# Patient Record
Sex: FEMALE | Race: BLACK OR AFRICAN AMERICAN | Employment: OTHER | URBAN - METROPOLITAN AREA
[De-identification: names, ages, dates, MRNs, and addresses within clinical notes are randomized per-mention and may not be internally consistent; named-entity substitution may affect disease eponyms.]

---

## 2018-12-25 ENCOUNTER — HOSPITAL ENCOUNTER (EMERGENCY)
Age: 63
Discharge: HOME | End: 2018-12-25
Attending: EMERGENCY MEDICINE
Payer: MEDICARE

## 2018-12-25 VITALS
BODY MASS INDEX: 33.34 KG/M2 | DIASTOLIC BLOOD PRESSURE: 89 MMHG | TEMPERATURE: 97.7 F | HEART RATE: 68 BPM | HEIGHT: 61 IN | WEIGHT: 176.59 LBS | SYSTOLIC BLOOD PRESSURE: 168 MMHG | RESPIRATION RATE: 16 BRPM | OXYGEN SATURATION: 96 %

## 2018-12-25 DIAGNOSIS — G89.29 CHRONIC PAIN OF LEFT KNEE: Primary | ICD-10-CM

## 2018-12-25 DIAGNOSIS — M25.562 CHRONIC PAIN OF LEFT KNEE: Primary | ICD-10-CM

## 2018-12-25 PROCEDURE — 99283 EMERGENCY DEPT VISIT LOW MDM: CPT

## 2018-12-25 PROCEDURE — 6370000000 HC RX 637 (ALT 250 FOR IP): Performed by: EMERGENCY MEDICINE

## 2018-12-25 RX ORDER — GLIPIZIDE 5 MG/1
5 TABLET ORAL
Status: ON HOLD | COMMUNITY
End: 2019-01-15 | Stop reason: ALTCHOICE

## 2018-12-25 RX ORDER — FLUTICASONE PROPIONATE 50 MCG
1 SPRAY, SUSPENSION (ML) NASAL DAILY PRN
COMMUNITY

## 2018-12-25 RX ORDER — TRIAMTERENE AND HYDROCHLOROTHIAZIDE 75; 50 MG/1; MG/1
0.5 TABLET ORAL DAILY
COMMUNITY
End: 2019-12-03

## 2018-12-25 RX ORDER — LOSARTAN POTASSIUM 50 MG/1
50 TABLET ORAL DAILY
COMMUNITY

## 2018-12-25 RX ORDER — IBUPROFEN 600 MG/1
600 TABLET ORAL ONCE
Status: COMPLETED | OUTPATIENT
Start: 2018-12-25 | End: 2018-12-25

## 2018-12-25 RX ORDER — IBUPROFEN 600 MG/1
600 TABLET ORAL EVERY 6 HOURS PRN
Qty: 20 TABLET | Refills: 0 | Status: ON HOLD | OUTPATIENT
Start: 2018-12-25 | End: 2019-01-16 | Stop reason: HOSPADM

## 2018-12-25 RX ADMIN — IBUPROFEN 600 MG: 600 TABLET ORAL at 17:53

## 2018-12-25 ASSESSMENT — PAIN DESCRIPTION - PAIN TYPE: TYPE: ACUTE PAIN

## 2018-12-25 ASSESSMENT — PAIN DESCRIPTION - LOCATION: LOCATION: KNEE

## 2018-12-25 ASSESSMENT — PAIN SCALES - GENERAL
PAINLEVEL_OUTOF10: 8

## 2018-12-25 ASSESSMENT — PAIN DESCRIPTION - ORIENTATION: ORIENTATION: LEFT

## 2018-12-29 ASSESSMENT — ENCOUNTER SYMPTOMS
COLOR CHANGE: 0
NAUSEA: 0
VOMITING: 0

## 2019-01-02 ENCOUNTER — OFFICE VISIT (OUTPATIENT)
Dept: ORTHOPEDIC SURGERY | Age: 64
End: 2019-01-02
Payer: MEDICARE

## 2019-01-02 VITALS
BODY MASS INDEX: 33.08 KG/M2 | WEIGHT: 175.2 LBS | HEIGHT: 61 IN | TEMPERATURE: 96.7 F | DIASTOLIC BLOOD PRESSURE: 96 MMHG | SYSTOLIC BLOOD PRESSURE: 181 MMHG | HEART RATE: 65 BPM

## 2019-01-02 DIAGNOSIS — M25.562 CHRONIC PAIN OF LEFT KNEE: Primary | ICD-10-CM

## 2019-01-02 DIAGNOSIS — G89.29 CHRONIC PAIN OF RIGHT KNEE: ICD-10-CM

## 2019-01-02 DIAGNOSIS — M17.0 PRIMARY OSTEOARTHRITIS OF BOTH KNEES: ICD-10-CM

## 2019-01-02 DIAGNOSIS — M25.561 CHRONIC PAIN OF RIGHT KNEE: ICD-10-CM

## 2019-01-02 DIAGNOSIS — G89.29 CHRONIC PAIN OF LEFT KNEE: Primary | ICD-10-CM

## 2019-01-02 PROCEDURE — 99204 OFFICE O/P NEW MOD 45 MIN: CPT | Performed by: PHYSICIAN ASSISTANT

## 2019-01-02 PROCEDURE — 20610 DRAIN/INJ JOINT/BURSA W/O US: CPT | Performed by: PHYSICIAN ASSISTANT

## 2019-01-04 ENCOUNTER — TELEPHONE (OUTPATIENT)
Dept: CARDIOLOGY CLINIC | Age: 64
End: 2019-01-04

## 2019-01-14 ENCOUNTER — APPOINTMENT (OUTPATIENT)
Dept: GENERAL RADIOLOGY | Age: 64
DRG: 069 | End: 2019-01-14
Payer: MEDICARE

## 2019-01-14 ENCOUNTER — APPOINTMENT (OUTPATIENT)
Dept: MRI IMAGING | Age: 64
DRG: 069 | End: 2019-01-14
Payer: MEDICARE

## 2019-01-14 ENCOUNTER — APPOINTMENT (OUTPATIENT)
Dept: CT IMAGING | Age: 64
DRG: 069 | End: 2019-01-14
Payer: MEDICARE

## 2019-01-14 ENCOUNTER — HOSPITAL ENCOUNTER (INPATIENT)
Age: 64
LOS: 2 days | Discharge: HOME | DRG: 069 | End: 2019-01-16
Attending: EMERGENCY MEDICINE | Admitting: INTERNAL MEDICINE
Payer: MEDICARE

## 2019-01-14 DIAGNOSIS — I10 HYPERTENSION, UNSPECIFIED TYPE: Primary | ICD-10-CM

## 2019-01-14 DIAGNOSIS — I63.9 CEREBROVASCULAR ACCIDENT (CVA), UNSPECIFIED MECHANISM (HCC): ICD-10-CM

## 2019-01-14 LAB
A/G RATIO: 1.4 (ref 1.1–2.2)
ALBUMIN SERPL-MCNC: 3.9 G/DL (ref 3.4–5)
ALP BLD-CCNC: 140 U/L (ref 40–129)
ALT SERPL-CCNC: 12 U/L (ref 10–40)
ANION GAP SERPL CALCULATED.3IONS-SCNC: 12 MMOL/L (ref 3–16)
AST SERPL-CCNC: 10 U/L (ref 15–37)
BASOPHILS ABSOLUTE: 0.1 K/UL (ref 0–0.2)
BASOPHILS RELATIVE PERCENT: 0.5 %
BILIRUB SERPL-MCNC: 0.6 MG/DL (ref 0–1)
BILIRUBIN URINE: NEGATIVE
BLOOD, URINE: NEGATIVE
BUN BLDV-MCNC: 23 MG/DL (ref 7–20)
CALCIUM SERPL-MCNC: 10 MG/DL (ref 8.3–10.6)
CHLORIDE BLD-SCNC: 106 MMOL/L (ref 99–110)
CLARITY: CLEAR
CO2: 26 MMOL/L (ref 21–32)
COLOR: YELLOW
CREAT SERPL-MCNC: 1 MG/DL (ref 0.6–1.2)
EOSINOPHILS ABSOLUTE: 0.1 K/UL (ref 0–0.6)
EOSINOPHILS RELATIVE PERCENT: 1.2 %
GFR AFRICAN AMERICAN: >60
GFR NON-AFRICAN AMERICAN: 56
GLOBULIN: 2.8 G/DL
GLUCOSE BLD-MCNC: 104 MG/DL (ref 70–99)
GLUCOSE BLD-MCNC: 114 MG/DL (ref 70–99)
GLUCOSE BLD-MCNC: 148 MG/DL (ref 70–99)
GLUCOSE BLD-MCNC: 90 MG/DL (ref 70–99)
GLUCOSE URINE: NEGATIVE MG/DL
HCT VFR BLD CALC: 38.9 % (ref 36–48)
HEMOGLOBIN: 13.3 G/DL (ref 12–16)
INR BLD: 1.03 (ref 0.86–1.14)
KETONES, URINE: NEGATIVE MG/DL
LEUKOCYTE ESTERASE, URINE: NEGATIVE
LYMPHOCYTES ABSOLUTE: 2.8 K/UL (ref 1–5.1)
LYMPHOCYTES RELATIVE PERCENT: 27.2 %
MCH RBC QN AUTO: 28.4 PG (ref 26–34)
MCHC RBC AUTO-ENTMCNC: 34.1 G/DL (ref 31–36)
MCV RBC AUTO: 83.2 FL (ref 80–100)
MICROSCOPIC EXAMINATION: NORMAL
MONOCYTES ABSOLUTE: 0.6 K/UL (ref 0–1.3)
MONOCYTES RELATIVE PERCENT: 5.6 %
NEUTROPHILS ABSOLUTE: 6.7 K/UL (ref 1.7–7.7)
NEUTROPHILS RELATIVE PERCENT: 65.5 %
NITRITE, URINE: NEGATIVE
PDW BLD-RTO: 14.1 % (ref 12.4–15.4)
PERFORMED ON: ABNORMAL
PERFORMED ON: ABNORMAL
PERFORMED ON: NORMAL
PH UA: 5.5
PLATELET # BLD: 262 K/UL (ref 135–450)
PMV BLD AUTO: 8.1 FL (ref 5–10.5)
POTASSIUM SERPL-SCNC: 3.8 MMOL/L (ref 3.5–5.1)
PRO-BNP: 327 PG/ML (ref 0–124)
PROTEIN UA: NEGATIVE MG/DL
PROTHROMBIN TIME: 11.7 SEC (ref 9.8–13)
RBC # BLD: 4.68 M/UL (ref 4–5.2)
SODIUM BLD-SCNC: 144 MMOL/L (ref 136–145)
SPECIFIC GRAVITY UA: 1.02
TOTAL PROTEIN: 6.7 G/DL (ref 6.4–8.2)
TROPONIN: <0.01 NG/ML
URINE TYPE: NORMAL
UROBILINOGEN, URINE: 0.2 E.U./DL
WBC # BLD: 10.3 K/UL (ref 4–11)

## 2019-01-14 PROCEDURE — 93010 ELECTROCARDIOGRAM REPORT: CPT | Performed by: INTERNAL MEDICINE

## 2019-01-14 PROCEDURE — G8988 SELF CARE GOAL STATUS: HCPCS

## 2019-01-14 PROCEDURE — G8979 MOBILITY GOAL STATUS: HCPCS | Performed by: PHYSICAL THERAPIST

## 2019-01-14 PROCEDURE — 97161 PT EVAL LOW COMPLEX 20 MIN: CPT | Performed by: PHYSICAL THERAPIST

## 2019-01-14 PROCEDURE — 71045 X-RAY EXAM CHEST 1 VIEW: CPT

## 2019-01-14 PROCEDURE — 70547 MR ANGIOGRAPHY NECK W/O DYE: CPT

## 2019-01-14 PROCEDURE — 70544 MR ANGIOGRAPHY HEAD W/O DYE: CPT

## 2019-01-14 PROCEDURE — 6360000002 HC RX W HCPCS: Performed by: INTERNAL MEDICINE

## 2019-01-14 PROCEDURE — 83880 ASSAY OF NATRIURETIC PEPTIDE: CPT

## 2019-01-14 PROCEDURE — 99285 EMERGENCY DEPT VISIT HI MDM: CPT

## 2019-01-14 PROCEDURE — 36415 COLL VENOUS BLD VENIPUNCTURE: CPT

## 2019-01-14 PROCEDURE — G8980 MOBILITY D/C STATUS: HCPCS | Performed by: PHYSICAL THERAPIST

## 2019-01-14 PROCEDURE — 2580000003 HC RX 258: Performed by: EMERGENCY MEDICINE

## 2019-01-14 PROCEDURE — 85025 COMPLETE CBC W/AUTO DIFF WBC: CPT

## 2019-01-14 PROCEDURE — G8987 SELF CARE CURRENT STATUS: HCPCS

## 2019-01-14 PROCEDURE — 96360 HYDRATION IV INFUSION INIT: CPT

## 2019-01-14 PROCEDURE — G8989 SELF CARE D/C STATUS: HCPCS

## 2019-01-14 PROCEDURE — 70551 MRI BRAIN STEM W/O DYE: CPT

## 2019-01-14 PROCEDURE — 6370000000 HC RX 637 (ALT 250 FOR IP): Performed by: INTERNAL MEDICINE

## 2019-01-14 PROCEDURE — 80053 COMPREHEN METABOLIC PANEL: CPT

## 2019-01-14 PROCEDURE — 85610 PROTHROMBIN TIME: CPT

## 2019-01-14 PROCEDURE — 70450 CT HEAD/BRAIN W/O DYE: CPT

## 2019-01-14 PROCEDURE — 6370000000 HC RX 637 (ALT 250 FOR IP): Performed by: EMERGENCY MEDICINE

## 2019-01-14 PROCEDURE — G8978 MOBILITY CURRENT STATUS: HCPCS | Performed by: PHYSICAL THERAPIST

## 2019-01-14 PROCEDURE — 83036 HEMOGLOBIN GLYCOSYLATED A1C: CPT

## 2019-01-14 PROCEDURE — 97165 OT EVAL LOW COMPLEX 30 MIN: CPT

## 2019-01-14 PROCEDURE — 2580000003 HC RX 258: Performed by: INTERNAL MEDICINE

## 2019-01-14 PROCEDURE — 81003 URINALYSIS AUTO W/O SCOPE: CPT

## 2019-01-14 PROCEDURE — 93005 ELECTROCARDIOGRAM TRACING: CPT | Performed by: EMERGENCY MEDICINE

## 2019-01-14 PROCEDURE — 84484 ASSAY OF TROPONIN QUANT: CPT

## 2019-01-14 PROCEDURE — 2060000000 HC ICU INTERMEDIATE R&B

## 2019-01-14 RX ORDER — NICOTINE POLACRILEX 4 MG
15 LOZENGE BUCCAL PRN
Status: DISCONTINUED | OUTPATIENT
Start: 2019-01-14 | End: 2019-01-16 | Stop reason: HOSPADM

## 2019-01-14 RX ORDER — SODIUM CHLORIDE 0.9 % (FLUSH) 0.9 %
10 SYRINGE (ML) INJECTION EVERY 12 HOURS SCHEDULED
Status: DISCONTINUED | OUTPATIENT
Start: 2019-01-14 | End: 2019-01-16 | Stop reason: HOSPADM

## 2019-01-14 RX ORDER — ATORVASTATIN CALCIUM 40 MG/1
40 TABLET, FILM COATED ORAL DAILY
Status: DISCONTINUED | OUTPATIENT
Start: 2019-01-14 | End: 2019-01-16 | Stop reason: HOSPADM

## 2019-01-14 RX ORDER — INSULIN GLARGINE 100 [IU]/ML
10 INJECTION, SOLUTION SUBCUTANEOUS NIGHTLY
Status: DISCONTINUED | OUTPATIENT
Start: 2019-01-14 | End: 2019-01-16 | Stop reason: HOSPADM

## 2019-01-14 RX ORDER — ASPIRIN 81 MG/1
81 TABLET, CHEWABLE ORAL DAILY
Status: DISCONTINUED | OUTPATIENT
Start: 2019-01-14 | End: 2019-01-14

## 2019-01-14 RX ORDER — 0.9 % SODIUM CHLORIDE 0.9 %
1000 INTRAVENOUS SOLUTION INTRAVENOUS ONCE
Status: COMPLETED | OUTPATIENT
Start: 2019-01-14 | End: 2019-01-14

## 2019-01-14 RX ORDER — DEXTROSE MONOHYDRATE 25 G/50ML
12.5 INJECTION, SOLUTION INTRAVENOUS PRN
Status: DISCONTINUED | OUTPATIENT
Start: 2019-01-14 | End: 2019-01-16 | Stop reason: HOSPADM

## 2019-01-14 RX ORDER — DOCUSATE SODIUM 100 MG/1
100 CAPSULE, LIQUID FILLED ORAL 2 TIMES DAILY PRN
COMMUNITY
End: 2020-12-21 | Stop reason: ALTCHOICE

## 2019-01-14 RX ORDER — MECLIZINE HCL 12.5 MG/1
25 TABLET ORAL EVERY 6 HOURS PRN
Status: DISCONTINUED | OUTPATIENT
Start: 2019-01-14 | End: 2019-01-16 | Stop reason: HOSPADM

## 2019-01-14 RX ORDER — ASPIRIN 81 MG/1
81 TABLET, CHEWABLE ORAL DAILY
Status: DISCONTINUED | OUTPATIENT
Start: 2019-01-15 | End: 2019-01-16 | Stop reason: HOSPADM

## 2019-01-14 RX ORDER — NITROGLYCERIN 0.4 MG/1
0.4 TABLET SUBLINGUAL EVERY 5 MIN PRN
Status: DISCONTINUED | OUTPATIENT
Start: 2019-01-14 | End: 2019-01-16 | Stop reason: HOSPADM

## 2019-01-14 RX ORDER — ONDANSETRON 2 MG/ML
4 INJECTION INTRAMUSCULAR; INTRAVENOUS EVERY 6 HOURS PRN
Status: DISCONTINUED | OUTPATIENT
Start: 2019-01-14 | End: 2019-01-16 | Stop reason: HOSPADM

## 2019-01-14 RX ORDER — SODIUM CHLORIDE 0.9 % (FLUSH) 0.9 %
10 SYRINGE (ML) INJECTION PRN
Status: DISCONTINUED | OUTPATIENT
Start: 2019-01-14 | End: 2019-01-16 | Stop reason: HOSPADM

## 2019-01-14 RX ORDER — FLUTICASONE PROPIONATE 50 MCG
1 SPRAY, SUSPENSION (ML) NASAL DAILY
Status: DISCONTINUED | OUTPATIENT
Start: 2019-01-14 | End: 2019-01-16 | Stop reason: HOSPADM

## 2019-01-14 RX ORDER — DIAPER,BRIEF,INFANT-TODD,DISP
EACH MISCELLANEOUS 2 TIMES DAILY
COMMUNITY

## 2019-01-14 RX ORDER — LOSARTAN POTASSIUM 25 MG/1
25 TABLET ORAL DAILY
Status: DISCONTINUED | OUTPATIENT
Start: 2019-01-14 | End: 2019-01-14

## 2019-01-14 RX ORDER — ISOSORBIDE MONONITRATE 60 MG/1
60 TABLET, EXTENDED RELEASE ORAL DAILY
Status: DISCONTINUED | OUTPATIENT
Start: 2019-01-15 | End: 2019-01-16 | Stop reason: HOSPADM

## 2019-01-14 RX ORDER — DEXTROSE MONOHYDRATE 50 MG/ML
100 INJECTION, SOLUTION INTRAVENOUS PRN
Status: DISCONTINUED | OUTPATIENT
Start: 2019-01-14 | End: 2019-01-16 | Stop reason: HOSPADM

## 2019-01-14 RX ORDER — ASPIRIN 81 MG/1
324 TABLET, CHEWABLE ORAL ONCE
Status: COMPLETED | OUTPATIENT
Start: 2019-01-14 | End: 2019-01-14

## 2019-01-14 RX ADMIN — ASPIRIN 81 MG 324 MG: 81 TABLET ORAL at 08:13

## 2019-01-14 RX ADMIN — Medication 10 ML: at 22:11

## 2019-01-14 RX ADMIN — INSULIN GLARGINE 10 UNITS: 100 INJECTION, SOLUTION SUBCUTANEOUS at 22:07

## 2019-01-14 RX ADMIN — ATORVASTATIN CALCIUM 40 MG: 40 TABLET, FILM COATED ORAL at 15:38

## 2019-01-14 RX ADMIN — FLUTICASONE PROPIONATE 1 SPRAY: 50 SPRAY, METERED NASAL at 22:04

## 2019-01-14 RX ADMIN — ENOXAPARIN SODIUM 40 MG: 40 INJECTION SUBCUTANEOUS at 15:38

## 2019-01-14 RX ADMIN — SODIUM CHLORIDE 1000 ML: 9 INJECTION, SOLUTION INTRAVENOUS at 07:34

## 2019-01-14 RX ADMIN — Medication 10 ML: at 15:38

## 2019-01-14 ASSESSMENT — PAIN SCALES - GENERAL
PAINLEVEL_OUTOF10: 0

## 2019-01-15 PROBLEM — I63.9 CEREBROVASCULAR ACCIDENT (CVA) (HCC): Status: ACTIVE | Noted: 2019-01-15

## 2019-01-15 LAB
CHOLESTEROL, TOTAL: 111 MG/DL (ref 0–199)
ESTIMATED AVERAGE GLUCOSE: 142.7 MG/DL
GLUCOSE BLD-MCNC: 121 MG/DL (ref 70–99)
GLUCOSE BLD-MCNC: 126 MG/DL (ref 70–99)
GLUCOSE BLD-MCNC: 146 MG/DL (ref 70–99)
GLUCOSE BLD-MCNC: 152 MG/DL (ref 70–99)
HBA1C MFR BLD: 6.6 %
HDLC SERPL-MCNC: 28 MG/DL (ref 40–60)
LDL CHOLESTEROL CALCULATED: 63 MG/DL
LV EF: 63 %
LVEF MODALITY: NORMAL
PERFORMED ON: ABNORMAL
TRIGL SERPL-MCNC: 101 MG/DL (ref 0–150)
VLDLC SERPL CALC-MCNC: 20 MG/DL

## 2019-01-15 PROCEDURE — 36415 COLL VENOUS BLD VENIPUNCTURE: CPT

## 2019-01-15 PROCEDURE — 2580000003 HC RX 258: Performed by: INTERNAL MEDICINE

## 2019-01-15 PROCEDURE — 2060000000 HC ICU INTERMEDIATE R&B

## 2019-01-15 PROCEDURE — 6370000000 HC RX 637 (ALT 250 FOR IP): Performed by: INTERNAL MEDICINE

## 2019-01-15 PROCEDURE — 93306 TTE W/DOPPLER COMPLETE: CPT

## 2019-01-15 PROCEDURE — 6360000002 HC RX W HCPCS: Performed by: INTERNAL MEDICINE

## 2019-01-15 PROCEDURE — 94760 N-INVAS EAR/PLS OXIMETRY 1: CPT

## 2019-01-15 PROCEDURE — 80061 LIPID PANEL: CPT

## 2019-01-15 RX ORDER — DOCUSATE SODIUM 100 MG/1
100 CAPSULE, LIQUID FILLED ORAL DAILY
Status: DISCONTINUED | OUTPATIENT
Start: 2019-01-15 | End: 2019-01-16 | Stop reason: HOSPADM

## 2019-01-15 RX ORDER — DOCUSATE SODIUM 100 MG/1
100 CAPSULE, LIQUID FILLED ORAL 2 TIMES DAILY PRN
Status: DISCONTINUED | OUTPATIENT
Start: 2019-01-15 | End: 2019-01-16 | Stop reason: HOSPADM

## 2019-01-15 RX ORDER — DIAPER,BRIEF,INFANT-TODD,DISP
EACH MISCELLANEOUS 2 TIMES DAILY
Status: DISCONTINUED | OUTPATIENT
Start: 2019-01-15 | End: 2019-01-16 | Stop reason: HOSPADM

## 2019-01-15 RX ORDER — AMLODIPINE BESYLATE 5 MG/1
5 TABLET ORAL DAILY
Status: DISCONTINUED | OUTPATIENT
Start: 2019-01-15 | End: 2019-01-16

## 2019-01-15 RX ORDER — LOSARTAN POTASSIUM 50 MG/1
50 TABLET ORAL DAILY
Status: DISCONTINUED | OUTPATIENT
Start: 2019-01-15 | End: 2019-01-16 | Stop reason: HOSPADM

## 2019-01-15 RX ADMIN — ATORVASTATIN CALCIUM 40 MG: 40 TABLET, FILM COATED ORAL at 08:13

## 2019-01-15 RX ADMIN — DOCUSATE SODIUM 100 MG: 100 CAPSULE, LIQUID FILLED ORAL at 13:14

## 2019-01-15 RX ADMIN — VITAMIN D, TAB 1000IU (100/BT) 1000 UNITS: 25 TAB at 08:13

## 2019-01-15 RX ADMIN — HYDROCORTISONE: 1 CREAM TOPICAL at 21:40

## 2019-01-15 RX ADMIN — Medication 10 ML: at 21:42

## 2019-01-15 RX ADMIN — INSULIN LISPRO 1 UNITS: 100 INJECTION, SOLUTION INTRAVENOUS; SUBCUTANEOUS at 21:40

## 2019-01-15 RX ADMIN — INSULIN GLARGINE 10 UNITS: 100 INJECTION, SOLUTION SUBCUTANEOUS at 21:40

## 2019-01-15 RX ADMIN — LOSARTAN POTASSIUM 50 MG: 50 TABLET ORAL at 15:34

## 2019-01-15 RX ADMIN — MECLIZINE 25 MG: 12.5 TABLET ORAL at 07:27

## 2019-01-15 RX ADMIN — AMLODIPINE BESYLATE 5 MG: 5 TABLET ORAL at 08:13

## 2019-01-15 RX ADMIN — Medication 10 ML: at 08:14

## 2019-01-15 RX ADMIN — INSULIN LISPRO 1 UNITS: 100 INJECTION, SOLUTION INTRAVENOUS; SUBCUTANEOUS at 12:49

## 2019-01-15 RX ADMIN — ENOXAPARIN SODIUM 40 MG: 40 INJECTION SUBCUTANEOUS at 08:13

## 2019-01-15 RX ADMIN — ASPIRIN 81 MG 81 MG: 81 TABLET ORAL at 08:12

## 2019-01-15 RX ADMIN — ISOSORBIDE MONONITRATE 60 MG: 60 TABLET, EXTENDED RELEASE ORAL at 08:13

## 2019-01-15 RX ADMIN — ONDANSETRON 4 MG: 2 INJECTION INTRAMUSCULAR; INTRAVENOUS at 07:27

## 2019-01-15 ASSESSMENT — PAIN SCALES - GENERAL
PAINLEVEL_OUTOF10: 0

## 2019-01-16 VITALS
HEIGHT: 61 IN | BODY MASS INDEX: 32.28 KG/M2 | SYSTOLIC BLOOD PRESSURE: 160 MMHG | WEIGHT: 171 LBS | RESPIRATION RATE: 16 BRPM | TEMPERATURE: 98.3 F | DIASTOLIC BLOOD PRESSURE: 72 MMHG | HEART RATE: 60 BPM | OXYGEN SATURATION: 95 %

## 2019-01-16 LAB
ANION GAP SERPL CALCULATED.3IONS-SCNC: 13 MMOL/L (ref 3–16)
BUN BLDV-MCNC: 19 MG/DL (ref 7–20)
CALCIUM SERPL-MCNC: 9.7 MG/DL (ref 8.3–10.6)
CHLORIDE BLD-SCNC: 106 MMOL/L (ref 99–110)
CO2: 27 MMOL/L (ref 21–32)
CREAT SERPL-MCNC: 0.8 MG/DL (ref 0.6–1.2)
EKG ATRIAL RATE: 45 BPM
EKG DIAGNOSIS: NORMAL
EKG P AXIS: 24 DEGREES
EKG P-R INTERVAL: 216 MS
EKG Q-T INTERVAL: 468 MS
EKG QRS DURATION: 88 MS
EKG QTC CALCULATION (BAZETT): 404 MS
EKG R AXIS: -4 DEGREES
EKG T AXIS: 16 DEGREES
EKG VENTRICULAR RATE: 45 BPM
GFR AFRICAN AMERICAN: >60
GFR NON-AFRICAN AMERICAN: >60
GLUCOSE BLD-MCNC: 124 MG/DL (ref 70–99)
GLUCOSE BLD-MCNC: 126 MG/DL (ref 70–99)
GLUCOSE BLD-MCNC: 138 MG/DL (ref 70–99)
PERFORMED ON: ABNORMAL
PERFORMED ON: ABNORMAL
POTASSIUM SERPL-SCNC: 3.6 MMOL/L (ref 3.5–5.1)
SODIUM BLD-SCNC: 146 MMOL/L (ref 136–145)

## 2019-01-16 PROCEDURE — 6360000002 HC RX W HCPCS: Performed by: INTERNAL MEDICINE

## 2019-01-16 PROCEDURE — 80048 BASIC METABOLIC PNL TOTAL CA: CPT

## 2019-01-16 PROCEDURE — 94760 N-INVAS EAR/PLS OXIMETRY 1: CPT

## 2019-01-16 PROCEDURE — 6370000000 HC RX 637 (ALT 250 FOR IP): Performed by: INTERNAL MEDICINE

## 2019-01-16 PROCEDURE — 2580000003 HC RX 258: Performed by: INTERNAL MEDICINE

## 2019-01-16 PROCEDURE — 36415 COLL VENOUS BLD VENIPUNCTURE: CPT

## 2019-01-16 RX ORDER — AMLODIPINE BESYLATE 5 MG/1
5 TABLET ORAL ONCE
Status: COMPLETED | OUTPATIENT
Start: 2019-01-16 | End: 2019-01-16

## 2019-01-16 RX ORDER — AMLODIPINE BESYLATE 10 MG/1
10 TABLET ORAL DAILY
Status: DISCONTINUED | OUTPATIENT
Start: 2019-01-17 | End: 2019-01-16 | Stop reason: HOSPADM

## 2019-01-16 RX ORDER — AMLODIPINE BESYLATE 10 MG/1
10 TABLET ORAL DAILY
Qty: 30 TABLET | Refills: 0 | Status: SHIPPED | OUTPATIENT
Start: 2019-01-17 | End: 2019-05-13 | Stop reason: ALTCHOICE

## 2019-01-16 RX ORDER — MECLIZINE HYDROCHLORIDE 25 MG/1
25 TABLET ORAL 3 TIMES DAILY PRN
Qty: 30 TABLET | Refills: 0 | Status: SHIPPED | OUTPATIENT
Start: 2019-01-16

## 2019-01-16 RX ADMIN — LOSARTAN POTASSIUM 50 MG: 50 TABLET ORAL at 07:35

## 2019-01-16 RX ADMIN — AMLODIPINE BESYLATE 5 MG: 5 TABLET ORAL at 12:11

## 2019-01-16 RX ADMIN — ENOXAPARIN SODIUM 40 MG: 40 INJECTION SUBCUTANEOUS at 07:36

## 2019-01-16 RX ADMIN — Medication 10 ML: at 07:33

## 2019-01-16 RX ADMIN — VITAMIN D, TAB 1000IU (100/BT) 1000 UNITS: 25 TAB at 07:35

## 2019-01-16 RX ADMIN — ONDANSETRON 4 MG: 2 INJECTION INTRAMUSCULAR; INTRAVENOUS at 07:33

## 2019-01-16 RX ADMIN — MECLIZINE 25 MG: 12.5 TABLET ORAL at 07:35

## 2019-01-16 RX ADMIN — HYDROCORTISONE: 1 CREAM TOPICAL at 10:59

## 2019-01-16 RX ADMIN — ATORVASTATIN CALCIUM 40 MG: 40 TABLET, FILM COATED ORAL at 07:35

## 2019-01-16 RX ADMIN — ASPIRIN 81 MG 81 MG: 81 TABLET ORAL at 07:36

## 2019-01-16 RX ADMIN — MECLIZINE 25 MG: 12.5 TABLET ORAL at 01:26

## 2019-01-16 RX ADMIN — ISOSORBIDE MONONITRATE 60 MG: 60 TABLET, EXTENDED RELEASE ORAL at 07:35

## 2019-01-16 RX ADMIN — AMLODIPINE BESYLATE 5 MG: 5 TABLET ORAL at 07:36

## 2019-01-16 ASSESSMENT — PAIN SCALES - GENERAL
PAINLEVEL_OUTOF10: 0

## 2019-02-13 ENCOUNTER — OFFICE VISIT (OUTPATIENT)
Dept: ORTHOPEDIC SURGERY | Age: 64
End: 2019-02-13
Payer: MEDICARE

## 2019-02-13 VITALS
DIASTOLIC BLOOD PRESSURE: 84 MMHG | SYSTOLIC BLOOD PRESSURE: 149 MMHG | TEMPERATURE: 97 F | HEART RATE: 80 BPM | BODY MASS INDEX: 33.04 KG/M2 | HEIGHT: 61 IN | WEIGHT: 175 LBS

## 2019-02-13 DIAGNOSIS — M17.0 PRIMARY OSTEOARTHRITIS OF BOTH KNEES: Primary | ICD-10-CM

## 2019-02-13 PROCEDURE — 99212 OFFICE O/P EST SF 10 MIN: CPT | Performed by: PHYSICIAN ASSISTANT

## 2019-03-06 ENCOUNTER — OFFICE VISIT (OUTPATIENT)
Dept: CARDIOLOGY CLINIC | Age: 64
End: 2019-03-06
Payer: MEDICARE

## 2019-03-06 VITALS
HEART RATE: 70 BPM | SYSTOLIC BLOOD PRESSURE: 128 MMHG | WEIGHT: 174 LBS | BODY MASS INDEX: 32.85 KG/M2 | HEIGHT: 61 IN | DIASTOLIC BLOOD PRESSURE: 64 MMHG | OXYGEN SATURATION: 97 %

## 2019-03-06 DIAGNOSIS — I25.10 CORONARY ARTERY DISEASE INVOLVING NATIVE CORONARY ARTERY OF NATIVE HEART, ANGINA PRESENCE UNSPECIFIED: Primary | ICD-10-CM

## 2019-03-06 DIAGNOSIS — I63.9 CEREBROVASCULAR ACCIDENT (CVA), UNSPECIFIED MECHANISM (HCC): ICD-10-CM

## 2019-03-06 DIAGNOSIS — E78.5 HYPERLIPIDEMIA, UNSPECIFIED HYPERLIPIDEMIA TYPE: ICD-10-CM

## 2019-03-06 DIAGNOSIS — I10 ESSENTIAL HYPERTENSION: ICD-10-CM

## 2019-03-06 PROCEDURE — 99205 OFFICE O/P NEW HI 60 MIN: CPT | Performed by: INTERNAL MEDICINE

## 2019-03-06 PROCEDURE — 93000 ELECTROCARDIOGRAM COMPLETE: CPT | Performed by: INTERNAL MEDICINE

## 2019-03-06 RX ORDER — MECLIZINE HYDROCHLORIDE 25 MG/1
25 TABLET ORAL
COMMUNITY
Start: 2019-01-08 | End: 2019-03-26 | Stop reason: SDUPTHER

## 2019-03-14 ENCOUNTER — OFFICE VISIT (OUTPATIENT)
Dept: ORTHOPEDIC SURGERY | Age: 64
End: 2019-03-14
Payer: MEDICARE

## 2019-03-14 VITALS
HEART RATE: 78 BPM | DIASTOLIC BLOOD PRESSURE: 85 MMHG | WEIGHT: 172 LBS | BODY MASS INDEX: 32.47 KG/M2 | HEIGHT: 61 IN | SYSTOLIC BLOOD PRESSURE: 134 MMHG

## 2019-03-14 DIAGNOSIS — G89.29 CHRONIC LOW BACK PAIN WITHOUT SCIATICA, UNSPECIFIED BACK PAIN LATERALITY: ICD-10-CM

## 2019-03-14 DIAGNOSIS — M54.50 CHRONIC LOW BACK PAIN WITHOUT SCIATICA, UNSPECIFIED BACK PAIN LATERALITY: ICD-10-CM

## 2019-03-14 DIAGNOSIS — M47.816 LUMBAR FACET ARTHROPATHY: Primary | ICD-10-CM

## 2019-03-14 PROCEDURE — 99214 OFFICE O/P EST MOD 30 MIN: CPT | Performed by: NURSE PRACTITIONER

## 2019-03-14 RX ORDER — ACETAMINOPHEN 500 MG
500 TABLET ORAL EVERY 6 HOURS PRN
COMMUNITY

## 2019-03-19 ENCOUNTER — HOSPITAL ENCOUNTER (OUTPATIENT)
Dept: NON INVASIVE DIAGNOSTICS | Age: 64
Discharge: HOME | End: 2019-03-19
Payer: MEDICARE

## 2019-03-19 DIAGNOSIS — I10 ESSENTIAL HYPERTENSION: ICD-10-CM

## 2019-03-19 DIAGNOSIS — I25.10 CORONARY ARTERY DISEASE INVOLVING NATIVE CORONARY ARTERY OF NATIVE HEART, ANGINA PRESENCE UNSPECIFIED: ICD-10-CM

## 2019-03-19 LAB
LV EF: 76 %
LVEF MODALITY: NORMAL

## 2019-03-19 PROCEDURE — 78452 HT MUSCLE IMAGE SPECT MULT: CPT

## 2019-03-19 PROCEDURE — A9502 TC99M TETROFOSMIN: HCPCS | Performed by: INTERNAL MEDICINE

## 2019-03-19 PROCEDURE — 93017 CV STRESS TEST TRACING ONLY: CPT

## 2019-03-19 PROCEDURE — 3430000000 HC RX DIAGNOSTIC RADIOPHARMACEUTICAL: Performed by: INTERNAL MEDICINE

## 2019-03-19 PROCEDURE — 6360000002 HC RX W HCPCS: Performed by: INTERNAL MEDICINE

## 2019-03-19 RX ADMIN — TETROFOSMIN 30 MILLICURIE: 0.23 INJECTION, POWDER, LYOPHILIZED, FOR SOLUTION INTRAVENOUS at 08:58

## 2019-03-19 RX ADMIN — REGADENOSON 0.4 MG: 0.08 INJECTION, SOLUTION INTRAVENOUS at 09:08

## 2019-03-19 RX ADMIN — TETROFOSMIN 10 MILLICURIE: 0.23 INJECTION, POWDER, LYOPHILIZED, FOR SOLUTION INTRAVENOUS at 07:34

## 2019-03-26 ENCOUNTER — OFFICE VISIT (OUTPATIENT)
Dept: ORTHOPEDIC SURGERY | Age: 64
End: 2019-03-26
Payer: MEDICARE

## 2019-03-26 ENCOUNTER — OFFICE VISIT (OUTPATIENT)
Dept: CARDIOLOGY CLINIC | Age: 64
End: 2019-03-26
Payer: MEDICARE

## 2019-03-26 VITALS
TEMPERATURE: 97.9 F | DIASTOLIC BLOOD PRESSURE: 85 MMHG | HEART RATE: 80 BPM | WEIGHT: 175 LBS | SYSTOLIC BLOOD PRESSURE: 147 MMHG | HEIGHT: 61 IN | BODY MASS INDEX: 33.04 KG/M2

## 2019-03-26 VITALS
BODY MASS INDEX: 32.85 KG/M2 | OXYGEN SATURATION: 96 % | DIASTOLIC BLOOD PRESSURE: 72 MMHG | HEIGHT: 61 IN | SYSTOLIC BLOOD PRESSURE: 134 MMHG | HEART RATE: 76 BPM | WEIGHT: 174 LBS

## 2019-03-26 DIAGNOSIS — I25.10 CORONARY ARTERY DISEASE INVOLVING NATIVE CORONARY ARTERY OF NATIVE HEART, ANGINA PRESENCE UNSPECIFIED: ICD-10-CM

## 2019-03-26 DIAGNOSIS — E78.5 HYPERLIPIDEMIA, UNSPECIFIED HYPERLIPIDEMIA TYPE: ICD-10-CM

## 2019-03-26 DIAGNOSIS — I10 ESSENTIAL HYPERTENSION: ICD-10-CM

## 2019-03-26 DIAGNOSIS — I63.9 CEREBROVASCULAR ACCIDENT (CVA), UNSPECIFIED MECHANISM (HCC): Primary | ICD-10-CM

## 2019-03-26 DIAGNOSIS — M17.0 PRIMARY OSTEOARTHRITIS OF BOTH KNEES: Primary | ICD-10-CM

## 2019-03-26 PROCEDURE — 99212 OFFICE O/P EST SF 10 MIN: CPT | Performed by: PHYSICIAN ASSISTANT

## 2019-03-26 PROCEDURE — 99215 OFFICE O/P EST HI 40 MIN: CPT | Performed by: INTERNAL MEDICINE

## 2019-03-27 ENCOUNTER — TELEPHONE (OUTPATIENT)
Dept: CARDIOLOGY CLINIC | Age: 64
End: 2019-03-27

## 2019-03-27 DIAGNOSIS — I25.10 CORONARY ARTERY DISEASE INVOLVING NATIVE CORONARY ARTERY OF NATIVE HEART, ANGINA PRESENCE UNSPECIFIED: ICD-10-CM

## 2019-03-27 LAB
ANION GAP SERPL CALCULATED.3IONS-SCNC: 11 MMOL/L (ref 3–16)
BUN BLDV-MCNC: 25 MG/DL (ref 7–20)
CALCIUM SERPL-MCNC: 10.1 MG/DL (ref 8.3–10.6)
CHLORIDE BLD-SCNC: 104 MMOL/L (ref 99–110)
CO2: 28 MMOL/L (ref 21–32)
CREAT SERPL-MCNC: 1.1 MG/DL (ref 0.6–1.2)
GFR AFRICAN AMERICAN: >60
GFR NON-AFRICAN AMERICAN: 50
GLUCOSE BLD-MCNC: 137 MG/DL (ref 70–99)
HCT VFR BLD CALC: 36.9 % (ref 36–48)
HEMOGLOBIN: 12.4 G/DL (ref 12–16)
INR BLD: 1.07 (ref 0.86–1.14)
MCH RBC QN AUTO: 28.3 PG (ref 26–34)
MCHC RBC AUTO-ENTMCNC: 33.6 G/DL (ref 31–36)
MCV RBC AUTO: 84 FL (ref 80–100)
PDW BLD-RTO: 14.1 % (ref 12.4–15.4)
PLATELET # BLD: 267 K/UL (ref 135–450)
PMV BLD AUTO: 8.2 FL (ref 5–10.5)
POTASSIUM SERPL-SCNC: 3.9 MMOL/L (ref 3.5–5.1)
PROTHROMBIN TIME: 12.2 SEC (ref 9.8–13)
RBC # BLD: 4.39 M/UL (ref 4–5.2)
SODIUM BLD-SCNC: 143 MMOL/L (ref 136–145)
WBC # BLD: 9.1 K/UL (ref 4–11)

## 2019-04-01 ENCOUNTER — TELEPHONE (OUTPATIENT)
Dept: CARDIOLOGY CLINIC | Age: 64
End: 2019-04-01

## 2019-04-01 NOTE — TELEPHONE ENCOUNTER
Due to emergent cath needing to be done ASAP in the morning, I called patient and asked her to arrive 1 hour later for procedure now at 9:00 and she was agreeable.   Kezia Kearney MSN, RN Calvin Benavidez

## 2019-04-02 ENCOUNTER — HOSPITAL ENCOUNTER (OUTPATIENT)
Dept: CARDIAC CATH/INVASIVE PROCEDURES | Age: 64
Discharge: HOME | End: 2019-04-03
Attending: INTERNAL MEDICINE | Admitting: INTERNAL MEDICINE
Payer: MEDICARE

## 2019-04-02 PROBLEM — I25.10 CAD S/P PERCUTANEOUS CORONARY ANGIOPLASTY: Status: ACTIVE | Noted: 2019-04-02

## 2019-04-02 PROBLEM — I25.10 CAD IN NATIVE ARTERY: Status: ACTIVE | Noted: 2019-04-02

## 2019-04-02 PROBLEM — Z98.61 CAD S/P PERCUTANEOUS CORONARY ANGIOPLASTY: Status: ACTIVE | Noted: 2019-04-02

## 2019-04-02 LAB
GLUCOSE BLD-MCNC: 111 MG/DL (ref 70–99)
GLUCOSE BLD-MCNC: 134 MG/DL (ref 70–99)
GLUCOSE BLD-MCNC: 138 MG/DL (ref 70–99)
PERFORMED ON: ABNORMAL
POC ACT LR: 250 SEC
POC ACT LR: 326 SEC

## 2019-04-02 PROCEDURE — 2709999900 HC NON-CHARGEABLE SUPPLY

## 2019-04-02 PROCEDURE — 6370000000 HC RX 637 (ALT 250 FOR IP): Performed by: INTERNAL MEDICINE

## 2019-04-02 PROCEDURE — 85347 COAGULATION TIME ACTIVATED: CPT

## 2019-04-02 PROCEDURE — C1769 GUIDE WIRE: HCPCS

## 2019-04-02 PROCEDURE — 6360000004 HC RX CONTRAST MEDICATION: Performed by: INTERNAL MEDICINE

## 2019-04-02 PROCEDURE — C1725 CATH, TRANSLUMIN NON-LASER: HCPCS

## 2019-04-02 PROCEDURE — 99153 MOD SED SAME PHYS/QHP EA: CPT

## 2019-04-02 PROCEDURE — 94760 N-INVAS EAR/PLS OXIMETRY 1: CPT

## 2019-04-02 PROCEDURE — 93010 ELECTROCARDIOGRAM REPORT: CPT | Performed by: INTERNAL MEDICINE

## 2019-04-02 PROCEDURE — 92928 PRQ TCAT PLMT NTRAC ST 1 LES: CPT | Performed by: INTERNAL MEDICINE

## 2019-04-02 PROCEDURE — C1874 STENT, COATED/COV W/DEL SYS: HCPCS

## 2019-04-02 PROCEDURE — 92928 PRQ TCAT PLMT NTRAC ST 1 LES: CPT

## 2019-04-02 PROCEDURE — 93458 L HRT ARTERY/VENTRICLE ANGIO: CPT | Performed by: INTERNAL MEDICINE

## 2019-04-02 PROCEDURE — C1894 INTRO/SHEATH, NON-LASER: HCPCS

## 2019-04-02 PROCEDURE — 93005 ELECTROCARDIOGRAM TRACING: CPT | Performed by: INTERNAL MEDICINE

## 2019-04-02 PROCEDURE — 6370000000 HC RX 637 (ALT 250 FOR IP)

## 2019-04-02 PROCEDURE — 2500000003 HC RX 250 WO HCPCS

## 2019-04-02 PROCEDURE — C1887 CATHETER, GUIDING: HCPCS

## 2019-04-02 PROCEDURE — 93458 L HRT ARTERY/VENTRICLE ANGIO: CPT

## 2019-04-02 PROCEDURE — 2060000000 HC ICU INTERMEDIATE R&B

## 2019-04-02 PROCEDURE — 6360000002 HC RX W HCPCS

## 2019-04-02 PROCEDURE — 99152 MOD SED SAME PHYS/QHP 5/>YRS: CPT

## 2019-04-02 PROCEDURE — 2580000003 HC RX 258: Performed by: INTERNAL MEDICINE

## 2019-04-02 RX ORDER — SODIUM CHLORIDE 0.9 % (FLUSH) 0.9 %
10 SYRINGE (ML) INJECTION PRN
Status: DISCONTINUED | OUTPATIENT
Start: 2019-04-02 | End: 2019-04-02 | Stop reason: SDUPTHER

## 2019-04-02 RX ORDER — DEXTROSE MONOHYDRATE 25 G/50ML
12.5 INJECTION, SOLUTION INTRAVENOUS PRN
Status: DISCONTINUED | OUTPATIENT
Start: 2019-04-02 | End: 2019-04-03 | Stop reason: HOSPADM

## 2019-04-02 RX ORDER — DOCUSATE SODIUM 100 MG/1
100 CAPSULE, LIQUID FILLED ORAL 2 TIMES DAILY PRN
Status: DISCONTINUED | OUTPATIENT
Start: 2019-04-02 | End: 2019-04-03 | Stop reason: HOSPADM

## 2019-04-02 RX ORDER — HYDROXYZINE HYDROCHLORIDE 10 MG/1
50 TABLET, FILM COATED ORAL PRN
Status: DISCONTINUED | OUTPATIENT
Start: 2019-04-02 | End: 2019-04-03 | Stop reason: HOSPADM

## 2019-04-02 RX ORDER — ATORVASTATIN CALCIUM 40 MG/1
40 TABLET, FILM COATED ORAL DAILY
Status: DISCONTINUED | OUTPATIENT
Start: 2019-04-03 | End: 2019-04-03 | Stop reason: HOSPADM

## 2019-04-02 RX ORDER — DIAPER,BRIEF,INFANT-TODD,DISP
EACH MISCELLANEOUS 2 TIMES DAILY
Status: DISCONTINUED | OUTPATIENT
Start: 2019-04-02 | End: 2019-04-03 | Stop reason: HOSPADM

## 2019-04-02 RX ORDER — SODIUM CHLORIDE 0.9 % (FLUSH) 0.9 %
10 SYRINGE (ML) INJECTION EVERY 12 HOURS SCHEDULED
Status: DISCONTINUED | OUTPATIENT
Start: 2019-04-02 | End: 2019-04-02 | Stop reason: SDUPTHER

## 2019-04-02 RX ORDER — INSULIN GLARGINE 100 [IU]/ML
10 INJECTION, SOLUTION SUBCUTANEOUS NIGHTLY
Status: DISCONTINUED | OUTPATIENT
Start: 2019-04-02 | End: 2019-04-03 | Stop reason: HOSPADM

## 2019-04-02 RX ORDER — SODIUM CHLORIDE 0.9 % (FLUSH) 0.9 %
10 SYRINGE (ML) INJECTION EVERY 12 HOURS SCHEDULED
Status: DISCONTINUED | OUTPATIENT
Start: 2019-04-02 | End: 2019-04-03 | Stop reason: HOSPADM

## 2019-04-02 RX ORDER — 0.9 % SODIUM CHLORIDE 0.9 %
250 INTRAVENOUS SOLUTION INTRAVENOUS PRN
Status: DISCONTINUED | OUTPATIENT
Start: 2019-04-02 | End: 2019-04-03 | Stop reason: HOSPADM

## 2019-04-02 RX ORDER — ASPIRIN 81 MG/1
81 TABLET, CHEWABLE ORAL DAILY
Status: DISCONTINUED | OUTPATIENT
Start: 2019-04-03 | End: 2019-04-03 | Stop reason: HOSPADM

## 2019-04-02 RX ORDER — METOPROLOL SUCCINATE 25 MG/1
25 TABLET, EXTENDED RELEASE ORAL DAILY
Status: DISCONTINUED | OUTPATIENT
Start: 2019-04-02 | End: 2019-04-03 | Stop reason: HOSPADM

## 2019-04-02 RX ORDER — TRIAMTERENE AND HYDROCHLOROTHIAZIDE 37.5; 25 MG/1; MG/1
1 TABLET ORAL DAILY
Status: DISCONTINUED | OUTPATIENT
Start: 2019-04-03 | End: 2019-04-03 | Stop reason: HOSPADM

## 2019-04-02 RX ORDER — MORPHINE SULFATE 2 MG/ML
2 INJECTION, SOLUTION INTRAMUSCULAR; INTRAVENOUS
Status: ACTIVE | OUTPATIENT
Start: 2019-04-02 | End: 2019-04-02

## 2019-04-02 RX ORDER — FLUTICASONE PROPIONATE 50 MCG
1 SPRAY, SUSPENSION (ML) NASAL DAILY
Status: DISCONTINUED | OUTPATIENT
Start: 2019-04-02 | End: 2019-04-03 | Stop reason: HOSPADM

## 2019-04-02 RX ORDER — AMLODIPINE BESYLATE 10 MG/1
10 TABLET ORAL DAILY
Status: DISCONTINUED | OUTPATIENT
Start: 2019-04-03 | End: 2019-04-03 | Stop reason: HOSPADM

## 2019-04-02 RX ORDER — ASPIRIN 325 MG
325 TABLET ORAL ONCE
Status: DISCONTINUED | OUTPATIENT
Start: 2019-04-02 | End: 2019-04-03 | Stop reason: HOSPADM

## 2019-04-02 RX ORDER — ASPIRIN 81 MG/1
81 TABLET, CHEWABLE ORAL DAILY
Status: DISCONTINUED | OUTPATIENT
Start: 2019-04-03 | End: 2019-04-02 | Stop reason: SDUPTHER

## 2019-04-02 RX ORDER — LOSARTAN POTASSIUM 50 MG/1
50 TABLET ORAL DAILY
Status: DISCONTINUED | OUTPATIENT
Start: 2019-04-03 | End: 2019-04-03 | Stop reason: HOSPADM

## 2019-04-02 RX ORDER — ACETAMINOPHEN 325 MG/1
650 TABLET ORAL EVERY 4 HOURS PRN
Status: DISCONTINUED | OUTPATIENT
Start: 2019-04-02 | End: 2019-04-03 | Stop reason: HOSPADM

## 2019-04-02 RX ORDER — NITROGLYCERIN 0.4 MG/1
0.4 TABLET SUBLINGUAL EVERY 5 MIN PRN
Status: DISCONTINUED | OUTPATIENT
Start: 2019-04-02 | End: 2019-04-03 | Stop reason: HOSPADM

## 2019-04-02 RX ORDER — ISOSORBIDE MONONITRATE 60 MG/1
60 TABLET, EXTENDED RELEASE ORAL DAILY
Status: DISCONTINUED | OUTPATIENT
Start: 2019-04-03 | End: 2019-04-03 | Stop reason: HOSPADM

## 2019-04-02 RX ORDER — DEXTROSE MONOHYDRATE 50 MG/ML
100 INJECTION, SOLUTION INTRAVENOUS PRN
Status: DISCONTINUED | OUTPATIENT
Start: 2019-04-02 | End: 2019-04-03 | Stop reason: HOSPADM

## 2019-04-02 RX ORDER — ONDANSETRON 2 MG/ML
4 INJECTION INTRAMUSCULAR; INTRAVENOUS EVERY 6 HOURS PRN
Status: DISCONTINUED | OUTPATIENT
Start: 2019-04-02 | End: 2019-04-03 | Stop reason: HOSPADM

## 2019-04-02 RX ORDER — ATROPINE SULFATE 0.4 MG/ML
0.5 AMPUL (ML) INJECTION
Status: ACTIVE | OUTPATIENT
Start: 2019-04-02 | End: 2019-04-02

## 2019-04-02 RX ORDER — MECLIZINE HCL 12.5 MG/1
25 TABLET ORAL 3 TIMES DAILY PRN
Status: DISCONTINUED | OUTPATIENT
Start: 2019-04-02 | End: 2019-04-03 | Stop reason: HOSPADM

## 2019-04-02 RX ORDER — SODIUM CHLORIDE 9 MG/ML
INJECTION, SOLUTION INTRAVENOUS CONTINUOUS
Status: DISCONTINUED | OUTPATIENT
Start: 2019-04-02 | End: 2019-04-03 | Stop reason: HOSPADM

## 2019-04-02 RX ORDER — BISACODYL 10 MG
10 SUPPOSITORY, RECTAL RECTAL DAILY PRN
Status: DISCONTINUED | OUTPATIENT
Start: 2019-04-02 | End: 2019-04-03 | Stop reason: HOSPADM

## 2019-04-02 RX ORDER — SODIUM CHLORIDE 0.9 % (FLUSH) 0.9 %
10 SYRINGE (ML) INJECTION PRN
Status: DISCONTINUED | OUTPATIENT
Start: 2019-04-02 | End: 2019-04-03 | Stop reason: HOSPADM

## 2019-04-02 RX ORDER — ACETAMINOPHEN 500 MG
500 TABLET ORAL EVERY 6 HOURS PRN
Status: DISCONTINUED | OUTPATIENT
Start: 2019-04-02 | End: 2019-04-02 | Stop reason: SDUPTHER

## 2019-04-02 RX ORDER — HYDRALAZINE HYDROCHLORIDE 20 MG/ML
10 INJECTION INTRAMUSCULAR; INTRAVENOUS
Status: DISCONTINUED | OUTPATIENT
Start: 2019-04-02 | End: 2019-04-03 | Stop reason: HOSPADM

## 2019-04-02 RX ORDER — NICOTINE POLACRILEX 4 MG
15 LOZENGE BUCCAL PRN
Status: DISCONTINUED | OUTPATIENT
Start: 2019-04-02 | End: 2019-04-03 | Stop reason: HOSPADM

## 2019-04-02 RX ADMIN — LINAGLIPTIN 5 MG: 5 TABLET, FILM COATED ORAL at 18:58

## 2019-04-02 RX ADMIN — METOPROLOL SUCCINATE 25 MG: 25 TABLET, EXTENDED RELEASE ORAL at 18:58

## 2019-04-02 RX ADMIN — IOPAMIDOL 235 ML: 755 INJECTION, SOLUTION INTRAVENOUS at 12:30

## 2019-04-02 RX ADMIN — HYDROCORTISONE: 1 CREAM TOPICAL at 21:33

## 2019-04-02 RX ADMIN — INSULIN GLARGINE 10 UNITS: 100 INJECTION, SOLUTION SUBCUTANEOUS at 21:35

## 2019-04-02 RX ADMIN — TICAGRELOR 90 MG: 90 TABLET ORAL at 21:34

## 2019-04-02 RX ADMIN — SODIUM CHLORIDE: 9 INJECTION, SOLUTION INTRAVENOUS at 08:30

## 2019-04-02 ASSESSMENT — PAIN SCALES - GENERAL
PAINLEVEL_OUTOF10: 0

## 2019-04-02 NOTE — PRE SEDATION
Brief Pre-Op Note/Sedation Assessment      Sonja Urena  1955  Room/bed info not found  [id number]  10:59 AM    Planned Procedure: Cardiac Catheterization Procedure    Post Procedure Plan: Return to same level of care    Consent: I have discussed with the patient and/or the patient representative the indication, alternatives, and the possible risks and/or complications of the planned procedure and the anesthesia methods. The patient and/or patient representative appear to understand and agree to proceed. Chief Complaint: Chest Pain/Pressure      Indications for Cath Procedure: Worsening Angina  Anginal Classification within 2 weeks:  CCS III - Symptoms with everyday living activities, i.e., moderate limitation  NYHA Heart Failure Class within 2 weeks: No symptoms    Anti- Anginal Meds within 2 weeks:   Yes: Beta Blockers, Aspirin and Statin (Any)    Stress or Imaging Studies Performed:  Stress Test with SPECT Result: Positive:  inferior and inferolateral Risk/Extent of Ischemia:  Intermediate    Vital Signs:  Ht 5' 1\" (1.549 m)   Wt 172 lb 13.5 oz (78.4 kg)   BMI 32.66 kg/m²     Allergies:   Allergies   Allergen Reactions    Clonidine Derivatives     Doxycycline     Lisinopril      States \"don't remember reaction but had something long time ago\"    Penicillins Swelling    Sulfa Antibiotics Swelling       Past Medical History:  Past Medical History:   Diagnosis Date    Arthritis     CAD (coronary artery disease)     \"blocked artery\" no stents    Constipation     due to slow movement through bowels    Depression     Diabetes mellitus (HCC)     GERD (gastroesophageal reflux disease)     Hyperlipidemia     Hypertension     Vertigo     Vitamin D deficiency, unspecified          Surgical History:  Past Surgical History:   Procedure Laterality Date    HYSTERECTOMY           Medications:  Current Outpatient Medications   Medication Sig Dispense Refill    acetaminophen (TYLENOL) 500 MG tablet Take 500 mg by mouth every 6 hours as needed for Pain      amLODIPine (NORVASC) 10 MG tablet Take 1 tablet by mouth daily 30 tablet 0    meclizine (ANTIVERT) 25 MG tablet Take 1 tablet by mouth 3 times daily as needed for Dizziness 30 tablet 0    docusate sodium (COLACE) 100 MG capsule Take 100 mg by mouth 2 times daily as needed for Constipation      hydrocortisone 1 % cream Apply topically 2 times daily Apply topically 2 times daily.  linagliptin (TRADJENTA) 5 MG tablet Take 5 mg by mouth daily      RaNITidine HCl (RANITIDINE 150 MAX STRENGTH PO) Take 150 mg by mouth as needed       vitamin D (CHOLECALCIFEROL) 1000 UNIT TABS tablet Take 1,000 Units by mouth daily      fluticasone (FLONASE) 50 MCG/ACT nasal spray 1 spray by Each Nare route daily      Insulin Detemir (LEVEMIR SC) Inject 10 Units into the skin nightly \"insulin that starts with a L, maybe levemir\"      losartan (COZAAR) 25 MG tablet Take 50 mg by mouth daily       triamterene-hydrochlorothiazide (MAXZIDE) 75-50 MG per tablet Take 0.5 tablets by mouth daily       hydrOXYzine (ATARAX) 25 MG tablet Take 50 mg by mouth as needed for Itching       Omega-3 Fatty Acids (FISH OIL PO) Take  by mouth.  aspirin 81 MG tablet Take 81 mg by mouth daily.  atorvastatin (LIPITOR) 40 MG tablet Take 1 tablet by mouth daily. 30 tablet 3    isosorbide mononitrate (IMDUR) 60 MG CR tablet Take 1 tablet by mouth daily. 30 tablet 3    nitroGLYCERIN (NITROSTAT) 0.4 MG SL tablet Place 0.4 mg under the tongue every 5 minutes as needed for Chest pain.        Current Facility-Administered Medications   Medication Dose Route Frequency Provider Last Rate Last Dose    0.9 % sodium chloride infusion   Intravenous Continuous Dariusz Venegas MD        sodium chloride flush 0.9 % injection 10 mL  10 mL Intravenous 2 times per day Chayito Escamilla MD        sodium chloride flush 0.9 % injection 10 mL  10 mL Intravenous PRN MD Javier Santos aspirin tablet 325 mg  325 mg Oral Once Wenceslao Blankenship MD               Pre-Sedation:    Pre-Sedation Documentation and Exam:  I have personally completed a history, physical exam & review of systems for this patient (see notes). Prior History of Anesthesia Complications:   none    Modified Mallampati:  I (soft palate, uvula, fauces, tonsillar pillars visible)    ASA Classification:  Class 3 - A patient with severe systemic disease that limits activity but is not incapacitating and Class 2 -- A normal healthy patient with mild systemic disease    Kofi Scale: Activity:  2 - Able to move 4 extremities voluntarily on command  Respiration:  2 - Able to breathe deeply and cough freely  Circulation:  2 - BP+/- 20mmHg of normal  Consciousness:  2 - Fully awake  Oxygen Saturation (color):  2 - Able to maintain oxygen saturation >92% on room air    Sedation/Anesthesia Plan:  Guard the patient's safety and welfare. Minimize physical discomfort and pain. Minimize negative psychological responses to treatment by providing sedation and analgesia and maximize the potential amnesia. Patient to meet pre-procedure discharge plan.     Medication Planned:  midazolam intravenously and fentanyl intravenously    Patient is an appropriate candidate for plan of sedation: yes      Electronically signed by Ethan Cerda MD on 4/2/2019 at 10:59 AM

## 2019-04-02 NOTE — LETTER
[address] [address]  Phone: [phone]             April 3, 2019    Patient: Dre Krishnamurthy   YOB: 1955   Date of Visit: 4/2/2019       To Whom It May Concern:    Lisa Hughes was seen and treated in our facility  beginning 4/2/2019 until DISCHARGE DATE:04/03/209. She may return to work on April 8 2019.   Sincerely,       Jimbo Juarez RN         Signature:__________________________________
Student

## 2019-04-02 NOTE — PROGRESS NOTES
4 Eyes Skin Assessment     The patient is being assess for  Admission    I agree that 2 RN's have performed a thorough Head to Toe Skin Assessment on the patient. ALL assessment sites listed below have been assessed. Areas assessed by both nurses: Abigail/  [x]   Head, Face, and Ears   [x]   Shoulders, Back, and Chest  [x]   Arms, Elbows, and Hands   [x]   Coccyx, Sacrum, and IschIum  [x]   Legs, Feet, and Heels        Does the Patient have Skin Breakdown?   No         Carlo Prevention initiated:  No   Wound Care Orders initiated:  No      [facility] nurse consulted for Pressure Injury (Stage 3,4, Unstageable, DTI, NWPT, and Complex wounds), New and Established Ostomies:  No       Nurse 1 eSignature: Electronically signed by Ethel Judd RN on 4/2/19 at 6:36 PM    **SHARE this note so that the co-signing nurse is able to place an eSignature**    Nurse 2 eSignature: Electronically signed by Sarah Marcano RN on 4/2/19 at 6:38 PM

## 2019-04-02 NOTE — PROGRESS NOTES
Patient arrived from cath lab via stretcher. TR band in place with 2ml air remaining. PCI site CDI, soft. Tirofiban infusing at 14ml/hr. Patient and spouse oriented to room, given opportunity to ask questions. Patient nelli to ambulate to restroom with standby assist, spouse will help. Verbalize understanding to call for assist as needed.

## 2019-04-02 NOTE — H&P
capsule Take 100 mg by mouth 2 times daily as needed for Constipation        hydrocortisone 1 % cream Apply topically 2 times daily Apply topically 2 times daily.        linagliptin (TRADJENTA) 5 MG tablet Take 5 mg by mouth daily        RaNITidine HCl (RANITIDINE 150 MAX STRENGTH PO) Take 150 mg by mouth as needed         vitamin D (CHOLECALCIFEROL) 1000 UNIT TABS tablet Take 1,000 Units by mouth daily        fluticasone (FLONASE) 50 MCG/ACT nasal spray 1 spray by Each Nare route daily        Insulin Detemir (LEVEMIR SC) Inject 10 Units into the skin nightly \"insulin that starts with a L, maybe levemir\"        losartan (COZAAR) 25 MG tablet Take 50 mg by mouth daily         triamterene-hydrochlorothiazide (MAXZIDE) 75-50 MG per tablet Take 0.5 tablets by mouth daily         hydrOXYzine (ATARAX) 25 MG tablet Take 50 mg by mouth as needed for Itching         Omega-3 Fatty Acids (FISH OIL PO) Take  by mouth.        aspirin 81 MG tablet Take 81 mg by mouth daily.        nitroGLYCERIN (NITROSTAT) 0.4 MG SL tablet Place 0.4 mg under the tongue every 5 minutes as needed for Chest pain.        atorvastatin (LIPITOR) 40 MG tablet Take 1 tablet by mouth daily. 30 tablet 3    isosorbide mononitrate (IMDUR) 60 MG CR tablet Take 1 tablet by mouth daily. 30 tablet 3      No current facility-administered medications for this visit.             Review of Systems:  · Constitutional: no unanticipated weight loss. There's been no change in energy level, sleep pattern, or activity level. No fevers, chills. · Eyes: No visual changes or diplopia. No scleral icterus. · ENT: No Headaches, hearing loss or vertigo. No mouth sores or sore throat. · Cardiovascular: as reviewed in HPI  · Respiratory: No cough or wheezing, no sputum production. No hematemesis. · Gastrointestinal: No abdominal pain, appetite loss, blood in stools. No change in bowel or bladder habits.   · Genitourinary: No dysuria, trouble voiding, or follow-up after recent stress test. She continues to have exertional chest tightness and shortness of breath suggestive of angina. Symptoms improve have worsened compared to the last visit. A nuclear stresses shows evidence of reversible ischemia of the mid to basal inferior and inferolateral walls. In view of abnormal stress test and symptoms that she has been having , I recommend coronary angiogram. Risks, benefits, expectations and alternative treatments discussed. The patient verbalizes understanding and agrees to proceed. I have told her to stop therapy until she has the [address]. I have told her to keep nitroglycerin with her at all times. Have instructed her to go to the ER if she has to take 2 nitro without relieve.      HTN  BP is stable today.      HLD  Lat lipid profile 1/15/19 was WNL. Will continue Lipitor 40 mg daily.      Follow up after procedure.      Thank you very much for allowing me to participate in the care of your patient. Please do not hesitate to contact me if you have any questions.     This note was scribed in the presence of Dr Mary Pierre, by Lisa Buck RN  Physician Attestation:  The scribes documentation has been prepared under my direction and personally reviewed by me in its entirety.      I confirm that the note above accurately reflects all work, treatment, procedures, and medical decision making performed by me.       History update  Chart reviewed. Pt examined.  No new changes seen the last visit.     Sincerely,  Endy Camacho MD        [address] [place] [address]  Ph: [phone]  Fax: [phone]

## 2019-04-02 NOTE — CONSULTS
DIAGNOSIS:   CAD S/P percutaneous coronary angioplasty [I25.10, Z98.61]  CAD in native artery [I25.10]     /76   Pulse 66   Temp 97.6 °F (36.4 °C) (Oral)   Resp 18   Ht 5' 1\" (1.549 m)   Wt 172 lb 13.5 oz (78.4 kg)   SpO2 98%   BMI 32.66 kg/m²     ADMIT:  Weight: 172 lb 13.5 oz (78.4 kg)    TODAY: Weight: 172 lb 13.5 oz (78.4 kg)    Wt Readings from Last 3 Encounters:   04/02/19 172 lb 13.5 oz (78.4 kg)   03/26/19 174 lb (78.9 kg)   03/26/19 175 lb (79.4 kg)      ECHOCARDIOGRAM: Summary   Left ventricular cavity size is normal.   There is asymmetric hypertrophy of the septum.   Normal systolic function with an LV ejection fraction visually estimated to   be 60-65%.   No regional wall motion abnormalities are noted.   The right ventricle is not well visualized but appears to have normal size   and function.   There are no significant valvular abnormalities appreciated in this study.       HgBA1c:  No components found for: HGBA1C  LIPID PANEL:    Lab Results   Component Value Date    CHOL 111 01/15/2019    HDL 28 01/15/2019    TRIG 101 01/15/2019        Assessment:     CONSULTS:   IP CONSULT TO CARDIAC REHAB    Patient has a CARDIOLOGY CONSULT: Yes        EDUCATION STATUS: Patient   [x]  Provided both written and verbal education on Cardiopulmonary Rehabilitation. []  Provided instructions for smoking cessation programs. []  Provided education of CAD risk factors. []  Provided recommendations on activity and exercise. []  Provided education on medications. []  Provided education on coronary anatomy and coronary interventions. []  Other:    CURRENT DIET: DIET CARDIAC;    EDUCATIONAL PACKETS PROVIDED- PRINTED FROM [employer].     Titles and material given:  Yes   [x]  Managing Heart Disease and Preventing Stroke  []  Heart Owner's Manual  []  Living Well with Heart Failure  []  Other:     PATIENT/CAREGIVER TEACHING:    Level of patient/caregiver understanding able to:   [x] Verbalize understanding   [] Demonstrate understanding       [] Teach back        [] Needs reinforcement     []  Other:       TEACHING TIME:  15 minutes       Plan:       DISCHARGE PLAN:  Placement for patient upon discharge: home with support   Hospice Care:  no  Code Status: Full Code  Discharge appointment scheduled: No     RECOMMENDATIONS:   [x]  Patient/Family instructed to call Cardiopulmonary Rehab ([phone]) upon discharge schedule the initial evaluation  []  Encourage to call Cardiopulmonary Rehabilitation with any questions. []  Referral to alternate Cardiopulmonary Rehabilitation facility.    []  Other:    [] Appointment scheduled for   [x] Chooses to not schedule at this time          Electronically signed by Abhay Marsh, RN,MS on 4/2/2019 at 6:36 PM

## 2019-04-03 ENCOUNTER — TELEPHONE (OUTPATIENT)
Dept: CARDIOLOGY CLINIC | Age: 64
End: 2019-04-03

## 2019-04-03 VITALS
SYSTOLIC BLOOD PRESSURE: 160 MMHG | TEMPERATURE: 98.1 F | OXYGEN SATURATION: 96 % | BODY MASS INDEX: 33.01 KG/M2 | RESPIRATION RATE: 16 BRPM | HEIGHT: 61 IN | WEIGHT: 174.82 LBS | HEART RATE: 66 BPM | DIASTOLIC BLOOD PRESSURE: 81 MMHG

## 2019-04-03 LAB
EKG ATRIAL RATE: 64 BPM
EKG DIAGNOSIS: NORMAL
EKG P AXIS: 23 DEGREES
EKG P-R INTERVAL: 194 MS
EKG Q-T INTERVAL: 424 MS
EKG QRS DURATION: 94 MS
EKG QTC CALCULATION (BAZETT): 437 MS
EKG R AXIS: -6 DEGREES
EKG T AXIS: 62 DEGREES
EKG VENTRICULAR RATE: 64 BPM
GLUCOSE BLD-MCNC: 134 MG/DL (ref 70–99)
PERFORMED ON: ABNORMAL
PLATELET # BLD: 213 K/UL (ref 135–450)

## 2019-04-03 PROCEDURE — 85049 AUTOMATED PLATELET COUNT: CPT

## 2019-04-03 PROCEDURE — 2580000003 HC RX 258: Performed by: INTERNAL MEDICINE

## 2019-04-03 PROCEDURE — 6370000000 HC RX 637 (ALT 250 FOR IP): Performed by: INTERNAL MEDICINE

## 2019-04-03 PROCEDURE — 99239 HOSP IP/OBS DSCHRG MGMT >30: CPT | Performed by: INTERNAL MEDICINE

## 2019-04-03 PROCEDURE — 94760 N-INVAS EAR/PLS OXIMETRY 1: CPT

## 2019-04-03 PROCEDURE — 36415 COLL VENOUS BLD VENIPUNCTURE: CPT

## 2019-04-03 RX ORDER — METOPROLOL SUCCINATE 25 MG/1
25 TABLET, EXTENDED RELEASE ORAL DAILY
Qty: 30 TABLET | Refills: 3 | Status: SHIPPED | OUTPATIENT
Start: 2019-04-04 | End: 2019-04-15 | Stop reason: SDUPTHER

## 2019-04-03 RX ADMIN — METOPROLOL SUCCINATE 25 MG: 25 TABLET, EXTENDED RELEASE ORAL at 09:13

## 2019-04-03 RX ADMIN — SODIUM CHLORIDE 50 ML/HR: 9 INJECTION, SOLUTION INTRAVENOUS at 03:38

## 2019-04-03 RX ADMIN — ATORVASTATIN CALCIUM 40 MG: 40 TABLET, FILM COATED ORAL at 09:13

## 2019-04-03 RX ADMIN — ISOSORBIDE MONONITRATE 60 MG: 60 TABLET, EXTENDED RELEASE ORAL at 09:13

## 2019-04-03 RX ADMIN — VITAMIN D, TAB 1000IU (100/BT) 1000 UNITS: 25 TAB at 09:13

## 2019-04-03 RX ADMIN — TICAGRELOR 90 MG: 90 TABLET ORAL at 09:13

## 2019-04-03 RX ADMIN — LINAGLIPTIN 5 MG: 5 TABLET, FILM COATED ORAL at 09:13

## 2019-04-03 RX ADMIN — AMLODIPINE BESYLATE 10 MG: 10 TABLET ORAL at 09:13

## 2019-04-03 RX ADMIN — TRIAMTERENE AND HYDROCHLOROTHIAZIDE 1 TABLET: 37.5; 25 TABLET ORAL at 09:23

## 2019-04-03 RX ADMIN — ASPIRIN 81 MG 81 MG: 81 TABLET ORAL at 09:13

## 2019-04-03 RX ADMIN — LOSARTAN POTASSIUM 50 MG: 50 TABLET ORAL at 09:13

## 2019-04-03 RX ADMIN — FLUTICASONE PROPIONATE 1 SPRAY: 50 SPRAY, METERED NASAL at 09:14

## 2019-04-03 ASSESSMENT — PAIN SCALES - GENERAL
PAINLEVEL_OUTOF10: 0

## 2019-04-03 NOTE — TELEPHONE ENCOUNTER
Patient would like a letter stating she can return to work on Monday. Per pt Dr. Mary Pierre said she could when she was in the hospital.  You can reach the pt at [phone].

## 2019-04-03 NOTE — PROCEDURES
[address] [place] [address] Bernadine Antonio [address]                                 PROCEDURE NOTE    PATIENT NAME: Prince Payan                   :        1955  MED REC NO:   [id number]                          ROOM:       5106  ACCOUNT NO:   [de-identified]                           ADMIT DATE: 2019  PROVIDER:     Naresh Laboy MD    DATE OF PROCEDURE:  2019    PROCEDURE NOTE:  The patient was explained benefits and the risks of the  procedure. Informed consent was obtained. The right radial artery was  punctured using Seldinger technique after performing Pb's test.  A  5/6-Polish sheath was placed in the right radial artery. The patient  received intraarterial heparin, verapamil, and nitroglycerin. Coronary  angiography was carried out with the help of a 5-Polish Jeff catheter. Multiple view of the left and the right coronary arteries were obtained  in [place]an and MARIA projection with cranial and caudal angulations. Angled  pigtail catheter was used for a left ventriculogram.  Pressure in the  left ventricular cavity was obtained before and after left  ventriculogram.  The patient's angiogram was carefully reviewed and it  was decided to proceed with the intervention of the mid left anterior  descending artery. After multiple attempts, a 5-Polish EBU 3.5 guide  was used to cannulate the ostium of the left main trunk. Guide shots  were obtained. A 180 Runthrough wire was advanced to the calcified  plaque into the distal LAD. The patient received intravenous heparin as  well as tirofiban bolus followed by a drip. ACT was monitored  throughout the case. The 2.5 x 15 balloon was used for the dilatation. Subsequently, 3.0 x 23 Karen drug-eluting stent was deployed in the  area of the stenosis.   This was postdilated with a noncompliant 3.25 x  15 balloon with a final diameter of the proximal segment to be close to  3.41 and close to 3.25 in the distal portion of the stent. The patient  had excellent result. The patient received multiple doses of  nitroglycerin. All the balloon wires were removed and the patient left  the cath lab in stable condition. HEMODYNAMIC DATA:  Please see the computerized printout. CORONARY ANGIOGRAPHY:  1. Left main trunk gives rise to the left sinus of Valsalva. It is  long. It divides into the left anterior descending artery and left  circumflex artery. The left main trunk is free of atherosclerosis. 2.  Left anterior descending artery is a moderate-sized artery. It  descends in the interventricular sulcus and wraps around the apex of the  heart. It has evidence of moderate to heavy calcification but there is  a discrete 80% to 90% stenosis of the mid LAD at the origin of the  diagonal branch which appears eccentric in nature. There is also 99%  stenosis of the ostium of the diagonal branch. 3.  Left circumflex artery gives rise to the left main trunk. It is  extremely tortuous and comes with a very acute angle from the left main  trunk but there appears to be a 40% to 50% stenosis at the mid portion  of the circumflex artery. There are left to right collaterals seen  extensively from the left coronary ejection. 4.  Right coronary artery gives rise to the right sinus of Valsalva. It  is 100% occluded in the mid segment. There are collaterals from the  left side. LEFT VENTRICULOGRAM:  Reveals a normal left ventricular systolic  function with an EF of 50% to 55%. OVERALL IMPRESSION:  1. Patent left main trunk. 2.  Moderate calcification of the mid left anterior descending artery  with 80% to 90% eccentric stenosis, 99% stenosis of the septal  . 3.  Tortuous circumflex artery with 50% stenosis in the mid segment. 4.  100% occlusion of the mid right coronary artery with left to right  collaterals.   5.  Normal left ventricular systolic function with estimated EF of 50%  to 55%. 6.  Successful angioplasty followed by drug-eluting stent deployment in  the mid left anterior descending artery. 90% stenosis reduced to 0%. In view of the above findings, we will continue the patient carefully on  the current medicines and add beta blocker to her regimen.         Angela Nassar MD    D: 04/02/2019 12:47:13       T: 04/02/2019 21:16:04     [id number]  Job#: [id number]     Doc#: [id number]    CC:  Brock Chang MD

## 2019-04-03 NOTE — DISCHARGE SUMMARY
[address] [place] [address] SUMMARY      Patient ID:  Jessica Kaur  [id number] y.o. 1955    Admit date: 4/2/2019    Discharge date:  4/3/19    Admitting Physician: Autumn Camarena MD     Discharge Physician: Nneka Holley     Admission Diagnoses: CAD S/P percutaneous coronary angioplasty [I25.10, Z98.61]  CAD in native artery [I25.10]  CAD in native artery [I25.10]    Discharge Diagnoses:   Patient Active Problem List   Diagnosis    CAD (coronary artery disease)    HTN (hypertension)    Hyperlipidemia    Chronic pain of right knee    Primary osteoarthritis of both knees    [facility]    [facility]    CAD S/P percutaneous coronary angioplasty    CAD in native artery        Discharged Condition: good    Hospital Course: Jessica Kaur was admitted  After she had LAD PCI. She is having increasing angina. She had abnormal stress test. She had cardiac cath which showed 80-90% mid LAD stenosis & had successful PCI using RACHELLE. She had uneventful post PCI course. Consults:  IP CONSULT TO CARDIAC REHAB    Significant Diagnostic Studies:   Echocardiography: normal and reviewed by myself  Stress test:  not done   Cardiac cath(4/3/19)  1. Patent left main trunk. 2.  Moderate calcification of the mid left anterior descending artery  with 80% to 90% eccentric stenosis, 99% stenosis of the septal  . 3.  Tortuous circumflex artery with 50% stenosis in the mid segment. 4.  100% occlusion of the mid right coronary artery with left to right  collaterals. 5.  Normal left ventricular systolic function with estimated EF of 50%  to 55%. 6.  Successful angioplasty followed by drug-eluting stent deployment in  the mid left anterior descending artery.   90% stenosis reduced to 0%.         Labs:   Lab Results   Component Value Date    CREATININE 1.1 03/27/2019    BUN 25 (H) 03/27/2019     03/27/2019    K 3.9 03/27/2019     03/27/2019    CO2 28 03/27/2019      Lab Results   Component Value Date    WBC 9.1 03/27/2019    HGB 12.4 03/27/2019    HCT 36.9 03/27/2019    MCV 84.0 03/27/2019     04/03/2019      Lab Results   Component Value Date    INR 1.07 03/27/2019    PROTIME 12.2 03/27/2019    No results found for: BNP    Disposition: home  1. Patent left main trunk. 2.  Moderate calcification of the mid left anterior descending artery  with 80% to 90% eccentric stenosis, 99% stenosis of the septal  . 3.  Tortuous circumflex artery with 50% stenosis in the mid segment. 4.  100% occlusion of the mid right coronary artery with left to right  collaterals. 5.  Normal left ventricular systolic function with estimated EF of 50%  to 55%. 6.  Successful angioplasty followed by drug-eluting stent deployment in  the mid left anterior descending artery. 90% stenosis reduced to 0%.         Patient Instructions:    Lulu Burroughs   Home Medication Instructions [id number]    Printed on:04/03/19 7168   Medication Information                      acetaminophen (TYLENOL) 500 MG tablet  Take 500 mg by mouth every 6 hours as needed for Pain             amLODIPine (NORVASC) 10 MG tablet  Take 1 tablet by mouth daily             aspirin 81 MG tablet  Take 81 mg by mouth daily. atorvastatin (LIPITOR) 40 MG tablet  Take 1 tablet by mouth daily. docusate sodium (COLACE) 100 MG capsule  Take 100 mg by mouth 2 times daily as needed for Constipation             fluticasone (FLONASE) 50 MCG/ACT nasal spray  1 spray by Each Nare route daily             hydrocortisone 1 % cream  Apply topically 2 times daily Apply topically 2 times daily. hydrOXYzine (ATARAX) 50 MG tablet  Take 50 mg by mouth as needed for Itching              Insulin Detemir (LEVEMIR SC)  Inject 10 Units into the skin nightly              isosorbide mononitrate (IMDUR) 60 MG CR tablet  Take 1 tablet by mouth daily.

## 2019-04-03 NOTE — PLAN OF CARE
Pain/discomfort being managed with PRN analgesics per MD orders. Pt able to express presence and absence of pain and rate pain appropriately using numerical scale. Fall risk assessment completed every shift. All precautions in place. Pt has call light within reach at all times. Room clear of clutter. Pt aware to call for assistance when getting up.

## 2019-04-03 NOTE — DISCHARGE INSTR - COC
Continuity of Care Form    Patient Name: Charanjit Morillo   :  1955  MRN:  [id number]    Admit date:  2019  Discharge date:  ***    Code Status Order: Full Code   Advance Directives:   Advance Care Flowsheet Documentation     Date/Time Healthcare Directive Type of Healthcare Directive Copy in [address] Agent's Name Healthcare Agent's Phone Number    [phone]  No, patient does not have an advance directive for healthcare treatment -- -- -- -- --          Admitting Physician:  Izabella Schneider MD  PCP: Magnolia Tenorio MD    Discharging Nurse: [facility] Unit/Room#: L4R-1727/4376-70  Discharging Unit Phone Number: ***    Emergency Contact:   Extended Emergency Contact Information  Primary Emergency Contact: [facility]  Address: [place] [address] [place] [address] Phone: [phone]  Mobile Phone: [phone]  Relation: Other  Secondary Emergency Contact: Ezio Ambrosio , [address] [facility] Phone: [phone]  Relation: Brother/Sister    Past Surgical History:  Past Surgical History:   Procedure Laterality Date    HYSTERECTOMY         Immunization History: There is no immunization history on file for this patient.     Active Problems:  Patient Active Problem List   Diagnosis Code    CAD (coronary artery disease) I25.10    HTN (hypertension) I10    Hyperlipidemia E78.5    Chronic pain of right knee M25.561, G89.29    Primary osteoarthritis of both knees M17.0    Acute CVA (cerebrovascular accident) ([address] I63.9    Cerebrovascular accident (CVA) ([address] I63.9    CAD S/P percutaneous coronary angioplasty I25.10, Z80.64    CAD in native artery I25.10       Isolation/Infection:   Isolation          No Isolation            Nurse Assessment:  Last Vital Signs: BP (!) 160/81   Pulse 66   Temp 98.1 °F (36.7 °C) (Oral)   Resp 16   Ht 5' 1\" (1.549 m)   Wt 174 lb 13.2 oz {EQUIPMENT:070069272}  Other Treatments: ***    Patient's personal belongings (please select all that are sent with patient):  {CHP DME Belongings:015743975}    RN SIGNATURE:  {Esignature:674569027}    CASE MANAGEMENT/SOCIAL WORK SECTION    Inpatient Status Date: ***    Readmission Risk Assessment Score:  Readmission Risk              Risk of Unplanned Readmission:        14           Discharging to Facility/ Agency   · Name:   · Address:  · Phone:  · Fax:    Dialysis Facility (if applicable)   · Name:  · Address:  · Dialysis Schedule:  · Phone:  · Fax:    / signature: {Esignature:486608064}    PHYSICIAN SECTION    Prognosis: {Prognosis:3808830375}    Condition at Discharge: [address] [place] [address] Patient Condition:145364828}    Rehab Potential (if transferring to Rehab): {Prognosis:4774019241}    Recommended Labs or Other Treatments After Discharge: ***    Physician Certification: I certify the above information and transfer of Yash Rosenberg  is necessary for the continuing treatment of the diagnosis listed and that she requires {Admit to Appropriate Level of Care:22651} for {GREATER/LESS:465065267} 30 days.      Update Admission H&P: {CHP DME Changes in ISJQ:747503563}    PHYSICIAN SIGNATURE:  {Esignature:830704961}

## 2019-04-03 NOTE — PLAN OF CARE
Problem: Falls - Risk of:  Goal: Will remain free from falls  Description  Will remain free from falls  Outcome: Ongoing   Fall risk precautions in place. Bed in lowest position with wheels locked,bed alarm in place and activated,non-skid socks on pt, fall risk ID on pt, call light in reach, pt sedated, will continue to monitor. Fall risk assessment completed every shift. All precautions in place. Pt has call light within reach at all times. Room clear of clutter. Pt aware to call for assistance when getting up.

## 2019-04-04 ENCOUNTER — TELEPHONE (OUTPATIENT)
Dept: CARDIOLOGY CLINIC | Age: 64
End: 2019-04-04

## 2019-04-04 DIAGNOSIS — I25.10 CORONARY ARTERY DISEASE INVOLVING NATIVE CORONARY ARTERY OF NATIVE HEART, ANGINA PRESENCE UNSPECIFIED: Primary | ICD-10-CM

## 2019-04-15 ENCOUNTER — OFFICE VISIT (OUTPATIENT)
Dept: CARDIOLOGY CLINIC | Age: 64
End: 2019-04-15
Payer: MEDICARE

## 2019-04-15 VITALS
OXYGEN SATURATION: 98 % | HEIGHT: 61 IN | BODY MASS INDEX: 32.28 KG/M2 | SYSTOLIC BLOOD PRESSURE: 120 MMHG | DIASTOLIC BLOOD PRESSURE: 62 MMHG | WEIGHT: 171 LBS | HEART RATE: 57 BPM

## 2019-04-15 DIAGNOSIS — I25.10 CORONARY ARTERY DISEASE INVOLVING NATIVE CORONARY ARTERY OF NATIVE HEART WITHOUT ANGINA PECTORIS: Primary | ICD-10-CM

## 2019-04-15 DIAGNOSIS — I10 ESSENTIAL HYPERTENSION: ICD-10-CM

## 2019-04-15 DIAGNOSIS — E78.5 HYPERLIPIDEMIA LDL GOAL <70: ICD-10-CM

## 2019-04-15 PROCEDURE — 93000 ELECTROCARDIOGRAM COMPLETE: CPT | Performed by: NURSE PRACTITIONER

## 2019-04-15 PROCEDURE — 99214 OFFICE O/P EST MOD 30 MIN: CPT | Performed by: NURSE PRACTITIONER

## 2019-04-15 RX ORDER — METOPROLOL SUCCINATE 25 MG/1
12.5 TABLET, EXTENDED RELEASE ORAL DAILY
Qty: 30 TABLET | Refills: 3 | Status: SHIPPED | OUTPATIENT
Start: 2019-04-15

## 2019-04-15 NOTE — LETTER
[facility] HEART [address]  Phone: [phone]  Fax: [phone]    ELISSA Arreguin CNP        April 15, 2019    [address], [place]  [address] [facility] [address]  [address] [facility]    Patient: Harry Ybarra  MR Number: [id number]  YOB: 1955  Date of Visit: 4/15/2019    Dear Dr. Meghana Mccormick:    Thank you for the request for consultation for [facility]. If you have questions, please do not hesitate to call me. I look forward to following Sonia Oshea along with you.     Sincerely,        ELISSA Arreguin CNP

## 2019-04-15 NOTE — PROGRESS NOTES
[facility], [place], operated by [facility]  Office Visit    Sonja Urena  1955    April 15, 2019    CC:   Chief Complaint   Patient presents with    Follow-Up from [address]     gets really sweaty    Dizziness     HPI:  The patient is 70 y.o. female with a past medical history significant for CAD, HTN, hyperlipidemia, DM and CVA here for follow up. She was recently seen d/t c/o SOB with ambulation and underwent stress testing on 3/19/2019 demonstrating reversible ischemia. On 4/2/2019 she had cardiac cath with resultant PCI to mid LAD. Here for follow up of her CAD and recent procedure. Since procedure has noted some SOB, described as having to take a deep sigh once in a while, but continuing to improve. Has also noted that in the AM after taking AM meds and approximately 60-90 minutes she develops dizziness, diaphoresis and nausea which last few hours which limits her activity. Denies chest pain/discomfort, SOB, orthopnea/PND, cough, palpitations, syncope, edema , weight change or claudication. She works full time as [employer]. Starts cardiac rehab in May. Review of Systems:  Constitutional: Denies  fatigue, weakness, night sweats or fever. + occasional diaphoresis  HEENT: Denies new visual changes, ringing in ears, nosebleeds, nasal congestion  Respiratory: Denies new or change in SOB, PND, orthopnea or cough. Cardiovascular: see HPI. + dizziness, occasional SOB  GI: Denies vomiting, diarrhea, constipation, abdominal pain, change in bowel habits, melena or hematochezia  + nausea after medications  : Denies urinary frequency, urgency, incontinence, hematuria or dysuria. Skin: Denies rash, hives, or cyanosis  Musculoskeletal: + chronic arthritic pain in back and knees  Neurological: Denies syncope or TIA-like symptoms.   Psychiatric: Denies anxiety, insomnia or depression     Past Medical History:   Diagnosis Date    Arthritis     CAD (coronary artery disease) \"blocked artery\" no stents    Constipation     due to slow movement through bowels    Depression     Diabetes mellitus ([address]     GERD (gastroesophageal reflux disease)     Hyperlipidemia     Hypertension     Vertigo     Vitamin D deficiency, unspecified      Past Surgical History:   Procedure Laterality Date    DIAGNOSTIC CARDIAC CATH LAB PROCEDURE  04/2019    stent    HYSTERECTOMY       Family History   Problem Relation Age of Onset    High Blood Pressure Mother     High Cholesterol Mother      Social History     Tobacco Use    Smoking status: Never Smoker    Smokeless tobacco: Never Used   Substance Use Topics    Alcohol use: No    Drug use: No       Allergies   Allergen Reactions    Clonidine Derivatives     Doxycycline     Lisinopril      States \"don't remember reaction but had something long time ago\"    Penicillins Swelling    Sulfa Antibiotics Swelling     Current Outpatient Medications   Medication Sig Dispense Refill    metoprolol succinate (TOPROL XL) 25 MG extended release tablet Take 0.5 tablets by mouth daily 30 tablet 3    ticagrelor (BRILINTA) 90 MG TABS tablet Take 1 tablet by mouth 2 times daily 60 tablet 11    acetaminophen (TYLENOL) 500 MG tablet Take 500 mg by mouth every 6 hours as needed for Pain      amLODIPine (NORVASC) 10 MG tablet Take 1 tablet by mouth daily 30 tablet 0    meclizine (ANTIVERT) 25 MG tablet Take 1 tablet by mouth 3 times daily as needed for Dizziness 30 tablet 0    docusate sodium (COLACE) 100 MG capsule Take 100 mg by mouth 2 times daily as needed for Constipation      hydrocortisone 1 % cream Apply topically 2 times daily Apply topically 2 times daily.       linagliptin (TRADJENTA) 5 MG tablet Take 5 mg by mouth daily      RaNITidine HCl (RANITIDINE 150 MAX STRENGTH PO) Take 150 mg by mouth as needed       vitamin D (CHOLECALCIFEROL) 1000 UNIT TABS tablet Take 1,000 Units by mouth daily      fluticasone (FLONASE) 50 MCG/ACT nasal spray 1 spray by Each Nare route daily      Insulin Detemir (LEVEMIR SC) Inject 10 Units into the skin nightly       losartan (COZAAR) 50 MG tablet Take 50 mg by mouth daily       triamterene-hydrochlorothiazide (MAXZIDE) 75-50 MG per tablet Take 0.5 tablets by mouth daily       hydrOXYzine (ATARAX) 50 MG tablet Take 50 mg by mouth as needed for Itching       Omega-3 Fatty Acids (FISH OIL PO) Take  by mouth.  aspirin 81 MG tablet Take 81 mg by mouth daily.  nitroGLYCERIN (NITROSTAT) 0.4 MG SL tablet Place 0.4 mg under the tongue every 5 minutes as needed for Chest pain.  atorvastatin (LIPITOR) 40 MG tablet Take 1 tablet by mouth daily. 30 tablet 3    isosorbide mononitrate (IMDUR) 60 MG CR tablet Take 1 tablet by mouth daily. 30 tablet 3     No current facility-administered medications for this visit. Physical Exam:   /62 (Site: Left Upper Arm, Position: Sitting)   Pulse 57   Ht 5' 1\" (1.549 m)   Wt 171 lb (77.6 kg)   SpO2 98%   BMI 32.31 kg/m²   Wt Readings from Last 2 Encounters:   04/15/19 171 lb (77.6 kg)   04/03/19 174 lb 13.2 oz (79.3 kg)     Constitutional: She is oriented to person, place, and time. She appears well-developed and well-nourished. In no acute distress. HEENT: Normocephalic and atraumatic. Sclerae anicteric. No xanthelasmas. EOM's intact. Neck: Neck supple. No JVD present. Carotids without bruits. No thyromegaly present. No lymphadenopathy present. Cardiovascular: bradycardic but regular, normal S1 and S2; no murmur/gallop or rub, PMI nondisplaced  Pulmonary/Chest: Effort normal.  Lungs clear to auscultation. Chest wall nontender  Abdominal: soft, nontender, nondistended. + bowel sounds; no hepatomegaly   Extremities: No edema, cyanosis, or clubbing. Pulses are 2+ radial/DP/PT bilaterally. Cap refill brisk. Neurological: No focal deficit. Skin: Skin is warm and dry. Psychiatric: She has a normal mood and affect.  Her speech is normal and behavior is normal.     Lab Review:   Lab Results   Component Value Date    TRIG 101 01/15/2019    HDL 28 01/15/2019    LDLCALC 63 01/15/2019    LABVLDL 20 01/15/2019     Lab Results   Component Value Date     03/27/2019    K 3.9 03/27/2019     03/27/2019    CO2 28 03/27/2019    BUN 25 03/27/2019    CREATININE 1.1 03/27/2019    GLUCOSE 137 03/27/2019    CALCIUM 10.1 03/27/2019      Lab Results   Component Value Date    WBC 9.1 03/27/2019    HGB 12.4 03/27/2019    HCT 36.9 03/27/2019    MCV 84.0 03/27/2019     04/03/2019     ECG 4/15/2019: Sinus rhythm    4/2/2019: Cardiac cath with PCI  1. Patent left main trunk. 2.  Moderate calcification of the mid left anterior descending artery  with 80% to 90% eccentric stenosis, 99% stenosis of the septal  . 3.  Tortuous circumflex artery with 50% stenosis in the mid segment. 4.  100% occlusion of the mid right coronary artery with left to right collaterals. 5.  Normal left ventricular systolic function with estimated EF of 50%  to 55%. 6.  Successful angioplasty followed by drug-eluting stent deployment in  the mid left anterior descending artery. 90% stenosis reduced to 0%. 3/19/2019 Lexiscan-Myoview:  Abnormal study. There is a small to moderate sized, mild intensity,    reversible defect of the basal to mid inferior and basal inferolateral walls    which is consistent with ischemia.   [place] is also a small sized, moderate intensity, fixed anteroapical wall    defect which is most consistent with breast attenuation artifact.    Normal LV size and systolic function.    Overall findings represent an intermediate risk study.      Echo 1/14/2019:  Left ventricular cavity size is normal.   There is asymmetric hypertrophy of the septum.   Normal systolic function with an LV ejection fraction visually estimated to   be 60-65%.   No regional wall motion abnormalities are noted.   The right ventricle is not well visualized but appears to have normal size   and function.   There are no significant valvular abnormalities appreciated in this study. 6/2014 Cardiac cath with PCI;  1.  A 100% occlusion of the mid right coronary artery with large left to  right collaterals. 2.  Patent left main trunk. 3.  A 30% proximal left anterior descending and 50% mid left anterior  descending stenosis. 4.  Mild to moderate stenosis of the diagonal branch of the left anterior  descending. 5.  Very tortuous circumflex artery with mild disease. Large collaterals to  the mid to distal right coronary artery. 6.  Wall motion abnormalities of the inferior wall with overall preserved  left ventricular ejection fraction at 50% to 55%. Assessment:    1. Coronary artery disease involving native coronary artery of native heart without angina pectoris  -sxs improved after PCI  -S/P RACHELLE of mid LAD after abnormal stress test  -her SOB is typical for angina and I suspect secondary to her Brilinta (it is improving)  -continue DAPT, BB and statin  -decrease dose of BB d/t bradycardia and I suspect contributing to her sxs of dizziness  -risk factor modification    2. Essential hypertension  -controlled  -continue medical management    3. Hyperlipidemia LDL goal <70  -on statin    4. Diabetes Mellitus  -Rx per PCP    5. H/O CVA      Plan:  Continue ASA, Brilinta, indur, statin, amlodipine, maxzide, losartan  Decrease Toprol Xl to 12.5 mg daily (given her dizziness, diaphoresis, and bradycardia)  Discussed low fat/low sodium diet and reinforced regular aerobic exercise. To begin cardiac rehab  Approximately 25-30 minutes spent with patient and > 50% of time spent on pt education, answering questions and coordinating care regarding her meds, follow up etc.   Follow up in 1 month with D. Enzweiler, CNP or sooner if needed    Return in about 1 month (around 5/13/2019) for 4-6 weeks with D. Enzweiler, CNP .      Thanks for allowing me to participate in the care of this patient.       Karen Ana, APRN-CNP  [address], [address] [place] [address]) [place], [address] [place] [address]  [facility]  Office: [phone]  Fax: [phone]

## 2019-05-08 ENCOUNTER — OFFICE VISIT (OUTPATIENT)
Dept: ORTHOPEDIC SURGERY | Age: 64
End: 2019-05-08
Payer: MEDICARE

## 2019-05-08 ENCOUNTER — TELEPHONE (OUTPATIENT)
Dept: CARDIOLOGY CLINIC | Age: 64
End: 2019-05-08

## 2019-05-08 VITALS
WEIGHT: 171 LBS | DIASTOLIC BLOOD PRESSURE: 71 MMHG | HEART RATE: 80 BPM | HEIGHT: 61 IN | SYSTOLIC BLOOD PRESSURE: 126 MMHG | BODY MASS INDEX: 32.28 KG/M2

## 2019-05-08 DIAGNOSIS — M17.0 PRIMARY OSTEOARTHRITIS OF BOTH KNEES: Primary | ICD-10-CM

## 2019-05-08 PROCEDURE — 20610 DRAIN/INJ JOINT/BURSA W/O US: CPT | Performed by: PHYSICIAN ASSISTANT

## 2019-05-08 NOTE — PROGRESS NOTES
Subjective:      Patient ID: Betti Habermann is a 70 y.o.  female. Chief Complaint   Patient presents with    Follow-up     Bilateral knee        HPI: She is here for evaluation and treatment of left and right knee pain related to arthritis. She states the previous injection which was performed 1/2/19 helped relieve the knee symptoms. The bilateral knee pain has gradually returned. There has not been a recent injury. Pain is 6-7/10. Pain is worse with activity. Pain improves somewhat with rest and elevation. Review of Systems:   Negative for fever or chills. Negative for numbness or tingling around the knee.     Past Medical History:   Diagnosis Date    Arthritis     CAD (coronary artery disease)     \"blocked artery\" no stents    Constipation     due to slow movement through bowels    Depression     Diabetes mellitus (HCC)     GERD (gastroesophageal reflux disease)     Hyperlipidemia     Hypertension     Vertigo     Vitamin D deficiency, unspecified        Family History   Problem Relation Age of Onset    High Blood Pressure Mother     High Cholesterol Mother        Past Surgical History:   Procedure Laterality Date    DIAGNOSTIC CARDIAC CATH LAB PROCEDURE  04/2019    stent    HYSTERECTOMY         Social History     Occupational History    Not on file   Tobacco Use    Smoking status: Never Smoker    Smokeless tobacco: Never Used   Substance and Sexual Activity    Alcohol use: No    Drug use: No    Sexual activity: Not on file       Current Outpatient Medications   Medication Sig Dispense Refill    metoprolol succinate (TOPROL XL) 25 MG extended release tablet Take 0.5 tablets by mouth daily 30 tablet 3    ticagrelor (BRILINTA) 90 MG TABS tablet Take 1 tablet by mouth 2 times daily 60 tablet 11    acetaminophen (TYLENOL) 500 MG tablet Take 500 mg by mouth every 6 hours as needed for Pain      amLODIPine (NORVASC) 10 MG tablet Take 1 tablet by mouth daily 30 tablet 0    meclizine (ANTIVERT) 25 MG tablet Take 1 tablet by mouth 3 times daily as needed for Dizziness 30 tablet 0    docusate sodium (COLACE) 100 MG capsule Take 100 mg by mouth 2 times daily as needed for Constipation      hydrocortisone 1 % cream Apply topically 2 times daily Apply topically 2 times daily.  linagliptin (TRADJENTA) 5 MG tablet Take 5 mg by mouth daily      RaNITidine HCl (RANITIDINE 150 MAX STRENGTH PO) Take 150 mg by mouth as needed       vitamin D (CHOLECALCIFEROL) 1000 UNIT TABS tablet Take 1,000 Units by mouth daily      fluticasone (FLONASE) 50 MCG/ACT nasal spray 1 spray by Each Nare route daily      Insulin Detemir (LEVEMIR SC) Inject 10 Units into the skin nightly       losartan (COZAAR) 50 MG tablet Take 50 mg by mouth daily       triamterene-hydrochlorothiazide (MAXZIDE) 75-50 MG per tablet Take 0.5 tablets by mouth daily       hydrOXYzine (ATARAX) 50 MG tablet Take 50 mg by mouth as needed for Itching       Omega-3 Fatty Acids (FISH OIL PO) Take  by mouth.  aspirin 81 MG tablet Take 81 mg by mouth daily.  nitroGLYCERIN (NITROSTAT) 0.4 MG SL tablet Place 0.4 mg under the tongue every 5 minutes as needed for Chest pain.  atorvastatin (LIPITOR) 40 MG tablet Take 1 tablet by mouth daily. 30 tablet 3    isosorbide mononitrate (IMDUR) 60 MG CR tablet Take 1 tablet by mouth daily. 30 tablet 3     No current facility-administered medications for this visit. Objective:     She is alert, oriented x 3, pleasant, well nourished, developed and in no acute distress. /71   Pulse 80   Ht 5' 1\" (1.549 m)   Wt 171 lb (77.6 kg)   BMI 32.31 kg/m²        Examination of the left knee shows: There is not erythema. ROM- decreased range of motion is noted. There is mild to moderate pain associated with ROM testing. Extensor Mechanism is  intact. Examination of the right knee shows: There is not erythema. ROM- decreased range of motion is noted.   There is mild to moderate pain associated with ROM testing. Extensor Mechanism is  intact. X Rays: was not performed in the office today:       Assessment:       ICD-10-CM    1. Primary osteoarthritis of both knees M17.0 NC ARTHROCENTESIS ASPIR&/INJ MAJOR JT/BURSA W/O US     NC TRIAMCINOLONE ACETONIDE INJ        Plan:     The natural history of the patient's diagnosis as well as the treatment options were discussed in full and questions were answered. Risks and benefits of the treatment options also reviewed in detail. May increase blood sugars in diabetics. She understands that eventually knee arthroplasty will be the surgical correction needed to control the pain related to the arthritic knee condition. And when the injections fail to control the symptoms then surgical correction should be considered. Risk and benefits of corticosteroid intra-articular injection was discussed today. All questions were answered to her satisfaction. She verbally consented to proceed with intra-articular injection today. Cortisone Injection                                                       PROCEDURE NOTE:     Pre op Diagnosis: Knee pain/ DJD left and right Knee     Post op Diagnosis: Same  With the patient's permission, her left and right knee was prepped  in standard sterile fashion with  Alcohol and 2 cc of 0.25% Marcaine and 1 cc of Kenalog 40 mg was injected into the left and right lateral compartment  without difficulty. The patient tolerated this well without difficulty. A band-aid was applied. The patient was advised to ice the knee for 15-20 minutes to relieve any injection site related pain. Continue with medications as previously prescribed. If diabetic, observe blood sugars for the next 24 to 48 hours. Contact PCP if they remain elevated. HEP instructed for Knee ROM  and Quad strengthening exercises. Activities as tolerated.     Follow Up:   Call or return to clinic prn if these symptoms worsen or fail to improve as anticipated.

## 2019-05-13 ENCOUNTER — TELEPHONE (OUTPATIENT)
Dept: CARDIOLOGY CLINIC | Age: 64
End: 2019-05-13

## 2019-05-13 ENCOUNTER — HOSPITAL ENCOUNTER (OUTPATIENT)
Dept: CARDIAC REHAB | Age: 64
Setting detail: THERAPIES SERIES
Discharge: HOME | End: 2019-05-13
Payer: MEDICARE

## 2019-05-13 PROCEDURE — 93798 PHYS/QHP OP CAR RHAB W/ECG: CPT

## 2019-05-13 NOTE — TELEPHONE ENCOUNTER
Last OV 4/15/19:   Called patient. She said her decreased dose of toprol helped for a couple of days then her same symptoms came back. She took nitro on Friday or Saturday (she can not remember which day) because of pounding of her heart and an achy left arm. Nitro did help her symptoms. She said Sil Barraza NP would see her earlier if needed. When would patient be able to come to see her.       1. Coronary artery disease involving native coronary artery of native heart without angina pectoris  -sxs improved after PCI  -S/P RACHELLE of mid LAD after abnormal stress test  -her SOB is typical for angina and I suspect secondary to her Brilinta (it is improving)  -continue DAPT, BB and statin  -decrease dose of BB d/t bradycardia and I suspect contributing to her sxs of dizziness  -risk factor modification          Plan:  Continue ASA, Brilinta, indur, statin, amlodipine, maxzide, losartan  Decrease Toprol Xl to 12.5 mg daily (given her dizziness, diaphoresis, and bradycardia)  Discussed low fat/low sodium diet and reinforced regular aerobic exercise. To begin cardiac rehab  Approximately 25-30 minutes spent with patient and > 50% of time spent on pt education, answering questions and coordinating care regarding her meds, follow up etc.   Follow up in 1 month with D. Enzweiler,CNP or sooner if needed

## 2019-05-13 NOTE — TELEPHONE ENCOUNTER
Frantz Barrientos from [address] [place] [address] rehab is calling stating that Eloisa Pickard was complaining of being light headed, dizzy, can't stand, nausea and yesterday day had to take a nitro for arm pain.     Frantz Barrientos states Eloisa Pickard takes her morning medications at 5 am and the symptoms seem to start around  9 am    Eloisa Pickard has had a LHC with Dr. Sarah Smith on 4/2/2019 and has af/u with Trey Durbin on 5/20    Frantz Barrientos can be reached at [phone] can be reached at  [phone]

## 2019-05-13 NOTE — PROGRESS NOTES
[facility]-Based Program  Phase 2 Cardiac Rehabilitation  Individualized Cardiac Treatment Plan    Patient Name:  Rosa Isela Jonas :  1955 Age:  70 y.o. Account Number:  [de-identified]  Diagnosis: PCI stent to LAD Date of Event: 2019  Physician:  Yg Montenegro  Next Office Visit:  3 months   Risk Stratifications: ()Low (x)Intermediate ()High  Allergies: Allergies   Allergen Reactions    Clonidine Derivatives     Doxycycline     Lisinopril      States \"don't remember reaction but had something long time ago\"    Penicillins Swelling    Sulfa Antibiotics Swelling     Phase 1: Yes    . Primary Diagnosis  PCI and stent to the LAD 2019    Cardiac History: 70 yr old with a hx of HTN,DM,HLD and CVA who had a a stent to her LAD 2019. Pt was experiencing anginal symptoms and a stress test revealed ischemia. Pt reports she is still struggling with nausea and dizziness which last several hours after taking medications. Beta blocker was already reduced and patient takes in the afternoon. This has not helped nausea or dizziness.          []  MI              []  CABG         []  PTCA            []  Valve Replacement    []  Arrhythmia    []  CHF   Last Admission:   [] Pacemaker        []  ICD   []  Other     Ejection fraction   60-65%       Physical Assessment     General Appearance   Color: []  Natural [] Pale ( ) Cyanotic []  Flushed [] Jaundice   Skin Integrity: intact   Incision(s) where: None  Hx of infection at incision(s) site [x] No  [] Yes      Cardiovascular Assessment/ Vital Signs    Heart rate: RRR Heart sounds: S1,S2  Height: 61  Weight: 170.3    Resp rate: reg  Lungs sounds: clear     Dyspnea  []  no  [x] yes       []  Sleep Apnea    []  CPAP     []  Oxygen     Mild SOB noted with activity  Sleeping Habits:  10 hours   # of Pillows: 1   # of times up at night: 5-6    BP  R arm sittin/72   L arm sittin/72    Peripheral pulses  []  no [x] yes    Edema [x] no [] yes  Location:      Fatigue  [] no  [x] yes    Numbness/tingling []  no   [x]  yes   Yes in left arm     Orthopedic/Exercise Limitations     Pain Assessment   Rate on 1 to 10 scale      []  No complaints of pain at this time                    [] Angina/chest pain Rates:    Relief with:       []  Incisional Pain Rates:      Relief with:        [x]  Muscle / Joint / Arthritic    Rates:   6/10  Relief with: rest and tylenol. Had a cortisone injection 4 days ago for knee pain        []  Leg Pain     Rates:    Relief with:         []  Other        Fall Risk Assessment:  Falls in the last 3 months  [] yes [x]  no     []  Alzheimers Disease [] Cognitive Impairment      [] Balance/Gait Disturbance  []  Fall History      []  Visual impairment uncorrected []  Dizziness []  Uses a cane or walker    []  Other     [x]  Fall safety issues reviewed    Physical/behavioral signs of abuse/neglect  [x] no    []  yes      Do you feel safe at home   []  no    [x]  yes    Advanced Directives        [x] no   []  yes   []  Pt given Advanced Directive pack            Individual Cardiac Treatment Plan -EXERCISE  EXERCISE  ASSESSMENT/PLAN EXERCISE  REASSESSMENT  30 day EXERCISE   REASSESSMENT  60 day EXERCISE  REASSESSMENT  90 day EXERCISE  DISCHARGE/FOLLOW-UP   DATE: 05/13/2019 DATE:  DATE:  DATE:  DATE:    EXERCISE ASSESSMENT EXERCISE ASSESSMENT EXERCISE ASSESSMENT EXERCISE  ASSESSMENT EXERCISE REASSESSMENT   6 Min Walk Test  Distance walked: 800 feet 1.5 mph  METs: 2.2  Max HR:108 BPM      RPE:  13   Rhythm: NSR      6 Min. Walk Test  Distance walked:       feet  Mets:  Max.  HR.      BPM  RPE:   Rhythm:  % changed:     Fall Risk/Other  Fall risk assessed (x)yes   Issue:  Orthopedic problems ()yes (x)no  Walker () Cane()   Safety issues reviewed  (x)yes   If patient has balance or orthopedic issue, action:      EXERCISE PLAN Exercise Plan EXERCISE PLAN EXERCISE PLAN EXERCISE PLAN   Interventions Interventions Interventions Interventions Interventions   Exercise Prescription/Order   Exercise Prescription/Order Exercise Prescription/order Exercise   Prescription/Order Discharge Exercise Plan                Mode       1. TM at 1.2 mph for 20 min at 2 mets  2. NS at 1/ 60 blankenship  for 10 min   3. UBE at 0 blankenship for 10 min   Mode      TM  NS  Ellipt/Arc  Bike  UBE  Scifit     Mode      TM  NS  Ellipt/Arc  Bike  UBE  Scifit     Mode      TM  NS  Ellipt/Arc  Bike  UBE  Scifit   Continue independent exercise [] Y    Continue in Phase IV [] Y    Aerobic Mode:     Frequency: 2-3  Week  Duration: 15-30 min  Intensity:   RPE 11-12     Frequency:   3 x week   Duration: 20-30 min  Intensity:   THR ___or RPE 11-13     Frequency: 3 x week  Duration: 20-40 min. Intensity:   THR ___ or RPE 11-14     Frequency: 3 x week  Duration: 30-45 min  Intensity:  THR ___or RPE 11-14 Frequency:      Exercise 3-5 days per week. [] yes[] no  []   30+ min. Of exercise per session    [] yes [] no     Progression:  Depending on patient condition, time and intensity will be increased. An initial met level< 3 is classified as \"light\". Progression to \"moderate\" 3-6 mets or higher for patients in better physical condition. Resistance Training  [x] yes  [] no         Max. Met level:    Session #:    Resistance Training  [] yes  [] no  Type:    Symptoms with Exercise[] yes [] no Max. Met Level:    Session#:    Resistance Training  [] yes [] no  Type:    Symptoms with Exercise[] yes[] no   Max. Met. Level:    Session #:    Resistance Training  [] yes [] no  Type:    Symptoms with Exercise [] yes [] no Max. Met level:    Sessions#:    Home Resistance Training [] Y[] N  Type:   Home Exercise Plan and Goals  Breanna plans:  Exercise (x)yes ()no  Frequency:every day  Duration:10-15 min  Mode:walking    Discharge Goal:  30+ min.  Of aerobic exercise 3-5 days/week       Home Exercise Goal  Reassessed  Exercising [] yes[] no  Frequency:  Duration:  Mode:         Home Exercise Goal Reassessed  Exercising [] yes [] no  Frequency:  Duration:  Mode:       Home Exercise Goal Reassessed  Exercising [] yes [] no  Frequency:  Duration:  Mode:           See above plan   Education Plan Education Plan Education Plan Education Plan Education Completed   Orientation to:  [x] Y [] N Rehab/Routine  [x] Y[] N RPE Scale  [x] Y [] N Exercise Safety  [x] Y [] N   S/S to Report  [x] ()Y [] N Infection Control      Understand/Review  [] Y [] N RPE Scale  [] Y [] N Exercise Safety  [] Y [] N Equipment Orientation  [] Y [] N S/S to Report  [] Y [] N Warm Up/Cool Down      Attends Ed Class:  []  Benefits of Exercise   []   Resistance Training 101  []   Benefits of Cardiac Rehab    [] Patient is competent with stretches/equipment  []  Patient continues to need assist with equipment/routine        Attends Ed. Class  []  Benefits of Exercise  []   Resistance Training 101  []  Benefits of Cardiac Rehab                                Attends Ed.  Class:  []   Benefits of Exercise   []  Resistance Training 101  []   Benefits of Cardiac Rehab    []  Y  Knows when not to exercise  []  Y Completed all 3  Education classes       Individual Cardiac Treatment Plan - Nutrition  NUTRITION  ASSESSMENT/PLAN NUTRITION  REASSESSMENT NUTRITION   REASSESSMENT NUTRITION   REASSESSMENT NUTRITION  DISCHARGE/FOLLOW-UP   NUTRITION ASSESSMENT NUTRITION REASSESSMENT NUTRITION REASSESSMENT NUTRITION   REASSESSMENT NUTRITION REASSESSMENT   Weight Management  Weight: 61 Height: 170.3  BMI: 32.1   [] Normal  [] Overweight (x)Obese    [] Recent gain:    [] Recent loss:   Patients goal weight:   125    Weight Management  Weight:                         Weight Management  Weight:                       Weight   Management  Weight:         Weight Management  Weight:                  Height:    BMI:    Diet  Current Diet: JAKOB,       Diet  Dietary Improvements:   Diabetes:   [x] Y  [] N    HgA1c/date  Checks BS at home   [] Y  [] N  Frequency 3  Range   Medications insulin at night, tradjenta  Low BS Reaction   [x]  To check BS first 6 sessions before/after exercise   []  To carry snack for low BS   Most recent BS    [] Y  [] N Any medication changes   [] Y  [] N Problems with low sugar or high sugars with exercise. Treatment: Most recent BS    [] Y  [] N Any medication changes Most recent BS    [] Y  [] N Any medication changes Most recent BS   [] Y  [] N Any medication changes   Lipids  Hyperlipidemia  [x] Y  [] N  Total Chol: 111  HDL: 28  LDL: 63  Triglycerides: 101  Current Tx: lipitor atorvastatin          [] Y [] N Medication changes? [] Y  [] N Recent Lipids   [] Y [] N Medication changes? [] Y [] N Recent Lipids  [] Y [] N Medication changes? [] Y [] N Recent Lipids  [] Y  [] N Medication changes? [] Y  [] N Recent lipids   Diet Assessment Tool:  Rate your plate survey  AFSJR=58/58  Score of 55-69 is excellent. Diet Assessment Tool:  Rate your plate survey  Score:    /69  Score of 55-69 is excellent. NUTRITION PLAN NUTRITION PLAN NUTRITION PLAN NUTRITION PLAN NUTRITION PLAN   *Interventions* *Interventions* *Interventions* *Interventions* *Interventions*   Professional Referral  Please check if needed. [] Dietician Consult    [] Diabetes Referral Professional Referral   []  Seen by dietician for   Consult/referral   []  Diabetes referral  [] Seen by dietician for consult/ referral   []  Seen for Diabetes Referral   Has patient completed consult if needed? [] Y [] N Met with dietician   [] Y  [] N Met with diabetes educator   Nutrition Education Nutrition Education Nutrition Education Nutrition Education Nutrition Education    [] Individual Nutrition Counseling by staff/dietician    []  Individual Nutrition Counseling   []  Diabetic education if needed    Education Classes by dietician  1. []  Nutrients and Portion control  2. [] Fats & Fiber  3.  []  Sodium, Dash & Mediterranean Diet Education Classes by dietician  1. [] Nutrients & Portion Control  2. [] Fats & Fibers  3. []  Sodium, Dash and Mediterranean Diet           Education Classes by Dietician  1. [] Nutrients & Portion Control  2. [] Fat & Fibers  3. [] Sodium, Dash and Mediterranean Diet   Patient has knowledge of:   [] Y  [] N Heart Healthy diet   [] Y [] N Nutritional guidelines    [] Y  [] N Diabetic diet      Goals Goals Reassessed Goals Reassessed Goals Reassessed Goals   Initial Nutritional Goals Set (x)Yes  ()No Previous Nutritional Goals Met?  ()Yes  ()No Previous Nutritional Goals Met?  ()Yes  ()No Previous Nutritional Goals Met?  ()Yes  ()No Previous Nutritional Goals Met?  ()Yes  ()No   Breanna's nutritional goals are as follows: Individual goals     1. Increase fruits and vegetables to 3-5 servings a day  2. Increase knowledge of cardiac diet. Long term:  1. Improved Rate your plate score  2. Improved glucose control Review goals/ Revised:             Review goals/Revised:             Review goals/Revised:                   Long Term:  1. Improved Rate your plate score  [] yes  2. Improved glucose control  [] yes   Individual Cardiac Treatment Plan - Psychosocial  PSYCHOSOCIAL  ASSESSMENT/PLAN PSYCHOSOCIAL  REASSESSMENT PSYCHOSOCIAL   REASSESSMENT PSYCHOSOCIAL   REASSESSMENT PSYCHOSOCIAL  DISCHARGE/FOLLOW-UP   PSYCHOSOCIAL ASSESSMENT PSYCHOSOCIAL REASSESSMENT PSYCHOSOCIAL REASSESSMENT PSYCHOSOCIAL REASSESSMENT PSYCHOSOCIAL REASSESSMENT   Behavioral Outcomes Behavioral Outcomes Behavioral Outcomes Behavioral Outcomes Behavioral Outcomes   Tool Used: Christina CUELLAR: 28     PHQ-9 score 3  Score 10 or > signifies moderate or > depression. Depression:  Overall score  Psych/Spiritial     PHQ-9 score:    Does patient have Family Support?    [x] Yes   [] No   [] Lives alone [x]  Lives with spouse       PSYCHOSOCIAL PLAN PSYCHOSOCIAL PLAN PSYCHOSOCIAL PLAN PSYCHOSOCIAL PLAN  PSYCHOSOCIAL PLAN   Interventions Interventions Interventions Interventions Interventions    [] Physician notified of PHQ-9 score of 10 or > per protocol, as signifies moderate or > depression           PHQ-9 score:    []  Improvement   []  Unchanged   []  Notify PCP if score is worse   Is patient currently taking anti-depressant or anti-anxiety medications? [] Yes   [x] No  Current depression tx: none Current depression tx:   []  N/A Current depression tx   [] N/A: Current depression tx:   []  N/A  []  Patient has plan/treatment for anxiety/depression. Education Education Education Education Education   Individual discussion/education of:   [x] Stress management skills   [x] Relaxation Techniques   [] Coping Techniques Check if completed:   [] Attends Emotional Wellness class  ()Voices method of coping/ relaxation technique   Check if completed:   [] Attends Emotional wellness class   [] Voices method of coping/ relaxation technique   Check if completed:   [] Attends Emotional wellness class   [] Voices method of coping/ relaxation technique      Goals    Goals*   Initial Psychosocial Goals Set [] Yes   [] No    Previous Psychosocial Goals Met  [] Yes   [] No   Breanna's psychosocial goals are as follows:  1. Feel More like my self.   2. Decreased Nausea and dizziness    Improved PHQ9 score  Improved Mental Health  Score               [] Improved PHQ9 score   [] Improved Mental Health score     Individual Cardiac Treatment Plan - Other:  Risk Factor/Education  CORE MEASURE  ASSESSMENT/PLAN CORE MEASURE  REASSESSMENT  CORE MEASURE  REASSESSMENT CORE MEASURE  REASSESSMENT CORE MEASURE  DISCHARGE/FOLLOW-UP   CORE MEASURE ASSESSMENT CORE MEASURE REASSESSMENT CORE MEASURE REASSESSMENT [address] MEASURE  Discharge   Hypertension   []Yes []No  Resting BP: 130/72  Peak Ex BP: 144/72  See medication list.   Hypertension    Resting BP:   Peak Ex BP:  Medication Changes:  []Yes []No   Hypertension    Resting BP:   Peak Ex BP:  Medication Changes:  []Yes []No     Hypertension    Resting BP:   Peak Ex BP:  Medication Changes:  []Yes []No    Hypertension    Resting BP:   Peak Ex BP:  Medication Changes:  []Yes []No     Tobacco Use  []Current []Former [x]Never    Years smoked:NA  Date Quit:NA  Quit date set: []Yes []No  Date: NA  # cigarettes smoked/day: NA  Smokeless Tobacco use:   []Yes  []No  Amount: NA Tobacco Use  Change in smoking status           Quit date:    Tobacco Use  Change in smoking status           Quit date:    Tobacco Use  Change in smoking status           Quit date:     Tobacco Use  Change in smoking status           Quit date:                      Learning Barriers  Please select one:  []Speech  []Literacy  [] Hearing  []Cognitive  [] Vision  [x]Ready to Learn Learning Barriers addressed:[] Y[] N  Modifications: Other Core Measures EDUCATION PLAN Other Core Measures EDUCATION PLAN Other Core Measures EDUCATION PLAN Other Core Measures EDUCATION PLAN Other Core Measure EDUCATION PLAN   Interventions Interventions Interventions Interventions Interventions   Cardiac Risk Factor Education Needed      [x]HTN              [] Attended Education Class on Risk Factors for Heart Disease  [] Home B/P monitoring  [] Dietary Sodium Restriction      []Attended Education Class on Risk Factors for Heart Disease  [] Home B/P monitoring  [] Dietary Sodium Restriction          []Attended Education Class on Risk Factors for Heart Disease    []Home B/P monitoring  [] Dietary Sodium Restriction  Breanna voices 1. Understanding of risks for heart disease and how to modify their risk. 2. Understanding of importance of restricting sodium in diet.     []Y []N  Smoking Cessation counseling needed  []Y []N Pt verbalizes readiness to quit smoking?  []Y[] () N Quit date set  []Y []N Cut down cigarettes   []One on one counseling on Tobacco Cessation  [] Attend Smoking Cessation classes    []Smoking Cessation Information given  []Smoking cessation medications used:   [] One on one counseling on Tobacco Cessation. [] Attend smoking cessation classes      []Smoking cessation medications used:   [] One on one counseling on Tobacco Cessation. [] Attend smoking cessation classes        []Smoking cessation medications used: Tobacco Cessation Counseling attended  []Yes  []No  If not completed, Why? Core Measure Education Core Measure Education Core Measure Education Core Measure Education Education   [x] Cardiac Rehab Education booklet given  [x] Cardiac Rehab education class list given Attended Education Classes:  1. []  A & P of the  Heart & Body  2. []Heart Disease  3. [] Risk Factors  4. []  Cardiac Medications  5. [] Cardiac Interventions Attended Education Classes:  1. []  A & P of the  Heart & Body  2. [] Heart Disease  3. []  Risk Factors  4.[] ()  Cardiac Medications  5. [] Cardiac Interventions Attended Education Classes:  1. [] A & P of the  Heart & Body  2. []Heart Disease  3. [] Risk Factors  4. [] Cardiac Medications  5. []Cardiac Interventions Attend all the education classes. *Goals* Goal Reassessment Goal Reassessment Goal Reassessment *Goals*   Katelyn Initial Risk factor/education  goals are as follows:  1. How to lose weight and keep it off  2. What She can have and not have food wise      Resting B/P < 140/90 or 130/80 if DM or CKD  [] Y []N Complete tobacco cessation  []Y []N Understanding risks of heart disease  []Y []N Heart failure self management     Goal Progress:    Goal Progress:     Goal Progress: Mehul Santana has met goals ()yes         Physician Response  []Initiate Individualized Treatment Plan (ITP)  []Other   Physician Response  [] Proceed with rehab and 30 day updates per ITP. []Other     Physician Response  [] Proceed with rehab and 30 day updates per ITP. []Other   Physician Response  [] Proceed with rehab and 30 day updates per ITP.    []Other    Physician Final Signature     Comments:Breanna walked for 800 ft./2.2 mets / 1.8 mph.Pt had to stop once for leg and hip pain. Monitor revealed NSR. Appropriate HR and BP response. Pt has agreed to attend exercise 3 days a week and is interested in risk factor modification. Will initiate ITP and progress exercise as tolerated. Pt has been experiencing nausea and dizziness after taking medications. Has already tried taking beta blocker later in the day instead of in the morning. Pt is still having nausea and dizziness after taking  Morning medications. .  It last for several hours and is debilitating. Pt describes has to sit down for some activities due to dizziness and nausea . She also states she has little or no energy and is not feeling better after stent. Left message for Justino Weinberg CNP who saw patient 4/15/2019.   Anu WARD, MED    Physician's Signature: _________________ Date:________

## 2019-05-14 ENCOUNTER — OFFICE VISIT (OUTPATIENT)
Dept: CARDIOLOGY CLINIC | Age: 64
End: 2019-05-14
Payer: MEDICARE

## 2019-05-14 VITALS
OXYGEN SATURATION: 98 % | SYSTOLIC BLOOD PRESSURE: 126 MMHG | BODY MASS INDEX: 32.47 KG/M2 | DIASTOLIC BLOOD PRESSURE: 68 MMHG | WEIGHT: 172 LBS | HEIGHT: 61 IN | HEART RATE: 73 BPM

## 2019-05-14 DIAGNOSIS — I63.9 CEREBROVASCULAR ACCIDENT (CVA), UNSPECIFIED MECHANISM (HCC): ICD-10-CM

## 2019-05-14 DIAGNOSIS — I10 ESSENTIAL HYPERTENSION: ICD-10-CM

## 2019-05-14 DIAGNOSIS — I25.10 CORONARY ARTERY DISEASE INVOLVING NATIVE CORONARY ARTERY OF NATIVE HEART WITHOUT ANGINA PECTORIS: ICD-10-CM

## 2019-05-14 DIAGNOSIS — R42 DIZZINESS: Primary | ICD-10-CM

## 2019-05-14 DIAGNOSIS — E78.5 HYPERLIPIDEMIA LDL GOAL <70: ICD-10-CM

## 2019-05-14 PROCEDURE — 99214 OFFICE O/P EST MOD 30 MIN: CPT | Performed by: NURSE PRACTITIONER

## 2019-05-14 NOTE — PATIENT INSTRUCTIONS
STOP METOPROLOL SUCCINATE (TOPROL XL)    Continue other medications    Call if recurrent symptoms off the metoprolol and we will consider placement of 30 day event monitor

## 2019-05-14 NOTE — LETTER
[facility] [address]. [address]  Phone: [phone]  Fax: [phone]    ELISSA Tapia CNP        May 14, 2019     STACIA Orta MD  [address] [facility] [id number] [facility] [address]    Patient: Zehra Lemon  MR Number: [id number]  YOB: 1955  Date of Visit: 5/14/2019    Dear Dr. Danyelle Valentine:    Thank you for the request for consultation for [facility]. If you have questions, please do not hesitate to call me. I look forward to following Kailyn Boys along with you.     Sincerely,        ELISSA Tapia CNP

## 2019-05-15 ENCOUNTER — HOSPITAL ENCOUNTER (OUTPATIENT)
Dept: CARDIAC REHAB | Age: 64
Setting detail: THERAPIES SERIES
Discharge: HOME | End: 2019-05-15
Payer: MEDICARE

## 2019-05-15 PROCEDURE — 93798 PHYS/QHP OP CAR RHAB W/ECG: CPT

## 2019-05-17 ENCOUNTER — HOSPITAL ENCOUNTER (OUTPATIENT)
Dept: CARDIAC REHAB | Age: 64
Setting detail: THERAPIES SERIES
Discharge: HOME | End: 2019-05-17
Payer: MEDICARE

## 2019-05-17 PROCEDURE — 93798 PHYS/QHP OP CAR RHAB W/ECG: CPT

## 2019-05-20 ENCOUNTER — HOSPITAL ENCOUNTER (OUTPATIENT)
Dept: CARDIAC REHAB | Age: 64
Setting detail: THERAPIES SERIES
Discharge: HOME | End: 2019-05-20
Payer: MEDICARE

## 2019-05-22 ENCOUNTER — HOSPITAL ENCOUNTER (OUTPATIENT)
Dept: CARDIAC REHAB | Age: 64
Setting detail: THERAPIES SERIES
Discharge: HOME | End: 2019-05-22
Payer: MEDICARE

## 2019-05-22 PROCEDURE — 93798 PHYS/QHP OP CAR RHAB W/ECG: CPT

## 2019-05-24 ENCOUNTER — HOSPITAL ENCOUNTER (OUTPATIENT)
Dept: CARDIAC REHAB | Age: 64
Setting detail: THERAPIES SERIES
Discharge: HOME | End: 2019-05-24
Payer: MEDICARE

## 2019-05-24 PROCEDURE — 93798 PHYS/QHP OP CAR RHAB W/ECG: CPT

## 2019-05-29 ENCOUNTER — HOSPITAL ENCOUNTER (OUTPATIENT)
Dept: CARDIAC REHAB | Age: 64
Setting detail: THERAPIES SERIES
Discharge: HOME | End: 2019-05-29
Payer: MEDICARE

## 2019-05-29 PROCEDURE — 93798 PHYS/QHP OP CAR RHAB W/ECG: CPT

## 2019-05-31 ENCOUNTER — HOSPITAL ENCOUNTER (OUTPATIENT)
Dept: CARDIAC REHAB | Age: 64
Setting detail: THERAPIES SERIES
Discharge: HOME | End: 2019-05-31
Payer: MEDICARE

## 2019-05-31 PROCEDURE — 93798 PHYS/QHP OP CAR RHAB W/ECG: CPT

## 2019-06-03 ENCOUNTER — HOSPITAL ENCOUNTER (OUTPATIENT)
Dept: CARDIAC REHAB | Age: 64
Setting detail: THERAPIES SERIES
Discharge: HOME | End: 2019-06-03
Payer: MEDICARE

## 2019-06-03 PROCEDURE — 93798 PHYS/QHP OP CAR RHAB W/ECG: CPT

## 2019-06-07 ENCOUNTER — HOSPITAL ENCOUNTER (OUTPATIENT)
Dept: CARDIAC REHAB | Age: 64
Setting detail: THERAPIES SERIES
Discharge: HOME | End: 2019-06-07
Payer: MEDICARE

## 2019-06-07 PROCEDURE — 93798 PHYS/QHP OP CAR RHAB W/ECG: CPT

## 2019-06-10 ENCOUNTER — HOSPITAL ENCOUNTER (OUTPATIENT)
Dept: CARDIAC REHAB | Age: 64
Setting detail: THERAPIES SERIES
Discharge: HOME | End: 2019-06-10
Payer: MEDICARE

## 2019-06-10 PROCEDURE — 93798 PHYS/QHP OP CAR RHAB W/ECG: CPT

## 2019-06-12 ENCOUNTER — HOSPITAL ENCOUNTER (OUTPATIENT)
Dept: CARDIAC REHAB | Age: 64
Setting detail: THERAPIES SERIES
Discharge: HOME | End: 2019-06-12
Payer: MEDICARE

## 2019-06-12 PROCEDURE — 93798 PHYS/QHP OP CAR RHAB W/ECG: CPT

## 2019-06-12 NOTE — PROGRESS NOTES
yes    Edema [x] no [] yes  Location:      Fatigue  [] no  [x] yes    Numbness/tingling []  no   [x]  yes   Yes in left arm     Orthopedic/Exercise Limitations     Pain Assessment   Rate on 1 to 10 scale      []  No complaints of pain at this time                    [] Angina/chest pain Rates:    Relief with:       []  Incisional Pain Rates:      Relief with:        [x]  Muscle / Joint / Arthritic    Rates:   6/10  Relief with: rest and tylenol. Had a cortisone injection 4 days ago for knee pain        []  Leg Pain     Rates:    Relief with:         []  Other        Fall Risk Assessment:  Falls in the last 3 months  [] yes [x]  no     []  Alzheimers Disease [] Cognitive Impairment      [] Balance/Gait Disturbance  []  Fall History      []  Visual impairment uncorrected []  Dizziness []  Uses a cane or walker    []  Other     [x]  Fall safety issues reviewed    Physical/behavioral signs of abuse/neglect  [x] no    []  yes      Do you feel safe at home   []  no    [x]  yes    Advanced Directives        [x] no   []  yes   []  Pt given Advanced Directive pack            Individual Cardiac Treatment Plan -EXERCISE  EXERCISE  ASSESSMENT/PLAN EXERCISE  REASSESSMENT  30 day EXERCISE   REASSESSMENT  60 day EXERCISE  REASSESSMENT  90 day EXERCISE  DISCHARGE/FOLLOW-UP   DATE: 05/13/2019 DATE:  6/12/19 DATE:  DATE:  DATE:    EXERCISE ASSESSMENT EXERCISE ASSESSMENT EXERCISE ASSESSMENT EXERCISE  ASSESSMENT EXERCISE REASSESSMENT   6 Min Walk Test  Distance walked: 800 feet 1.5 mph  METs: 2.2  Max HR:108 BPM      RPE:  13   Rhythm: NSR      6 Min. Walk Test  Distance walked:       feet  Mets:  Max.  HR.      BPM  RPE:   Rhythm:  % changed:     Fall Risk/Other  Fall risk assessed (x)yes   Issue:  Orthopedic problems ()yes (x)no  Walker () Cane()   Safety issues reviewed  (x)yes   If patient has balance or orthopedic issue, action:      Safety has been maintained while in cardiac rehab       [address] [facility] EXERCISE PLAN EXERCISE PLAN   Interventions Interventions Interventions Interventions Interventions   Exercise Prescription/Order   Exercise Prescription/Order Exercise Prescription/order Exercise   Prescription/Order Discharge Exercise Plan                Mode       1. TM at 1.2 mph for 20 min at 2 mets  2. NS at 1/ 60 blankenship  for 10 min   3. UBE at 0 blankenship for 10 min   Mode      TM 1.0 /0 only tolerated 2 minutes     NS 5/36 for 30 min @ 2.1 Mets     Scifit  A/L 4/7 blankenship for 10 min @ 1.4 Mets     Breanna had difficulty with recumbent bikes and upright bike due to her short stature      Mode      TM  NS  Ellipt/Arc  Bike  UBE  Scifit     Mode      TM  NS  Ellipt/Arc  Bike  UBE  Scifit   Continue independent exercise [] Y    Continue in Phase IV [] Y    Aerobic Mode:     Frequency: 2-3  Week  Duration: 15-30 min  Intensity:   RPE 11-12     Frequency:   3 x week   Duration: 20-30 min  Intensity:  11-13  THR ___or RPE 11-13     Frequency: 3 x week  Duration: 20-40 min. Intensity:   THR ___ or RPE 11-14     Frequency: 3 x week  Duration: 30-45 min  Intensity:  THR ___or RPE 11-14 Frequency:      Exercise 3-5 days per week. [] yes[] no  []   30+ min. Of exercise per session    [] yes [] no     Progression:  Depending on patient condition, time and intensity will be increased. An initial met level< 3 is classified as \"light\". Progression to \"moderate\" 3-6 mets or higher for patients in better physical condition. Resistance Training  [x] yes  [] no         Max. Met level: 2.1    Session #:11    Resistance Training  [x] yes  [] no  Type: 3 # hand weights     Symptoms with Exercise[] yes [x] no Max. Met Level:    Session#:    Resistance Training  [] yes [] no  Type:    Symptoms with Exercise[] yes[] no   Max. Met. Level:    Session #:    Resistance Training  [] yes [] no  Type:    Symptoms with Exercise [] yes [] no Max.  Met level:    Sessions#:    Home Resistance Training [] Y[] N  Type:   Home Exercise Plan and Management  Weight: 171.0                        Weight Management  Weight:                       Weight   Management  Weight:         Weight Management  Weight:                  Height:    BMI:    Diet  Current Diet: JAKOB,       Diet  Dietary Improvements:   Diabetes:   [x] Y  [] N    HgA1c/date  Checks BS at home   [] Y  [] N  Frequency 3  Range   Medications insulin at night, tradjenta  Low BS Reaction   [x]  To check BS first 6 sessions before/after exercise   []  To carry snack for low BS   Most recent BS    [] Y  [x] N Any medication changes   [x] Y  [] N Problems with low sugar or high sugars with exercise. Treatment:    FBS was 95    Rexgerald De Leon states she ate some M&M's prior to exercise today to keep BS elevated. She has ranged  Pre exercise     Post exercise   She has been given  OJ for BS's below 100    She has been encouraged to eat a snack or bring one with her to rehab      Most recent BS    [] Y  [] N Any medication changes Most recent BS    [] Y  [] N Any medication changes Most recent BS   [] Y  [] N Any medication changes   Lipids  Hyperlipidemia  [x] Y  [] N  Total Chol: 111  HDL: 28  LDL: 63  Triglycerides: 101  Current Tx: lipitor atorvastatin          [] Y [x] N Medication changes? [] Y  [x] N Recent Lipids      She states she has had some recent labs and now needs to see   a \" kidney doctor\"  This week   [] Y [] N Medication changes? [] Y [] N Recent Lipids  [] Y [] N Medication changes? [] Y [] N Recent Lipids  [] Y  [] N Medication changes? [] Y  [] N Recent lipids   Diet Assessment Tool:  Rate your plate survey  NZICT=08/91  Score of 55-69 is excellent. Diet Assessment Tool:  Rate your plate survey  Score:    /69  Score of 55-69 is excellent. NUTRITION PLAN NUTRITION PLAN NUTRITION PLAN NUTRITION PLAN NUTRITION PLAN   *Interventions* *Interventions* *Interventions* *Interventions* *Interventions*   Professional Referral  Please check if needed.    [] Dietician Consult    [] Diabetes Referral Professional Referral   []  Seen by dietician for   Consult/referral   []  Diabetes referral  [] Seen by dietician for consult/ referral   []  Seen for Diabetes Referral   Has patient completed consult if needed? [] Y [] N Met with dietician   [] Y  [] N Met with diabetes educator   Nutrition Education Nutrition Education Nutrition Education Nutrition Education Nutrition Education    [] Individual Nutrition Counseling by staff/dietician    []  Individual Nutrition Counseling   []  Diabetic education if needed    Education Classes by dietician  1. []  Nutrients and Portion control  2. [] Fats & Fiber  3. []  Sodium, Dash & Mediterranean Diet               Education Classes by dietician  1. [] Nutrients & Portion Control  2. [] Fats & Fibers  3. []  Sodium, Dash and Mediterranean Diet           Education Classes by Dietician  1. [] Nutrients & Portion Control  2. [] Fat & Fibers  3. [] Sodium, Dash and Mediterranean Diet   Patient has knowledge of:   [] Y  [] N Heart Healthy diet   [] Y [] N Nutritional guidelines    [] Y  [] N Diabetic diet      Goals Goals Reassessed Goals Reassessed Goals Reassessed Goals   Initial Nutritional Goals Set (x)Yes  ()No Previous Nutritional Goals Met?  ()Yes  ()No Previous Nutritional Goals Met?  ()Yes  ()No Previous Nutritional Goals Met?  ()Yes  ()No Previous Nutritional Goals Met?  ()Yes  ()No   Breanna's nutritional goals are as follows: Individual goals     1. Increase fruits and vegetables to 3-5 servings a day  2. Increase knowledge of cardiac diet. Long term:  1. Improved Rate your plate score  2. Improved glucose control Review goals/ Revised:      1. She states she is monitoring her fruits as they increase her BS. She does eat a small banana every morning  And will have some grapes or water melon     2. She will be attending the upcoming nutrition classes as a 3 part series.      She states she measures her portions   3 oz meat serving   Eating carrots and celery with hummus for snacks  and eating vegetables for dinner               Review goals/Revised:             Review goals/Revised:                   Long Term:  1. Improved Rate your plate score  [] yes  2. Improved glucose control  [] yes   Individual Cardiac Treatment Plan - Psychosocial  PSYCHOSOCIAL  ASSESSMENT/PLAN PSYCHOSOCIAL  REASSESSMENT PSYCHOSOCIAL   REASSESSMENT PSYCHOSOCIAL   REASSESSMENT PSYCHOSOCIAL  DISCHARGE/FOLLOW-UP   PSYCHOSOCIAL ASSESSMENT PSYCHOSOCIAL REASSESSMENT PSYCHOSOCIAL REASSESSMENT PSYCHOSOCIAL REASSESSMENT PSYCHOSOCIAL REASSESSMENT   Behavioral Outcomes Behavioral Outcomes Behavioral Outcomes Behavioral Outcomes Behavioral Outcomes   Tool Used: Caitlinoutarun COOP: 28     PHQ-9 score 3  Score 10 or > signifies moderate or > depression. Depression:  Overall score  Psych/Spiritial     PHQ-9 score:    Does patient have Family Support? [x] Yes   [] No   [] Lives alone [x]  Lives with spouse       PSYCHOSOCIAL PLAN PSYCHOSOCIAL PLAN PSYCHOSOCIAL PLAN PSYCHOSOCIAL PLAN  PSYCHOSOCIAL PLAN   Interventions Interventions Interventions Interventions Interventions    [] Physician notified of PHQ-9 score of 10 or > per protocol, as signifies moderate or > depression           PHQ-9 score:    []  Improvement   []  Unchanged   []  Notify PCP if score is worse   Is patient currently taking anti-depressant or anti-anxiety medications? [] Yes   [x] No  Current depression tx: none Current depression tx:   [x]  N/A    Denies depression or anxiety  states she is feeling better since exercising  Current depression tx   [] N/A: Current depression tx:   []  N/A  []  Patient has plan/treatment for anxiety/depression.    Education Education Education Education Education   Individual discussion/education of:   [x] Stress management skills   [x] Relaxation Techniques   [] Coping Techniques Check if completed:   [] Attends Emotional Wellness class  (x)Voices method of coping/ date:    Tobacco Use  Change in smoking status           Quit date:     Tobacco Use  Change in smoking status           Quit date:                      Learning Barriers  Please select one:  []Speech  []Literacy  [] Hearing  []Cognitive  [] Vision  [x]Ready to Learn Learning Barriers addressed:[] Y[] N  Modifications: Other Core Measures EDUCATION PLAN Other Core Measures EDUCATION PLAN Other Core Measures EDUCATION PLAN Other Core Measures EDUCATION PLAN Other Core Measure EDUCATION PLAN   Interventions Interventions Interventions Interventions Interventions   Cardiac Risk Factor Education Needed      [x]HTN              [] Attended Education Class on Risk Factors for Heart Disease  [x] Home B/P monitoring - every AM   Averages 120/60  [x] Dietary Sodium Restriction      []Attended Education Class on Risk Factors for Heart Disease  [] Home B/P monitoring  [] Dietary Sodium Restriction          []Attended Education Class on Risk Factors for Heart Disease    []Home B/P monitoring  [] Dietary Sodium Restriction  Breanna voices 1. Understanding of risks for heart disease and how to modify their risk. 2. Understanding of importance of restricting sodium in diet. []Y []N  Smoking Cessation counseling needed  []Y []N Pt verbalizes readiness to quit smoking?  []Y[] () N Quit date set  []Y []N Cut down cigarettes   []One on one counseling on Tobacco Cessation  [] Attend Smoking Cessation classes    []Smoking Cessation Information given  []Smoking cessation medications used:   [] One on one counseling on Tobacco Cessation. [] Attend smoking cessation classes      []Smoking cessation medications used:   [] One on one counseling on Tobacco Cessation. [] Attend smoking cessation classes        []Smoking cessation medications used: Tobacco Cessation Counseling attended  []Yes  []No  If not completed, Why?      Core Measure Education Core Measure Education Core Measure Education Core Measure Education Education   [x]

## 2019-06-14 ENCOUNTER — HOSPITAL ENCOUNTER (OUTPATIENT)
Dept: CARDIAC REHAB | Age: 64
Setting detail: THERAPIES SERIES
Discharge: HOME | End: 2019-06-14
Payer: MEDICARE

## 2019-06-14 PROCEDURE — 93798 PHYS/QHP OP CAR RHAB W/ECG: CPT

## 2019-06-17 ENCOUNTER — HOSPITAL ENCOUNTER (OUTPATIENT)
Dept: CARDIAC REHAB | Age: 64
Setting detail: THERAPIES SERIES
Discharge: HOME | End: 2019-06-17
Payer: MEDICARE

## 2019-06-17 PROCEDURE — 93798 PHYS/QHP OP CAR RHAB W/ECG: CPT

## 2019-06-19 ENCOUNTER — HOSPITAL ENCOUNTER (OUTPATIENT)
Dept: CARDIAC REHAB | Age: 64
Setting detail: THERAPIES SERIES
Discharge: HOME | End: 2019-06-19
Payer: MEDICARE

## 2019-06-19 PROCEDURE — 93798 PHYS/QHP OP CAR RHAB W/ECG: CPT

## 2019-06-21 ENCOUNTER — HOSPITAL ENCOUNTER (OUTPATIENT)
Dept: CARDIAC REHAB | Age: 64
Setting detail: THERAPIES SERIES
Discharge: HOME | End: 2019-06-21
Payer: MEDICARE

## 2019-06-21 PROCEDURE — 93798 PHYS/QHP OP CAR RHAB W/ECG: CPT

## 2019-06-24 ENCOUNTER — HOSPITAL ENCOUNTER (OUTPATIENT)
Dept: CARDIAC REHAB | Age: 64
Setting detail: THERAPIES SERIES
Discharge: HOME | End: 2019-06-24
Payer: MEDICARE

## 2019-06-24 PROCEDURE — 93798 PHYS/QHP OP CAR RHAB W/ECG: CPT

## 2019-06-25 LAB
GLUCOSE BLD-MCNC: 158 MG/DL (ref 70–99)
PERFORMED ON: ABNORMAL

## 2019-06-26 ENCOUNTER — HOSPITAL ENCOUNTER (OUTPATIENT)
Dept: CARDIAC REHAB | Age: 64
Setting detail: THERAPIES SERIES
Discharge: HOME | End: 2019-06-26
Payer: MEDICARE

## 2019-06-26 PROCEDURE — 93798 PHYS/QHP OP CAR RHAB W/ECG: CPT

## 2019-06-28 ENCOUNTER — HOSPITAL ENCOUNTER (OUTPATIENT)
Dept: CARDIAC REHAB | Age: 64
Setting detail: THERAPIES SERIES
Discharge: HOME | End: 2019-06-28
Payer: MEDICARE

## 2019-06-28 PROCEDURE — 93798 PHYS/QHP OP CAR RHAB W/ECG: CPT

## 2019-07-01 ENCOUNTER — HOSPITAL ENCOUNTER (OUTPATIENT)
Dept: CARDIAC REHAB | Age: 64
Setting detail: THERAPIES SERIES
Discharge: HOME | End: 2019-07-01
Payer: MEDICARE

## 2019-07-01 LAB
GLUCOSE BLD-MCNC: 175 MG/DL (ref 70–99)
PERFORMED ON: ABNORMAL

## 2019-07-01 PROCEDURE — 93798 PHYS/QHP OP CAR RHAB W/ECG: CPT

## 2019-07-03 ENCOUNTER — HOSPITAL ENCOUNTER (OUTPATIENT)
Dept: CARDIAC REHAB | Age: 64
Setting detail: THERAPIES SERIES
Discharge: HOME | End: 2019-07-03
Payer: MEDICARE

## 2019-07-03 PROCEDURE — 93798 PHYS/QHP OP CAR RHAB W/ECG: CPT

## 2019-07-05 ENCOUNTER — HOSPITAL ENCOUNTER (OUTPATIENT)
Dept: CARDIAC REHAB | Age: 64
Setting detail: THERAPIES SERIES
Discharge: HOME | End: 2019-07-05
Payer: MEDICARE

## 2019-07-05 PROCEDURE — 93798 PHYS/QHP OP CAR RHAB W/ECG: CPT

## 2019-07-08 ENCOUNTER — HOSPITAL ENCOUNTER (OUTPATIENT)
Dept: CARDIAC REHAB | Age: 64
Setting detail: THERAPIES SERIES
Discharge: HOME | End: 2019-07-08
Payer: MEDICARE

## 2019-07-08 PROCEDURE — 93798 PHYS/QHP OP CAR RHAB W/ECG: CPT

## 2019-07-10 ENCOUNTER — HOSPITAL ENCOUNTER (OUTPATIENT)
Dept: CARDIAC REHAB | Age: 64
Setting detail: THERAPIES SERIES
Discharge: HOME | End: 2019-07-10
Payer: MEDICARE

## 2019-07-10 PROCEDURE — 93798 PHYS/QHP OP CAR RHAB W/ECG: CPT

## 2019-07-10 NOTE — PROGRESS NOTES
24    Resistance Training  [x] yes [] no  Type: # hand weights    Symptoms with Exercise[x] yes[] no    Increased knee and back pain with increased workloads on the NS, has tolerated level 6 as her maximum before it begins to hurt her knees and back   Max. Met. Level:    Session #:    Resistance Training  [] yes [] no  Type:    Symptoms with Exercise [] yes [] no Max. Met level:    Sessions#:    Home Resistance Training [] Y[] N  Type:   Home Exercise Plan and Goals  Breanna plans:  Exercise (x)yes ()no  Frequency:every day  Duration:10-15 min  Mode:walking    Discharge Goal:  30+ min. Of aerobic exercise 3-5 days/week       Home Exercise Goal  Reassessed  Exercising [] yes[x] no  Frequency:  Duration:  Mode:  Encouraged to start walking on her days away from rehab. She states she can walk in the [place] in her neighborhood with her           Home Exercise Goal Reassessed  Exercising [] yes [x] no  Frequency:  Duration:  Mode:    Patient has not been exercising at home stating that it has been too hot outside for walking. She does belong to the [facility]. She has  Been encouraged to begin exercising again at the [facility] on days alternate of cardiac rehab. Home Exercise Goal Reassessed  Exercising [] yes [] no  Frequency:  Duration:  Mode:           See above plan   Education Plan Education Plan Education Plan Education Plan Education Completed   Orientation to:  [x] Y [] N Rehab/Routine  [x] Y[] N RPE Scale  [x] Y [] N Exercise Safety  [x] Y [] N   S/S to Report  [x] ()Y [] N Infection Control      Understand/Review  [x] Y [] N RPE Scale  [x] Y [] N Exercise Safety  [x] Y [] N Equipment Orientation  [x] Y [] N S/S to Report  [x] Y [] N Warm Up/Cool Down      Attends Ed Class:  [x]  Benefits of Exercise   [x]   Resistance Training 101      [x] Patient is competent with stretches/equipment  []  Patient continues to need assist with equipment/routine        Attends Ed.  Class  [x]  Benefits of Exercise  [x]   Resistance pain.   Monitor revealed NSR. Appropriate HR and BP response. Pt has agreed to attend exercise 3 days a week and is interested in risk factor modification. Will initiate ITP and progress exercise as tolerated. Pt has been experiencing nausea and dizziness after taking medications. Has already tried taking beta blocker later in the day instead of in the morning. Pt is still having nausea and dizziness after taking  Morning medications. .  It last for several hours and is debilitating. Pt describes has to sit down for some activities due to dizziness and nausea . She also states she has little or no energy and is not feeling better after stent. Left message for Nisha Morrison CNP who saw patient 4/15/2019. Anu WARD, MED    Comments 30 day Progress Report. Radha Pina has completed 11 sessions of cardiac rehab phase II. She has increased her workload levels, improving her Met level to 2.1. She has not had complaints of dizziness while in cardiac rehab but has had 1-2 episodes at home, which she then called in and did not exercise on those days. See above for goals she continues to make positive life style changes to  progression to attain. She will continue to be provided with education classes and staff support to increase her knowledge of heart disease and risk factor modifications. Her telemetry reveals Sinus Rhythm with a resting HR 67-79 Thank You. Burgess Khanh POWERS Thank You     60 day ITP Update: Mrs. Leti Solis continues to progress well in the program. She has completed 24 monitored sessions. She has reached a max MET level of 2.4 on the Nustep. She reports a lesser frequency in regards to the previously referenced episodes of dizziness. She denies these episodes while in rehab. She continues to make progress in terms of meeting her initial program goals. Please see above for details. Telemetry remains normal sinus rhythm with resting heart rates 70-80 bpm and exercise rates of  bpm. She has

## 2019-07-12 ENCOUNTER — HOSPITAL ENCOUNTER (OUTPATIENT)
Dept: CARDIAC REHAB | Age: 64
Setting detail: THERAPIES SERIES
Discharge: HOME | End: 2019-07-12
Payer: MEDICARE

## 2019-07-12 PROCEDURE — 93798 PHYS/QHP OP CAR RHAB W/ECG: CPT

## 2019-07-15 ENCOUNTER — HOSPITAL ENCOUNTER (OUTPATIENT)
Dept: CARDIAC REHAB | Age: 64
Setting detail: THERAPIES SERIES
Discharge: HOME | End: 2019-07-15
Payer: MEDICARE

## 2019-07-15 PROCEDURE — 93798 PHYS/QHP OP CAR RHAB W/ECG: CPT

## 2019-07-19 ENCOUNTER — HOSPITAL ENCOUNTER (OUTPATIENT)
Dept: CARDIAC REHAB | Age: 64
Setting detail: THERAPIES SERIES
Discharge: HOME | End: 2019-07-19
Payer: MEDICARE

## 2019-07-19 PROCEDURE — 93798 PHYS/QHP OP CAR RHAB W/ECG: CPT

## 2019-07-22 ENCOUNTER — HOSPITAL ENCOUNTER (OUTPATIENT)
Dept: CARDIAC REHAB | Age: 64
Setting detail: THERAPIES SERIES
Discharge: HOME | End: 2019-07-22
Payer: MEDICARE

## 2019-07-22 PROCEDURE — 93798 PHYS/QHP OP CAR RHAB W/ECG: CPT

## 2019-07-24 ENCOUNTER — HOSPITAL ENCOUNTER (OUTPATIENT)
Dept: CARDIAC REHAB | Age: 64
Setting detail: THERAPIES SERIES
Discharge: HOME | End: 2019-07-24
Payer: MEDICARE

## 2019-07-24 PROCEDURE — 93798 PHYS/QHP OP CAR RHAB W/ECG: CPT

## 2019-07-25 ENCOUNTER — TELEPHONE (OUTPATIENT)
Dept: CARDIOLOGY CLINIC | Age: 64
End: 2019-07-25

## 2019-07-25 NOTE — TELEPHONE ENCOUNTER
Lacey Worthington stopped in this afternoon stating that [facility] is too expensive. She needs a cheaper version of the prescription. She has 0 pills left and states she needs it called in today. You can reach Lacey Worthington at [phone].

## 2019-07-25 NOTE — TELEPHONE ENCOUNTER
I spoke with Dr Faby Arriola and he would like for Brie Schwartz to continue the Nilesh Fieldin. I am going to contact the rep about assistance. I have talked to Brie Schwartz and I will get her samples of 60 mg and she will take 1.5 tablets BID. She verbalized understanding and will  tomorrow.

## 2019-07-26 ENCOUNTER — HOSPITAL ENCOUNTER (OUTPATIENT)
Dept: CARDIAC REHAB | Age: 64
Setting detail: THERAPIES SERIES
Discharge: HOME | End: 2019-07-26
Payer: MEDICARE

## 2019-07-26 PROCEDURE — 93798 PHYS/QHP OP CAR RHAB W/ECG: CPT

## 2019-07-29 ENCOUNTER — HOSPITAL ENCOUNTER (OUTPATIENT)
Dept: CARDIAC REHAB | Age: 64
Setting detail: THERAPIES SERIES
Discharge: HOME | End: 2019-07-29
Payer: MEDICARE

## 2019-07-29 PROCEDURE — 93798 PHYS/QHP OP CAR RHAB W/ECG: CPT

## 2019-07-31 ENCOUNTER — HOSPITAL ENCOUNTER (OUTPATIENT)
Dept: CARDIAC REHAB | Age: 64
Setting detail: THERAPIES SERIES
Discharge: HOME | End: 2019-07-31
Payer: MEDICARE

## 2019-07-31 ENCOUNTER — TELEPHONE (OUTPATIENT)
Dept: CARDIOLOGY CLINIC | Age: 64
End: 2019-07-31

## 2019-07-31 PROCEDURE — 93798 PHYS/QHP OP CAR RHAB W/ECG: CPT

## 2019-08-02 ENCOUNTER — HOSPITAL ENCOUNTER (OUTPATIENT)
Dept: CARDIAC REHAB | Age: 64
Setting detail: THERAPIES SERIES
Discharge: HOME | End: 2019-08-02
Payer: MEDICARE

## 2019-08-02 PROCEDURE — 93798 PHYS/QHP OP CAR RHAB W/ECG: CPT

## 2019-08-05 ENCOUNTER — HOSPITAL ENCOUNTER (OUTPATIENT)
Dept: CARDIAC REHAB | Age: 64
Setting detail: THERAPIES SERIES
Discharge: HOME | End: 2019-08-05
Payer: MEDICARE

## 2019-08-05 PROCEDURE — 93798 PHYS/QHP OP CAR RHAB W/ECG: CPT

## 2019-08-07 ENCOUNTER — HOSPITAL ENCOUNTER (OUTPATIENT)
Dept: CARDIAC REHAB | Age: 64
Setting detail: THERAPIES SERIES
Discharge: HOME | End: 2019-08-07
Payer: MEDICARE

## 2019-08-07 PROCEDURE — 93798 PHYS/QHP OP CAR RHAB W/ECG: CPT

## 2019-08-09 ENCOUNTER — HOSPITAL ENCOUNTER (OUTPATIENT)
Dept: CARDIAC REHAB | Age: 64
Setting detail: THERAPIES SERIES
Discharge: HOME | End: 2019-08-09
Payer: MEDICARE

## 2019-08-09 PROCEDURE — 93798 PHYS/QHP OP CAR RHAB W/ECG: CPT

## 2019-08-13 ENCOUNTER — OFFICE VISIT (OUTPATIENT)
Dept: ORTHOPEDIC SURGERY | Age: 64
End: 2019-08-13
Payer: MEDICARE

## 2019-08-13 VITALS
BODY MASS INDEX: 32.28 KG/M2 | TEMPERATURE: 98.1 F | HEIGHT: 61 IN | DIASTOLIC BLOOD PRESSURE: 76 MMHG | WEIGHT: 171 LBS | SYSTOLIC BLOOD PRESSURE: 137 MMHG | HEART RATE: 78 BPM

## 2019-08-13 DIAGNOSIS — M17.0 PRIMARY OSTEOARTHRITIS OF BOTH KNEES: Primary | ICD-10-CM

## 2019-08-13 PROCEDURE — 20610 DRAIN/INJ JOINT/BURSA W/O US: CPT | Performed by: PHYSICIAN ASSISTANT

## 2019-08-13 NOTE — PROGRESS NOTES
Subjective:      Patient ID: Loan Guillen is a 70 y.o.  female. Chief Complaint   Patient presents with    Knee Problem     Bilateral knee        HPI: She is here for evaluation and treatment of left and right knee pain related to arthritis. She states the previous injection which was performed 5/8/2019 helped relieve the knee symptoms. The bilateral knee pain has gradually returned. There has not been a recent injury. Pain is 5/10. Pain is worse with activity. Pain improves somewhat with rest and elevation. Review of Systems:   Negative for fever or chills. Negative for numbness or tingling around the knee.     Past Medical History:   Diagnosis Date    Arthritis     CAD (coronary artery disease)     \"blocked artery\" no stents    Constipation     due to slow movement through bowels    Depression     Diabetes mellitus (HCC)     GERD (gastroesophageal reflux disease)     Hyperlipidemia     Hypertension     Vertigo     Vitamin D deficiency, unspecified        Family History   Problem Relation Age of Onset    High Blood Pressure Mother     High Cholesterol Mother        Past Surgical History:   Procedure Laterality Date    DIAGNOSTIC CARDIAC CATH LAB PROCEDURE  04/2019    stent    HYSTERECTOMY         Social History     Occupational History    Not on file   Tobacco Use    Smoking status: Never Smoker    Smokeless tobacco: Never Used   Substance and Sexual Activity    Alcohol use: No    Drug use: No    Sexual activity: Not on file       Current Outpatient Medications   Medication Sig Dispense Refill    ticagrelor (BRILINTA) 90 MG TABS tablet Take 1 tablet by mouth 2 times daily 60 tablet 11    metoprolol succinate (TOPROL XL) 25 MG extended release tablet Take 0.5 tablets by mouth daily 30 tablet 3    acetaminophen (TYLENOL) 500 MG tablet Take 500 mg by mouth every 6 hours as needed for Pain      meclizine (ANTIVERT) 25 MG tablet Take 1 tablet by mouth 3 times daily as needed for testing. Extensor Mechanism is  intact. X Rays: was not performed in the office today:       Assessment:       ICD-10-CM    1. Primary osteoarthritis of both knees M17.0 [place] ARTHROCENTESIS ASPIR&/INJ MAJOR JT/BURSA W/O US     [place] TRIAMCINOLONE ACETONIDE INJ        Plan:     The natural history of the patient's diagnosis as well as the treatment options were discussed in full and questions were answered. Risks and benefits of the treatment options also reviewed in detail. May increase blood sugars in diabetics. She understands that eventually knee arthroplasty will be the surgical correction needed to control the pain related to the arthritic knee condition. And when the injections fail to control the symptoms then surgical correction should be considered. Risk and benefits of corticosteroid intra-articular injection was discussed today. All questions were answered to her satisfaction. She verbally consented to proceed with intra-articular injection today. Cortisone Injection                                                       PROCEDURE NOTE:     Pre op Diagnosis: Knee pain/ DJD left and right Knee     Post op Diagnosis: Same  With the patient's permission, her left and right knee was prepped  in standard sterile fashion with  Alcohol and 2 cc of 0.25% Marcaine and 1 cc of Kenalog 40 mg was injected into the left and right lateral compartment  without difficulty. The patient tolerated this well without difficulty. A band-aid was applied. The patient was advised to ice the knee for 15-20 minutes to relieve any injection site related pain. Continue with medications as previously prescribed. If diabetic, observe blood sugars for the next 24 to 48 hours. Contact PCP if they remain elevated. HEP instructed for Knee ROM  and Quad strengthening exercises. Activities as tolerated.     Follow Up:   Call or return to clinic prn if these symptoms worsen or fail to improve as

## 2019-08-14 ENCOUNTER — TELEPHONE (OUTPATIENT)
Dept: CARDIOLOGY CLINIC | Age: 64
End: 2019-08-14

## 2019-11-13 ENCOUNTER — OFFICE VISIT (OUTPATIENT)
Dept: ORTHOPEDIC SURGERY | Age: 64
End: 2019-11-13
Payer: MEDICARE

## 2019-11-13 VITALS
DIASTOLIC BLOOD PRESSURE: 90 MMHG | BODY MASS INDEX: 33.42 KG/M2 | HEART RATE: 72 BPM | TEMPERATURE: 97.3 F | WEIGHT: 177 LBS | HEIGHT: 61 IN | SYSTOLIC BLOOD PRESSURE: 161 MMHG

## 2019-11-13 DIAGNOSIS — M17.0 PRIMARY OSTEOARTHRITIS OF BOTH KNEES: Primary | ICD-10-CM

## 2019-11-13 PROCEDURE — 20610 DRAIN/INJ JOINT/BURSA W/O US: CPT | Performed by: PHYSICIAN ASSISTANT

## 2019-11-24 ENCOUNTER — HOSPITAL ENCOUNTER (EMERGENCY)
Age: 64
Discharge: HOME | End: 2019-11-24
Attending: EMERGENCY MEDICINE
Payer: MEDICARE

## 2019-11-24 ENCOUNTER — APPOINTMENT (OUTPATIENT)
Dept: GENERAL RADIOLOGY | Age: 64
End: 2019-11-24
Payer: MEDICARE

## 2019-11-24 VITALS
HEART RATE: 65 BPM | BODY MASS INDEX: 30.33 KG/M2 | TEMPERATURE: 97.6 F | DIASTOLIC BLOOD PRESSURE: 62 MMHG | SYSTOLIC BLOOD PRESSURE: 122 MMHG | RESPIRATION RATE: 12 BRPM | WEIGHT: 160.5 LBS | OXYGEN SATURATION: 98 %

## 2019-11-24 DIAGNOSIS — M79.671 RIGHT FOOT PAIN: Primary | ICD-10-CM

## 2019-11-24 PROCEDURE — 6370000000 HC RX 637 (ALT 250 FOR IP): Performed by: EMERGENCY MEDICINE

## 2019-11-24 PROCEDURE — 73630 X-RAY EXAM OF FOOT: CPT

## 2019-11-24 PROCEDURE — 99283 EMERGENCY DEPT VISIT LOW MDM: CPT

## 2019-11-24 RX ORDER — NAPROXEN 250 MG/1
500 TABLET ORAL ONCE
Status: COMPLETED | OUTPATIENT
Start: 2019-11-24 | End: 2019-11-24

## 2019-11-24 RX ADMIN — NAPROXEN 500 MG: 250 TABLET ORAL at 08:03

## 2019-11-24 ASSESSMENT — PAIN DESCRIPTION - LOCATION
LOCATION: FOOT
LOCATION: FOOT

## 2019-11-24 ASSESSMENT — PAIN DESCRIPTION - DESCRIPTORS: DESCRIPTORS: PRESSURE

## 2019-11-24 ASSESSMENT — PAIN DESCRIPTION - ORIENTATION
ORIENTATION: RIGHT
ORIENTATION: RIGHT

## 2019-11-24 ASSESSMENT — ENCOUNTER SYMPTOMS: COLOR CHANGE: 0

## 2019-11-24 ASSESSMENT — PAIN SCALES - GENERAL
PAINLEVEL_OUTOF10: 8

## 2019-11-24 ASSESSMENT — PAIN - FUNCTIONAL ASSESSMENT: PAIN_FUNCTIONAL_ASSESSMENT: 0-10

## 2019-12-03 ENCOUNTER — OFFICE VISIT (OUTPATIENT)
Dept: CARDIOLOGY CLINIC | Age: 64
End: 2019-12-03
Payer: MEDICARE

## 2019-12-03 VITALS
DIASTOLIC BLOOD PRESSURE: 60 MMHG | HEIGHT: 61 IN | HEART RATE: 70 BPM | BODY MASS INDEX: 33.42 KG/M2 | OXYGEN SATURATION: 98 % | SYSTOLIC BLOOD PRESSURE: 128 MMHG | WEIGHT: 177 LBS

## 2019-12-03 DIAGNOSIS — Z86.73 HISTORY OF CVA (CEREBROVASCULAR ACCIDENT): Primary | ICD-10-CM

## 2019-12-03 DIAGNOSIS — E78.2 MIXED HYPERLIPIDEMIA: ICD-10-CM

## 2019-12-03 DIAGNOSIS — I10 ESSENTIAL HYPERTENSION: ICD-10-CM

## 2019-12-03 DIAGNOSIS — I25.10 CORONARY ARTERY DISEASE INVOLVING NATIVE CORONARY ARTERY OF NATIVE HEART WITHOUT ANGINA PECTORIS: ICD-10-CM

## 2019-12-03 PROCEDURE — 99214 OFFICE O/P EST MOD 30 MIN: CPT | Performed by: INTERNAL MEDICINE

## 2019-12-03 RX ORDER — ATORVASTATIN CALCIUM 80 MG/1
80 TABLET, FILM COATED ORAL DAILY
Qty: 90 TABLET | Refills: 3 | Status: SHIPPED
Start: 2019-12-03

## 2020-02-25 ENCOUNTER — OFFICE VISIT (OUTPATIENT)
Dept: ORTHOPEDIC SURGERY | Age: 65
End: 2020-02-25
Payer: MEDICARE

## 2020-02-25 VITALS
WEIGHT: 177 LBS | HEIGHT: 61 IN | SYSTOLIC BLOOD PRESSURE: 152 MMHG | TEMPERATURE: 97.5 F | BODY MASS INDEX: 33.42 KG/M2 | DIASTOLIC BLOOD PRESSURE: 85 MMHG | HEART RATE: 76 BPM

## 2020-02-25 PROCEDURE — 99214 OFFICE O/P EST MOD 30 MIN: CPT | Performed by: PHYSICIAN ASSISTANT

## 2020-02-25 PROCEDURE — 20610 DRAIN/INJ JOINT/BURSA W/O US: CPT | Performed by: PHYSICIAN ASSISTANT

## 2020-02-25 NOTE — PROGRESS NOTES
acetaminophen (TYLENOL) 500 MG tablet Take 500 mg by mouth every 6 hours as needed for Pain      meclizine (ANTIVERT) 25 MG tablet Take 1 tablet by mouth 3 times daily as needed for Dizziness 30 tablet 0    docusate sodium (COLACE) 100 MG capsule Take 100 mg by mouth 2 times daily as needed for Constipation      hydrocortisone 1 % cream Apply topically 2 times daily Apply topically 2 times daily.  linagliptin (TRADJENTA) 5 MG tablet Take 5 mg by mouth daily      RaNITidine HCl (RANITIDINE 150 MAX STRENGTH PO) Take 150 mg by mouth as needed       vitamin D (CHOLECALCIFEROL) 1000 UNIT TABS tablet Take 1,000 Units by mouth daily      fluticasone (FLONASE) 50 MCG/ACT nasal spray 1 spray by Each Nare route daily      Insulin Detemir (LEVEMIR SC) Inject 10 Units into the skin nightly       losartan (COZAAR) 50 MG tablet Take 50 mg by mouth daily       hydrOXYzine (ATARAX) 50 MG tablet Take 50 mg by mouth as needed for Itching       Omega-3 Fatty Acids (FISH OIL PO) Take  by mouth.  aspirin 81 MG tablet Take 81 mg by mouth daily.  nitroGLYCERIN (NITROSTAT) 0.4 MG SL tablet Place 0.4 mg under the tongue every 5 minutes as needed for Chest pain.  isosorbide mononitrate (IMDUR) 60 MG CR tablet Take 1 tablet by mouth daily. 30 tablet 3     No current facility-administered medications for this visit. Objective:   She is alert, oriented x 3, pleasant, well nourished, developed and in no acute distress. BP (!) 152/85   Pulse 76   Temp 97.5 °F (36.4 °C) (Temporal)   Ht 5' 1\" (1.549 m)   Wt 177 lb (80.3 kg)   BMI 33.44 kg/m²      KNEE EXAM:  Examination of the left and right knee shows: There is not erythema. ROM- decreased range of motion is noted. There is mild to moderate pain associated with ROM testing. Extensor Mechanism is  intact. NEUROLOGICAL EXAM:  Examination of the lower extremities are intact with sensation to light touch.   Motor testing  5/5 in all major motor groups of the lower extremities. Gait is normal heel to toe. Gait is antalgic. Negative Mario's Sign. SLR negative. VASCULAR EXAM:  Examination of the lower extremities shows intact perfusion to all extremities. No cyanosis. Digits are warm to touch, capillary refill is less than 2 seconds. no edema noted. SKIN:  Examination of the skin over both lower extremities reveals: The skin to be intact without lacerations or abrasions. No significant erythema. No rashes or skin lesions. X Rays: was performed in the office today:   AP Standing, Lateral and Sunrise views of bilateral knees: There is severe osteoarthritic findings of the grade 4 arthritic changes of the patellofemoral compartments and grade 2-3 arthritic changes of the medial and lateral compartments of both left and right knee. No acute fractures or dislocations noted. Assessment:       ICD-10-CM    1. Primary osteoarthritis of both knees M17.0 XR Knee Bilateral Standing     XR KNEE LEFT (1-2 VIEWS)     XR KNEE RIGHT (1-2 VIEWS)     ID ARTHROCENTESIS ASPIR&/INJ MAJOR JT/BURSA W/O US     ID TRIAMCINOLONE ACETONIDE INJ        Plan:     The natural history of the patient's diagnosis as well as the treatment options were discussed in full and questions were answered. Risks and benefits of the treatment options also reviewed in detail. May increase blood sugars in diabetics. She understands that eventually knee arthroplasty will be the surgical correction needed to control the pain related to the arthritic knee condition. And when the injections fail to control the symptoms then surgical correction should be considered. Risk and benefits of corticosteroid intra-articular injection was discussed today. All questions were answered to her satisfaction. She verbally consented to proceed with intra-articular injection today.       Cortisone Injection                                                       PROCEDURE NOTE:     Pre op Diagnosis: Knee pain/ DJD left and right Knee     Post op Diagnosis: Same  With the patient's permission, her left and right knee was prepped  in standard sterile fashion with  Alcohol and 2 cc of 0.25% Marcaine and 1 cc of Kenalog 40 mg was injected into the left lateral compartment  without difficulty. The patient tolerated this well without difficulty. A band-aid was applied. The patient was advised to ice the knee for 15-20 minutes to relieve any injection site related pain. Continue with medications as previously prescribed. If diabetic, observe blood sugars for the next 24 to 48 hours. Contact PCP if they remain elevated. HEP instructed for Knee ROM  and Quad strengthening exercises. Activities as tolerated. Follow Up:   Call or return to clinic prn if these symptoms worsen or fail to improve as anticipated.

## 2020-03-06 ENCOUNTER — TELEPHONE (OUTPATIENT)
Dept: CARDIOLOGY CLINIC | Age: 65
End: 2020-03-06

## 2020-03-13 NOTE — TELEPHONE ENCOUNTER
Signed by Dr. Rachelle Nieto. Faxed to [phone] [facility] [phone]. Fax confirmation rec'd.

## 2020-05-26 ENCOUNTER — OFFICE VISIT (OUTPATIENT)
Dept: ORTHOPEDIC SURGERY | Age: 65
End: 2020-05-26
Payer: MEDICARE

## 2020-05-26 VITALS — TEMPERATURE: 97.8 F

## 2020-05-26 PROCEDURE — 99999 PR OFFICE/OUTPT VISIT,PROCEDURE ONLY: CPT | Performed by: PHYSICIAN ASSISTANT

## 2020-05-26 PROCEDURE — 20610 DRAIN/INJ JOINT/BURSA W/O US: CPT | Performed by: PHYSICIAN ASSISTANT

## 2020-05-26 NOTE — PROGRESS NOTES
Kenalog:  NDC: [id number]  Lot Number: [id number]  Expiration Date: 04/2021
(ANTIVERT) 25 MG tablet Take 1 tablet by mouth 3 times daily as needed for Dizziness 30 tablet 0    docusate sodium (COLACE) 100 MG capsule Take 100 mg by mouth 2 times daily as needed for Constipation      hydrocortisone 1 % cream Apply topically 2 times daily Apply topically 2 times daily.  linagliptin (TRADJENTA) 5 MG tablet Take 5 mg by mouth daily      RaNITidine HCl (RANITIDINE 150 MAX STRENGTH PO) Take 150 mg by mouth as needed       vitamin D (CHOLECALCIFEROL) 1000 UNIT TABS tablet Take 1,000 Units by mouth daily      fluticasone (FLONASE) 50 MCG/ACT nasal spray 1 spray by Each Nare route daily      Insulin Detemir (LEVEMIR SC) Inject 10 Units into the skin nightly       losartan (COZAAR) 50 MG tablet Take 50 mg by mouth daily       hydrOXYzine (ATARAX) 50 MG tablet Take 50 mg by mouth as needed for Itching       Omega-3 Fatty Acids (FISH OIL PO) Take  by mouth.  aspirin 81 MG tablet Take 81 mg by mouth daily.  nitroGLYCERIN (NITROSTAT) 0.4 MG SL tablet Place 0.4 mg under the tongue every 5 minutes as needed for Chest pain.  isosorbide mononitrate (IMDUR) 60 MG CR tablet Take 1 tablet by mouth daily. 30 tablet 3     No current facility-administered medications for this visit. Objective:     She is alert, oriented x 3, pleasant, well nourished, developed and in no acute distress. Temp 97.8 °F (36.6 °C) (Temporal)        Examination of the left knee shows: There is not erythema. ROM- decreased range of motion is noted. There is mild to moderate pain associated with ROM testing. Extensor Mechanism is  intact. Examination of the right knee shows: There is not erythema. ROM- decreased range of motion is noted. There is mild to moderate pain associated with ROM testing. Extensor Mechanism is  intact. X Rays: was not performed in the office today:       Assessment:       ICD-10-CM    1.  Primary osteoarthritis of both knees M17.0 TN ARTHROCENTESIS

## 2020-06-01 RX ORDER — TICAGRELOR 90 MG/1
TABLET ORAL
Qty: 60 TABLET | Refills: 11 | Status: SHIPPED | OUTPATIENT
Start: 2020-06-01 | End: 2020-12-21 | Stop reason: ALTCHOICE

## 2020-06-08 NOTE — PROGRESS NOTES
Arthritis     CAD (coronary artery disease)     \"blocked artery\" no stents    Constipation     due to slow movement through bowels    Depression     Diabetes mellitus (HCC)     GERD (gastroesophageal reflux disease)     Hyperlipidemia     Hypertension     Vertigo     Vitamin D deficiency, unspecified      Past Surgical History:   Procedure Laterality Date    DIAGNOSTIC CARDIAC CATH LAB PROCEDURE  04/2019    stent    HYSTERECTOMY       Family History   Problem Relation Age of Onset    High Blood Pressure Mother     High Cholesterol Mother      Social History     Tobacco Use    Smoking status: Never Smoker    Smokeless tobacco: Never Used   Substance Use Topics    Alcohol use: No    Drug use: No       Allergies   Allergen Reactions    Clonidine Derivatives     Doxycycline     Lisinopril      States \"don't remember reaction but had something long time ago\"    Penicillins Swelling    Sulfa Antibiotics Swelling     Current Outpatient Medications   Medication Sig Dispense Refill    isosorbide mononitrate (IMDUR) 60 MG extended release tablet Take 1 tablet by mouth daily 30 tablet 3    BRILINTA 90 MG TABS tablet TAKE 1 TABLET BY MOUTH TWICE A DAY 60 tablet 11    atorvastatin (LIPITOR) 80 MG tablet Take 1 tablet by mouth daily 90 tablet 3    metoprolol succinate (TOPROL XL) 25 MG extended release tablet Take 0.5 tablets by mouth daily 30 tablet 3    acetaminophen (TYLENOL) 500 MG tablet Take 500 mg by mouth every 6 hours as needed for Pain      meclizine (ANTIVERT) 25 MG tablet Take 1 tablet by mouth 3 times daily as needed for Dizziness 30 tablet 0    docusate sodium (COLACE) 100 MG capsule Take 100 mg by mouth 2 times daily as needed for Constipation      hydrocortisone 1 % cream Apply topically 2 times daily Apply topically 2 times daily.       linagliptin (TRADJENTA) 5 MG tablet Take 5 mg by mouth daily      RaNITidine HCl (RANITIDINE 150 MAX STRENGTH PO) Take 150 mg by mouth as needed

## 2020-06-10 ENCOUNTER — OFFICE VISIT (OUTPATIENT)
Dept: CARDIOLOGY CLINIC | Age: 65
End: 2020-06-10
Payer: MEDICARE

## 2020-06-10 VITALS
TEMPERATURE: 97.8 F | HEART RATE: 72 BPM | BODY MASS INDEX: 31.91 KG/M2 | SYSTOLIC BLOOD PRESSURE: 116 MMHG | DIASTOLIC BLOOD PRESSURE: 70 MMHG | HEIGHT: 61 IN | OXYGEN SATURATION: 96 % | WEIGHT: 169 LBS

## 2020-06-10 PROCEDURE — 99214 OFFICE O/P EST MOD 30 MIN: CPT | Performed by: NURSE PRACTITIONER

## 2020-06-10 PROCEDURE — 93000 ELECTROCARDIOGRAM COMPLETE: CPT | Performed by: NURSE PRACTITIONER

## 2020-06-10 RX ORDER — ISOSORBIDE MONONITRATE 60 MG/1
60 TABLET, EXTENDED RELEASE ORAL DAILY
Qty: 30 TABLET | Refills: 3 | Status: SHIPPED | OUTPATIENT
Start: 2020-06-10 | End: 2020-10-20

## 2020-08-26 ENCOUNTER — OFFICE VISIT (OUTPATIENT)
Dept: ORTHOPEDIC SURGERY | Age: 65
End: 2020-08-26
Payer: MEDICARE

## 2020-08-26 PROCEDURE — 20610 DRAIN/INJ JOINT/BURSA W/O US: CPT | Performed by: PHYSICIAN ASSISTANT

## 2020-08-26 NOTE — PROGRESS NOTES
Kenalog:  NDC: [id number]  Lot Number: [id number]  Expiration Date: 10/2021
MT TRIAMCINOLONE ACETONIDE INJ        Plan:     The natural history of the patient's diagnosis as well as the treatment options were discussed in full and questions were answered. Risks and benefits of the treatment options also reviewed in detail. May increase blood sugars in diabetics. She understands that eventually knee arthroplasty will be the surgical correction needed to control the pain related to the arthritic knee condition. And when the injections fail to control the symptoms then surgical correction should be considered. Risk and benefits of corticosteroid intra-articular injection was discussed today. All questions were answered to her satisfaction. She verbally consented to proceed with intra-articular injection today. Cortisone Injection                                                       PROCEDURE NOTE:     Pre op Diagnosis: Knee pain/ DJD left and right Knee     Post op Diagnosis: Same  With the patient's permission, her left and right knee was prepped  in standard sterile fashion with  Alcohol and 2 cc of 0.25% Marcaine and 1 cc of Kenalog 40 mg was injected into the left and right lateral compartment  without difficulty. The patient tolerated this well without difficulty. A band-aid was applied. The patient was advised to ice the knee for 15-20 minutes to relieve any injection site related pain. Continue with medications as previously prescribed. If diabetic, observe blood sugars for the next 24 to 48 hours. Contact PCP if they remain elevated. HEP instructed for Knee ROM  and Quad strengthening exercises. Activities as tolerated. Follow Up:   Call or return to clinic prn if these symptoms worsen or fail to improve as anticipated.

## 2020-10-20 RX ORDER — ISOSORBIDE MONONITRATE 60 MG/1
TABLET, EXTENDED RELEASE ORAL
Qty: 30 TABLET | Refills: 5 | Status: SHIPPED | OUTPATIENT
Start: 2020-10-20

## 2020-11-03 PROBLEM — I63.9 CEREBROVASCULAR ACCIDENT (CVA) (HCC): Status: RESOLVED | Noted: 2019-01-15 | Resolved: 2020-11-03

## 2020-12-02 ENCOUNTER — OFFICE VISIT (OUTPATIENT)
Dept: ORTHOPEDIC SURGERY | Age: 65
End: 2020-12-02
Payer: MEDICARE

## 2020-12-02 VITALS — TEMPERATURE: 97.2 F

## 2020-12-02 PROCEDURE — 20610 DRAIN/INJ JOINT/BURSA W/O US: CPT | Performed by: PHYSICIAN ASSISTANT

## 2020-12-02 NOTE — PROGRESS NOTES
Subjective:      Patient ID: Brittney Logan is a 77 y.o.  female. Chief Complaint   Patient presents with    Knee Pain     bilateral knee pain        HPI: She is here for evaluation and treatment of left and right knee pain related to arthritis. She states the previous injection which was performed 8/26/2020 helped relieve the knee symptoms. The bilateral knee pain has gradually returned. There has not been a recent injury. Pain is 6/10. Pain is worse with activity. Pain improves somewhat with rest and elevation. Review of Systems:   Negative for fever or chills. Negative for numbness or tingling around the knee.     Past Medical History:   Diagnosis Date    Arthritis     CAD (coronary artery disease)     \"blocked artery\" no stents    Constipation     due to slow movement through bowels    Depression     Diabetes mellitus (HCC)     GERD (gastroesophageal reflux disease)     Hyperlipidemia     Hypertension     Vertigo     Vitamin D deficiency, unspecified        Family History   Problem Relation Age of Onset    High Blood Pressure Mother     High Cholesterol Mother        Past Surgical History:   Procedure Laterality Date    DIAGNOSTIC CARDIAC CATH LAB PROCEDURE  04/2019    stent    HYSTERECTOMY         Social History     Occupational History    Not on file   Tobacco Use    Smoking status: Never Smoker    Smokeless tobacco: Never Used   Substance and Sexual Activity    Alcohol use: No    Drug use: No    Sexual activity: Not on file       Current Outpatient Medications   Medication Sig Dispense Refill    isosorbide mononitrate (IMDUR) 60 MG extended release tablet TAKE 1 TABLET BY MOUTH EVERY DAY 30 tablet 5    BRILINTA 90 MG TABS tablet TAKE 1 TABLET BY MOUTH TWICE A DAY 60 tablet 11    atorvastatin (LIPITOR) 80 MG tablet Take 1 tablet by mouth daily 90 tablet 3    metoprolol succinate (TOPROL XL) 25 MG extended release tablet Take 0.5 tablets by mouth daily 30 tablet 3    acetaminophen (TYLENOL) 500 MG tablet Take 500 mg by mouth every 6 hours as needed for Pain      meclizine (ANTIVERT) 25 MG tablet Take 1 tablet by mouth 3 times daily as needed for Dizziness 30 tablet 0    docusate sodium (COLACE) 100 MG capsule Take 100 mg by mouth 2 times daily as needed for Constipation      hydrocortisone 1 % cream Apply topically 2 times daily Apply topically 2 times daily.  linagliptin (TRADJENTA) 5 MG tablet Take 5 mg by mouth daily      RaNITidine HCl (RANITIDINE 150 MAX STRENGTH PO) Take 150 mg by mouth as needed       vitamin D (CHOLECALCIFEROL) 1000 UNIT TABS tablet Take 1,000 Units by mouth daily      fluticasone (FLONASE) 50 MCG/ACT nasal spray 1 spray by Each Nare route daily      Insulin Detemir (LEVEMIR SC) Inject 10 Units into the skin nightly       losartan (COZAAR) 50 MG tablet Take 50 mg by mouth daily       hydrOXYzine (ATARAX) 50 MG tablet Take 50 mg by mouth as needed for Itching       Omega-3 Fatty Acids (FISH OIL PO) Take  by mouth.  aspirin 81 MG tablet Take 81 mg by mouth daily.  nitroGLYCERIN (NITROSTAT) 0.4 MG SL tablet Place 0.4 mg under the tongue every 5 minutes as needed for Chest pain. No current facility-administered medications for this visit. Objective:     She is alert, oriented x 3, pleasant, well nourished, developed and in no acute distress. Temp 97.2 °F (36.2 °C) (Temporal)        Examination of the left knee shows: There is not erythema. ROM- decreased range of motion is noted. There is mild to moderate pain associated with ROM testing. Extensor Mechanism is  intact. Examination of the right knee shows: There is not erythema. ROM- decreased range of motion is noted. There is mild to moderate pain associated with ROM testing. Extensor Mechanism is  intact. X Rays: was not performed in the office today:       Assessment:       ICD-10-CM    1.  Primary osteoarthritis of both knees  M17.0 55358 - ID DRAIN/INJECT LARGE JOINT/BURSA     ID TRIAMCINOLONE ACETONIDE INJ        Plan:     The natural history of the patient's diagnosis as well as the treatment options were discussed in full and questions were answered. Risks and benefits of the treatment options also reviewed in detail. May increase blood sugars in diabetics. She understands that eventually knee arthroplasty will be the surgical correction needed to control the pain related to the arthritic knee condition. And when the injections fail to control the symptoms then surgical correction should be considered. Risk and benefits of corticosteroid intra-articular injection was discussed today. All questions were answered to her satisfaction. She verbally consented to proceed with intra-articular injection today. Cortisone Injection                                                       PROCEDURE NOTE:     Pre op Diagnosis: Knee pain/ DJD left and right Knee     Post op Diagnosis: Same  With the patient's permission, her left and right knee was prepped  in standard sterile fashion with  Alcohol and 2 cc of 0.25% Marcaine and 1 cc of Kenalog 40 mg was injected into the left and right lateral compartment  without difficulty. The patient tolerated this well without difficulty. A band-aid was applied. The patient was advised to ice the knee for 15-20 minutes to relieve any injection site related pain. Continue with medications as previously prescribed. If diabetic, observe blood sugars for the next 24 to 48 hours. Contact PCP if they remain elevated. HEP instructed for Knee ROM  and Quad strengthening exercises. Activities as tolerated. Follow Up:   Call or return to clinic prn if these symptoms worsen or fail to improve as anticipated.

## 2020-12-18 NOTE — PROGRESS NOTES
[address]   Cardiology    Stanley Gonzales  1955 December 21, 2020      CC: \"I am okay. \"     HPI:  The patient is 77 y.o. female with a past medical history significant for CAD, HTN, HLD and diabetes. Stress test 3/19/19 showed ischemia. 4/2/19 underwent LHC with PCI to mid LAD. F/u 4/15/19 reported shortness of breath likely related to Brilinta. Has been participating in cardiac rehab. Today, she is here for follow up for CAD. Admits to having an episode of chest pain a couple of weeks ago. She says that the pain was relieved with nitro.  She says that this is the pain the same as when she had PCI in the past.       Past Medical History:   Diagnosis Date    Arthritis     CAD (coronary artery disease)     \"blocked artery\" no stents    Constipation     due to slow movement through bowels    Depression     Diabetes mellitus ([address]     GERD (gastroesophageal reflux disease)     Hyperlipidemia     Hypertension     Vertigo     Vitamin D deficiency, unspecified      Past Surgical History:   Procedure Laterality Date    DIAGNOSTIC CARDIAC CATH LAB PROCEDURE  04/2019    stent    HYSTERECTOMY       Family History   Problem Relation Age of Onset    High Blood Pressure Mother     High Cholesterol Mother      Social History     Tobacco Use    Smoking status: Never Smoker    Smokeless tobacco: Never Used   Substance Use Topics    Alcohol use: No    Drug use: No       Allergies   Allergen Reactions    Clonidine Derivatives     Doxycycline     Lisinopril      States \"don't remember reaction but had something long time ago\"    Penicillins Swelling    Sulfa Antibiotics Swelling     Current Outpatient Medications   Medication Sig Dispense Refill    Psyllium (METAMUCIL FIBER PO) Take by mouth daily      ZINC PO Take by mouth daily      isosorbide mononitrate (IMDUR) 60 MG extended release tablet TAKE 1 TABLET BY MOUTH EVERY DAY 30 tablet 5    atorvastatin (LIPITOR) 80 MG tablet Take 1 complaints. · Integumentary: No rash or pruritis. · Neurological: No headache, diplopia, change in muscle strength, numbness or tingling. · Psychiatric: No anxiety or depression. · Endocrine: No temperature intolerance. No excessive thirst, fluid intake, or urination. No tremor. · Hematologic/Lymphatic: No abnormal bruising or bleeding, blood clots or swollen lymph nodes. · Allergic/Immunologic: No nasal congestion or hives. Physical Exam:   /76   Pulse 80   Temp 97.1 °F (36.2 °C)   Ht 5' 1\" (1.549 m)   Wt 173 lb (78.5 kg) Comment: with shoes  SpO2 98%   BMI 32.69 kg/m²   Wt Readings from Last 3 Encounters:   12/21/20 173 lb (78.5 kg)   06/10/20 169 lb (76.7 kg)   02/25/20 177 lb (80.3 kg)     Constitutional: She is oriented to person, place, and time. She appears well-developed and well-nourished. In no acute distress. Head: Normocephalic and atraumatic. Pupils equal and round. Neck: Neck supple. No JVP or carotid bruit appreciated. No mass and no thyromegaly present. No lymphadenopathy present. Cardiovascular: Normal rate. Normal heart sounds. Exam reveals no gallop and no friction rub. No murmur heard. Pulmonary/Chest: Effort normal and breath sounds normal. No respiratory distress. She has no wheezes, rhonchi or rales. Abdominal: Soft, non-tender. Bowel sounds are normal. She exhibits no organomegaly, mass or bruit. Extremities: No edema, cyanosis, or clubbing. Pulses are 2+ radial/dorsalis pedis/posterior tibial/carotid bilaterally. Neurological: No gross cranial nerve deficit. Coordination normal.   Skin: Skin is warm and dry. There is no rash or diaphoresis. Psychiatric: She has a normal mood and affect.  Her speech is normal and behavior is normal.     Lab Review:   FLP:    Lab Results   Component Value Date    TRIG 103 02/20/2020    HDL 34 02/20/2020    LDLCALC 60 02/20/2020    LABVLDL 20 01/15/2019     BUN/Creatinine:    Lab Results   Component Value Date    BUN 25 disease. Large collaterals to  the mid to distal right coronary artery. 6.  Wall motion abnormalities of the inferior wall with overall preserved  left ventricular ejection fraction at 50% to 55%. In view of the above findings, will try to treat the patient medically first  and if she fails medical therapy, will consider high-risk intervention of the  right coronary artery. Assessment/Plan:   CAD  2 episodes of chest pain recently relieved with NTG. Patient has refused to have an EKG done today will repeat  nuclear stress test to assess this further with her known history of coronary artery disease with PCI of the left and descending artery continue Asa,B-blocker, and statin therapy. HTN  Controlled. Continue current medical management. HLD  Last lipid panel from 11/30/2020 is 83. Goal LDL is <70. Continue Lipitor 80 mg. Advised her to adhere to heart healthy diet. Will repeat lipid/liver profile in 6 months. Follow up in 6 months. She had her flu vaccine this season. She will get Covid vaccine tomorrow. Complexity of medical decision making-high    Thank you very much for allowing me to participate in the care of your patient. Please do not hesitate to contact me if you have any questions. Physician Attestation:  The scribes documentation has been prepared under my direction and personally reviewed by me in its entirety. I confirm that the note above accurately reflects all work, treatment, procedures, and medical decision making performed by me.     Sincerely,  Joseluis Scott MD      [facility], [address] [place] [address]  Ph: [phone]  Fax: [phone]    This note was scribed in the presence of Dr Shorty Motley, by Bismark Orozco RN

## 2020-12-21 ENCOUNTER — OFFICE VISIT (OUTPATIENT)
Dept: CARDIOLOGY CLINIC | Age: 65
End: 2020-12-21
Payer: MEDICARE

## 2020-12-21 VITALS
SYSTOLIC BLOOD PRESSURE: 128 MMHG | TEMPERATURE: 97.1 F | HEIGHT: 61 IN | WEIGHT: 173 LBS | HEART RATE: 80 BPM | DIASTOLIC BLOOD PRESSURE: 76 MMHG | BODY MASS INDEX: 32.66 KG/M2 | OXYGEN SATURATION: 98 %

## 2020-12-21 PROCEDURE — 99215 OFFICE O/P EST HI 40 MIN: CPT | Performed by: INTERNAL MEDICINE

## 2020-12-21 NOTE — PATIENT INSTRUCTIONS
Patient Education        Heart-Healthy Diet: Care Instructions  Your Care Instructions     A heart-healthy diet has lots of vegetables, fruits, nuts, beans, and whole grains, and is low in salt. It limits foods that are high in saturated fat, such as meats, cheeses, and fried foods. It may be hard to change your diet, but even small changes can lower your risk of heart attack and heart disease. Follow-up care is a key part of your treatment and safety. Be sure to make and go to all appointments, and call your doctor if you are having problems. It's also a good idea to know your test results and keep a list of the medicines you take. How can you care for yourself at home? Watch your portions  · Learn what a serving is. A \"serving\" and a \"portion\" are not always the same thing. Make sure that you are not eating larger portions than are recommended. For example, a serving of pasta is ½ cup. A serving size of meat is 2 to 3 ounces. A 3-ounce serving is about the size of a deck of cards. Measure serving sizes until you are good at [place]" them. Keep in mind that restaurants often serve portions that are 2 or 3 times the size of one serving. · To keep your energy level up and keep you from feeling hungry, eat often but in smaller portions. · Eat only the number of calories you need to stay at a healthy weight. If you need to lose weight, eat fewer calories than your body burns (through exercise and other physical activity). Eat more fruits and vegetables  · Eat a variety of fruit and vegetables every day. Dark green, deep orange, red, or yellow fruits and vegetables are especially good for you. Examples include spinach, carrots, peaches, and berries. · Keep carrots, celery, and other veggies handy for snacks. Buy fruit that is in season and store it where you can see it so that you will be tempted to eat it. · Cook dishes that have a lot of veggies in them, such as stir-fries and soups. Limit saturated and trans fat  · Read food labels, and try to avoid saturated and trans fats. They increase your risk of heart disease. · Use olive or canola oil when you cook. · Bake, broil, grill, or steam foods instead of frying them. · Choose lean meats instead of high-fat meats such as hot dogs and sausages. Cut off all visible fat when you prepare meat. · Eat fish, skinless poultry, and meat alternatives such as soy products instead of high-fat meats. Soy products, such as tofu, may be especially good for your heart. · Choose low-fat or fat-free milk and dairy products. Eat foods high in fiber  · Eat a variety of grain products every day. Include whole-grain foods that have lots of fiber and nutrients. Examples of whole-grain foods include oats, whole wheat bread, and brown rice. · Buy whole-grain breads and cereals, instead of white bread or pastries. Limit salt and sodium  · Limit how much salt and sodium you eat to help lower your blood pressure. · Taste food before you salt it. Add only a little salt when you think you need it. With time, your taste buds will adjust to less salt. · Eat fewer snack items, fast foods, and other high-salt, processed foods. Check food labels for the amount of sodium in packaged foods. · Choose low-sodium versions of canned goods (such as soups, vegetables, and beans). Limit sugar  · Limit drinks and foods with added sugar. These include candy, desserts, and soda pop. Limit alcohol  · Limit alcohol to no more than 2 drinks a day for men and 1 drink a day for women. Too much alcohol can cause health problems. When should you call for help? Watch closely for changes in your health, and be sure to contact your doctor if:    · You would like help planning heart-healthy meals. Where can you learn more? Go to [email or web address] and sign in to your [employer] account. Enter V137 in the [employer] box to learn more about \"Heart-Healthy Diet: Care Instructions. \"     If you do not have an account, please click on the \"Sign Up Now\" link. Current as of: August 22, 2019               Content Version: 12.6  © 3187-7760 [employer]. Care instructions adapted under license by [facility] ([facility]). If you have questions about a medical condition or this instruction, always ask your healthcare professional. [place] [address] any warranty or liability for your use of this information.

## 2021-01-04 ENCOUNTER — HOSPITAL ENCOUNTER (OUTPATIENT)
Dept: NON INVASIVE DIAGNOSTICS | Age: 66
Discharge: HOME | End: 2021-01-04
Payer: MEDICARE

## 2021-01-04 DIAGNOSIS — R07.9 CHEST PAIN, UNSPECIFIED TYPE: ICD-10-CM

## 2021-01-04 DIAGNOSIS — I25.10 CORONARY ARTERY DISEASE INVOLVING NATIVE CORONARY ARTERY OF NATIVE HEART WITHOUT ANGINA PECTORIS: ICD-10-CM

## 2021-01-04 LAB
LV EF: 74 %
LVEF MODALITY: NORMAL

## 2021-01-04 PROCEDURE — 93017 CV STRESS TEST TRACING ONLY: CPT

## 2021-01-04 PROCEDURE — 6360000002 HC RX W HCPCS: Performed by: INTERNAL MEDICINE

## 2021-01-04 PROCEDURE — 78452 HT MUSCLE IMAGE SPECT MULT: CPT

## 2021-01-04 PROCEDURE — 3430000000 HC RX DIAGNOSTIC RADIOPHARMACEUTICAL: Performed by: INTERNAL MEDICINE

## 2021-01-04 PROCEDURE — A9502 TC99M TETROFOSMIN: HCPCS | Performed by: INTERNAL MEDICINE

## 2021-01-04 RX ADMIN — TETROFOSMIN 10 MILLICURIE: 1.38 INJECTION, POWDER, LYOPHILIZED, FOR SOLUTION INTRAVENOUS at 07:59

## 2021-01-04 RX ADMIN — TETROFOSMIN 30 MILLICURIE: 1.38 INJECTION, POWDER, LYOPHILIZED, FOR SOLUTION INTRAVENOUS at 09:12

## 2021-01-04 RX ADMIN — REGADENOSON 0.4 MG: 0.08 INJECTION, SOLUTION INTRAVENOUS at 09:09

## 2021-03-02 ENCOUNTER — OFFICE VISIT (OUTPATIENT)
Dept: ORTHOPEDIC SURGERY | Age: 66
End: 2021-03-02
Payer: MEDICARE

## 2021-03-02 VITALS — DIASTOLIC BLOOD PRESSURE: 80 MMHG | SYSTOLIC BLOOD PRESSURE: 149 MMHG | HEART RATE: 80 BPM | TEMPERATURE: 97.7 F

## 2021-03-02 DIAGNOSIS — M17.0 PRIMARY OSTEOARTHRITIS OF BOTH KNEES: Primary | ICD-10-CM

## 2021-03-02 PROCEDURE — 1036F TOBACCO NON-USER: CPT | Performed by: PHYSICIAN ASSISTANT

## 2021-03-02 PROCEDURE — 3017F COLORECTAL CA SCREEN DOC REV: CPT | Performed by: PHYSICIAN ASSISTANT

## 2021-03-02 PROCEDURE — G8427 DOCREV CUR MEDS BY ELIG CLIN: HCPCS | Performed by: PHYSICIAN ASSISTANT

## 2021-03-02 PROCEDURE — G8484 FLU IMMUNIZE NO ADMIN: HCPCS | Performed by: PHYSICIAN ASSISTANT

## 2021-03-02 PROCEDURE — G8417 CALC BMI ABV UP PARAM F/U: HCPCS | Performed by: PHYSICIAN ASSISTANT

## 2021-03-02 PROCEDURE — 1123F ACP DISCUSS/DSCN MKR DOCD: CPT | Performed by: PHYSICIAN ASSISTANT

## 2021-03-02 PROCEDURE — 1090F PRES/ABSN URINE INCON ASSESS: CPT | Performed by: PHYSICIAN ASSISTANT

## 2021-03-02 PROCEDURE — G8400 PT W/DXA NO RESULTS DOC: HCPCS | Performed by: PHYSICIAN ASSISTANT

## 2021-03-02 PROCEDURE — 20610 DRAIN/INJ JOINT/BURSA W/O US: CPT | Performed by: PHYSICIAN ASSISTANT

## 2021-03-02 PROCEDURE — 99212 OFFICE O/P EST SF 10 MIN: CPT | Performed by: PHYSICIAN ASSISTANT

## 2021-03-02 PROCEDURE — 4040F PNEUMOC VAC/ADMIN/RCVD: CPT | Performed by: PHYSICIAN ASSISTANT

## 2021-03-03 NOTE — PROGRESS NOTES
Subjective:      Patient ID: Karina Patel is a 77 y.o.  female. Chief Complaint   Patient presents with    Knee Problem     Bilateral        HPI: She is here for evaluation and treatment of left and right knee pain related to arthritis. She states the previous injection which was performed 12/2/2020  helped relieve the knee symptoms. The knee pain has gradually returned. There has not been a recent injury. Pain is 7/10. Pain is worse with activity. Pain improves somewhat with rest and elevation. It has been over a year since x-rays and clinical exam has been performed for knee arthritic complaints. Review of Systems:   Negative for fever or chills. Negative for numbness or tingling around the knee.     Past Medical History:   Diagnosis Date    Arthritis     CAD (coronary artery disease)     \"blocked artery\" no stents    Constipation     due to slow movement through bowels    Depression     Diabetes mellitus (HCC)     GERD (gastroesophageal reflux disease)     Hyperlipidemia     Hypertension     Vertigo     Vitamin D deficiency, unspecified        Family History   Problem Relation Age of Onset    High Blood Pressure Mother     High Cholesterol Mother        Past Surgical History:   Procedure Laterality Date    DIAGNOSTIC CARDIAC CATH LAB PROCEDURE  04/2019    stent    HYSTERECTOMY         Social History     Occupational History    Not on file   Tobacco Use    Smoking status: Never Smoker    Smokeless tobacco: Never Used   Substance and Sexual Activity    Alcohol use: No    Drug use: No    Sexual activity: Not on file       Current Outpatient Medications   Medication Sig Dispense Refill    Psyllium (METAMUCIL FIBER PO) Take by mouth daily      ZINC PO Take by mouth daily      isosorbide mononitrate (IMDUR) 60 MG extended release tablet TAKE 1 TABLET BY MOUTH EVERY DAY 30 tablet 5    atorvastatin (LIPITOR) 80 MG tablet Take 1 tablet by mouth daily 90 tablet 3 Intact perfusion to both lower extremities. No cyanosis. Digits are warm to touch, capillary refill is less than 2 seconds. no edema noted. The skin to be intact without lacerations or abrasions. No significant erythema. No rashes or skin lesions. X Rays: was performed in the office today:   AP Standing, Lateral and Sunrise views of left knee:   No acute fractures or dislocations noted. Knee x-rays demonstrate osteoarthritis. Medial compartment-    2/4 Kellgren and Rico Classification. Lateral compartment-    2/4 Kellgren and Rico Classification. PF compartment-          3/4 Kellgren and Rico Classification. AP Standing, Lateral and Sunrise views of right knee:   No acute fractures or dislocations noted. Knee x-rays demonstrate osteoarthritis. Medial compartment-    2/4 Kellgren and Rico Classification. Lateral compartment-    2/4 Kellgren and Rico Classification. PF compartment-          3/4 Kellgren and Rico Classification. Assessment:       ICD-10-CM    1. Primary osteoarthritis of both knees  M17.0 VT ARTHROCENTESIS ASPIR&/INJ MAJOR JT/BURSA W/O US     VT TRIAMCINOLONE ACETONIDE INJ     XR KNEE BILATERAL STANDING     XR KNEE LEFT (1-2 VIEWS)     XR KNEE RIGHT (1-2 VIEWS)        Plan:     Assessment:  Moderate degenerative joint disease of the left and right knee. Primarily patellofemoral nature. 15 minutes was the total time spent on today's visit  including reviewing test results, obtaining or reviewing history, physical exam, time spent on documentation or ordering prescriptions, tests and procedures after the visit. Weight loss, activity modification, home exercise therapy program, NSAID'S, dietary changes have been discussed as a means to help control the symptoms.     Non surgical options including cortisone injection, Visco supplementation injection, Coolief (cooled frequency ablation of the geniculate nerves), stem cell injections, PRP injections were discussed. Surgical option, knee arthroplasty discussed. Plan:    Weight loss, activity modification, home exercise therapy program, NSAID'S, dietary changes have been discussed as a means to help control the symptoms. Non surgical options including cortisone injection, Visco supplementation injection,  Coolief (cooled frequency ablation of the geniculate nerves), stem cell injections, PRP injections were discussed. Surgical option, knee arthroplasty discussed. Medications- OTC NSAIDS discussed. She  was advised that NSAID-type medications have two very important potential side effects: gastrointestinal irritation including hemorrhage and renal injuries. She was asked to take the medication with food and to stop if she experiences any GI upset. I asked her to call for vomiting, abdominal pain or black/bloody stools. She should have renal function testing per his medical provider periodically. The patient expresses understanding of these issues and questions were answered. PT- A home exercise program was instructed today including ROM exercises and strengthening exercises. The patient verbalized understanding of these exercises as well as the importance of the exercise program to promote return of normal function. If pain intensifies or other problems arise you are to notify the office. Further Imaging- none    Procedures-   Risk and benefits of corticosteroid intra-articular injection was discussed today. All questions were answered to her satisfaction. She verbally consented to proceed with intra-articular injection today.         Cortisone Injection                                                       PROCEDURE NOTE:     Pre op Diagnosis:  bilateral knee pain     Post op Diagnosis: Same With Tab Hyattless permission, her left and right knee was prepped  in standard sterile fashion with  Alcohol and 2 cc of 0.25% Marcaine and 1 cc of Kenalog 40 mg was injected into the left and right lateral compartment  without difficulty. The patient tolerated this well without difficulty and a band-aid was applied. The patient was advised to ice the knee for 15-20 minutes to relieve any injection site related pain. Follow up- 3-4 months. Call or return to clinic if these symptoms worsen or fail to improve as anticipated.

## 2021-04-09 NOTE — PROGRESS NOTES
[facility]  Office Visit    Pablo Fly  1955    May 14, 2019    CC:   Chief Complaint   Patient presents with    Dizziness    Arm Pain    Nausea     some sweating     HPI:  The patient is 70 y.o. female with a past medical history significant for CAD, HTN, hyperlipidemia, DM and CVA here for follow up. She was recently seen d/t c/o SOB with ambulation and underwent stress testing on 3/19/2019 demonstrating reversible ischemia. On 4/2/2019 she had cardiac cath with resultant PCI to mid LAD. She was seen in follow up on 4/15/2019 and she c/o SOB which was atypical for her angina and felt to be secondary to [address]. She started cardiac rehab and yesterday c/o lightheadedness, dizziness, difficulty standing and nausea. She had taken a NTG the day before for L arm pain. Scheduled to follow up up next week, but appointment moved forward d/t above complaints. Seen in cardiac rehab yesterday for her initial visit and paperwork and expressed she had nausea, difficulty standing, diaphoresis and had to sit down d/t weakness in her legs. Episode lasted few minutes, doesn't happen every day and causes decreased appetite. Denies SOB, palpitations, chest pain. Few nights ago had \"heart racing\" and developed L arm numbness and took NTG x 1 with relief almost immediately. Since Sunday, no recurrent issues. Unable to predict when these episodes will occur. Has SOB going up stairs after she gets off work. Has to rest frequently d/t fatigue. Edema has not been an issue. Takes meclizine everyday. BS have been controlled during these episodes. These episodes are different from her prior vertigo. Review of Systems:  Constitutional: Denies  fatigue, weakness, night sweats or fever. + occasional diaphoresis  HEENT: Denies new visual changes, ringing in ears, nosebleeds, nasal congestion  Respiratory: Denies new or change in SOB, PND, orthopnea or cough.    Cardiovascular: see HPI. + dizziness, occasional SOB  GI: Denies vomiting, diarrhea, constipation, abdominal pain, change in bowel habits, melena or hematochezia  + nausea after medications  : Denies urinary frequency, urgency, incontinence, hematuria or dysuria. Skin: Denies rash, hives, or cyanosis  Musculoskeletal: + chronic arthritic pain in back and knees  Neurological: Denies syncope or TIA-like symptoms.   Psychiatric: Denies anxiety, insomnia or depression     Past Medical History:   Diagnosis Date    Arthritis     CAD (coronary artery disease)     \"blocked artery\" no stents    Constipation     due to slow movement through bowels    Depression     Diabetes mellitus (HCC)     GERD (gastroesophageal reflux disease)     Hyperlipidemia     Hypertension     Vertigo     Vitamin D deficiency, unspecified      Past Surgical History:   Procedure Laterality Date    DIAGNOSTIC CARDIAC CATH LAB PROCEDURE  04/2019    stent    HYSTERECTOMY       Family History   Problem Relation Age of Onset    High Blood Pressure Mother     High Cholesterol Mother      Social History     Tobacco Use    Smoking status: Never Smoker    Smokeless tobacco: Never Used   Substance Use Topics    Alcohol use: No    Drug use: No       Allergies   Allergen Reactions    Clonidine Derivatives     Doxycycline     Lisinopril      States \"don't remember reaction but had something long time ago\"    Penicillins Swelling    Sulfa Antibiotics Swelling     Current Outpatient Medications   Medication Sig Dispense Refill    metoprolol succinate (TOPROL XL) 25 MG extended release tablet Take 0.5 tablets by mouth daily 30 tablet 3    acetaminophen (TYLENOL) 500 MG tablet Take 500 mg by mouth every 6 hours as needed for Pain      meclizine (ANTIVERT) 25 MG tablet Take 1 tablet by mouth 3 times daily as needed for Dizziness 30 tablet 0    docusate sodium (COLACE) 100 MG capsule Take 100 mg by mouth 2 times daily as needed for Constipation      hydrocortisone 1 % cream Apply topically 2 times daily Apply topically 2 times daily.  linagliptin (TRADJENTA) 5 MG tablet Take 5 mg by mouth daily      RaNITidine HCl (RANITIDINE 150 MAX STRENGTH PO) Take 150 mg by mouth as needed       vitamin D (CHOLECALCIFEROL) 1000 UNIT TABS tablet Take 1,000 Units by mouth daily      fluticasone (FLONASE) 50 MCG/ACT nasal spray 1 spray by Each Nare route daily      Insulin Detemir (LEVEMIR SC) Inject 10 Units into the skin nightly       losartan (COZAAR) 50 MG tablet Take 50 mg by mouth daily       triamterene-hydrochlorothiazide (MAXZIDE) 75-50 MG per tablet Take 0.5 tablets by mouth daily       hydrOXYzine (ATARAX) 50 MG tablet Take 50 mg by mouth as needed for Itching       Omega-3 Fatty Acids (FISH OIL PO) Take  by mouth.  aspirin 81 MG tablet Take 81 mg by mouth daily.  nitroGLYCERIN (NITROSTAT) 0.4 MG SL tablet Place 0.4 mg under the tongue every 5 minutes as needed for Chest pain.  atorvastatin (LIPITOR) 40 MG tablet Take 1 tablet by mouth daily. 30 tablet 3    isosorbide mononitrate (IMDUR) 60 MG CR tablet Take 1 tablet by mouth daily. 30 tablet 3    ticagrelor (BRILINTA) 90 MG TABS tablet Take 1 tablet by mouth 2 times daily 60 tablet 11     No current facility-administered medications for this visit. Physical Exam:   /68 (Site: Left Upper Arm, Position: Sitting)   Pulse 73   Ht 5' 1\" (1.549 m)   Wt 172 lb (78 kg)   SpO2 98%   BMI 32.50 kg/m²   Wt Readings from Last 2 Encounters:   05/14/19 172 lb (78 kg)   05/08/19 171 lb (77.6 kg)     Constitutional: She is oriented to person, place, and time. She appears well-developed and well-nourished. In no acute distress. HEENT: Normocephalic and atraumatic. Sclerae anicteric. No xanthelasmas. EOM's intact. Neck: Neck supple. No JVD present. Carotids without bruits. No thyromegaly present. No lymphadenopathy present.   Cardiovascular: RRR, normal S1 and S2; no murmur/gallop or size and systolic function.    Overall findings represent an intermediate risk study. Echo 1/14/2019:  Left ventricular cavity size is normal.   There is asymmetric hypertrophy of the septum.   Normal systolic function with an LV ejection fraction visually estimated to  be 60-65%.   No regional wall motion abnormalities are noted.   The right ventricle is not well visualized but appears to have normal size  and function.   There are no significant valvular abnormalities appreciated in this study. 6/2014 Cardiac cath with PCI;  1.  A 100% occlusion of the mid right coronary artery with large left to right collaterals. 2.  Patent left main trunk. 3.  A 30% proximal left anterior descending and 50% mid left anterior descending stenosis. 4.  Mild to moderate stenosis of the diagonal branch of the left anterior descending. 5.  Very tortuous circumflex artery with mild disease. Large collaterals to the mid to distal right coronary artery. 6.  Wall motion abnormalities of the inferior wall with overall preserved left ventricular ejection fraction at 50% to 55%. Assessment:    1. Dizziness  -multifactorial  -associated with diaphoresis +/- palpitations  -? Secondary to CVA +/- meds  -will discontinue Toprol and monitor (she feels that is the culprit)    2. Coronary artery disease involving native coronary artery of native heart without angina pectoris  -S/P RACHELLE to mid LAD after abnormal stress test  -no recurrent angina  -continue ASA, Brilinta, and statin  -will discontinue BB d/t bradycardia and dizziness    3. Essential hypertension  -controlled  -continue medical management    4. Hyperlipidemia LDL goal <70  -on high intensity statin    5. H/O cerebrovascular accident (CVA), unspecified mechanism ([address]  -?  Dizziness as residual of CVA  -will trial med change to see if sxs improve    Plan:  Stop  Toprol  Continue Brilinta, imdur, statin, maxzide, losartan and ASA  To begin cardiac rehab tomorrow: will monitor and see if recurrent sxs and if associated with arrhythmia. If continues after cessation of Toprol, may need to consider 30 day event monitor to R/O arrhythmia. She is agreeable to this. She will call with update on her symptoms  Follow up with Dr. Ebony Dubois as scheduled in September 2019 or sooner if needed    Return for as scheduled in September 2019 with Dr. Ebony Dubois as scheduled. .     Thanks for allowing me to participate in the care of this patient.       Karen Perez, APRN-CNP  [facility], [address] [place] [address] [address] [place] [address] ([facility]) [place], [address] [place] [address] Romero Ordaz [facility]  Office: [phone]  Fax: [phone] Location #1: eye lids

## 2021-06-02 ENCOUNTER — CLINICAL DOCUMENTATION (OUTPATIENT)
Dept: OTHER | Age: 66
End: 2021-06-02

## 2021-06-09 ENCOUNTER — OFFICE VISIT (OUTPATIENT)
Dept: ORTHOPEDIC SURGERY | Age: 66
End: 2021-06-09
Payer: MEDICARE

## 2021-06-09 VITALS
BODY MASS INDEX: 30.21 KG/M2 | WEIGHT: 160 LBS | HEART RATE: 74 BPM | SYSTOLIC BLOOD PRESSURE: 132 MMHG | DIASTOLIC BLOOD PRESSURE: 77 MMHG | HEIGHT: 61 IN

## 2021-06-09 DIAGNOSIS — M17.0 PRIMARY OSTEOARTHRITIS OF BOTH KNEES: Primary | ICD-10-CM

## 2021-06-09 PROCEDURE — 20610 DRAIN/INJ JOINT/BURSA W/O US: CPT | Performed by: PHYSICIAN ASSISTANT

## 2021-06-10 NOTE — PROGRESS NOTES
Subjective:      Patient ID: Yandel Lentz is a 77 y.o.  female. Chief Complaint   Patient presents with    Knee Problem     Bilateral knee        HPI: She is here for evaluation and treatment of left and right knee pain related to arthritis. She states the previous injection which was performed 3/4/2021 helped relieve the knee symptoms. The knee pain has gradually returned. There has not been a recent injury. Pain is 7/10. Pain is worse with activity. Pain improves somewhat with rest and elevation. Review of Systems:   Negative for fever or chills. Negative for numbness or tingling around the knee.     Past Medical History:   Diagnosis Date    Arthritis     CAD (coronary artery disease)     \"blocked artery\" no stents    Constipation     due to slow movement through bowels    Depression     Diabetes mellitus (HCC)     GERD (gastroesophageal reflux disease)     Hyperlipidemia     Hypertension     Vertigo     Vitamin D deficiency, unspecified        Family History   Problem Relation Age of Onset    High Blood Pressure Mother     High Cholesterol Mother        Past Surgical History:   Procedure Laterality Date    DIAGNOSTIC CARDIAC CATH LAB PROCEDURE  04/2019    stent    HYSTERECTOMY         Social History     Occupational History    Not on file   Tobacco Use    Smoking status: Never Smoker    Smokeless tobacco: Never Used   Vaping Use    Vaping Use: Never used   Substance and Sexual Activity    Alcohol use: No    Drug use: No    Sexual activity: Not on file       Current Outpatient Medications   Medication Sig Dispense Refill    Psyllium (METAMUCIL FIBER PO) Take by mouth daily      ZINC PO Take by mouth daily      isosorbide mononitrate (IMDUR) 60 MG extended release tablet TAKE 1 TABLET BY MOUTH EVERY DAY 30 tablet 5    atorvastatin (LIPITOR) 80 MG tablet Take 1 tablet by mouth daily 90 tablet 3    metoprolol succinate (TOPROL XL) 25 MG extended release tablet Take 0.5 tablets by mouth daily 30 tablet 3    acetaminophen (TYLENOL) 500 MG tablet Take 500 mg by mouth every 6 hours as needed for Pain      meclizine (ANTIVERT) 25 MG tablet Take 1 tablet by mouth 3 times daily as needed for Dizziness 30 tablet 0    hydrocortisone 1 % cream Apply topically 2 times daily Apply topically 2 times daily.  linagliptin (TRADJENTA) 5 MG tablet Take 5 mg by mouth daily      RaNITidine HCl (RANITIDINE 150 MAX STRENGTH PO) Take 150 mg by mouth as needed       vitamin D (CHOLECALCIFEROL) 1000 UNIT TABS tablet Take 1,000 Units by mouth daily      fluticasone (FLONASE) 50 MCG/ACT nasal spray 1 spray by Each Nostril route daily as needed       Insulin Detemir (LEVEMIR SC) Inject 10 Units into the skin nightly       losartan (COZAAR) 50 MG tablet Take 50 mg by mouth daily       hydrOXYzine (ATARAX) 50 MG tablet Take 50 mg by mouth as needed for Itching       Omega-3 Fatty Acids (FISH OIL PO) Take  by mouth.  aspirin 81 MG tablet Take 81 mg by mouth daily.  nitroGLYCERIN (NITROSTAT) 0.4 MG SL tablet Place 0.4 mg under the tongue every 5 minutes as needed for Chest pain. No current facility-administered medications for this visit. Objective:   She is alert, oriented x 3, pleasant, well nourished, developed and in no acute distress. /77   Pulse 74   Ht 5' 1\" (1.549 m)   Wt 160 lb (72.6 kg)   BMI 30.23 kg/m²      Knee exam:  Examination of the left knee shows: There is not erythema. ROM- decreased range of motion is noted. There is mild to moderate pain associated with ROM testing. Extensor Mechanism is intact. Examination of the right knee shows: There is not erythema. ROM- decreased range of motion is noted. There is mild to moderate pain associated with ROM testing. Extensor Mechanism is intact. X Rays: was not performed in the office today:       Diagnosis       ICD-10-CM    1.  Primary osteoarthritis of both knees  M17.0 IN ARTHROCENTESIS ASPIR&/INJ MAJOR JT/BURSA W/O US     NE TRIAMCINOLONE ACETONIDE INJ        Assessment/ Plan:       Assessment:  Bilateral knee osteoarthritis. Temporary relief with prior steroid injections. She is not ready to consider joint arthroplasty at this time. Plan:    Medications-Tylenol as needed for pain. PT- A home exercise program was instructed today including ROM exercises and strengthening exercises. The patient verbalized understanding of these exercises as well as the importance of the exercise program to promote return of normal function. If pain intensifies or other problems arise you are to notify the office. Procedures- left and right knee intra-articular corticosteroid injection. Risk and benefits of corticosteroid intra-articular injection was discussed today. All questions were answered to her satisfaction. Robbie Jessica verbally consented to proceed with intra-articular injections today. PROCEDURE NOTE:     Pre op Diagnosis:  left knee pain     Post op Diagnosis: Same  The left knee was prepped in standard sterile fashion with  Alcohol and 2 cc of 0.25% Marcaine and 1 cc of Kenalog 40 mg was injected into the left lateral compartment without difficulty. Procedure was tolerated well without difficulty and a band-aid was applied. Advised to ice the knee for 15-20 minutes to relieve any injection site related pain. PROCEDURE NOTE:     Pre op Diagnosis:  right knee pain     Post op Diagnosis: Same  The right knee was prepped in standard sterile fashion with  Alcohol and 2 cc of 0.25% Marcaine and 1 cc of Kenalog 40 mg was injected into the right lateral compartment without difficulty. Procedure was tolerated well without difficulty and a band-aid was applied. Advised to ice the knee for 15-20 minutes to relieve any injection site related pain. Follow up- 3-4 months.      Call or return to clinic if these symptoms worsen or fail to improve as anticipated.

## 2021-07-14 ENCOUNTER — OFFICE VISIT (OUTPATIENT)
Dept: CARDIOLOGY CLINIC | Age: 66
End: 2021-07-14
Payer: MEDICARE

## 2021-07-14 VITALS
OXYGEN SATURATION: 97 % | SYSTOLIC BLOOD PRESSURE: 134 MMHG | HEIGHT: 61 IN | HEART RATE: 68 BPM | BODY MASS INDEX: 33.23 KG/M2 | DIASTOLIC BLOOD PRESSURE: 64 MMHG | WEIGHT: 176 LBS

## 2021-07-14 DIAGNOSIS — I10 ESSENTIAL HYPERTENSION: ICD-10-CM

## 2021-07-14 DIAGNOSIS — I25.10 CORONARY ARTERY DISEASE INVOLVING NATIVE CORONARY ARTERY OF NATIVE HEART WITHOUT ANGINA PECTORIS: Primary | ICD-10-CM

## 2021-07-14 DIAGNOSIS — E78.5 HYPERLIPIDEMIA, UNSPECIFIED HYPERLIPIDEMIA TYPE: ICD-10-CM

## 2021-07-14 PROCEDURE — G8400 PT W/DXA NO RESULTS DOC: HCPCS | Performed by: INTERNAL MEDICINE

## 2021-07-14 PROCEDURE — 99214 OFFICE O/P EST MOD 30 MIN: CPT | Performed by: INTERNAL MEDICINE

## 2021-07-14 PROCEDURE — 1123F ACP DISCUSS/DSCN MKR DOCD: CPT | Performed by: INTERNAL MEDICINE

## 2021-07-14 PROCEDURE — G8417 CALC BMI ABV UP PARAM F/U: HCPCS | Performed by: INTERNAL MEDICINE

## 2021-07-14 PROCEDURE — 3017F COLORECTAL CA SCREEN DOC REV: CPT | Performed by: INTERNAL MEDICINE

## 2021-07-14 PROCEDURE — 1036F TOBACCO NON-USER: CPT | Performed by: INTERNAL MEDICINE

## 2021-07-14 PROCEDURE — 1090F PRES/ABSN URINE INCON ASSESS: CPT | Performed by: INTERNAL MEDICINE

## 2021-07-14 PROCEDURE — G8427 DOCREV CUR MEDS BY ELIG CLIN: HCPCS | Performed by: INTERNAL MEDICINE

## 2021-07-14 PROCEDURE — 93000 ELECTROCARDIOGRAM COMPLETE: CPT | Performed by: INTERNAL MEDICINE

## 2021-07-14 PROCEDURE — 4040F PNEUMOC VAC/ADMIN/RCVD: CPT | Performed by: INTERNAL MEDICINE

## 2021-07-14 NOTE — PROGRESS NOTES
[facility]   Cardiology Progress Note    Liana Francois  1955 July 14, 2021      CC: \"I had a few episodes of chest pressure. \"     HPI:  The patient is 77 y.o. female with a past medical history significant for CAD, HTN, HLD and diabetes. Stress test 3/19/19 showed ischemia. 4/2/19 underwent LHC with PCI to mid LAD. F/u 4/15/19 reported shortness of breath likely related to Brilinta. Has been participating in cardiac rehab. Today, she is here for follow up for CAD. She reports that she had 3 episodes of chest pressure over 1 month time when she was laying down x2 and one time at work. She felt short winded, dizzy and nausea. She isn't sure if she had a sour taste in her mouth. She has not undergone GI workup nor updated her PCP. Patient denies daily symptoms of exertional chest pain/pressure, dyspnea at rest, TREVINO, PND, orthopnea, palpitations, lightheadedness, weight changes, changes in LE edema, and syncope. She admits to medical therapy compliance and tolerating. She will have left arm lipoma removal 7/22/21 with local anesthesia.          Past Medical History:   Diagnosis Date    Arthritis     CAD (coronary artery disease)     \"blocked artery\" no stents    Constipation     due to slow movement through bowels    Depression     Diabetes mellitus (HCC)     GERD (gastroesophageal reflux disease)     Hyperlipidemia     Hypertension     Vertigo     Vitamin D deficiency, unspecified      Past Surgical History:   Procedure Laterality Date    DIAGNOSTIC CARDIAC CATH LAB PROCEDURE  04/2019    stent    HYSTERECTOMY       Family History   Problem Relation Age of Onset    High Blood Pressure Mother     High Cholesterol Mother      Social History     Tobacco Use    Smoking status: Never Smoker    Smokeless tobacco: Never Used   Vaping Use    Vaping Use: Never used   Substance Use Topics    Alcohol use: No    Drug use: No       Allergies   Allergen Reactions    Clonidine Derivatives     Doxycycline     Lisinopril      States \"don't remember reaction but had something long time ago\"    Penicillins Swelling    Sulfa Antibiotics Swelling     Current Outpatient Medications   Medication Sig Dispense Refill    Psyllium (METAMUCIL FIBER PO) Take by mouth daily      ZINC PO Take by mouth daily      isosorbide mononitrate (IMDUR) 60 MG extended release tablet TAKE 1 TABLET BY MOUTH EVERY DAY 30 tablet 5    atorvastatin (LIPITOR) 80 MG tablet Take 1 tablet by mouth daily 90 tablet 3    metoprolol succinate (TOPROL XL) 25 MG extended release tablet Take 0.5 tablets by mouth daily 30 tablet 3    acetaminophen (TYLENOL) 500 MG tablet Take 500 mg by mouth every 6 hours as needed for Pain      meclizine (ANTIVERT) 25 MG tablet Take 1 tablet by mouth 3 times daily as needed for Dizziness 30 tablet 0    hydrocortisone 1 % cream Apply topically 2 times daily Apply topically 2 times daily.  linagliptin (TRADJENTA) 5 MG tablet Take 5 mg by mouth daily      RaNITidine HCl (RANITIDINE 150 MAX STRENGTH PO) Take 150 mg by mouth as needed       vitamin D (CHOLECALCIFEROL) 1000 UNIT TABS tablet Take 1,000 Units by mouth daily      fluticasone (FLONASE) 50 MCG/ACT nasal spray 1 spray by Each Nostril route daily as needed       Insulin Detemir (LEVEMIR SC) Inject 10 Units into the skin nightly       losartan (COZAAR) 50 MG tablet Take 50 mg by mouth daily       hydrOXYzine (ATARAX) 50 MG tablet Take 50 mg by mouth as needed for Itching       Omega-3 Fatty Acids (FISH OIL PO) Take  by mouth.  aspirin 81 MG tablet Take 81 mg by mouth daily.  nitroGLYCERIN (NITROSTAT) 0.4 MG SL tablet Place 0.4 mg under the tongue every 5 minutes as needed for Chest pain. No current facility-administered medications for this visit. Review of Systems:  · Constitutional: no unanticipated weight loss. There's been no change in energy level, sleep pattern, or activity level.  No fevers, bilaterally. Neurological: No gross cranial nerve deficit. Coordination normal.   Skin: Skin is warm and dry. There is no rash or diaphoresis. Psychiatric: She has a normal mood and affect. Her speech is normal and behavior is normal.     Lab [facility]   [facility]  Lab Results   Component Value Date    TRIG 103 02/20/2020    HDL 34 02/20/2020    LDLCALC 60 02/20/2020    LABVLDL 20 01/15/2019     Lab Results   Component Value Date    BUN 25 03/27/2019    CREATININE 1.1 03/27/2019       EKG Interpretation: 3/6/19 Normal Sinus Rhythm     7/14/21: SR, 68 bpm   Image Review:     Stress study: 1/4/21  Summary    Normal myocardial perfusion study.    Normal LV size and systolic function.        Non-diagnostic EKG response due to failure to reach target heart rate.    Overall findings represent a low risk study.         4/2/2019: Cardiac cath with PCI  1.  Patent left main trunk. 2.  Moderate calcification of the mid left anterior descending artery with 80% to 90% eccentric stenosis, 99% stenosis of the septal . 3.  Tortuous circumflex artery with 50% stenosis in the mid segment. 4.  100% occlusion of the mid right coronary artery with left to right collaterals. 5.  Normal left ventricular systolic function with estimated EF of 50% to 55%. 6.  Successful angioplasty followed by drug-eluting stent deployment in the mid left anterior descending artery.  90% stenosis reduced to 0%. Stress test 3/19/19  Abnormal study. There is a small to moderate sized, mild intensity,    reversible defect of the basal to mid inferior and basal inferolateral walls    which is consistent with ischemia.   Megan Seller is also a small sized, moderate intensity, fixed anteroapical wall    defect which is most consistent with breast attenuation artifact.    Normal LV size and systolic function.    Overall findings represent an intermediate risk study.        ECHO 1/14/19   Left ventricular cavity size is normal.  There is asymmetric hypertrophy of the septum. Normal systolic function with an LV ejection fraction visually estimated to  be 60-65%. No regional wall motion abnormalities are noted. The right ventricle is not well visualized but appears to have normal size  and function. There are no significant valvular abnormalities appreciated in this study. Cardiac Cath 6/11/14  1. A 100% occlusion of the mid right coronary artery with large left to  right collaterals. 2.  Patent left main trunk. 3.  A 30% proximal left anterior descending and 50% mid left anterior  descending stenosis. 4.  Mild to moderate stenosis of the diagonal branch of the left anterior  descending. 5.  Very tortuous circumflex artery with mild disease. Large collaterals to  the mid to distal right coronary artery. 6.  Wall motion abnormalities of the inferior wall with overall preserved  left ventricular ejection fraction at 50% to 55%. In view of the above findings, will try to treat the patient medically first  and if she fails medical therapy, will consider high-risk intervention of the  right coronary artery. Assessment/Plan:     She has obtained COVID vaccine. CAD  3 episodes of chest pressure with nausea, breathlessness, lightheadedness laying down x2 and one time at work otherwise no cardiac symptoms with ADL's. This is relieved with NTG. I have asked her to consider GI workup and discuss further with her PCP. EKG today SR and her nuclear stress test 1/2021 normal. Hx of PCI of the left and descending artery. She will continue Asa,B-blocker, and statin therapy. If she continues to have recurrent chest pressure, will consider additional cardiac testing    HTN  Controlled today. Continue current medical management. BMP 6/23/21 [facility] stable. HLD  Last lipid panel from 8/25/20 wnl except HDL 31, LDLc 81. Goal LDL is <70. Continue Lipitor 80 mg.  Repeat lipid profile wnl except LDLc 82 with [facility]. Advised her to adhere to heart healthy diet with low fat/chol/carb diet. Walking program.     Routine blood work per PCP. Instructed to obtain flu vaccine this season. Follow up in 6-7 months. Laxity of medical decision making-high    Thank you very much for allowing me to participate in the care of your patient. Please do not hesitate to contact me if you have any questions. Sincerely,  Leela Ray MD      [facility], [address] [place] [address]  Ph: [phone]  Fax: [phone]    This note was scribed in the presence of Dr. Marcelino Sadler MD by Noreen Acosta RN.

## 2021-07-14 NOTE — PATIENT INSTRUCTIONS
Patient Education        Heart-Healthy Diet: Care Instructions  Your Care Instructions     A heart-healthy diet has lots of vegetables, fruits, nuts, beans, and whole grains, and is low in salt. It limits foods that are high in saturated fat, such as meats, cheeses, and fried foods. It may be hard to change your diet, but even small changes can lower your risk of heart attack and heart disease. Follow-up care is a key part of your treatment and safety. Be sure to make and go to all appointments, and call your doctor if you are having problems. It's also a good idea to know your test results and keep a list of the medicines you take. How can you care for yourself at home? Watch your portions  · Use food labels to learn what the recommended servings are for the foods you eat. · Eat only the number of calories you need to stay at a healthy weight. If you need to lose weight, eat fewer calories than your body burns (through exercise and other physical activity). Eat more fruits and vegetables  · Eat a variety of fruit and vegetables every day. Dark green, deep orange, red, or yellow fruits and vegetables are especially good for you. Examples include spinach, carrots, peaches, and berries. · Keep carrots, celery, and other veggies handy for snacks. Buy fruit that is in season and store it where you can see it so that you will be tempted to eat it. · Cook dishes that have a lot of veggies in them, such as stir-fries and soups. Limit saturated fat  · Read food labels, and try to avoid saturated fats. They increase your risk of heart disease. · Use olive or canola oil when you cook. · Bake, broil, grill, or steam foods instead of frying them. · Choose lean meats instead of high-fat meats such as hot dogs and sausages. Cut off all visible fat when you prepare meat. · Eat fish, skinless poultry, and meat alternatives such as soy products instead of high-fat meats.  Soy products, such as tofu, may be especially good for your heart. · Choose low-fat or fat-free milk and dairy products. Eat foods high in fiber  · Eat a variety of grain products every day. Include whole-grain foods that have lots of fiber and nutrients. Examples of whole-grain foods include oats, whole wheat bread, and brown rice. · Buy whole-grain breads and cereals, instead of white bread or pastries. Limit salt and sodium  · Limit how much salt and sodium you eat to help lower your blood pressure. · Taste food before you salt it. Add only a little salt when you think you need it. With time, your taste buds will adjust to less salt. · Eat fewer snack items, fast foods, and other high-salt, processed foods. Check food labels for the amount of sodium in packaged foods. · Choose low-sodium versions of canned goods (such as soups, vegetables, and beans). Limit sugar  · Limit drinks and foods with added sugar. These include candy, desserts, and soda pop. Limit alcohol  · Limit alcohol to no more than 2 drinks a day for men and 1 drink a day for women. Too much alcohol can cause health problems. When should you call for help? Watch closely for changes in your health, and be sure to contact your doctor if:    · You would like help planning heart-healthy meals. Where can you learn more? Go to https://Prestiamoci.CareLuLu. org and sign in to your [employer] account. Enter V137 in the [facility] box to learn more about \"Heart-Healthy Diet: Care Instructions. \"     If you do not have an account, please click on the \"Sign Up Now\" link. Current as of: December 17, 2020               Content Version: 12.9  © 5264-0200 Healthwise, [employer]endorse. Care instructions adapted under license by [facility] ([facility]). If you have questions about a medical condition or this instruction, always ask your healthcare professional. [address] any warranty or liability for your use of this information.          Patient Education Learning About Low-Fat Eating  What is low-fat eating? Most food has some fat in it. Your body needs some fat to be healthy. But some kinds of fats are healthier than others. In a low-fat eating plan, you try to choose healthier fats and eat fewer unhealthy fats. Healthy fats include olive and canola oil. Try to avoid eating too much saturated fat, such as in cheese and meats. You do not need to cut all fat from your diet. But you can make healthier choices about the types and amount of fat you eat. Even though it is a good idea to choose healthier fats, it is still important to be careful of how much fat you eat, because all fats are high in calories. What are the different types of fats? Unhealthy fat  · Saturated fat. Saturated fats are mostly in animal foods, such as meat and dairy foods. Tropical oils, such as coconut oil, palm oil, and cocoa butter, are also saturated fats. Healthy fats  · Monounsaturated fat. Monounsaturated fats are liquid at room temperature but get solid when refrigerated. Eating foods that are high in this fat may help lower your \"bad\" (LDL) cholesterol, keep your \"good\" (HDL) cholesterol level up, and lower your chances of getting coronary artery disease. This fat is found in canola oil, olive oil, peanut oil, olives, avocados, nuts, and nut butters. · Polyunsaturated fat. Polyunsaturated fats are liquid at room temperature. They are in safflower, sunflower, and corn oils. They are also the main fat in seafood. Omega-3 fatty acids are types of polyunsaturated fat. Eating fish may lower your chances of getting coronary artery disease. Fatty fish such as salmon and mackerel contain these healthy fatty acids. So do ground flaxseeds and flaxseed oil, soybeans, walnuts, and seeds. Why cut down on unhealthy fats? Eating foods that contain saturated fats can raise the LDL (\"bad\") cholesterol in your blood.  Having a high level of LDL cholesterol increases your chance of hardening of the arteries (atherosclerosis), which can lead to heart disease, heart attack, and stroke. In general:  · No more than 10% of your daily calories should come from saturated fat. This is about 20 grams in a 2,000-calorie diet. · No more than 10% of your daily calories should come from polyunsaturated fat. This is about 20 grams in a 2,000-calorie diet. · Monounsaturated fats can be up to 15% of your daily calories. This is about 25 to 30 grams in a 2,000-calorie diet. If you're not sure how much fat you should be eating or how many calories you need each day to stay at a healthy weight, talk to a registered dietitian. A dietitian can help you create a plan that's right for you. What can you do to cut down on fat? Foods like cheese, butter, sausage, and desserts can have a lot of unhealthy fats. Try these tips for healthier meals at home and when you eat out. At home  · Fill up on fruits, vegetables, and whole grains. · Think of meat as a side dish instead of as the main part of your meal.  · When you do eat meat, make it extra-lean ground beef (97% lean), ground turkey breast (without skin added), meats with fat trimmed off before cooking, or skinless chicken. · Try main dishes that use whole wheat pasta, brown rice, dried beans, or vegetables. · Use cooking methods that use little or no fat, such as broiling, steaming, or grilling. Use cooking spray instead of oil. If you use oil, use a monounsaturated oil, such as canola or olive oil. · Read food labels on canned, bottled, or packaged foods. Choose those with little saturated fat. When eating out at a restaurant  · Order foods that are broiled or poached instead of fried or breaded. · Cut back on the amount of butter or margarine that you use on bread. Use small amounts of olive oil instead. · Order sauces, gravies, and salad dressings on the side, and use only a little. · When you order pasta, choose tomato sauce instead of cream sauce.   · Ask medicine. · Your doctor may recommend over-the-counter medicine. For mild or occasional indigestion, antacids, such as Tums, Gaviscon, Mylanta, or Maalox, may help. Your doctor also may recommend over-the-counter acid reducers, such as famotidine (Pepcid AC), cimetidine (Tagamet HB), or omeprazole (Prilosec). Read and follow all instructions on the label. If you use these medicines often, talk with your doctor. · Change your eating habits. ? It's best to eat several small meals instead of two or three large meals. ? After you eat, wait 2 to 3 hours before you lie down. ? Chocolate, mint, and alcohol can make GERD worse. ? Spicy foods, foods that have a lot of acid (like tomatoes and oranges), and coffee can make GERD symptoms worse in some people. If your symptoms are worse after you eat a certain food, you may want to stop eating that food to see if your symptoms get better. · Do not smoke or chew tobacco. Smoking can make GERD worse. If you need help quitting, talk to your doctor about stop-smoking programs and medicines. These can increase your chances of quitting for good. · If you have GERD symptoms at night, raise the head of your bed 6 to 8 inches by putting the frame on blocks or placing a foam wedge under the head of your mattress. (Adding extra pillows does not work.)  · Do not wear tight clothing around your middle. · Lose weight if you need to. Losing just 5 to 10 pounds can help. When should you call for help? Call your doctor now or seek immediate medical care if:    · You have new or different belly pain.     · Your stools are black and tarlike or have streaks of blood. Watch closely for changes in your health, and be sure to contact your doctor if:    · Your symptoms have not improved after 2 days.     · Food seems to catch in your throat or chest.   Where can you learn more? Go to https://grey.[employer]. org and sign in to your [employer] account.  Enter U679 in the Search Health Information box to learn more about \"Gastroesophageal Reflux Disease (GERD): Care Instructions. \"     If you do not have an account, please click on the \"Sign Up Now\" link. Current as of: February 10, 2021               Content Version: 12.9  © 2006-2021 Healthwise, [employer]. Care instructions adapted under license by [facility] ([facility]). If you have questions about a medical condition or this instruction, always ask your healthcare professional. [address] any warranty or liability for your use of this information. Patient Education        Walking for Exercise: Care Instructions  Your Care Instructions     Walking is one of the easiest ways to get the exercise you need for good health. A brisk, 30-minute walk each day can help you feel better and have more energy. It can help you lower your risk of disease. Walking can help you keep your bones strong and your heart healthy. Check with your doctor before you start a walking plan if you have heart problems, other health issues, or you have not been active in a long time. Follow your doctor's instructions for safe levels of exercise. Follow-up care is a key part of your treatment and safety. Be sure to make and go to all appointments, and call your doctor if you are having problems. It's also a good idea to know your test results and keep a list of the medicines you take. How can you care for yourself at home? Getting started  · Start slowly and set a short-term goal. For example, walk for 5 or 10 minutes every day. · Bit by bit, increase the amount you walk every day. Try for at least 30 minutes on most days of the week. You also may want to swim, bike, or do other activities. · If finding enough time is a problem, it's fine to be active in shorter periods of time throughout your day.   · To get the heart-healthy benefits of walking, you need to walk briskly enough to increase your heart rate and breathing, but not so fast that you can't talk comfortably. · Wear comfortable shoes that fit well and provide good support for your feet and ankles. Staying with your plan  · After you've made walking a habit, set a longer-term goal. You may want to set a goal of walking briskly for longer or walking farther. Experts say to do 2½ hours (150 minutes) of moderate activity a week. A faster heartbeat is what defines moderate-level activity. · To stay motivated, walk with friends, coworkers, or pets. · Use a phone yovany or pedometer to track your steps each day. Set a goal to increase your steps. When you reach that goal, set a higher goal.  · If the weather keeps you from walking outside, go for walks at the mall with a friend. Local schools and churches may have indoor gyms where you can walk. Fitting a walk into your workday  · Park several blocks away from work, or get off the bus a few stops early. · Use the stairs instead of the elevator, at least for a few floors. · Suggest holding meetings with colleagues during a walk inside or outside the building. · Use the restroom that is the farthest from your desk or workstation. · Use your morning and afternoon breaks to take quick 15 minutes walks. Staying safe  · Know your surroundings. Walk in a well-lighted, safe place. If it's dark, walk with a partner. Wear light-colored clothing. If you can, buy a vest or jacket that reflects light. · Carry a cell phone for emergencies. · Drink plenty of water. Take a water bottle with you when you walk. This is very important if it is hot out. · Be careful not to slip on wet or icy ground. You can buy \"grippers\" for your shoes to help keep you from slipping. · Pay attention to your walking surface. Use sidewalks and paths. · If you have health issues such as asthma, COPD, or heart problems, or if you haven't been active for a long time, check with your doctor before you start a new activity. Where can you learn more?   Go to https://chpepiceweb.healthRetAPPs. org and sign in to your [employer] account. Enter R159 in the [facility] box to learn more about \"Walking for Exercise: Care Instructions. \"     If you do not have an account, please click on the \"Sign Up Now\" link. Current as of: September 10, 2020               Content Version: 12.9  © 2155-1532 [facility], [facility]. Care instructions adapted under license by [facility] ([facility]). If you have questions about a medical condition or this instruction, always ask your healthcare professional. [address] any warranty or liability for your use of this information.

## 2021-09-08 ENCOUNTER — OFFICE VISIT (OUTPATIENT)
Dept: ORTHOPEDIC SURGERY | Age: 66
End: 2021-09-08
Payer: MEDICARE

## 2021-09-08 VITALS — RESPIRATION RATE: 1 BRPM | HEIGHT: 61 IN | BODY MASS INDEX: 30.21 KG/M2 | WEIGHT: 160 LBS

## 2021-09-08 DIAGNOSIS — M17.0 PRIMARY OSTEOARTHRITIS OF BOTH KNEES: Primary | ICD-10-CM

## 2021-09-08 PROCEDURE — 20610 DRAIN/INJ JOINT/BURSA W/O US: CPT | Performed by: PHYSICIAN ASSISTANT

## 2021-09-08 NOTE — PROGRESS NOTES
Subjective:      Patient ID: Francis Armenta is a 77 y.o.  female. Chief Complaint   Patient presents with    Knee Problem     Bilateral knee        HPI: She is here for evaluation and treatment of left and right knee pain related to arthritis. She states the previous injection which was performed 6/10/2021  helped relieve the knee symptoms. She knee pain has gradually returned. She denies recent injury. Pain is 6/10. Pain is worse with weightbearing activities. Pain improves somewhat with rest and elevation. Review of Systems:   She denies fever or chills. She denies numbness or tingling around the knee.     Past Medical History:   Diagnosis Date    Arthritis     CAD (coronary artery disease)     \"blocked artery\" no stents    Constipation     due to slow movement through bowels    Depression     Diabetes mellitus (HCC)     GERD (gastroesophageal reflux disease)     Hyperlipidemia     Hypertension     Vertigo     Vitamin D deficiency, unspecified        Family History   Problem Relation Age of Onset    High Blood Pressure Mother     High Cholesterol Mother        Past Surgical History:   Procedure Laterality Date    DIAGNOSTIC CARDIAC CATH LAB PROCEDURE  04/2019    stent    HYSTERECTOMY         Social History     Occupational History    Not on file   Tobacco Use    Smoking status: Never Smoker    Smokeless tobacco: Never Used   Vaping Use    Vaping Use: Never used   Substance and Sexual Activity    Alcohol use: No    Drug use: No    Sexual activity: Not on file       Current Outpatient Medications   Medication Sig Dispense Refill    Psyllium (METAMUCIL FIBER PO) Take by mouth daily      ZINC PO Take by mouth daily      isosorbide mononitrate (IMDUR) 60 MG extended release tablet TAKE 1 TABLET BY MOUTH EVERY DAY 30 tablet 5    atorvastatin (LIPITOR) 80 MG tablet Take 1 tablet by mouth daily 90 tablet 3    metoprolol succinate (TOPROL XL) 25 MG extended release tablet Take 0.5 tablets by mouth daily 30 tablet 3    acetaminophen (TYLENOL) 500 MG tablet Take 500 mg by mouth every 6 hours as needed for Pain      meclizine (ANTIVERT) 25 MG tablet Take 1 tablet by mouth 3 times daily as needed for Dizziness 30 tablet 0    hydrocortisone 1 % cream Apply topically 2 times daily Apply topically 2 times daily.  linagliptin (TRADJENTA) 5 MG tablet Take 5 mg by mouth daily      RaNITidine HCl (RANITIDINE 150 MAX STRENGTH PO) Take 150 mg by mouth as needed       vitamin D (CHOLECALCIFEROL) 1000 UNIT TABS tablet Take 1,000 Units by mouth daily      fluticasone (FLONASE) 50 MCG/ACT nasal spray 1 spray by Each Nostril route daily as needed       Insulin Detemir (LEVEMIR SC) Inject 10 Units into the skin nightly       losartan (COZAAR) 50 MG tablet Take 50 mg by mouth daily       hydrOXYzine (ATARAX) 50 MG tablet Take 50 mg by mouth as needed for Itching       Omega-3 Fatty Acids (FISH OIL PO) Take  by mouth.  aspirin 81 MG tablet Take 81 mg by mouth daily.  nitroGLYCERIN (NITROSTAT) 0.4 MG SL tablet Place 0.4 mg under the tongue every 5 minutes as needed for Chest pain. No current facility-administered medications for this visit. Allergies   Allergen Reactions    Clonidine Derivatives     Doxycycline     Lisinopril      States \"don't remember reaction but had something long time ago\"    Penicillins Swelling    Sulfa Antibiotics Swelling        Objective:   She is alert, oriented x 3, pleasant, well nourished, developed and in no acute distress. Resp (!) 1   Ht 5' 1\" (1.549 m)   Wt 160 lb (72.6 kg)   BMI 30.23 kg/m²      Knee exam:  Examination of the left knee shows: There is not erythema. ROM- decreased range of motion is noted. There is mild to moderate pain associated with ROM testing. Extensor Mechanism is intact. Examination of the right knee shows: There is not erythema. ROM- decreased range of motion is noted.   There is mild to moderate pain associated with ROM testing. Extensor Mechanism is intact. X Rays: was not performed in the office today:       Diagnosis       ICD-10-CM    1. Primary osteoarthritis of both knees  M17.0 SD ARTHROCENTESIS ASPIR&/INJ MAJOR JT/BURSA W/O US     SD TRIAMCINOLONE ACETONIDE INJ        Assessment/ Plan:     Assessment:  Moderate arthritis of the left and right knee. She reports good response with previous intra-articular steroid injections. Most recent injections provide 6/10/2021 with moderate to good relief. I had an extensive discussion with Ms. Liana Francois  regarding the natural history, etiology, and long term consequences of her condition. I have presented reasonable alternatives to the patient's proposed care, treatment, and services. Risks and benefits of the treatment options also reviewed in detail. I have outlined a treatment plan with them. She has had full opportunity to ask her questions. I have answered them all to her satisfaction. I feel that Ms. Liana Francois understands our discussion today. Weight loss, activity modification, home exercise therapy program, NSAID'S, dietary changes have been discussed as a means to help control the symptoms. Non surgical options including cortisone injection, Visco supplementation injection,  Coolief (cooled frequency ablation of the geniculate nerves), stem cell injections, PRP injections were discussed. Surgical option, knee arthroplasty discussed. Plan:  Medications- OTC NSAIDS discussed. She  was advised that NSAID-type medications have several important potential side effects: gastrointestinal irritation including hemorrhage, cardiac events and renal injuries. She was asked to take the medication with food and to stop if she experiences any GI upset. I asked her to call for vomiting, abdominal pain or black/bloody stools. She should have renal function testing per his medical provider periodically.   She expresses understanding of these risks associated with NSAID use and questions were answered. PT- A home exercise program was instructed today including ROM exercises and strengthening exercises. The patient verbalized understanding of these exercises as well as the importance of the exercise program to promote return of normal function. If pain intensifies or other problems arise you are to notify the office. Procedures- left and right knee intra-articular corticosteroid injection. Risk and benefits of corticosteroid intra-articular injection was discussed today. All questions were answered to her satisfaction. She verbally consented to proceed with intra-articular injections today. PROCEDURE NOTE:     Pre op Diagnosis:  left knee arthritis     Post op Diagnosis: Same  The left knee was prepped in standard sterile fashion with  Alcohol and 2 cc of 0.25% Marcaine and 1 cc of Kenalog 40 mg was injected into the left lateral compartment without difficulty. Procedure was tolerated well without difficulty and a band-aid was applied. Advised to ice the knee for 15-20 minutes to relieve any injection site related pain. PROCEDURE NOTE:     Pre op Diagnosis:  right knee arthritis     Post op Diagnosis: Same  The right knee was prepped in standard sterile fashion with  Alcohol and 2 cc of 0.25% Marcaine and 1 cc of Kenalog 40 mg was injected into the right lateral compartment without difficulty. Procedure was tolerated well without difficulty and a band-aid was applied. Advised to ice the knee for 15-20 minutes to relieve any injection site related pain. Follow up- 3-4 months. Call or return to clinic if these symptoms worsen or fail to improve as anticipated.                                                  Patricia Mata PA-C   Senior Physician Assistant   [facility] Disclaimer: This note was generated with use of a verbal recognition program (DRAGON) and an attempt was made to check for errors. It is possible that there are still dictated errors within this office note. If so, please bring any significant errors to my attention for an addendum. All efforts were made to ensure that this office note is accurate.

## 2021-12-08 ENCOUNTER — OFFICE VISIT (OUTPATIENT)
Dept: ORTHOPEDIC SURGERY | Age: 66
End: 2021-12-08
Payer: MEDICARE

## 2021-12-08 VITALS — BODY MASS INDEX: 30.21 KG/M2 | RESPIRATION RATE: 18 BRPM | HEIGHT: 61 IN | WEIGHT: 160 LBS

## 2021-12-08 DIAGNOSIS — M17.0 PRIMARY OSTEOARTHRITIS OF BOTH KNEES: Primary | ICD-10-CM

## 2021-12-08 PROCEDURE — 20610 DRAIN/INJ JOINT/BURSA W/O US: CPT | Performed by: PHYSICIAN ASSISTANT

## 2021-12-08 NOTE — PROGRESS NOTES
Subjective:      Patient ID: Nicci Agosto is a 74 y.o.  female. HPI: She is here for evaluation and treatment of left and right knee pain related to arthritis. She states the previous injection which was performed 9/8/2021  helped relieve the knee symptoms. She knee pain has gradually returned. She denies recent injury. Pain is 6/10. Pain is worse with weightbearing activities. Pain improves somewhat with rest and elevation. Review of Systems:   She denies fever or chills. She denies numbness or tingling around the knee.     Past Medical History:   Diagnosis Date    Arthritis     CAD (coronary artery disease)     \"blocked artery\" no stents    Constipation     due to slow movement through bowels    Depression     Diabetes mellitus (HCC)     GERD (gastroesophageal reflux disease)     Hyperlipidemia     Hypertension     Vertigo     Vitamin D deficiency, unspecified        Family History   Problem Relation Age of Onset    High Blood Pressure Mother     High Cholesterol Mother        Past Surgical History:   Procedure Laterality Date    DIAGNOSTIC CARDIAC CATH LAB PROCEDURE  04/2019    stent    HYSTERECTOMY         Social History     Occupational History    Not on file   Tobacco Use    Smoking status: Never Smoker    Smokeless tobacco: Never Used   Vaping Use    Vaping Use: Never used   Substance and Sexual Activity    Alcohol use: No    Drug use: No    Sexual activity: Not on file       Current Outpatient Medications   Medication Sig Dispense Refill    Psyllium (METAMUCIL FIBER PO) Take by mouth daily      ZINC PO Take by mouth daily      isosorbide mononitrate (IMDUR) 60 MG extended release tablet TAKE 1 TABLET BY MOUTH EVERY DAY 30 tablet 5    atorvastatin (LIPITOR) 80 MG tablet Take 1 tablet by mouth daily 90 tablet 3    metoprolol succinate (TOPROL XL) 25 MG extended release tablet Take 0.5 tablets by mouth daily 30 tablet 3    acetaminophen (TYLENOL) 500 MG tablet Take 500 mg by mouth every 6 hours as needed for Pain      meclizine (ANTIVERT) 25 MG tablet Take 1 tablet by mouth 3 times daily as needed for Dizziness 30 tablet 0    hydrocortisone 1 % cream Apply topically 2 times daily Apply topically 2 times daily.  linagliptin (TRADJENTA) 5 MG tablet Take 5 mg by mouth daily      RaNITidine HCl (RANITIDINE 150 MAX STRENGTH PO) Take 150 mg by mouth as needed       vitamin D (CHOLECALCIFEROL) 1000 UNIT TABS tablet Take 1,000 Units by mouth daily      fluticasone (FLONASE) 50 MCG/ACT nasal spray 1 spray by Each Nostril route daily as needed       Insulin Detemir (LEVEMIR SC) Inject 10 Units into the skin nightly       losartan (COZAAR) 50 MG tablet Take 50 mg by mouth daily       hydrOXYzine (ATARAX) 50 MG tablet Take 50 mg by mouth as needed for Itching       Omega-3 Fatty Acids (FISH OIL PO) Take  by mouth.  aspirin 81 MG tablet Take 81 mg by mouth daily.  nitroGLYCERIN (NITROSTAT) 0.4 MG SL tablet Place 0.4 mg under the tongue every 5 minutes as needed for Chest pain. No current facility-administered medications for this visit. Allergies   Allergen Reactions    Clonidine Derivatives     Doxycycline     Lisinopril      States \"don't remember reaction but had something long time ago\"    Penicillins Swelling    Sulfa Antibiotics Swelling        Objective:   She is alert, oriented x 3, pleasant, well nourished, developed and in no acute distress. Resp 18   Ht 5' 1\" (1.549 m)   Wt 160 lb (72.6 kg)   BMI 30.23 kg/m²      Knee exam:  Examination of the left knee shows: There is not erythema. ROM- decreased range of motion is noted. There is mild to moderate pain associated with ROM testing. Extensor Mechanism is intact. Examination of the right knee shows: There is not erythema. ROM- decreased range of motion is noted. There is mild to moderate pain associated with ROM testing. Extensor Mechanism is intact.      X Rays: was not performed in the office today:       Diagnosis       ICD-10-CM    1. Primary osteoarthritis of both knees  M17.0         Assessment/ Plan:     Assessment:  Degenerative arthritis left and right knee. Steroid injections are effective in treating her knee pain due to knee arthritis. She is not ready to consider joint arthroplasty at this time. I had an extensive discussion with Ms. Samuel Tomlinson  regarding the natural history, etiology, and long term consequences of her condition. I have presented reasonable alternatives to the patient's proposed care, treatment, and services. Risks and benefits of the treatment options also reviewed in detail. I have outlined a treatment plan with them. She has had full opportunity to ask her questions. I have answered them all to her satisfaction. I feel that Ms. Samuel Tomlinson understands our discussion today. Plan:    Procedures- left and right knee intra-articular corticosteroid injection. Risk and benefits of corticosteroid intra-articular injection was discussed today. All questions were answered to her satisfaction. She verbally consented to proceed with intra-articular injections today. PROCEDURE NOTE:     Pre op Diagnosis:  left knee arthritis     Post op Diagnosis: Same  The left knee was prepped in standard sterile fashion with  Alcohol and 2 cc of 0.25% Marcaine and 1 cc of Kenalog 40 mg was injected into the left lateral compartment without difficulty. Procedure was tolerated well without difficulty and a band-aid was applied. Advised to ice the knee for 15-20 minutes to relieve any injection site related pain.      PROCEDURE NOTE:     Pre op Diagnosis:  right knee arthritis     Post op Diagnosis: Same  The right knee was prepped in standard sterile fashion with  Alcohol and 2 cc of 0.25% Marcaine and 1 cc of Kenalog 40 mg was injected into the right lateral compartment without difficulty. Procedure was tolerated well without difficulty and a band-aid was applied. Advised to ice the knee for 15-20 minutes to relieve any injection site related pain. Follow up:   Call or return to clinic if these symptoms worsen or fail to improve as anticipated. Marisabel Banegas PA-C   Senior Physician Assistant   [facility]                                         Disclaimer: This note was generated with use of a verbal recognition program (DRAGON) and an attempt was made to check for errors. It is possible that there are still dictated errors within this office note. If so, please bring any significant errors to my attention for an addendum. All efforts were made to ensure that this office note is accurate.

## 2022-01-29 ENCOUNTER — HOSPITAL ENCOUNTER (EMERGENCY)
Age: 67
Discharge: HOME | End: 2022-01-29
Attending: EMERGENCY MEDICINE
Payer: MEDICARE

## 2022-01-29 VITALS
BODY MASS INDEX: 33.47 KG/M2 | WEIGHT: 177.25 LBS | RESPIRATION RATE: 16 BRPM | HEART RATE: 81 BPM | DIASTOLIC BLOOD PRESSURE: 98 MMHG | TEMPERATURE: 98.5 F | HEIGHT: 61 IN | OXYGEN SATURATION: 97 % | SYSTOLIC BLOOD PRESSURE: 173 MMHG

## 2022-01-29 DIAGNOSIS — B86 SCABIES: Primary | ICD-10-CM

## 2022-01-29 DIAGNOSIS — L29.9 PRURITIC DISORDER: ICD-10-CM

## 2022-01-29 PROCEDURE — 99282 EMERGENCY DEPT VISIT SF MDM: CPT

## 2022-01-29 RX ORDER — METHYLPREDNISOLONE 4 MG/1
TABLET ORAL
Qty: 1 KIT | Refills: 0 | Status: SHIPPED | OUTPATIENT
Start: 2022-01-29

## 2022-01-29 RX ORDER — PERMETHRIN 50 MG/G
CREAM TOPICAL
Qty: 60 G | Refills: 1 | Status: SHIPPED | OUTPATIENT
Start: 2022-01-29

## 2022-01-29 RX ORDER — DIPHENHYDRAMINE HCL 25 MG
25 CAPSULE ORAL EVERY 4 HOURS PRN
Qty: 20 CAPSULE | Refills: 0 | Status: SHIPPED | OUTPATIENT
Start: 2022-01-29 | End: 2022-02-08

## 2022-01-29 ASSESSMENT — PAIN SCALES - GENERAL
PAINLEVEL_OUTOF10: 0
PAINLEVEL_OUTOF10: 0

## 2022-01-29 NOTE — ED NOTES
Discharge instructions reviewed and patient educated on steroids. Patient verbalized understanding. Denies questions. Patient to follow up with PCP.       Ok Brandt RN  01/29/22 7101

## 2022-01-29 NOTE — ED PROVIDER NOTES
eMERGENCY dEPARTMENT eNCOUnter      Pt Name: Marek Viera  MRN: [id number]  [place] 1955  Date of evaluation: 1/29/2022  Provider: Birana Waddell MD     [address]       Chief Complaint   Patient presents with    Pruritis     left foot x 2 days         HISTORY OF PRESENT ILLNESS   (Location/Symptom, Timing/Onset,Context/Setting, Quality, Duration, Modifying Factors, Severity) Note limiting factors. HPI    Marek Viera is a 74 y.o. female who presents to the emergency department with left foot pruritus or itching for 2 days. No exposure. 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 Patient states that it is worse at night at times is intense itching she would have to stop driving the car and stretch the area. Mostly it is dry and chapping. I do not see any evidence of verbal.  No known exposure to scabies. There is no rash. It is only on the left foot at this time. Patient has tried cortisone cream without relief. Patient is a diabetic. There is no pain. Patient denies numbness or tingling sensation. Patient has no fever. Nursing Notes were reviewed. REVIEW OFSYSTEMS    (2+ for level 4; 10+ for level 5)   Review of Systems    General: No fevers, chills or night sweats, No weight loss    Head:  No Sore throat,  No Ear Pain    Chest:  Nontender. No Cough, No SOB,  Chest Pain    GI: No abdominal pain or vomiting    : No dysuria or hematuria    Musculoskeletal: No unrelenting pain or night pain. Left foot itching. Dry skin. Neurologic: No bowel or bladder incontinence, No saddle anesthesia, No leg weakness    All other systems reviewed and are negative.         PAST MEDICAL HISTORY     Past Medical History:   Diagnosis Date    Arthritis     CAD (coronary artery disease)     \"blocked artery\" no stents    Constipation     due to slow movement through bowels    Depression     Diabetes mellitus (HCC)     GERD (gastroesophageal reflux disease)     Hyperlipidemia     Hypertension     Vertigo     Vitamin D deficiency, unspecified        SURGICAL HISTORY       Past Surgical History:   Procedure Laterality Date    DIAGNOSTIC CARDIAC CATH LAB PROCEDURE  04/2019    stent    HYSTERECTOMY         CURRENT MEDICATIONS       Previous Medications    ACETAMINOPHEN (TYLENOL) 500 MG TABLET    Take 500 mg by mouth every 6 hours as needed for Pain    ASPIRIN 81 MG TABLET    Take 81 mg by mouth daily. ATORVASTATIN (LIPITOR) 80 MG TABLET    Take 1 tablet by mouth daily    FLUTICASONE (FLONASE) 50 MCG/ACT NASAL SPRAY    1 spray by Each Nostril route daily as needed     HYDROCORTISONE 1 % CREAM    Apply topically 2 times daily Apply topically 2 times daily. HYDROXYZINE (ATARAX) 50 MG TABLET    Take 50 mg by mouth as needed for Itching     INSULIN DETEMIR (LEVEMIR SC)    Inject 10 Units into the skin nightly     ISOSORBIDE MONONITRATE (IMDUR) 60 MG EXTENDED RELEASE TABLET    TAKE 1 TABLET BY MOUTH EVERY DAY    LINAGLIPTIN (TRADJENTA) 5 MG TABLET    Take 5 mg by mouth daily    LOSARTAN (COZAAR) 50 MG TABLET    Take 50 mg by mouth daily     MECLIZINE (ANTIVERT) 25 MG TABLET    Take 1 tablet by mouth 3 times daily as needed for Dizziness    METOPROLOL SUCCINATE (TOPROL XL) 25 MG EXTENDED RELEASE TABLET    Take 0.5 tablets by mouth daily    NITROGLYCERIN (NITROSTAT) 0.4 MG SL TABLET    Place 0.4 mg under the tongue every 5 minutes as needed for Chest pain. OMEGA-3 FATTY ACIDS (FISH OIL PO)    Take  by mouth.     PSYLLIUM (METAMUCIL FIBER PO)    Take by mouth daily    RANITIDINE HCL (RANITIDINE 150 MAX STRENGTH PO)    Take 150 mg by mouth as needed     VITAMIN D (CHOLECALCIFEROL) 1000 UNIT TABS TABLET    Take 1,000 Units by mouth daily    ZINC PO    Take by mouth daily       ALLERGIES     Clonidine derivatives, Doxycycline, Lisinopril, Penicillins, and Sulfa antibiotics    FAMILY HISTORY       Family History   Problem Relation Age of Onset    High Blood Pressure Mother  High Cholesterol Mother         SOCIAL HISTORY       Social History     Socioeconomic History    Marital status:      Spouse name: Not on file    Number of children: Not on file    Years of education: Not on file    Highest education level: Not on file   Occupational History    Not on file   Tobacco Use    Smoking status: Never Smoker    Smokeless tobacco: Never Used   Vaping Use    Vaping Use: Never used   Substance and Sexual Activity    Alcohol use: No    Drug use: No    Sexual activity: Not on file   Other Topics Concern    Not on file   Social History Narrative    Not on file     Social Determinants of Health     Financial Resource Strain:     Difficulty of Paying Living Expenses: Not on file   Food Insecurity:     Worried About Running Out of Food in the Last Year: Not on file    Dalia of Food in the Last Year: Not on file   Transportation Needs:     Lack of Transportation (Medical): Not on file    Lack of Transportation (Non-Medical):  Not on file   Physical Activity:     Days of Exercise per Week: Not on file    Minutes of Exercise per Session: Not on file   Stress:     Feeling of Stress : Not on file   Social Connections:     Frequency of Communication with Friends and Family: Not on file    Frequency of Social Gatherings with Friends and Family: Not on file    Attends [employer] Services: Not on file    Active Member of [address] [place] [address] Next audience or Organizations: Not on file    Attends Club or Organization Meetings: Not on file    Marital Status: Not on file   Intimate Partner Violence:     Fear of Current or Ex-Partner: Not on file    Emotionally Abused: Not on file    Physically Abused: Not on file    Sexually Abused: Not on file   Housing Stability:     Unable to Pay for Housing in the Last Year: Not on file    Number of [place] in the Last Year: Not on file    Unstable Housing in the Last Year: Not on file       SCREENINGS           PHYSICAL EXAM    (up to 7 for level 4, 8 or more for level 5)     ED Triage Vitals [01/29/22 1740]   BP Temp Temp Source Pulse Resp SpO2 Height Weight   (!) 173/98 98.5 °F (36.9 °C) Oral 81 16 97 % 5' 1\" (1.549 m) 177 lb 4 oz (80.4 kg)       Physical Exam    General: Alert and awake ×3. Nontoxic appearance. Well-developed well-nourished 58-year-old female in no acute distress  HEENT: Normocephalic atraumatic. Neck is supple. Airway intact. No adenopathy  Cardiac: Regular rate and rhythm with no murmurs rubs or gallops  Pulmonary: Lungs are clear in all lung fields. No wheezing. No Rales. Abdomen: Soft and nontender. Negative hepatosplenomegaly. Bowel sounds are active  Extremities: Moving all extremities. No calf tenderness. Peripheral pulses all intact. Examination of the foot reveals no rash. No swelling. No pain. I do not see any burrows. There is definitely dry skins and crack skin or epidermal layer. No bleeding. She has been scratching it. Skin: No skin lesions. No rashes  Neurologic: Cranial nerves II through XII was grossly intact. Nonfocal neurological exam  Psychiatric: Patient is pleasant. Mood is appropriate. DIAGNOSTIC RESULTS     EKG (Per Emergency Physician):       RADIOLOGY (Per Emergency Physician): Interpretation per the Radiologist below, if available at the time of this note:  No results found. ED BEDSIDE ULTRASOUND:   Performed by ED Physician - none    LABS:  Labs Reviewed - No data to display     All other labs were within normal range or not returned as of this dictation. Procedures      EMERGENCY DEPARTMENT COURSE and DIFFERENTIAL DIAGNOSIS/MDM:   Vitals:    Vitals:    01/29/22 1740   BP: (!) 173/98   Pulse: 81   Resp: 16   Temp: 98.5 °F (36.9 °C)   TempSrc: Oral   SpO2: 97%   Weight: 177 lb 4 oz (80.4 kg)   Height: 5' 1\" (1.549 m)       Medications - No data to display    MDM. Patient is a 58-year-old diabetic with chief foot. Mostly at night.   Does not involve the interdigit webspace of the foot and toe. Suspect may be scabies. Will use Elimite cream at this time as directed. Patient will placed on Medrol Dosepak and Benadryl. Warned her because of diabetes her sugar will be slightly high. Patient understands. Patient will be discharged. May need a second treatment. Follow-up. REVAL:         CRITICAL CARE TIME   Total CriticalCare time was 0 minutes, excluding separately reportable procedures. There was a high probability of clinically significant/life threatening deterioration in the patient's condition which required my urgent intervention. CONSULTS:  None    PROCEDURES:  Unless otherwise noted below, none     [unfilled]    FINAL IMPRESSION      1. Scabies    2. Pruritic disorder          DISPOSITION/PLAN   DISPOSITION Decision To Discharge 01/29/2022 05:52:43 PM      PATIENT REFERRED TO:  Carmen Grande MD  [address] [place] [address] [id number]  [address] [facility] [address]  [phone]    Schedule an appointment as soon as possible for a visit in 1 week  If symptoms worsen      DISCHARGE MEDICATIONS:  New Prescriptions    DIPHENHYDRAMINE (BENADRYL) 25 MG CAPSULE    Take 1 capsule by mouth every 4 hours as needed for Itching    METHYLPREDNISOLONE (MEDROL, DENISE,) 4 MG TABLET    Take as directed    PERMETHRIN (ELIMITE) 5 % CREAM    Use head to toe at night, wash off in 8-10 hours. Repeat in 1 week. (Please note:  Portions of this note were completed with a voice recognition program.Efforts were made to edit the dictations but occasionally words and phrases are mis-transcribed.)  Form v2016. J.[id number]    MICHAEL WEST MD (electronically signed)  Emergency Medicine Provider        Marisel Bolton MD  01/29/22 1800

## 2022-02-18 ENCOUNTER — HOSPITAL ENCOUNTER (EMERGENCY)
Age: 67
Discharge: HOME | End: 2022-02-18
Attending: EMERGENCY MEDICINE
Payer: MEDICARE

## 2022-02-18 VITALS
WEIGHT: 170 LBS | BODY MASS INDEX: 32.1 KG/M2 | SYSTOLIC BLOOD PRESSURE: 166 MMHG | DIASTOLIC BLOOD PRESSURE: 81 MMHG | HEIGHT: 61 IN | RESPIRATION RATE: 16 BRPM | OXYGEN SATURATION: 98 % | TEMPERATURE: 97.8 F

## 2022-02-18 DIAGNOSIS — Z00.00 ENCOUNTER FOR WELLNESS EXAMINATION IN ADULT: Primary | ICD-10-CM

## 2022-02-18 PROCEDURE — 99282 EMERGENCY DEPT VISIT SF MDM: CPT

## 2022-02-18 NOTE — Clinical Note
Candice Sosa was seen and treated in our emergency department on 2/18/2022. To whom it may concern:Breanna Payton has no current evidence of rash or scabies. Symptoms have completely resolved. She may return to work and resume normal activities.     Xavier Rosen, DO

## 2022-02-18 NOTE — ED NOTES
AVS reviewed with patient. Verbalized understanding. AVS was printed and given to patient.        Orestes Duke RN  02/18/22 1197

## 2022-02-18 NOTE — ED PROVIDER NOTES
[address] [facility] ENCOUNTER      Pt Name: Mehdi Philip  MRN: [id number]  [place] 1955  Date of evaluation: 2/18/2022  Provider: Jose Armando Ontiveros DO    CHIEF COMPLAINT       Chief Complaint   Patient presents with    Other     requests note that states she does not have scabies. HISTORY OF PRESENT ILLNESS   (Location/Symptom, Timing/Onset, Context/Setting, Quality, Duration, Modifying Factors, Severity)  Note limiting factors. Mehdi Philip is a 74 y.o. female who presents to the emergency department with a request for a letter stating that she does not have scabies anymore. She was seen for a rash that was pruritic on January 29 and was prescribed a steroid pack, permethrin lotion, and Benadryl. She reports that the rash has completely resolved and she has not had any symptoms for the last 2 weeks. She had a routine visit scheduled for her cardiologist but they would not see her because of the scabies. Her cardiologist office requested that she have a note stating that she no longer has scabies. She denies any current cardiac complaints, chest pain, shortness of breath, dyspnea on exertion, palpitations, syncope, edema, etc.  She denies any fever chills cough or cold symptoms. She denies any rash, pruritus, or any open wounds or lesions. She denies any recent illness. He states that her cardiology visit was for her routine checkup with a cardiologist.  Echo history is significant for coronary artery disease, hypertension and hyperlipidemia. Nursing Notes were reviewed. HPI        REVIEW OF SYSTEMS    (2-9 systems for level 4, 10 or more for level 5)       Constitutional: Negative for fever or chills. Respiratory: Negative for shortness of breath or dyspnea on exertion. Cardiovascular: Negative for chest pain. Gastrointestinal: Negative for abdominal pain. Negative for vomiting or diarrhea.    Genitourinary: Negative for flank pain. Negative for dysuria. Negative for hematuria. Neurological: Negative for headache. All systems are reviewed and are negative except for those listed above in the history of present illness and ROS. PAST MEDICAL HISTORY     Past Medical History:   Diagnosis Date    Arthritis     CAD (coronary artery disease)     \"blocked artery\" no stents    Constipation     due to slow movement through bowels    Depression     Diabetes mellitus (HCC)     GERD (gastroesophageal reflux disease)     Hyperlipidemia     Hypertension     Vertigo     Vitamin D deficiency, unspecified          SURGICAL HISTORY       Past Surgical History:   Procedure Laterality Date    DIAGNOSTIC CARDIAC CATH LAB PROCEDURE  04/2019    stent    HYSTERECTOMY           CURRENT MEDICATIONS       Previous Medications    ACETAMINOPHEN (TYLENOL) 500 MG TABLET    Take 500 mg by mouth every 6 hours as needed for Pain    ASPIRIN 81 MG TABLET    Take 81 mg by mouth daily. ATORVASTATIN (LIPITOR) 80 MG TABLET    Take 1 tablet by mouth daily    FLUTICASONE (FLONASE) 50 MCG/ACT NASAL SPRAY    1 spray by Each Nostril route daily as needed     HYDROCORTISONE 1 % CREAM    Apply topically 2 times daily Apply topically 2 times daily.     HYDROXYZINE (ATARAX) 50 MG TABLET    Take 50 mg by mouth as needed for Itching     INSULIN DETEMIR (LEVEMIR SC)    Inject 10 Units into the skin nightly     ISOSORBIDE MONONITRATE (IMDUR) 60 MG EXTENDED RELEASE TABLET    TAKE 1 TABLET BY MOUTH EVERY DAY    LINAGLIPTIN (TRADJENTA) 5 MG TABLET    Take 5 mg by mouth daily    LOSARTAN (COZAAR) 50 MG TABLET    Take 50 mg by mouth daily     MECLIZINE (ANTIVERT) 25 MG TABLET    Take 1 tablet by mouth 3 times daily as needed for Dizziness    METHYLPREDNISOLONE (MEDROL, DENISE,) 4 MG TABLET    Take as directed    METOPROLOL SUCCINATE (TOPROL XL) 25 MG EXTENDED RELEASE TABLET    Take 0.5 tablets by mouth daily    NITROGLYCERIN (NITROSTAT) 0.4 MG SL TABLET Place 0.4 mg under the tongue every 5 minutes as needed for Chest pain. OMEGA-3 FATTY ACIDS (FISH OIL PO)    Take  by mouth. PERMETHRIN (ELIMITE) 5 % CREAM    Use head to toe at night, wash off in 8-10 hours. Repeat in 1 week. PSYLLIUM (METAMUCIL FIBER PO)    Take by mouth daily    RANITIDINE HCL (RANITIDINE 150 MAX STRENGTH PO)    Take 150 mg by mouth as needed     VITAMIN D (CHOLECALCIFEROL) 1000 UNIT TABS TABLET    Take 1,000 Units by mouth daily    ZINC PO    Take by mouth daily       ALLERGIES     Clonidine derivatives, Doxycycline, Lisinopril, Penicillins, and Sulfa antibiotics    FAMILY HISTORY       Family History   Problem Relation Age of Onset    High Blood Pressure Mother     High Cholesterol Mother           SOCIAL HISTORY       Social History     Socioeconomic History    Marital status:      Spouse name: Not on file    Number of children: Not on file    Years of education: Not on file    Highest education level: Not on file   Occupational History    Not on file   Tobacco Use    Smoking status: Never Smoker    Smokeless tobacco: Never Used   Vaping Use    Vaping Use: Never used   Substance and Sexual Activity    Alcohol use: No    Drug use: No    Sexual activity: Not on file   Other Topics Concern    Not on file   Social History Narrative    Not on file     Social Determinants of Health     Financial Resource Strain:     Difficulty of Paying Living Expenses: Not on file   Food Insecurity:     Worried About Running Out of Food in the Last Year: Not on file    Dalia of Food in the Last Year: Not on file   Transportation Needs:     Lack of Transportation (Medical): Not on file    Lack of Transportation (Non-Medical):  Not on file   Physical Activity:     Days of Exercise per Week: Not on file    Minutes of Exercise per Session: Not on file   Stress:     Feeling of Stress : Not on file   Social Connections:     Frequency of Communication with Friends and Family: Not on file    Frequency of Social Gatherings with Friends and Family: Not on file    Attends Buddhist Services: Not on file    Active Member of Clubs or Organizations: Not on file    Attends Club or Organization Meetings: Not on file    Marital Status: Not on file   Intimate Partner Violence:     Fear of Current or Ex-Partner: Not on file    Emotionally Abused: Not on file    Physically Abused: Not on file    Sexually Abused: Not on file   Housing Stability:     Unable to Pay for Housing in the Last Year: Not on file    Number of [place] in the Last Year: Not on file    Unstable Housing in the Last Year: Not on file       SCREENINGS             PHYSICAL EXAM    (up to 7 for level 4, 8 or more for level 5)     ED Triage Vitals [02/18/22 1710]   BP Temp Temp Source Pulse Resp SpO2 Height Weight   (!) 166/81 97.8 °F (36.6 °C) Oral -- 16 98 % 5' 1\" (1.549 m) 170 lb (77.1 kg)         Physical Exam   Constitutional: Awake and alert. Very pleasant. Well-appearing. Head: No visible evidence of trauma. Normocephalic. Eyes: Pupils equal and reactive. No photophobia. Conjunctiva normal.    HENT: Oral mucosa moist.  Airway patent. No evidence of angioedema. Heart:  Regular rate and rhythm. Lungs:   No conversational dyspnea or accessory muscle use. Musculoskeletal: Extremities non-tender with full range of motion. Radial and dorsalis pedis pulses were intact. No calf tenderness erythema or edema. Neurological: Alert and oriented x 3. Speech clear. No acute focal motor or sensory deficits. Skin: Skin is warm and dry. No rash. No visible evidence of scabies, lesions, pruritus, erythema, or urticaria. Psychiatric: Normal mood and affect.  Behavior is normal.         DIAGNOSTIC RESULTS     EKG: All EKG's are interpreted by the Emergency Department Physician who either signs or Co-signs this chart in the absence of a cardiologist.        RADIOLOGY:   Non-plain film images such as CT, Ultrasound and MRI are read by the radiologist. Plain radiographic images are visualized and preliminarily interpreted by the emergency physician with the below findings:        Interpretation per the Radiologist below, if available at the time of this note:    No orders to display         ED BEDSIDE ULTRASOUND:   Performed by ED Physician - none    LABS:  Labs Reviewed - No data to display    All other labs were within normal range or not returned as of this dictation. EMERGENCY DEPARTMENT COURSE and DIFFERENTIAL DIAGNOSIS/MDM:   Vitals:    Vitals:    02/18/22 1710   BP: (!) 166/81   Resp: 16   Temp: 97.8 °F (36.6 °C)   TempSrc: Oral   SpO2: 98%   Weight: 170 lb (77.1 kg)   Height: 5' 1\" (1.549 m)         MDM      The patient presents for a request for a note stating that she no longer has scabies. Her symptoms have completely resolved. She has a normal exam.  There is no evidence of any rash or pruritus. No evidence of scabies at this time. REASSESSMENT          She was given a note stating that her scabies has resolved. She may resume normal activities. She was advised to follow-up with her primary care physician as needed. If her condition worsens or new symptoms develop, she was advised to return immediately to the emergency department. CRITICAL CARE TIME   Total Critical Care time was 0 minutes, excluding separately reportable procedures. There was a high probability of clinically significant/life threatening deterioration in the patient's condition which required my urgent intervention. CONSULTS:  None    PROCEDURES:  Unless otherwise noted below, none     Procedures        FINAL IMPRESSION      1.  Encounter for wellness examination in adult          DISPOSITION/PLAN   DISPOSITION Decision To Discharge 02/18/2022 05:57:58 PM      PATIENT REFERRED TO:  Ana Cristina Knutson MD  [address] [facility] [address]  [address] [facility] [address]  [phone]    Call today        DISCHARGE MEDICATIONS:  New Prescriptions No medications on file     Controlled Substances Monitoring:     No flowsheet data found. (Please note that portions of this note were completed with a voice recognition program.  Efforts were made to edit the dictations but occasionally words are mis-transcribed. )    [id number] Ontiveros DO (electronically signed)  Attending Emergency Physician          Betsy Costa DO  02/18/22 [id number]

## 2022-03-08 ENCOUNTER — OFFICE VISIT (OUTPATIENT)
Dept: ORTHOPEDIC SURGERY | Age: 67
End: 2022-03-08
Payer: MEDICARE

## 2022-03-08 VITALS — BODY MASS INDEX: 32.1 KG/M2 | HEIGHT: 61 IN | WEIGHT: 170 LBS

## 2022-03-08 DIAGNOSIS — M17.0 PRIMARY OSTEOARTHRITIS OF BOTH KNEES: Primary | ICD-10-CM

## 2022-03-08 PROCEDURE — G8400 PT W/DXA NO RESULTS DOC: HCPCS | Performed by: PHYSICIAN ASSISTANT

## 2022-03-08 PROCEDURE — G8427 DOCREV CUR MEDS BY ELIG CLIN: HCPCS | Performed by: PHYSICIAN ASSISTANT

## 2022-03-08 PROCEDURE — 1090F PRES/ABSN URINE INCON ASSESS: CPT | Performed by: PHYSICIAN ASSISTANT

## 2022-03-08 PROCEDURE — 20610 DRAIN/INJ JOINT/BURSA W/O US: CPT | Performed by: PHYSICIAN ASSISTANT

## 2022-03-08 PROCEDURE — G8417 CALC BMI ABV UP PARAM F/U: HCPCS | Performed by: PHYSICIAN ASSISTANT

## 2022-03-08 PROCEDURE — 1036F TOBACCO NON-USER: CPT | Performed by: PHYSICIAN ASSISTANT

## 2022-03-08 PROCEDURE — 3017F COLORECTAL CA SCREEN DOC REV: CPT | Performed by: PHYSICIAN ASSISTANT

## 2022-03-08 PROCEDURE — G8484 FLU IMMUNIZE NO ADMIN: HCPCS | Performed by: PHYSICIAN ASSISTANT

## 2022-03-08 PROCEDURE — 1123F ACP DISCUSS/DSCN MKR DOCD: CPT | Performed by: PHYSICIAN ASSISTANT

## 2022-03-08 PROCEDURE — 4040F PNEUMOC VAC/ADMIN/RCVD: CPT | Performed by: PHYSICIAN ASSISTANT

## 2022-03-08 RX ORDER — BUPIVACAINE HYDROCHLORIDE 2.5 MG/ML
2 INJECTION, SOLUTION INFILTRATION; PERINEURAL ONCE
Status: COMPLETED | OUTPATIENT
Start: 2022-03-08 | End: 2022-03-08

## 2022-03-08 RX ORDER — TRIAMCINOLONE ACETONIDE 40 MG/ML
40 INJECTION, SUSPENSION INTRA-ARTICULAR; INTRAMUSCULAR ONCE
Status: COMPLETED | OUTPATIENT
Start: 2022-03-08 | End: 2022-03-08

## 2022-03-08 RX ADMIN — BUPIVACAINE HYDROCHLORIDE 5 MG: 2.5 INJECTION, SOLUTION INFILTRATION; PERINEURAL at 14:45

## 2022-03-08 RX ADMIN — TRIAMCINOLONE ACETONIDE 40 MG: 40 INJECTION, SUSPENSION INTRA-ARTICULAR; INTRAMUSCULAR at 14:46

## 2022-03-09 NOTE — PROGRESS NOTES
Subjective:      Patient ID: Author Jose is a 74 y.o.  female. HPI: She is here for evaluation and treatment of left and right knee pain related to arthritis. She states the previous injection which was performed 12/8/2021  helped relieve the knee symptoms. The knee pain has gradually returned. There has not been a recent injury. Pain is 8/10. Pain is worse with activity. Pain improves somewhat with rest and elevation. It has been over a year since x-rays and clinical exam has been performed for knee arthritic complaints. Review of Systems:   Negative for fever or chills. Negative for numbness or tingling around the knee. Past Medical History:   Diagnosis Date    Arthritis     CAD (coronary artery disease)     \"blocked artery\" no stents    Constipation     due to slow movement through bowels    Depression     Diabetes mellitus (HCC)     GERD (gastroesophageal reflux disease)     Hyperlipidemia     Hypertension     Vertigo     Vitamin D deficiency, unspecified        Family History   Problem Relation Age of Onset    High Blood Pressure Mother     High Cholesterol Mother        Past Surgical History:   Procedure Laterality Date    DIAGNOSTIC CARDIAC CATH LAB PROCEDURE  04/2019    stent    HYSTERECTOMY         Social History     Occupational History    Not on file   Tobacco Use    Smoking status: Never Smoker    Smokeless tobacco: Never Used   Vaping Use    Vaping Use: Never used   Substance and Sexual Activity    Alcohol use: No    Drug use: No    Sexual activity: Not on file       Current Outpatient Medications   Medication Sig Dispense Refill    methylPREDNISolone (MEDROL, DENISE,) 4 MG tablet Take as directed 1 kit 0    permethrin (ELIMITE) 5 % cream Use head to toe at night, wash off in 8-10 hours. Repeat in 1 week.  60 g 1    Psyllium (METAMUCIL FIBER PO) Take by mouth daily      ZINC PO Take by mouth daily      isosorbide mononitrate (IMDUR) 60 MG extended release tablet TAKE 1 TABLET BY MOUTH EVERY DAY 30 tablet 5    atorvastatin (LIPITOR) 80 MG tablet Take 1 tablet by mouth daily 90 tablet 3    metoprolol succinate (TOPROL XL) 25 MG extended release tablet Take 0.5 tablets by mouth daily 30 tablet 3    acetaminophen (TYLENOL) 500 MG tablet Take 500 mg by mouth every 6 hours as needed for Pain      meclizine (ANTIVERT) 25 MG tablet Take 1 tablet by mouth 3 times daily as needed for Dizziness 30 tablet 0    hydrocortisone 1 % cream Apply topically 2 times daily Apply topically 2 times daily.  linagliptin (TRADJENTA) 5 MG tablet Take 5 mg by mouth daily      RaNITidine HCl (RANITIDINE 150 MAX STRENGTH PO) Take 150 mg by mouth as needed       vitamin D (CHOLECALCIFEROL) 1000 UNIT TABS tablet Take 1,000 Units by mouth daily      fluticasone (FLONASE) 50 MCG/ACT nasal spray 1 spray by Each Nostril route daily as needed       Insulin Detemir (LEVEMIR SC) Inject 10 Units into the skin nightly       losartan (COZAAR) 50 MG tablet Take 50 mg by mouth daily       hydrOXYzine (ATARAX) 50 MG tablet Take 50 mg by mouth as needed for Itching       Omega-3 Fatty Acids (FISH OIL PO) Take  by mouth.  aspirin 81 MG tablet Take 81 mg by mouth daily.  nitroGLYCERIN (NITROSTAT) 0.4 MG SL tablet Place 0.4 mg under the tongue every 5 minutes as needed for Chest pain. No current facility-administered medications for this visit. Objective:   She is alert, oriented x 3, pleasant, well nourished, developed and in no acute distress. Ht 5' 1\" (1.549 m)   Wt 170 lb (77.1 kg)   BMI 32.12 kg/m²      Examination of the left and right knee shows: Inspection of the skin and soft tissues reveals no erythema. The overall alignment of the knee is varus. Gait is antalgic. There is minimal intra-articular effusion.                          AROM:           PROM:  Extension-         0                   0  Flexion -           120                   125  There moderate pain associated with ROM testing. Medial joint line is tender to palpation. Lateral joint line is somewhat tender to palpation. Pes anserine bursa not tender to palpation. Patellar tendon is not tender to palpation. Quadriceps tendon is not tender to palpation. Collateral ligaments is not tender to palpation. Popliteal fossa demonstrates fullness, probable Bakers cyst and is tender to palpation. Varus Stress testing negative for instability. Valgus Stress testing negative for instability. Patellar Compression testing positive for pain or crepitus. Extensor Mechanism is intact. Examination of the lower extremities are intact with sensation to light touch. Motor testing  5/5 in all major motor groups of the lower extremities. SLR negative. Intact perfusion to both lower extremities. No cyanosis. Digits are warm to touch, capillary refill is less than 2 seconds. Mild edema noted BLE. The skin to be intact without lacerations or abrasions. No significant erythema. No rashes or skin lesions. X Rays: was performed in the office today:   AP Standing, Lateral and Sunrise views of left knee:   No acute fractures or dislocations noted. Knee x-rays demonstrate osteoarthritis. Medial compartment-    4/4 Kellgren and Rico Classification. Lateral compartment-    3/4 Kellgren and Rico Classification. PF compartment-          3/4 Kellgren and Rico Classification. P Standing, Lateral and Sunrise views of right knee:   No acute fractures or dislocations noted. Knee x-rays demonstrate osteoarthritis. Medial compartment-    4/4 Kellgren and Rico Classification. Lateral compartment-    3/4 Kellgren and Rico Classification. PF compartment-          3/4 Kellgren and Rico Classification. Assessment:       ICD-10-CM    1.  Primary osteoarthritis of both knees  M17.0 XR KNEE BILATERAL STANDING     XR KNEE LEFT (1-2 VIEWS)     XR KNEE RIGHT (1-2 VIEWS)     NC ARTHROCENTESIS ASPIR&/INJ MAJOR JT/BURSA W/O US     triamcinolone acetonide (KENALOG-40) injection 40 mg     bupivacaine (MARCAINE) 0.25 % injection 5 mg     triamcinolone acetonide (KENALOG-40) injection 40 mg     bupivacaine (MARCAINE) 0.25 % injection 5 mg        Plan:       Assessment:  Moderate to advanced degenerative arthritis of the left and right knee. Bilateral Baker's cyst.    I had an extensive discussion with Ms. Holly Guerrero and/or family regarding the natural history, etiology, and long term consequences of her condition. I have presented reasonable alternatives to the patient's proposed care, treatment, and services. Risks and benefits of the treatment options also reviewed in detail. I have outlined a treatment plan with them. She has had full opportunity to ask her questions. I have answered them all to her satisfaction. I feel that Ms. Holly Guerrero understands our discussion today. Reviewed conservative treatment of knee symptoms/arthritis, according to AAOS Clinical Practice Guidelines:  1)  Recommend oral or topical NSAIDs to reduce pain and swelling. 2)  Recommend acetaminophen to reduce pain. 3)  Oral narcotics, including tramadol, result in a significant increase of adverse events and are not effective at improving pain or function for treatment of osteoarthritis of the knee. 4)  Recommend maintaining weight in a healthy range to reduce stresses on the knee, particularly the patellofemoral compartment which sees up to 6x body weight. 5)  Home exercise program, focusing on strengthening, low impact aerobic exercises, and neuromuscular education. 6)  Intra-articular corticosteroid injections are an option. Informed patient corticosteroid injections can be performed every 3-4 months as needed.   7)  Evidence for intra-articular hyaluronic acid injections (viscosupplementation) is not as strong, but may benefit a subset of patients who have failed other options. Informed patient     viscosupplementation can be performed every 6 months as needed. 8)  Bracing and cane use can improve pain and function. Although not specifically listed in the CPGs, ice therapy and topical patches such as Salonpas are good options as well. Other non-surgical options including Coolief (cooled frequency ablation of the geniculate nerves), stem cell injections, PRP injections were discussed. Surgical option, knee arthroplasty discussed. Plan:  Medications- tylenol for pain. PT- A home exercise program was instructed today including ROM exercises and strengthening exercises. The patient verbalized understanding of these exercises as well as the importance of the exercise program to promote return of normal function. If pain intensifies or other problems arise you are to notify the office. Procedures-     Risk and benefits of corticosteroid intra-articular injection was discussed today. All questions were answered to her satisfaction. She verbally consented to proceed with intra-articular injection today. Cortisone Injection                                                       PROCEDURE NOTE:     Pre op Diagnosis:  bilateral knee pain     Post op Diagnosis: Same  With Gabriela Browning permission, her left and right knee was prepped  in standard sterile fashion with  Alcohol and 2 cc of 0.25% Marcaine and 1 cc of Kenalog 40 mg was injected into the left and right lateral compartment  without difficulty. The patient tolerated this well without difficulty and a band-aid was applied. The patient was advised to ice the knee for 15-20 minutes to relieve any injection site related pain. Follow up- 3-4 months. Call or return to clinic if these symptoms worsen or fail to improve as anticipated.                                                  Beatriz Esparza PA-C   Senior Physician Assistant   [facility] Disclaimer: This note was generated with use of a verbal recognition program (DRAGON) and an attempt was made to check for errors. It is possible that there are still dictated errors within this office note. If so, please bring any significant errors to my attention for an addendum. All efforts were made to ensure that this office note is accurate.

## 2022-04-05 ENCOUNTER — OFFICE VISIT (OUTPATIENT)
Dept: CARDIOLOGY CLINIC | Age: 67
End: 2022-04-05
Payer: MEDICARE

## 2022-04-05 VITALS
OXYGEN SATURATION: 97 % | SYSTOLIC BLOOD PRESSURE: 142 MMHG | HEART RATE: 64 BPM | BODY MASS INDEX: 32.66 KG/M2 | WEIGHT: 173 LBS | DIASTOLIC BLOOD PRESSURE: 88 MMHG | HEIGHT: 61 IN

## 2022-04-05 DIAGNOSIS — I10 ESSENTIAL HYPERTENSION: ICD-10-CM

## 2022-04-05 DIAGNOSIS — I25.10 CORONARY ARTERY DISEASE INVOLVING NATIVE CORONARY ARTERY OF NATIVE HEART WITHOUT ANGINA PECTORIS: Primary | ICD-10-CM

## 2022-04-05 DIAGNOSIS — E78.5 HYPERLIPIDEMIA LDL GOAL <70: ICD-10-CM

## 2022-04-05 PROCEDURE — 1036F TOBACCO NON-USER: CPT | Performed by: INTERNAL MEDICINE

## 2022-04-05 PROCEDURE — 4040F PNEUMOC VAC/ADMIN/RCVD: CPT | Performed by: INTERNAL MEDICINE

## 2022-04-05 PROCEDURE — 3017F COLORECTAL CA SCREEN DOC REV: CPT | Performed by: INTERNAL MEDICINE

## 2022-04-05 PROCEDURE — 99214 OFFICE O/P EST MOD 30 MIN: CPT | Performed by: INTERNAL MEDICINE

## 2022-04-05 PROCEDURE — 1090F PRES/ABSN URINE INCON ASSESS: CPT | Performed by: INTERNAL MEDICINE

## 2022-04-05 PROCEDURE — 1123F ACP DISCUSS/DSCN MKR DOCD: CPT | Performed by: INTERNAL MEDICINE

## 2022-04-05 PROCEDURE — G8427 DOCREV CUR MEDS BY ELIG CLIN: HCPCS | Performed by: INTERNAL MEDICINE

## 2022-04-05 PROCEDURE — G8417 CALC BMI ABV UP PARAM F/U: HCPCS | Performed by: INTERNAL MEDICINE

## 2022-04-05 PROCEDURE — G8400 PT W/DXA NO RESULTS DOC: HCPCS | Performed by: INTERNAL MEDICINE

## 2022-04-05 NOTE — PROGRESS NOTES
[address]   Cardiology Progress Note    Bibiana Bui  1955 April 5, 2022      CC: \"I have no heart symptoms. \"     HPI:  The patient is 74 y.o. female with a past medical history significant for CAD, HTN, HLD and diabetes. Stress test 3/19/19 showed ischemia. 4/2/19 underwent LHC with PCI to mid LAD. F/u 4/15/19 reported shortness of breath likely related to Brilinta. Has been participating in cardiac rehab. Today, she is here for follow up for CAD. Labs [facility] 1/24/22. She currently denies any new or recurrent cardiac sounding complaints. She note off/on bilateral feet edema at times but is not wearing compression stockings to work. Patient denies exertional chest pain/pressure, dyspnea at rest, TREVINO, PND, orthopnea, palpitations, lightheadedness, weight changes, changes in LE edema, and syncope. The patient admits to medical therapy and feels she continues to tolerate.        Past Medical History:   Diagnosis Date    Arthritis     CAD (coronary artery disease)     \"blocked artery\" no stents    Constipation     due to slow movement through bowels    Depression     Diabetes mellitus (HCC)     GERD (gastroesophageal reflux disease)     Hyperlipidemia     Hypertension     Vertigo     Vitamin D deficiency, unspecified      Past Surgical History:   Procedure Laterality Date    DIAGNOSTIC CARDIAC CATH LAB PROCEDURE  04/2019    stent    HYSTERECTOMY       Family History   Problem Relation Age of Onset    High Blood Pressure Mother     High Cholesterol Mother      Social History     Tobacco Use    Smoking status: Never Smoker    Smokeless tobacco: Never Used   Vaping Use    Vaping Use: Never used   Substance Use Topics    Alcohol use: No    Drug use: No       Allergies   Allergen Reactions    Clonidine Derivatives     Doxycycline     Lisinopril      States \"don't remember reaction but had something long time ago\"    Penicillins Swelling    Sulfa Antibiotics Swelling Current Outpatient Medications   Medication Sig Dispense Refill    methylPREDNISolone (MEDROL, DENISE,) 4 MG tablet Take as directed 1 kit 0    permethrin (ELIMITE) 5 % cream Use head to toe at night, wash off in 8-10 hours. Repeat in 1 week. 60 g 1    Psyllium (METAMUCIL FIBER PO) Take by mouth daily      ZINC PO Take by mouth daily      isosorbide mononitrate (IMDUR) 60 MG extended release tablet TAKE 1 TABLET BY MOUTH EVERY DAY 30 tablet 5    atorvastatin (LIPITOR) 80 MG tablet Take 1 tablet by mouth daily 90 tablet 3    metoprolol succinate (TOPROL XL) 25 MG extended release tablet Take 0.5 tablets by mouth daily 30 tablet 3    acetaminophen (TYLENOL) 500 MG tablet Take 500 mg by mouth every 6 hours as needed for Pain      meclizine (ANTIVERT) 25 MG tablet Take 1 tablet by mouth 3 times daily as needed for Dizziness 30 tablet 0    hydrocortisone 1 % cream Apply topically 2 times daily Apply topically 2 times daily.  linagliptin (TRADJENTA) 5 MG tablet Take 5 mg by mouth daily      RaNITidine HCl (RANITIDINE 150 MAX STRENGTH PO) Take 150 mg by mouth as needed       vitamin D (CHOLECALCIFEROL) 1000 UNIT TABS tablet Take 1,000 Units by mouth daily      fluticasone (FLONASE) 50 MCG/ACT nasal spray 1 spray by Each Nostril route daily as needed       Insulin Detemir (LEVEMIR SC) Inject 10 Units into the skin nightly       losartan (COZAAR) 50 MG tablet Take 50 mg by mouth daily       hydrOXYzine (ATARAX) 50 MG tablet Take 50 mg by mouth as needed for Itching       Omega-3 Fatty Acids (FISH OIL PO) Take  by mouth.  aspirin 81 MG tablet Take 81 mg by mouth daily.  nitroGLYCERIN (NITROSTAT) 0.4 MG SL tablet Place 0.4 mg under the tongue every 5 minutes as needed for Chest pain. No current facility-administered medications for this visit. Review of Systems:  · Constitutional: no unanticipated weight loss.  There's been no change in energy level, sleep pattern, or activity level. No fevers, chills. · Eyes: No visual changes or diplopia. No scleral icterus. · ENT: No Headaches, hearing loss or vertigo. No mouth sores or sore throat. · Cardiovascular: as reviewed in HPI  · Respiratory: No cough or wheezing, no sputum production. No hematemesis. · Gastrointestinal: No abdominal pain, appetite loss, blood in stools. No change in bowel or bladder habits. · Genitourinary: No dysuria, trouble voiding, or hematuria. · Musculoskeletal:  No gait disturbance, no joint complaints. · Integumentary: No rash or pruritis. · Neurological: No headache, diplopia, change in muscle strength, numbness or tingling. · Psychiatric: No anxiety or depression. · Endocrine: No temperature intolerance. No excessive thirst, fluid intake, or urination. No tremor. · Hematologic/Lymphatic: No abnormal bruising or bleeding, blood clots or swollen lymph nodes. · Allergic/Immunologic: No nasal congestion or hives. Physical Exam:   BP (!) 142/88   Pulse 64   Ht 5' 1\" (1.549 m)   Wt 173 lb (78.5 kg)   SpO2 97%   BMI 32.69 kg/m²   Wt Readings from Last 3 Encounters:   04/05/22 173 lb (78.5 kg)   03/08/22 170 lb (77.1 kg)   02/18/22 170 lb (77.1 kg)     Constitutional: She is oriented to person, place, and time. She appears well-developed and well-nourished. In no acute distress. Head: Normocephalic and atraumatic. Pupils equal and round. Neck: Neck supple. No JVP or carotid bruit appreciated. No mass and no thyromegaly present. No lymphadenopathy present. Cardiovascular: Normal rate. Normal heart sounds. Exam reveals no gallop and no friction rub. No murmur heard. Pulmonary/Chest: Effort normal and breath sounds normal. No respiratory distress. She has no wheezes, rhonchi or rales. Abdominal: Soft, non-tender. Bowel sounds are normal. She exhibits no organomegaly, mass or bruit. Extremities: No edema, cyanosis, or clubbing.  Pulses are 2+ radial/dorsalis pedis/posterior tibial/carotid bilaterally. Neurological: No gross cranial nerve deficit. Coordination normal.   Skin: Skin is warm and dry. There is no rash or diaphoresis. Psychiatric: She has a normal mood and affect. Her speech is normal and behavior is normal.     Lab Review:   [facility]  Lab Results   Component Value Date    TRIG 103 02/20/2020    HDL 34 02/20/2020    LDLCALC 60 02/20/2020    LABVLDL 20 01/15/2019     Lab Results   Component Value Date    BUN 25 03/27/2019    CREATININE 1.1 03/27/2019       EKG Interpretation: 3/6/19 Normal Sinus Rhythm     7/14/21: SR, 68 bpm      4/5/22: not completed   Image Review:     Stress study: 1/4/21  Summary    Normal myocardial perfusion study.    Normal LV size and systolic function.        Non-diagnostic EKG response due to failure to reach target heart rate.    Overall findings represent a low risk study.         4/2/2019: Cardiac cath with PCI  1.  Patent left main trunk. 2.  Moderate calcification of the mid left anterior descending artery with 80% to 90% eccentric stenosis, 99% stenosis of the septal . 3.  Tortuous circumflex artery with 50% stenosis in the mid segment. 4.  100% occlusion of the mid right coronary artery with left to right collaterals. 5.  Normal left ventricular systolic function with estimated EF of 50% to 55%. 6.  Successful angioplasty followed by drug-eluting stent deployment in the mid left anterior descending artery.  90% stenosis reduced to 0%. Stress test 3/19/19  Abnormal study. There is a small to moderate sized, mild intensity,    reversible defect of the basal to mid inferior and basal inferolateral walls    which is consistent with ischemia.   Prashant Lava is also a small sized, moderate intensity, fixed anteroapical wall    defect which is most consistent with breast attenuation artifact.    Normal LV size and systolic function.    Overall findings represent an intermediate risk study.        ECHO 1/14/19   Left ventricular cavity size is normal.  There is asymmetric hypertrophy of the septum. Normal systolic function with an LV ejection fraction visually estimated to  be 60-65%. No regional wall motion abnormalities are noted. The right ventricle is not well visualized but appears to have normal size  and function. There are no significant valvular abnormalities appreciated in this study. Cardiac Cath 6/11/14  1. A 100% occlusion of the mid right coronary artery with large left to  right collaterals. 2.  Patent left main trunk. 3.  A 30% proximal left anterior descending and 50% mid left anterior  descending stenosis. 4.  Mild to moderate stenosis of the diagonal branch of the left anterior  descending. 5.  Very tortuous circumflex artery with mild disease. Large collaterals to  the mid to distal right coronary artery. 6.  Wall motion abnormalities of the inferior wall with overall preserved  left ventricular ejection fraction at 50% to 55%. In view of the above findings, will try to treat the patient medically first  and if she fails medical therapy, will consider high-risk intervention of the  right coronary artery. Assessment/Plan:     CAD  -Hx of PCI of the left and descending artery.   -She will continue Asa,B-blocker, and statin therapy.    -1/24/22 BMP stable, K 3.3 [facility] with repeat labs tomorrow for PCP. HTN  -improved today.   -Continue current medical management.  -work on low salt diet     HLD  -Goal LDL is <70.   -LDLc 78 9/2021, 80 1/2022.   -Continue Lipitor 80 mg.  -I have advised her to adhere to heart healthy diet with low fat/chol/carb diet routinely to help reduce her LDLc <70. If repeat lipid profile continues to show LDL greater than 70 in spite of above-mentioned measures, will consider adding Zetia to his regiment.    -Walking program again discussed. Routine blood work per [facility] tomorrow for PCP, every 3 months and f/u.       She has obtained current flu vaccine this season. She has obtained COVID vaccine. Follow up in 6-7 months. Thank you very much for allowing me to participate in the care of your patient. Please do not hesitate to contact me if you have any questions. Sincerely,  Dionne Mata MD      [facility], [address] [place] [address]  Ph: [phone]  Fax: [phone]    This note was scribed in the presence of Dr. Wolfgang Wharton MD by Angie Carrasco RN.

## 2022-04-05 NOTE — PATIENT INSTRUCTIONS
Patient Education        Heart-Healthy Diet: Care Instructions  Your Care Instructions     A heart-healthy diet has lots of vegetables, fruits, nuts, beans, and whole grains, and is low in salt. It limits foods that are high in saturated fat, such as meats, cheeses, and fried foods. It may be hard to change your diet,but even small changes can lower your risk of heart attack and heart disease. Follow-up care is a key part of your treatment and safety. Be sure to make and go to all appointments, and call your doctor if you are having problems. It's also a good idea to know your test results and keep alist of the medicines you take. How can you care for yourself at home? Watch your portions   Use food labels to learn what the recommended servings are for the foods you eat.  Eat only the number of calories you need to stay at a healthy weight. If you need to lose weight, eat fewer calories than your body burns (through exercise and other physical activity). Eat more fruits and vegetables   Eat a variety of fruit and vegetables every day. Dark green, deep orange, red, or yellow fruits and vegetables are especially good for you. Examples include spinach, carrots, peaches, and berries.  Keep carrots, celery, and other veggies handy for snacks. Buy fruit that is in season and store it where you can see it so that you will be tempted to eat it.  Cook dishes that have a lot of veggies in them, such as stir-fries and soups. Limit saturated fat   Read food labels, and try to avoid saturated fats. They increase your risk of heart disease.  Use olive or canola oil when you cook.  Bake, broil, grill, or steam foods instead of frying them.  Choose lean meats instead of high-fat meats such as hot dogs and sausages. Cut off all visible fat when you prepare meat.  Eat fish, skinless poultry, and meat alternatives such as soy products instead of high-fat meats.  Soy products, such as tofu, may be especially good for your heart.  Choose low-fat or fat-free milk and dairy products. Eat foods high in fiber   Eat a variety of grain products every day. Include whole-grain foods that have lots of fiber and nutrients. Examples of whole-grain foods include oats, whole wheat bread, and brown rice.  Buy whole-grain breads and cereals, instead of white bread or pastries. Limit salt and sodium   Limit how much salt and sodium you eat to help lower your blood pressure.  Taste food before you salt it. Add only a little salt when you think you need it. With time, your taste buds will adjust to less salt.  Eat fewer snack items, fast foods, and other high-salt, processed foods. Check food labels for the amount of sodium in packaged foods.  Choose low-sodium versions of canned goods (such as soups, vegetables, and beans). Limit sugar   Limit drinks and foods with added sugar. These include candy, desserts, and soda pop. Limit alcohol   Limit alcohol to no more than 2 drinks a day for men and 1 drink a day for women. Too much alcohol can cause health problems. When should you call for help? Watch closely for changes in your health, and be sure to contact your doctor if:     You would like help planning heart-healthy meals. Where can you learn more? Go to [email or web address] org and sign in to your Anews account. Enter V137 in the [facility] box to learn more about \"Heart-Healthy Diet: Care Instructions. \"     If you do not have an account, please click on the \"Sign Up Now\" link. Current as of: September 8, 2021               Content Version: 13.2  © 2006-2022 Healthwise, En[employer]. Care instructions adapted under license by [facility] ([facility]). If you have questions about a medical condition or this instruction, always ask your healthcare professional. [place] [address] any warranty or liability for your use of this information.          Patient Education        Learning About Low-Fat Eating  What is low-fat eating? Most food has some fat in it. Your body needs some fat to be healthy. But somekinds of fats are healthier than others. In a low-fat eating plan, you try to choose healthier fats and eat fewer unhealthy fats. Healthy fats include olive and canola oil. Try to avoid eatingtoo much saturated fat, such as in cheese and meats. You do not need to cut all fat from your diet. But you can make healthierchoices about the types and amount of fat you eat. Even though it is a good idea to choose healthier fats, it is still importantto be careful of how much fat you eat, because all fats are high in calories. What are the different types of fats? Unhealthy fat   Saturated fat. Saturated fats are mostly in animal foods, such as meat and dairy foods. Tropical oils, such as coconut oil, palm oil, and cocoa butter, are also saturated fats. Healthy fats   Monounsaturated fat. Monounsaturated fats are liquid at room temperature but get solid when refrigerated. Eating foods that are high in this fat may help lower your \"bad\" (LDL) cholesterol, keep your \"good\" (HDL) cholesterol level up, and lower your chances of getting coronary artery disease. This fat is found in canola oil, olive oil, peanut oil, olives, avocados, nuts, and nut butters.  Polyunsaturated fat. Polyunsaturated fats are liquid at room temperature. They are in safflower, sunflower, and corn oils. They are also the main fat in seafood. Omega-3 fatty acids are types of polyunsaturated fat. Eating fish may lower your chances of getting coronary artery disease. Fatty fish such as salmon and mackerel contain these healthy fatty acids. So do ground flaxseeds and flaxseed oil, soybeans, walnuts, and seeds. Why cut down on unhealthy fats? Eating foods that contain saturated fats can raise the LDL (\"bad\") cholesterol in your blood.  Having a high level of LDL cholesterol increases your chance of hardening of the arteries (atherosclerosis), which can lead to heart disease,heart attack, and stroke. In general:   No more than 10% of your daily calories should come from saturated fat. This is about 20 grams in a 2,000-calorie diet.  No more than 10% of your daily calories should come from polyunsaturated fat. This is about 20 grams in a 2,000-calorie diet.  Monounsaturated fats can be up to 15% of your daily calories. This is about 25 to 30 grams in a 2,000-calorie diet. If you're not sure how much fat you should be eating or how many calories you need each day to stay at a healthy weight, talk to a registered dietitian. Adietitian can help you create a plan that's right for you. What can you do to cut down on fat? Foods like cheese, butter, sausage, and desserts can have a lot of unhealthyfats. Try these tips for healthier meals at home and when you eat out. At home  [facility] up on fruits, vegetables, and whole grains.  Think of meat as a side dish instead of as the main part of your meal.   When you do eat meat, make it extra-lean ground beef (97% lean), ground turkey breast (without skin added), meats with fat trimmed off before cooking, or skinless chicken.  Try main dishes that use whole wheat pasta, brown rice, dried beans, or vegetables.  Use cooking methods that use little or no fat, such as broiling, steaming, or grilling. Use cooking spray instead of oil. If you use oil, use a monounsaturated oil, such as canola or olive oil.  Read food labels on canned, bottled, or packaged foods. Choose those with little saturated fat. When eating out at a restaurant   Order foods that are broiled or poached instead of fried or breaded.  Cut back on the amount of butter or margarine that you use on bread. Use small amounts of olive oil instead.  Order sauces, gravies, and salad dressings on the side, and use only a little.  When you order pasta, choose tomato sauce instead of cream sauce.    Ask for salsa with your baked potato instead of sour cream, butter, cheese, or kothari. Where can you learn more? Go to [email or web address] and sign in to your Veodin account. Enter B665 in the [employer] box to learn more about \"Learning About Low-Fat Eating. \"     If you do not have an account, please click on the \"Sign Up Now\" link. Current as of: September 8, 2021               Content Version: 13.2  © 2006-2022 [employer]. Care instructions adapted under license by [facility] ([facility]). If you have questions about a medical condition or this instruction, always ask your healthcare professional. [place] [address] any warranty or liability for your use of this information. Patient Education        Walking for Exercise: Care Instructions  Your Care Instructions     Walking is one of the easiest ways to get the exercise you need for good health. A brisk, 30-minute walk each day can help you feel better and have more energy. It can help you lower your risk of disease. Walking can help you keepyour bones strong and your heart healthy. Check with your doctor before you start a walking plan if you have heart problems, other health issues, or you have not been active in a long time. Follow your doctor's instructions for safe levels of exercise. Follow-up care is a key part of your treatment and safety. Be sure to make and go to all appointments, and call your doctor if you are having problems. It's also a good idea to know your test results and keep alist of the medicines you take. How can you care for yourself at home? Getting started  [facility] slowly and set a short-term goal. For example, walk for 5 or 10 minutes every day.  Bit by bit, increase the amount you walk every day. Try for at least 30 minutes on most days of the week. You also may want to swim, bike, or do other activities.    If finding enough time is a problem, it's fine to be active in shorter periods of time throughout your day.  To get the heart-healthy benefits of walking, you need to walk briskly enough to increase your heart rate and breathing, but not so fast that you can't talk comfortably.  Wear comfortable shoes that fit well and provide good support for your feet and ankles. Staying with your plan   After you've made walking a habit, set a longer-term goal. You may want to set a goal of walking briskly for longer or walking farther. Experts say to do 2½ hours (150 minutes) of moderate activity a week. A faster heartbeat is what defines moderate-level activity.  To stay motivated, walk with friends, coworkers, or pets.  Use a phone yovany or pedometer to track your steps each day. Set a goal to increase your steps. When you reach that goal, set a higher goal.   If the weather keeps you from walking outside, go for walks at the mall with a friend. Local schools and churches may have indoor gyms where you can walk. Fitting a walk into your workday  [facility] several blocks away from work, or get off the bus a few stops early.  Use the stairs instead of the elevator, at least for a few floors.  Suggest holding meetings with colleagues during a walk inside or outside the building.  Use the restroom that is the farthest from your desk or workstation.  Use your morning and afternoon breaks to take quick 15 minutes walks. Staying safe   Know your surroundings. Walk in a well-lighted, safe place. If it's dark, walk with a partner. Wear light-colored clothing. If you can, buy a vest or jacket that reflects light.  Carry a cell phone for emergencies.  Drink plenty of water. Take a water bottle with you when you walk. This is very important if it is hot out.  Be careful not to slip on wet or icy ground. You can buy \"grippers\" for your shoes to help keep you from slipping.  Pay attention to your walking surface. Use sidewalks and paths.    If you have health issues such as asthma, COPD, or heart problems, or if you haven't been active for a long time, check with your doctor before you start a new activity. Where can you learn more? Go to https://chpepiceweb.360T. org and sign in to your Spurfly account. Enter R159 in the [facility] box to learn more about \"Walking for Exercise: Care Instructions. \"     If you do not have an account, please click on the \"Sign Up Now\" link. Current as of: May 12, 2021               Content Version: 13.2  © 7611-4287 [employer]. Care instructions adapted under license by [facility] ([facility]). If you have questions about a medical condition or this instruction, always ask your healthcare professional. [address] any warranty or liability for your use of this information. Patient Education        Learning About Low-Sodium Foods  What foods are low in sodium? The foods you eat contain nutrients, such as vitamins and minerals. Sodium is a nutrient. Your body needs the right amount to stay healthy and work as it should. You can use the list below to help you make choices about which foodsto eat.   Fruits   Fresh, frozen, canned, or dried fruit  Vegetables   Fresh or frozen vegetables, with no added salt   Canned vegetables, low-sodium or with no added salt  Grains   Bagels without salted tops   Cereal with no added salt   Corn tortillas   Crackers with no added salt   Oatmeal, cooked without salt   Popcorn with no salt   Pasta and noodles, cooked without salt   Rice, cooked without salt   Unsalted pretzels  Dairy and dairy alternatives   Butter, unsalted   Cream cheese   Ice cream   Milk   Soy milk  Meats and other protein foods   Beans and peas, canned with no salt   Eggs   Fresh fish (not smoked)   Fresh meats (not smoked or cured)   Nuts and nut butter, prepared without salt   Poultry, not packaged with sodium solution   Tofu, The Eusebio-Lamine, canned without salt  Seasonings   Garlic   Herbs and spices   Lemon juice   Mustard   Olive oil   Salt-free seasoning mixes   Vinegar  Work with your doctor to find out how much of this nutrient you need. Dependingon your health, you may need more or less of it in your diet. Where can you learn more? Go to [email or web address] and sign in to your [employer] account. Enter L937 in the [employer] box to learn more about \"Learning About Low-Sodium Foods. \"     If you do not have an account, please click on the \"Sign Up Now\" link. Current as of: September 8, 2021               Content Version: 13.2  © 2006-2022 Healthwise, Incorporated. Care instructions adapted under license by [facility] ([facility]). If you have questions about a medical condition or this instruction, always ask your healthcare professional. [address] any warranty or liability for your use of this information.

## 2022-06-09 ENCOUNTER — OFFICE VISIT (OUTPATIENT)
Dept: ORTHOPEDIC SURGERY | Age: 67
End: 2022-06-09
Payer: MEDICARE

## 2022-06-09 VITALS — HEIGHT: 61 IN | BODY MASS INDEX: 32.1 KG/M2 | WEIGHT: 170 LBS

## 2022-06-09 DIAGNOSIS — M17.0 PRIMARY OSTEOARTHRITIS OF BOTH KNEES: Primary | ICD-10-CM

## 2022-06-09 PROCEDURE — 20610 DRAIN/INJ JOINT/BURSA W/O US: CPT | Performed by: PHYSICIAN ASSISTANT

## 2022-06-09 PROCEDURE — 99999 PR OFFICE/OUTPT VISIT,PROCEDURE ONLY: CPT | Performed by: PHYSICIAN ASSISTANT

## 2022-06-09 RX ORDER — BUPIVACAINE HYDROCHLORIDE 2.5 MG/ML
2 INJECTION, SOLUTION INFILTRATION; PERINEURAL ONCE
Status: COMPLETED | OUTPATIENT
Start: 2022-06-09 | End: 2022-06-09

## 2022-06-09 RX ORDER — TRIAMCINOLONE ACETONIDE 40 MG/ML
40 INJECTION, SUSPENSION INTRA-ARTICULAR; INTRAMUSCULAR ONCE
Status: COMPLETED | OUTPATIENT
Start: 2022-06-09 | End: 2022-06-09

## 2022-06-09 RX ADMIN — TRIAMCINOLONE ACETONIDE 40 MG: 40 INJECTION, SUSPENSION INTRA-ARTICULAR; INTRAMUSCULAR at 14:32

## 2022-06-09 RX ADMIN — BUPIVACAINE HYDROCHLORIDE 5 MG: 2.5 INJECTION, SOLUTION INFILTRATION; PERINEURAL at 14:31

## 2022-06-09 RX ADMIN — BUPIVACAINE HYDROCHLORIDE 5 MG: 2.5 INJECTION, SOLUTION INFILTRATION; PERINEURAL at 14:30

## 2022-06-09 NOTE — PROGRESS NOTES
Subjective:      Patient ID: Maria Tate is a 74 y.o.  female. HPI: She is here for evaluation and treatment of left and right knee pain related to arthritis. She states the previous injection which was performed 3/8/2022  helped relieve the knee symptoms. She knee pain has gradually returned. She denies recent injury. Pain is 5/10. Pain is worse with weightbearing activities. Pain improves somewhat with rest and elevation. Review of Systems:   She denies fever or chills. She denies numbness or tingling around the knee. Past Medical History:   Diagnosis Date    Arthritis     CAD (coronary artery disease)     \"blocked artery\" no stents    Constipation     due to slow movement through bowels    Depression     Diabetes mellitus (HCC)     GERD (gastroesophageal reflux disease)     Hyperlipidemia     Hypertension     Vertigo     Vitamin D deficiency, unspecified        Family History   Problem Relation Age of Onset    High Blood Pressure Mother     High Cholesterol Mother        Past Surgical History:   Procedure Laterality Date    DIAGNOSTIC CARDIAC CATH LAB PROCEDURE  04/2019    stent    HYSTERECTOMY (CERVIX STATUS UNKNOWN)         Social History     Occupational History    Not on file   Tobacco Use    Smoking status: Never Smoker    Smokeless tobacco: Never Used   Vaping Use    Vaping Use: Never used   Substance and Sexual Activity    Alcohol use: No    Drug use: No    Sexual activity: Not on file       Current Outpatient Medications   Medication Sig Dispense Refill    methylPREDNISolone (MEDROL, DENISE,) 4 MG tablet Take as directed 1 kit 0    permethrin (ELIMITE) 5 % cream Use head to toe at night, wash off in 8-10 hours. Repeat in 1 week.  60 g 1    Psyllium (METAMUCIL FIBER PO) Take by mouth daily      ZINC PO Take by mouth daily      isosorbide mononitrate (IMDUR) 60 MG extended release tablet TAKE 1 TABLET BY MOUTH EVERY DAY 30 tablet 5    atorvastatin (LIPITOR) 80 MG tablet Take 1 tablet by mouth daily 90 tablet 3    metoprolol succinate (TOPROL XL) 25 MG extended release tablet Take 0.5 tablets by mouth daily 30 tablet 3    acetaminophen (TYLENOL) 500 MG tablet Take 500 mg by mouth every 6 hours as needed for Pain      meclizine (ANTIVERT) 25 MG tablet Take 1 tablet by mouth 3 times daily as needed for Dizziness 30 tablet 0    hydrocortisone 1 % cream Apply topically 2 times daily Apply topically 2 times daily.  linagliptin (TRADJENTA) 5 MG tablet Take 5 mg by mouth daily      RaNITidine HCl (RANITIDINE 150 MAX STRENGTH PO) Take 150 mg by mouth as needed       vitamin D (CHOLECALCIFEROL) 1000 UNIT TABS tablet Take 1,000 Units by mouth daily      fluticasone (FLONASE) 50 MCG/ACT nasal spray 1 spray by Each Nostril route daily as needed       Insulin Detemir (LEVEMIR SC) Inject 10 Units into the skin nightly       losartan (COZAAR) 50 MG tablet Take 50 mg by mouth daily       hydrOXYzine (ATARAX) 50 MG tablet Take 50 mg by mouth as needed for Itching       Omega-3 Fatty Acids (FISH OIL PO) Take  by mouth.  aspirin 81 MG tablet Take 81 mg by mouth daily.  nitroGLYCERIN (NITROSTAT) 0.4 MG SL tablet Place 0.4 mg under the tongue every 5 minutes as needed for Chest pain. No current facility-administered medications for this visit. Allergies   Allergen Reactions    Clonidine Derivatives     Doxycycline     Lisinopril      States \"don't remember reaction but had something long time ago\"    Penicillins Swelling    Sulfa Antibiotics Swelling        Objective:   She is alert, oriented x 3, pleasant, well nourished, developed and in no acute distress. Ht 5' 1\" (1.549 m)   Wt 170 lb (77.1 kg)   BMI 32.12 kg/m²      Knee exam:  Examination of the left knee shows: There is not erythema. ROM- decreased range of motion is noted. There is mild to moderate pain associated with ROM testing. Extensor Mechanism is intact. injections are an option. Informed patient corticosteroid injections can be performed every 3-4 months as needed. 7)  Evidence for intra-articular hyaluronic acid injections (viscosupplementation) is not as strong, but may benefit a subset of patients who have failed other options. Informed patient viscosupplementation can be performed every 6 months as needed. 8)  Bracing and cane use can improve pain and function. Although not specifically listed in the CPGs, ice therapy and topical patches such as Salonpas are good options as well. Other non-surgical options including Coolief (cooled frequency ablation of the geniculate nerves), stem cell injections, PRP injections were discussed. Surgical option, knee arthroplasty discussed. Plan:  Medications-   OTC NSAIDS discussed. 1. The most common side effects from NSAIDs are stomachaches, heartburn, and nausea. NSAIDs may irritate the stomach lining. If the medicine upsets your stomach, you can try taking it with food. But if that doesn't help, talk with your doctor to make sure it's not a more serious problem, such as a stomach ulcer or bleeding in the stomach or intestines. 2. Using NSAIDs may:  ? Lead to high blood pressure. ? Make symptoms of heart failure worse. ? Raise the risk of heart attack, stroke, kidney damage, and skin reactions. 3. Your risks are greater if you take NSAIDs at higher doses or for longer than the label says. People who are older than 72 or who have heart, stomach, or intestinal disease have a higher risk for problems. PT- A home exercise program was instructed today including ROM exercises and strengthening exercises. The patient verbalized understanding of these exercises as well as the importance of the exercise program to promote return of normal function. If pain intensifies or other problems arise you are to notify the office. Procedures- left and right knee intra-articular corticosteroid injection. Risk and benefits of corticosteroid intra-articular injection was discussed today. All questions were answered to her satisfaction. She verbally consented to proceed with intra-articular injections today. PROCEDURE NOTE:     Pre op Diagnosis:  left knee arthritis     Post op Diagnosis: Same  The left knee was prepped in standard sterile fashion with  Alcohol and 2 cc of 0.25% Marcaine and 1 cc of Kenalog 40 mg was injected into the left lateral compartment without difficulty. Procedure was tolerated well without difficulty and a band-aid was applied. Advised to ice the knee for 15-20 minutes to relieve any injection site related pain. PROCEDURE NOTE:     Pre op Diagnosis:  right knee arthritis     Post op Diagnosis: Same  The right knee was prepped in standard sterile fashion with  Alcohol and 2 cc of 0.25% Marcaine and 1 cc of Kenalog 40 mg was injected into the right lateral compartment without difficulty. Procedure was tolerated well without difficulty and a band-aid was applied. Advised to ice the knee for 15-20 minutes to relieve any injection site related pain. Follow up- 3-4 months. Call or return to clinic if these symptoms worsen or fail to improve as anticipated. Gerson Cowart PA-C   Senior Physician Assistant   [facility]                                         Disclaimer: This note was generated with use of a verbal recognition program (DRAGON) and an attempt was made to check for errors. It is possible that there are still dictated errors within this office note. If so, please bring any significant errors to my attention for an addendum. All efforts were made to ensure that this office note is accurate.

## 2022-09-08 ENCOUNTER — OFFICE VISIT (OUTPATIENT)
Dept: ORTHOPEDIC SURGERY | Age: 67
End: 2022-09-08
Payer: MEDICARE

## 2022-09-08 VITALS — HEIGHT: 61 IN | WEIGHT: 170 LBS | BODY MASS INDEX: 32.1 KG/M2 | RESPIRATION RATE: 18 BRPM

## 2022-09-08 DIAGNOSIS — M17.0 PRIMARY OSTEOARTHRITIS OF BOTH KNEES: Primary | ICD-10-CM

## 2022-09-08 PROCEDURE — 20610 DRAIN/INJ JOINT/BURSA W/O US: CPT | Performed by: PHYSICIAN ASSISTANT

## 2022-09-08 PROCEDURE — 99999 PR OFFICE/OUTPT VISIT,PROCEDURE ONLY: CPT | Performed by: PHYSICIAN ASSISTANT

## 2022-09-08 RX ORDER — BUPIVACAINE HYDROCHLORIDE 2.5 MG/ML
2 INJECTION, SOLUTION INFILTRATION; PERINEURAL ONCE
Status: COMPLETED | OUTPATIENT
Start: 2022-09-08 | End: 2022-09-08

## 2022-09-08 RX ORDER — TRIAMCINOLONE ACETONIDE 40 MG/ML
40 INJECTION, SUSPENSION INTRA-ARTICULAR; INTRAMUSCULAR ONCE
Status: COMPLETED | OUTPATIENT
Start: 2022-09-08 | End: 2022-09-08

## 2022-09-08 RX ADMIN — TRIAMCINOLONE ACETONIDE 40 MG: 40 INJECTION, SUSPENSION INTRA-ARTICULAR; INTRAMUSCULAR at 14:35

## 2022-09-08 RX ADMIN — BUPIVACAINE HYDROCHLORIDE 5 MG: 2.5 INJECTION, SOLUTION INFILTRATION; PERINEURAL at 14:34

## 2022-09-08 RX ADMIN — TRIAMCINOLONE ACETONIDE 40 MG: 40 INJECTION, SUSPENSION INTRA-ARTICULAR; INTRAMUSCULAR at 14:34

## 2022-09-08 RX ADMIN — BUPIVACAINE HYDROCHLORIDE 5 MG: 2.5 INJECTION, SOLUTION INFILTRATION; PERINEURAL at 14:33

## 2022-09-08 NOTE — PROGRESS NOTES
Subjective:      Patient ID: Lina Domingo is a 74 y.o.  female who is here for evaluation and treatment of right and left knee pain related to arthritis. The most recent  injection performed 6/9/2022 helped relieve the knee symptoms. Pain has gradually returned. Denies recent injury. Pain is 6/10. Pain is worse with weightbearing activities. Pain improves somewhat with rest and elevation. Review of Systems:   Denies fever or chills. Denies numbness or tingling around the knee. Past Medical History:   Diagnosis Date    Arthritis     CAD (coronary artery disease)     \"blocked artery\" no stents    Constipation     due to slow movement through bowels    Depression     Diabetes mellitus (HCC)     GERD (gastroesophageal reflux disease)     Hyperlipidemia     Hypertension     Vertigo     Vitamin D deficiency, unspecified        Family History   Problem Relation Age of Onset    High Blood Pressure Mother     High Cholesterol Mother        Past Surgical History:   Procedure Laterality Date    DIAGNOSTIC CARDIAC CATH LAB PROCEDURE  04/2019    stent    HYSTERECTOMY (CERVIX STATUS UNKNOWN)         Social History     Occupational History    Not on file   Tobacco Use    Smoking status: Never    Smokeless tobacco: Never   Vaping Use    Vaping Use: Never used   Substance and Sexual Activity    Alcohol use: No    Drug use: No    Sexual activity: Not on file       Current Outpatient Medications   Medication Sig Dispense Refill    methylPREDNISolone (MEDROL, DENISE,) 4 MG tablet Take as directed 1 kit 0    permethrin (ELIMITE) 5 % cream Use head to toe at night, wash off in 8-10 hours. Repeat in 1 week.  60 g 1    Psyllium (METAMUCIL FIBER PO) Take by mouth daily      ZINC PO Take by mouth daily      isosorbide mononitrate (IMDUR) 60 MG extended release tablet TAKE 1 TABLET BY MOUTH EVERY DAY 30 tablet 5    atorvastatin (LIPITOR) 80 MG tablet Take 1 tablet by mouth daily 90 tablet 3    metoprolol succinate (TOPROL XL) 25 MG extended release tablet Take 0.5 tablets by mouth daily 30 tablet 3    acetaminophen (TYLENOL) 500 MG tablet Take 500 mg by mouth every 6 hours as needed for Pain      meclizine (ANTIVERT) 25 MG tablet Take 1 tablet by mouth 3 times daily as needed for Dizziness 30 tablet 0    hydrocortisone 1 % cream Apply topically 2 times daily Apply topically 2 times daily. linagliptin (TRADJENTA) 5 MG tablet Take 5 mg by mouth daily      RaNITidine HCl (RANITIDINE 150 MAX STRENGTH PO) Take 150 mg by mouth as needed       vitamin D (CHOLECALCIFEROL) 1000 UNIT TABS tablet Take 1,000 Units by mouth daily      fluticasone (FLONASE) 50 MCG/ACT nasal spray 1 spray by Each Nostril route daily as needed       Insulin Detemir (LEVEMIR SC) Inject 10 Units into the skin nightly       losartan (COZAAR) 50 MG tablet Take 50 mg by mouth daily       hydrOXYzine (ATARAX) 50 MG tablet Take 50 mg by mouth as needed for Itching       Omega-3 Fatty Acids (FISH OIL PO) Take  by mouth. aspirin 81 MG tablet Take 81 mg by mouth daily. nitroGLYCERIN (NITROSTAT) 0.4 MG SL tablet Place 0.4 mg under the tongue every 5 minutes as needed for Chest pain. No current facility-administered medications for this visit. Allergies   Allergen Reactions    Clonidine Derivatives     Doxycycline     Lisinopril      States \"don't remember reaction but had something long time ago\"    Penicillins Swelling    Sulfa Antibiotics Swelling        Objective:   She is alert, oriented x 3, pleasant, well nourished, developed and in no acute distress. Resp 18   Ht 5' 1\" (1.549 m)   Wt 170 lb (77.1 kg)   BMI 32.12 kg/m²      Examination of the right and left knee shows: There is not erythema. ROM- decreased range of motion is noted. There is mild to moderate pain associated with ROM testing. Extensor Mechanism is intact. X Rays: was not performed in the office today:       Diagnosis       ICD-10-CM    1.  Primary

## 2022-12-15 ENCOUNTER — OFFICE VISIT (OUTPATIENT)
Dept: ORTHOPEDIC SURGERY | Age: 67
End: 2022-12-15
Payer: MEDICARE

## 2022-12-15 VITALS — RESPIRATION RATE: 16 BRPM | BODY MASS INDEX: 32.1 KG/M2 | HEIGHT: 61 IN | WEIGHT: 170 LBS

## 2022-12-15 DIAGNOSIS — M17.0 PRIMARY OSTEOARTHRITIS OF BOTH KNEES: Primary | ICD-10-CM

## 2022-12-15 PROCEDURE — 99999 PR OFFICE/OUTPT VISIT,PROCEDURE ONLY: CPT | Performed by: PHYSICIAN ASSISTANT

## 2022-12-15 PROCEDURE — 20610 DRAIN/INJ JOINT/BURSA W/O US: CPT | Performed by: PHYSICIAN ASSISTANT

## 2022-12-15 RX ORDER — TRIAMCINOLONE ACETONIDE 40 MG/ML
40 INJECTION, SUSPENSION INTRA-ARTICULAR; INTRAMUSCULAR ONCE
Status: COMPLETED | OUTPATIENT
Start: 2022-12-15 | End: 2022-12-15

## 2022-12-15 RX ORDER — BUPIVACAINE HYDROCHLORIDE 2.5 MG/ML
2 INJECTION, SOLUTION INFILTRATION; PERINEURAL ONCE
Status: COMPLETED | OUTPATIENT
Start: 2022-12-15 | End: 2022-12-15

## 2022-12-15 RX ADMIN — BUPIVACAINE HYDROCHLORIDE 5 MG: 2.5 INJECTION, SOLUTION INFILTRATION; PERINEURAL at 14:34

## 2022-12-15 RX ADMIN — TRIAMCINOLONE ACETONIDE 40 MG: 40 INJECTION, SUSPENSION INTRA-ARTICULAR; INTRAMUSCULAR at 14:35

## 2022-12-15 NOTE — PROGRESS NOTES
Subjective:      Patient ID: Gino Echavarria is a 69 y.o.  female who is here for evaluation and treatment of left and right knee pain related to arthritis. The most recent  injection performed 9/8/2022 helped relieve the knee symptoms. Pain has gradually returned. Denies recent injury. Pain is 0-1/10. Pain is worse with weightbearing activities. Pain improves somewhat with rest and elevation. Review of Systems:   Denies fever or chills. Denies numbness or tingling around the knee. Past Medical History:   Diagnosis Date    Arthritis     CAD (coronary artery disease)     \"blocked artery\" no stents    Constipation     due to slow movement through bowels    Depression     Diabetes mellitus (HCC)     GERD (gastroesophageal reflux disease)     Hyperlipidemia     Hypertension     Vertigo     Vitamin D deficiency, unspecified        Family History   Problem Relation Age of Onset    High Blood Pressure Mother     High Cholesterol Mother        Past Surgical History:   Procedure Laterality Date    DIAGNOSTIC CARDIAC CATH LAB PROCEDURE  04/2019    stent    HYSTERECTOMY (CERVIX STATUS UNKNOWN)         Social History     Occupational History    Not on file   Tobacco Use    Smoking status: Never    Smokeless tobacco: Never   Vaping Use    Vaping Use: Never used   Substance and Sexual Activity    Alcohol use: No    Drug use: No    Sexual activity: Not on file       Current Outpatient Medications   Medication Sig Dispense Refill    methylPREDNISolone (MEDROL, DENISE,) 4 MG tablet Take as directed 1 kit 0    permethrin (ELIMITE) 5 % cream Use head to toe at night, wash off in 8-10 hours. Repeat in 1 week.  60 g 1    Psyllium (METAMUCIL FIBER PO) Take by mouth daily      ZINC PO Take by mouth daily      isosorbide mononitrate (IMDUR) 60 MG extended release tablet TAKE 1 TABLET BY MOUTH EVERY DAY 30 tablet 5    atorvastatin (LIPITOR) 80 MG tablet Take 1 tablet by mouth daily 90 tablet 3    metoprolol succinate (TOPROL XL) 25 MG extended release tablet Take 0.5 tablets by mouth daily 30 tablet 3    acetaminophen (TYLENOL) 500 MG tablet Take 500 mg by mouth every 6 hours as needed for Pain      meclizine (ANTIVERT) 25 MG tablet Take 1 tablet by mouth 3 times daily as needed for Dizziness 30 tablet 0    hydrocortisone 1 % cream Apply topically 2 times daily Apply topically 2 times daily. linagliptin (TRADJENTA) 5 MG tablet Take 5 mg by mouth daily      RaNITidine HCl (RANITIDINE 150 MAX STRENGTH PO) Take 150 mg by mouth as needed       vitamin D (CHOLECALCIFEROL) 1000 UNIT TABS tablet Take 1,000 Units by mouth daily      fluticasone (FLONASE) 50 MCG/ACT nasal spray 1 spray by Each Nostril route daily as needed       Insulin Detemir (LEVEMIR SC) Inject 10 Units into the skin nightly       losartan (COZAAR) 50 MG tablet Take 50 mg by mouth daily       hydrOXYzine (ATARAX) 50 MG tablet Take 50 mg by mouth as needed for Itching       Omega-3 Fatty Acids (FISH OIL PO) Take  by mouth. aspirin 81 MG tablet Take 81 mg by mouth daily. nitroGLYCERIN (NITROSTAT) 0.4 MG SL tablet Place 0.4 mg under the tongue every 5 minutes as needed for Chest pain. No current facility-administered medications for this visit. Allergies   Allergen Reactions    Clonidine Derivatives     Doxycycline     Lisinopril      States \"don't remember reaction but had something long time ago\"    Penicillins Swelling    Sulfa Antibiotics Swelling        Objective:   She is alert, oriented x 3, pleasant, well nourished, developed and in no acute distress. Resp 16   Ht 5' 1\" (1.549 m)   Wt 170 lb (77.1 kg)   BMI 32.12 kg/m²      Examination of the right and left knee shows: There is not erythema. ROM- decreased range of motion is noted. There is mild to moderate pain associated with ROM testing. Extensor Mechanism is intact. X Rays: was not performed in the office today:       Diagnosis       ICD-10-CM    1.  Primary osteoarthritis of both knees  M17.0 68150 - CT DRAIN/INJECT LARGE JOINT/BURSA     bupivacaine (MARCAINE) 0.25 % injection 5 mg     triamcinolone acetonide (KENALOG-40) injection 40 mg     bupivacaine (MARCAINE) 0.25 % injection 5 mg     triamcinolone acetonide (KENALOG-40) injection 40 mg           Assessment/ Plan:     Assessment:  Arthritis of the right and left knee. Delete    Surgical option, knee arthroplasty discussed. Plan:    Procedures-right and left intra-articular corticosteroid injection. The risks and benefits of corticosteroid intra-articular injection was discussed today. All questions were answered to her satisfaction. Luci Ordoñez verbally consented to proceed with intra-articular injections today. PROCEDURE NOTE:     Pre op Diagnosis: Right and left knee due to arthritis. The right and left knee was prepped in standard sterile fashion with  Alcohol. 2 cc of 0.25% Marcaine and 1 cc of Kenalog 40 mg was injected into the right lateral compartment without difficulty. Procedure was tolerated well without difficulty and a band-aid was applied. Advised to use ice over the knee for 15-20 minutes to relieve any pain associated to the injection procedure. Follow up:   Call or return to clinic if these symptoms worsen or fail to improve as anticipated. Antonio Mcburney PA-C   Senior Physician Assistant   [facility]                                         Disclaimer: This note was generated with use of a verbal recognition program (DRAGON) and an attempt was made to check for errors. It is possible that there are still dictated errors within this office note. If so, please bring any significant errors to my attention for an addendum. All efforts were made to ensure that this office note is accurate.

## 2023-03-19 ENCOUNTER — APPOINTMENT (OUTPATIENT)
Dept: GENERAL RADIOLOGY | Age: 68
End: 2023-03-19
Payer: MEDICARE

## 2023-03-19 ENCOUNTER — HOSPITAL ENCOUNTER (EMERGENCY)
Age: 68
Discharge: HOME | End: 2023-03-19
Attending: EMERGENCY MEDICINE
Payer: MEDICARE

## 2023-03-19 VITALS
DIASTOLIC BLOOD PRESSURE: 114 MMHG | TEMPERATURE: 98 F | RESPIRATION RATE: 18 BRPM | OXYGEN SATURATION: 98 % | HEART RATE: 78 BPM | SYSTOLIC BLOOD PRESSURE: 140 MMHG | WEIGHT: 172.62 LBS | BODY MASS INDEX: 33.89 KG/M2 | HEIGHT: 60 IN

## 2023-03-19 DIAGNOSIS — G89.29 CHRONIC PAIN OF RIGHT KNEE: Primary | ICD-10-CM

## 2023-03-19 DIAGNOSIS — M25.561 CHRONIC PAIN OF RIGHT KNEE: Primary | ICD-10-CM

## 2023-03-19 DIAGNOSIS — M25.461 EFFUSION OF RIGHT KNEE: ICD-10-CM

## 2023-03-19 PROCEDURE — 99283 EMERGENCY DEPT VISIT LOW MDM: CPT

## 2023-03-19 PROCEDURE — 73560 X-RAY EXAM OF KNEE 1 OR 2: CPT

## 2023-03-19 RX ORDER — LIDOCAINE 50 MG/G
1 PATCH TOPICAL DAILY
Qty: 10 PATCH | Refills: 0 | Status: SHIPPED | OUTPATIENT
Start: 2023-03-19 | End: 2023-03-29

## 2023-03-19 ASSESSMENT — PAIN DESCRIPTION - LOCATION: LOCATION: KNEE

## 2023-03-19 ASSESSMENT — PAIN SCALES - GENERAL: PAINLEVEL_OUTOF10: 8

## 2023-03-19 ASSESSMENT — ENCOUNTER SYMPTOMS: BACK PAIN: 0

## 2023-03-19 ASSESSMENT — PAIN DESCRIPTION - ORIENTATION: ORIENTATION: RIGHT

## 2023-03-19 ASSESSMENT — PAIN - FUNCTIONAL ASSESSMENT: PAIN_FUNCTIONAL_ASSESSMENT: 0-10

## 2023-03-19 NOTE — ED PROVIDER NOTES
Vitamin D deficiency, unspecified          SURGICAL HISTORY       Past Surgical History:   Procedure Laterality Date    DIAGNOSTIC CARDIAC CATH LAB PROCEDURE  04/2019    stent    HYSTERECTOMY (CERVIX STATUS UNKNOWN)           CURRENT MEDICATIONS       Previous Medications    ACETAMINOPHEN (TYLENOL) 500 MG TABLET    Take 500 mg by mouth every 6 hours as needed for Pain    ASPIRIN 81 MG TABLET    Take 81 mg by mouth daily. ATORVASTATIN (LIPITOR) 80 MG TABLET    Take 1 tablet by mouth daily    FLUTICASONE (FLONASE) 50 MCG/ACT NASAL SPRAY    1 spray by Each Nostril route daily as needed     HYDROCORTISONE 1 % CREAM    Apply topically 2 times daily Apply topically 2 times daily. HYDROXYZINE (ATARAX) 50 MG TABLET    Take 50 mg by mouth as needed for Itching     INSULIN DETEMIR (LEVEMIR SC)    Inject 10 Units into the skin nightly     ISOSORBIDE MONONITRATE (IMDUR) 60 MG EXTENDED RELEASE TABLET    TAKE 1 TABLET BY MOUTH EVERY DAY    LINAGLIPTIN (TRADJENTA) 5 MG TABLET    Take 5 mg by mouth daily    LOSARTAN (COZAAR) 50 MG TABLET    Take 50 mg by mouth daily     MECLIZINE (ANTIVERT) 25 MG TABLET    Take 1 tablet by mouth 3 times daily as needed for Dizziness    METHYLPREDNISOLONE (MEDROL, DENISE,) 4 MG TABLET    Take as directed    METOPROLOL SUCCINATE (TOPROL XL) 25 MG EXTENDED RELEASE TABLET    Take 0.5 tablets by mouth daily    NITROGLYCERIN (NITROSTAT) 0.4 MG SL TABLET    Place 0.4 mg under the tongue every 5 minutes as needed for Chest pain. OMEGA-3 FATTY ACIDS (FISH OIL PO)    Take  by mouth. PERMETHRIN (ELIMITE) 5 % CREAM    Use head to toe at night, wash off in 8-10 hours. Repeat in 1 week.     PSYLLIUM (METAMUCIL FIBER PO)    Take by mouth daily    RANITIDINE HCL (RANITIDINE 150 MAX STRENGTH PO)    Take 150 mg by mouth as needed     VITAMIN D (CHOLECALCIFEROL) 1000 UNIT TABS TABLET    Take 1,000 Units by mouth daily    ZINC PO    Take by mouth daily       ALLERGIES     Clonidine derivatives, Doxycycline,

## 2023-03-19 NOTE — ED NOTES
AVS reviewed with patient. Verbalized understanding. AVS was printed and given to patient. Printed prescription given to patient.      Lizzie Adamson) Bibiana Frazier RN  03/19/23 [id number]

## 2023-03-19 NOTE — Clinical Note
Francisco Babb was seen and treated in our emergency department on 3/19/2023. She may return to work on 03/23/2023. If you have any questions or concerns, please don't hesitate to call.       Carolina Clancy MD

## 2023-03-19 NOTE — DISCHARGE INSTRUCTIONS
1.  Rest ice and elevation to the affected knee. 2.  Tylenol or ibuprofen over-the-counter for pain and then escalate to prescribed medications if needed. 3.  Follow-up with your primary care physician in the next few days call for an appointment. 4.  Avoid prolonged weightbearing in the right lower extremity. 5.  Return emerged department for severe intractable pain or signs of serious infection.

## 2023-04-24 ENCOUNTER — TELEPHONE (OUTPATIENT)
Dept: CARDIOLOGY CLINIC | Age: 68
End: 2023-04-24

## 2023-04-24 NOTE — TELEPHONE ENCOUNTER
Vick Santillan called in today she is wanting to know if she can come in on 5/3/2023 at 10:30 she stated that she works so that is the only time she is available.      Vick Santillan can be reached at [phone]

## 2023-04-24 NOTE — TELEPHONE ENCOUNTER
Called [place] no answer lvm letting her know that his schedule is booked until June.  And we can get her scheduled then to call the office back as noted

## 2023-04-24 NOTE — TELEPHONE ENCOUNTER
She is not scheduled next Wednesday 5/3/2023 and has no showed or canceled prior visits at that requested time. I would just scheduled her an appointment with Dr. Cristina Covarrubias where there is a true opening for 15 minute with her time requests. Thanks.

## 2023-05-03 ENCOUNTER — OFFICE VISIT (OUTPATIENT)
Dept: ORTHOPEDIC SURGERY | Age: 68
End: 2023-05-03
Payer: MEDICARE

## 2023-05-03 VITALS — BODY MASS INDEX: 33.65 KG/M2 | RESPIRATION RATE: 17 BRPM | WEIGHT: 171.4 LBS | HEIGHT: 60 IN

## 2023-05-03 DIAGNOSIS — M17.0 PRIMARY OSTEOARTHRITIS OF BOTH KNEES: Primary | ICD-10-CM

## 2023-05-03 PROCEDURE — 99999 PR OFFICE/OUTPT VISIT,PROCEDURE ONLY: CPT | Performed by: PHYSICIAN ASSISTANT

## 2023-05-03 PROCEDURE — 20610 DRAIN/INJ JOINT/BURSA W/O US: CPT | Performed by: PHYSICIAN ASSISTANT

## 2023-05-03 RX ORDER — BUPIVACAINE HYDROCHLORIDE 2.5 MG/ML
2 INJECTION, SOLUTION INFILTRATION; PERINEURAL ONCE
Status: COMPLETED | OUTPATIENT
Start: 2023-05-03 | End: 2023-05-03

## 2023-05-03 RX ORDER — TRIAMCINOLONE ACETONIDE 40 MG/ML
40 INJECTION, SUSPENSION INTRA-ARTICULAR; INTRAMUSCULAR ONCE
Status: COMPLETED | OUTPATIENT
Start: 2023-05-03 | End: 2023-05-03

## 2023-05-03 RX ADMIN — BUPIVACAINE HYDROCHLORIDE 5 MG: 2.5 INJECTION, SOLUTION INFILTRATION; PERINEURAL at 10:28

## 2023-05-03 RX ADMIN — TRIAMCINOLONE ACETONIDE 40 MG: 40 INJECTION, SUSPENSION INTRA-ARTICULAR; INTRAMUSCULAR at 10:29

## 2023-05-03 NOTE — PROGRESS NOTES
Subjective:      Patient ID: Vesna Bridges is a 69 y.o.  female who is here for evaluation and treatment of right and left knee pain related to arthritis. The most recent steroid injections performed 12/15/2022 helped relieve the knee symptoms. She underwent synvisc series of 3 injections for the left and right knee at the [address] [place] [address] 12 weeks ago without relief. Pain has gradually returned. Denies recent injury. Pain is 10/10. Pain is worse with weightbearing activities. Pain improves somewhat with rest and elevation. Review of Systems:   Denies fever or chills. Denies numbness or tingling around the knee. Past Medical History:   Diagnosis Date    Arthritis     CAD (coronary artery disease)     \"blocked artery\" no stents    Constipation     due to slow movement through bowels    Depression     Diabetes mellitus (HCC)     GERD (gastroesophageal reflux disease)     Hyperlipidemia     Hypertension     Vertigo     Vitamin D deficiency, unspecified        Family History   Problem Relation Age of Onset    High Blood Pressure Mother     High Cholesterol Mother        Past Surgical History:   Procedure Laterality Date    DIAGNOSTIC CARDIAC CATH LAB PROCEDURE  04/2019    stent    HYSTERECTOMY (CERVIX STATUS UNKNOWN)         Social History     Occupational History    Not on file   Tobacco Use    Smoking status: Never    Smokeless tobacco: Never   Vaping Use    Vaping Use: Never used   Substance and Sexual Activity    Alcohol use: No    Drug use: No    Sexual activity: Not on file       Current Outpatient Medications   Medication Sig Dispense Refill    methylPREDNISolone (MEDROL, DENISE,) 4 MG tablet Take as directed 1 kit 0    permethrin (ELIMITE) 5 % cream Use head to toe at night, wash off in 8-10 hours. Repeat in 1 week.  60 g 1    Psyllium (METAMUCIL FIBER PO) Take by mouth daily      ZINC PO Take by mouth daily      isosorbide mononitrate (IMDUR) 60 MG extended release tablet TAKE 1 TABLET BY

## 2023-05-13 ENCOUNTER — APPOINTMENT (OUTPATIENT)
Dept: CT IMAGING | Age: 68
End: 2023-05-13
Payer: OTHER MISCELLANEOUS

## 2023-05-13 ENCOUNTER — HOSPITAL ENCOUNTER (EMERGENCY)
Age: 68
Discharge: HOME | End: 2023-05-13
Attending: EMERGENCY MEDICINE
Payer: OTHER MISCELLANEOUS

## 2023-05-13 VITALS
RESPIRATION RATE: 22 BRPM | OXYGEN SATURATION: 99 % | DIASTOLIC BLOOD PRESSURE: 88 MMHG | SYSTOLIC BLOOD PRESSURE: 190 MMHG | HEIGHT: 60 IN | BODY MASS INDEX: 33.15 KG/M2 | WEIGHT: 168.87 LBS | HEART RATE: 69 BPM | TEMPERATURE: 98.5 F

## 2023-05-13 DIAGNOSIS — S09.90XA CLOSED HEAD INJURY, INITIAL ENCOUNTER: ICD-10-CM

## 2023-05-13 DIAGNOSIS — S16.1XXA STRAIN OF NECK MUSCLE, INITIAL ENCOUNTER: ICD-10-CM
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
## 2023-05-13 PROCEDURE — 99284 EMERGENCY DEPT VISIT MOD MDM: CPT

## 2023-05-13 PROCEDURE — 6370000000 HC RX 637 (ALT 250 FOR IP): Performed by: EMERGENCY MEDICINE

## 2023-05-13 PROCEDURE — 72125 CT NECK SPINE W/O DYE: CPT

## 2023-05-13 PROCEDURE — 70450 CT HEAD/BRAIN W/O DYE: CPT

## 2023-05-13 RX ORDER — METHOCARBAMOL 750 MG/1
750 TABLET, FILM COATED ORAL 3 TIMES DAILY PRN
Qty: 15 TABLET | Refills: 0 | Status: SHIPPED | OUTPATIENT
Start: 2023-05-13

## 2023-05-13 RX ORDER — IBUPROFEN 600 MG/1
600 TABLET ORAL ONCE
Status: COMPLETED | OUTPATIENT
Start: 2023-05-13 | End: 2023-05-13

## 2023-05-13 RX ADMIN — IBUPROFEN 600 MG: 600 TABLET, FILM COATED ORAL at 20:41

## 2023-05-13 ASSESSMENT — PAIN SCALES - GENERAL
PAINLEVEL_OUTOF10: 6
PAINLEVEL_OUTOF10: 6

## 2023-05-13 ASSESSMENT — PAIN DESCRIPTION - PAIN TYPE: TYPE: ACUTE PAIN

## 2023-05-13 ASSESSMENT — PAIN DESCRIPTION - LOCATION: LOCATION: HEAD;SHOULDER;NECK

## 2023-05-13 ASSESSMENT — PAIN DESCRIPTION - DESCRIPTORS: DESCRIPTORS: SORE

## 2023-08-07 ENCOUNTER — OFFICE VISIT (OUTPATIENT)
Dept: ORTHOPEDIC SURGERY | Age: 68
End: 2023-08-07
Payer: MEDICARE

## 2023-08-07 VITALS — HEIGHT: 60 IN | RESPIRATION RATE: 16 BRPM | WEIGHT: 168 LBS | BODY MASS INDEX: 32.98 KG/M2

## 2023-08-07 DIAGNOSIS — M17.0 PRIMARY OSTEOARTHRITIS OF BOTH KNEES: Primary | ICD-10-CM

## 2023-08-07 PROCEDURE — G8417 CALC BMI ABV UP PARAM F/U: HCPCS | Performed by: PHYSICIAN ASSISTANT

## 2023-08-07 PROCEDURE — 1090F PRES/ABSN URINE INCON ASSESS: CPT | Performed by: PHYSICIAN ASSISTANT

## 2023-08-07 PROCEDURE — G8400 PT W/DXA NO RESULTS DOC: HCPCS | Performed by: PHYSICIAN ASSISTANT

## 2023-08-07 PROCEDURE — G8427 DOCREV CUR MEDS BY ELIG CLIN: HCPCS | Performed by: PHYSICIAN ASSISTANT

## 2023-08-07 PROCEDURE — 3017F COLORECTAL CA SCREEN DOC REV: CPT | Performed by: PHYSICIAN ASSISTANT

## 2023-08-07 PROCEDURE — 20610 DRAIN/INJ JOINT/BURSA W/O US: CPT | Performed by: ORTHOPAEDIC SURGERY

## 2023-08-07 PROCEDURE — 99213 OFFICE O/P EST LOW 20 MIN: CPT | Performed by: PHYSICIAN ASSISTANT

## 2023-08-07 PROCEDURE — 1123F ACP DISCUSS/DSCN MKR DOCD: CPT | Performed by: PHYSICIAN ASSISTANT

## 2023-08-07 PROCEDURE — 1036F TOBACCO NON-USER: CPT | Performed by: PHYSICIAN ASSISTANT

## 2023-08-07 RX ORDER — BUPIVACAINE HYDROCHLORIDE 2.5 MG/ML
2 INJECTION, SOLUTION INFILTRATION; PERINEURAL ONCE
Status: COMPLETED | OUTPATIENT
Start: 2023-08-07 | End: 2023-08-07

## 2023-08-07 RX ORDER — TRIAMCINOLONE ACETONIDE 40 MG/ML
40 INJECTION, SUSPENSION INTRA-ARTICULAR; INTRAMUSCULAR ONCE
Status: COMPLETED | OUTPATIENT
Start: 2023-08-07 | End: 2023-08-07

## 2023-08-07 RX ADMIN — TRIAMCINOLONE ACETONIDE 40 MG: 40 INJECTION, SUSPENSION INTRA-ARTICULAR; INTRAMUSCULAR at 15:07

## 2023-08-07 RX ADMIN — BUPIVACAINE HYDROCHLORIDE 5 MG: 2.5 INJECTION, SOLUTION INFILTRATION; PERINEURAL at 15:06

## 2023-08-07 NOTE — PROGRESS NOTES
Subjective:      Patient ID: Jovi Molina is a 69 y.o.  female. HPI:   She is here for evaluation and treatment of  right and left knee pain related to arthritis. She states the previous injection which was performed 5/3/2023  helped relieve the knee symptoms. The knee pain has gradually returned. There has not been a recent injury. Pain is 6/10. Pain is worse with activity. Pain improves somewhat with rest and elevation. It has been over a year since x-rays and clinical exam has been performed for right and left knee arthritic complaints. Review of Systems:   Negative for fever or chills. Negative for numbness or tingling around the knee.     Past Medical History:   Diagnosis Date    Arthritis     CAD (coronary artery disease)     \"blocked artery\"  stents placed    Constipation     due to slow movement through bowels    Depression     Diabetes mellitus (HCC)     GERD (gastroesophageal reflux disease)     History of coronary artery stent placement     Hyperlipidemia     Hypertension     Vertigo     Vitamin D deficiency, unspecified        Family History   Problem Relation Age of Onset    High Blood Pressure Mother     High Cholesterol Mother        Past Surgical History:   Procedure Laterality Date    DIAGNOSTIC CARDIAC CATH LAB PROCEDURE  04/2019    stent    HYSTERECTOMY (CERVIX STATUS UNKNOWN)         Social History     Occupational History    Not on file   Tobacco Use    Smoking status: Never    Smokeless tobacco: Never   Vaping Use    Vaping Use: Never used   Substance and Sexual Activity    Alcohol use: No    Drug use: No    Sexual activity: Not on file       Current Outpatient Medications   Medication Sig Dispense Refill    methocarbamol (ROBAXIN-750) 750 MG tablet Take 1 tablet by mouth 3 times daily as needed (pain) 15 tablet 0    Psyllium (METAMUCIL FIBER PO) Take by mouth daily      ZINC PO Take by mouth daily (Patient not taking: Reported on 5/13/2023)      isosorbide mononitrate

## 2023-08-31 ENCOUNTER — HOSPITAL ENCOUNTER (EMERGENCY)
Age: 68
Discharge: HOME | End: 2023-08-31
Attending: EMERGENCY MEDICINE
Payer: MEDICARE

## 2023-08-31 ENCOUNTER — APPOINTMENT (OUTPATIENT)
Dept: GENERAL RADIOLOGY | Age: 68
End: 2023-08-31
Payer: MEDICARE

## 2023-08-31 VITALS
RESPIRATION RATE: 16 BRPM | WEIGHT: 167.55 LBS | TEMPERATURE: 101.2 F | HEART RATE: 52 BPM | DIASTOLIC BLOOD PRESSURE: 64 MMHG | SYSTOLIC BLOOD PRESSURE: 159 MMHG | OXYGEN SATURATION: 97 % | BODY MASS INDEX: 32.72 KG/M2

## 2023-08-31 DIAGNOSIS — U07.1 COVID-19: Primary | ICD-10-CM

## 2023-08-31 LAB
ALBUMIN SERPL-MCNC: 4.7 G/DL (ref 3.4–5)
ALBUMIN/GLOB SERPL: 1.9 {RATIO} (ref 1.1–2.2)
ALP SERPL-CCNC: 149 U/L (ref 40–129)
ALT SERPL-CCNC: 16 U/L (ref 10–40)
ANION GAP SERPL CALCULATED.3IONS-SCNC: 11 MMOL/L (ref 3–16)
AST SERPL-CCNC: 17 U/L (ref 15–37)
BASOPHILS # BLD: 0 K/UL (ref 0–0.2)
BASOPHILS NFR BLD: 0.6 %
BILIRUB SERPL-MCNC: 0.5 MG/DL (ref 0–1)
BUN SERPL-MCNC: 13 MG/DL (ref 7–20)
CALCIUM SERPL-MCNC: 10.3 MG/DL (ref 8.3–10.6)
CHLORIDE SERPL-SCNC: 107 MMOL/L (ref 99–110)
CO2 SERPL-SCNC: 25 MMOL/L (ref 21–32)
CREAT SERPL-MCNC: 0.9 MG/DL (ref 0.6–1.2)
DEPRECATED RDW RBC AUTO: 14 % (ref 12.4–15.4)
EOSINOPHIL # BLD: 0 K/UL (ref 0–0.6)
EOSINOPHIL NFR BLD: 0.1 %
GFR SERPLBLD CREATININE-BSD FMLA CKD-EPI: >60 ML/MIN/{1.73_M2}
GLUCOSE BLD-MCNC: 110 MG/DL (ref 70–99)
GLUCOSE SERPL-MCNC: 116 MG/DL (ref 70–99)
HCT VFR BLD AUTO: 39.1 % (ref 36–48)
HGB BLD-MCNC: 13.4 G/DL (ref 12–16)
LACTATE BLDV-SCNC: 1 MMOL/L (ref 0.4–1.9)
LYMPHOCYTES # BLD: 1.2 K/UL (ref 1–5.1)
LYMPHOCYTES NFR BLD: 17.5 %
MCH RBC QN AUTO: 30.2 PG (ref 26–34)
MCHC RBC AUTO-ENTMCNC: 34.3 G/DL (ref 31–36)
MCV RBC AUTO: 88.1 FL (ref 80–100)
MONOCYTES # BLD: 0.9 K/UL (ref 0–1.3)
MONOCYTES NFR BLD: 13 %
NEUTROPHILS # BLD: 4.8 K/UL (ref 1.7–7.7)
NEUTROPHILS NFR BLD: 68.8 %
PERFORMED ON: ABNORMAL
PLATELET # BLD AUTO: 202 K/UL (ref 135–450)
PMV BLD AUTO: 7.7 FL (ref 5–10.5)
POTASSIUM SERPL-SCNC: 3.7 MMOL/L (ref 3.5–5.1)
PROT SERPL-MCNC: 7.2 G/DL (ref 6.4–8.2)
RBC # BLD AUTO: 4.44 M/UL (ref 4–5.2)
SARS-COV-2 RDRP RESP QL NAA+PROBE: DETECTED
SODIUM SERPL-SCNC: 143 MMOL/L (ref 136–145)
TROPONIN, HIGH SENSITIVITY: 14 NG/L (ref 0–14)
WBC # BLD AUTO: 7 K/UL (ref 4–11)

## 2023-08-31 PROCEDURE — 83605 ASSAY OF LACTIC ACID: CPT

## 2023-08-31 PROCEDURE — 71045 X-RAY EXAM CHEST 1 VIEW: CPT

## 2023-08-31 PROCEDURE — 2580000003 HC RX 258: Performed by: EMERGENCY MEDICINE

## 2023-08-31 PROCEDURE — 80053 COMPREHEN METABOLIC PANEL: CPT

## 2023-08-31 PROCEDURE — 87040 BLOOD CULTURE FOR BACTERIA: CPT

## 2023-08-31 PROCEDURE — 87635 SARS-COV-2 COVID-19 AMP PRB: CPT

## 2023-08-31 PROCEDURE — 84484 ASSAY OF TROPONIN QUANT: CPT

## 2023-08-31 PROCEDURE — 99284 EMERGENCY DEPT VISIT MOD MDM: CPT

## 2023-08-31 PROCEDURE — 85025 COMPLETE CBC W/AUTO DIFF WBC: CPT

## 2023-08-31 RX ORDER — 0.9 % SODIUM CHLORIDE 0.9 %
500 INTRAVENOUS SOLUTION INTRAVENOUS ONCE
Status: COMPLETED | OUTPATIENT
Start: 2023-08-31 | End: 2023-08-31

## 2023-08-31 RX ADMIN — SODIUM CHLORIDE 500 ML: 9 INJECTION, SOLUTION INTRAVENOUS at 15:04

## 2023-08-31 ASSESSMENT — ENCOUNTER SYMPTOMS
ABDOMINAL PAIN: 0
COUGH: 1
SHORTNESS OF BREATH: 0
RHINORRHEA: 0
SORE THROAT: 1
EYE REDNESS: 0

## 2023-08-31 ASSESSMENT — PAIN DESCRIPTION - LOCATION: LOCATION: GENERALIZED

## 2023-08-31 ASSESSMENT — LIFESTYLE VARIABLES
HOW MANY STANDARD DRINKS CONTAINING ALCOHOL DO YOU HAVE ON A TYPICAL DAY: PATIENT DOES NOT DRINK
HOW OFTEN DO YOU HAVE A DRINK CONTAINING ALCOHOL: NEVER

## 2023-08-31 ASSESSMENT — PAIN - FUNCTIONAL ASSESSMENT
PAIN_FUNCTIONAL_ASSESSMENT: 0-10
PAIN_FUNCTIONAL_ASSESSMENT: ACTIVITIES ARE NOT PREVENTED
PAIN_FUNCTIONAL_ASSESSMENT: 0-10

## 2023-08-31 ASSESSMENT — PAIN DESCRIPTION - PAIN TYPE: TYPE: ACUTE PAIN

## 2023-08-31 ASSESSMENT — PAIN SCALES - GENERAL
PAINLEVEL_OUTOF10: 8
PAINLEVEL_OUTOF10: 6

## 2023-08-31 ASSESSMENT — PAIN DESCRIPTION - DESCRIPTORS: DESCRIPTORS: ACHING

## 2023-08-31 ASSESSMENT — PAIN DESCRIPTION - ONSET: ONSET: GRADUAL

## 2023-08-31 ASSESSMENT — PAIN DESCRIPTION - FREQUENCY: FREQUENCY: CONTINUOUS

## 2023-08-31 NOTE — ED NOTES
Pt d/c home with AVS no s.s of distress noted script x 1 in hand she denies questions pt ambulate to the chair she  states she just feels weak but her gait was steady , she denies questions about f/u no s/s of distress noted      Maxwell Peabody, RN  08/31/23 3326

## 2023-08-31 NOTE — ED PROVIDER NOTES
[address] ([facility]) ENCOUNTER            Pt Name: Kayla Vick   MRN: [id number]   [address] [facility] 1955   Date of evaluation: 8/31/2023   Provider: Zahra Farias MD   PCP: Lazaro Amaya MD   Note Started: 2:47 PM EDT 8/31/23          CHIEF COMPLAINT     Chief Complaint   Patient presents with    Cough     Pt states that she started with a cough two days ago, she is c/o cough, congestion, body aches , sore throat and headache. She reports just not feeling well and  feel fatigued              HISTORY OF PRESENT ILLNESS:   History from : Patient   Limitations to history : None     Kayla Vick is a 69 y.o. female who presents c/o a 2-day history of fever, cough, headache, sore throat, body aches. No dysuria. No frequency. No chills. No chest pain. No belly pain. No sick contacts. No history of lung disease. Nursing Notes were all reviewed and agreed with, or any disagreements were addressed in the HPI. REVIEW OF SYSTEMS :    Review of Systems   Constitutional:  Positive for fever. HENT:  Positive for sore throat. Negative for rhinorrhea. Eyes:  Negative for redness. Respiratory:  Positive for cough. Negative for shortness of breath. Cardiovascular:  Negative for chest pain. Gastrointestinal:  Negative for abdominal pain. Genitourinary:  Negative for flank pain. Musculoskeletal:  Positive for myalgias. Neurological:  Positive for headaches. Hematological:  Negative for adenopathy. Psychiatric/Behavioral:  Negative for confusion. MEDICAL HISTORY   has a past medical history of Arthritis, CAD (coronary artery disease), Constipation, Depression, Diabetes mellitus ([address] ), GERD (gastroesophageal reflux disease), History of coronary artery stent placement, Hyperlipidemia, Hypertension, Vertigo, and Vitamin D deficiency, unspecified.     Past Surgical History:   Procedure Laterality Date    DIAGNOSTIC

## 2023-09-01 LAB — BACTERIA BLD CULT: NORMAL

## 2023-09-04 LAB — BACTERIA BLD CULT: NORMAL

## 2023-10-19 ENCOUNTER — OFFICE VISIT (OUTPATIENT)
Dept: ORTHOPEDIC SURGERY | Age: 68
End: 2023-10-19

## 2023-10-19 VITALS — BODY MASS INDEX: 32.79 KG/M2 | HEIGHT: 60 IN | WEIGHT: 167 LBS | RESPIRATION RATE: 16 BRPM

## 2023-10-19 DIAGNOSIS — M17.0 PRIMARY OSTEOARTHRITIS OF BOTH KNEES: Primary | ICD-10-CM

## 2023-10-19 RX ORDER — TRIAMCINOLONE ACETONIDE 40 MG/ML
40 INJECTION, SUSPENSION INTRA-ARTICULAR; INTRAMUSCULAR ONCE
Status: COMPLETED | OUTPATIENT
Start: 2023-10-19 | End: 2023-10-19

## 2023-10-19 RX ORDER — BUPIVACAINE HYDROCHLORIDE 2.5 MG/ML
2 INJECTION, SOLUTION INFILTRATION; PERINEURAL ONCE
Status: COMPLETED | OUTPATIENT
Start: 2023-10-19 | End: 2023-10-19

## 2023-10-19 RX ADMIN — TRIAMCINOLONE ACETONIDE 40 MG: 40 INJECTION, SUSPENSION INTRA-ARTICULAR; INTRAMUSCULAR at 14:27

## 2023-10-19 RX ADMIN — BUPIVACAINE HYDROCHLORIDE 5 MG: 2.5 INJECTION, SOLUTION INFILTRATION; PERINEURAL at 14:26

## 2023-10-19 RX ADMIN — TRIAMCINOLONE ACETONIDE 40 MG: 40 INJECTION, SUSPENSION INTRA-ARTICULAR; INTRAMUSCULAR at 14:26

## 2023-10-19 NOTE — PROGRESS NOTES
intact. 5/5 motor strength of bilateral lower extremities. Sensation is intact to light touch bilaterally. There is no clonus. No cyanosis and digits are warm to touch, capillary refill is less than 2 seconds. There is no edema noted. Intact skin without lacerations or abrasions, no significant erythema, rashes or skin lesions. X Rays: was not performed in the office today:       Diagnosis       ICD-10-CM    1. Primary osteoarthritis of both knees  M17.0 [place] ARTHROCENTESIS ASPIR&/INJ MAJOR JT/BURSA W/O US     triamcinolone acetonide (KENALOG-40) injection 40 mg     bupivacaine (MARCAINE) 0.25 % injection 5 mg     triamcinolone acetonide (KENALOG-40) injection 40 mg     bupivacaine (MARCAINE) 0.25 % injection 5 mg           Assessment/ Plan:     Assessment:  Pain related to arthritis of the right and left knee. Mild contusions knees. Plan:    Procedures-right and left intra-articular corticosteroid injection. The risks and benefits of corticosteroid intra-articular injection was discussed today. All questions were answered to her satisfaction. Luis Taylor verbally consented to proceed with intra-articular injections today. PROCEDURE NOTE:     Pre op Diagnosis: right and left knee due to arthritis. The right and left knee was prepped in standard sterile fashion with  Alcohol. 2 cc of 0.25% Marcaine and 1 cc of Kenalog 40 mg was injected into the right lateral compartment without difficulty. Procedure was tolerated well without difficulty and a band-aid was applied. Advised to use ice over the knee for 15-20 minutes to relieve any pain associated to the injection procedure. Follow up:    Call or return to clinic if these symptoms worsen or fail to improve as anticipated.       The total time spent on today's visit including reviewing test results, history, performance of physical exam, counseling/ education, ordering of medications, tests or procedures was 21

## 2023-11-16 ENCOUNTER — TELEPHONE (OUTPATIENT)
Dept: ORTHOPEDIC SURGERY | Age: 68
End: 2023-11-16

## 2024-01-18 ENCOUNTER — OFFICE VISIT (OUTPATIENT)
Dept: ORTHOPEDIC SURGERY | Age: 69
End: 2024-01-18
Payer: MEDICARE

## 2024-01-18 VITALS — BODY MASS INDEX: 32.79 KG/M2 | WEIGHT: 167 LBS | HEIGHT: 60 IN

## 2024-01-18 DIAGNOSIS — M17.0 PRIMARY OSTEOARTHRITIS OF BOTH KNEES: Primary | ICD-10-CM

## 2024-01-18 PROCEDURE — 20610 DRAIN/INJ JOINT/BURSA W/O US: CPT | Performed by: PHYSICIAN ASSISTANT

## 2024-01-18 PROCEDURE — 99999 PR OFFICE/OUTPT VISIT,PROCEDURE ONLY: CPT | Performed by: PHYSICIAN ASSISTANT

## 2024-01-18 RX ORDER — TRIAMCINOLONE ACETONIDE 40 MG/ML
40 INJECTION, SUSPENSION INTRA-ARTICULAR; INTRAMUSCULAR ONCE
Status: COMPLETED | OUTPATIENT
Start: 2024-01-18 | End: 2024-01-18

## 2024-01-18 RX ORDER — BUPIVACAINE HYDROCHLORIDE 2.5 MG/ML
2 INJECTION, SOLUTION INFILTRATION; PERINEURAL ONCE
Status: COMPLETED | OUTPATIENT
Start: 2024-01-18 | End: 2024-01-18

## 2024-01-18 RX ADMIN — BUPIVACAINE HYDROCHLORIDE 5 MG: 2.5 INJECTION, SOLUTION INFILTRATION; PERINEURAL at 12:52

## 2024-01-18 RX ADMIN — TRIAMCINOLONE ACETONIDE 40 MG: 40 INJECTION, SUSPENSION INTRA-ARTICULAR; INTRAMUSCULAR at 12:53

## 2024-01-18 RX ADMIN — BUPIVACAINE HYDROCHLORIDE 5 MG: 2.5 INJECTION, SOLUTION INFILTRATION; PERINEURAL at 12:53

## 2024-01-18 NOTE — PROGRESS NOTES
meclizine (ANTIVERT) 25 MG tablet Take 1 tablet by mouth 3 times daily as needed for Dizziness 30 tablet 0    hydrocortisone 1 % cream Apply topically 2 times daily Apply topically 2 times daily.      vitamin D (CHOLECALCIFEROL) 1000 UNIT TABS tablet Take 1,000 Units by mouth daily (Patient not taking: Reported on 5/13/2023)      fluticasone (FLONASE) 50 MCG/ACT nasal spray 1 spray by Each Nostril route daily as needed      Insulin Detemir (LEVEMIR SC) Inject 14 Units into the skin nightly      losartan (COZAAR) 50 MG tablet Take 1 tablet by mouth daily      hydrOXYzine (ATARAX) 50 MG tablet Take 1 tablet by mouth as needed for Itching      Omega-3 Fatty Acids (FISH OIL PO) Take  by mouth.      aspirin 81 MG tablet Take 1 tablet by mouth daily      nitroGLYCERIN (NITROSTAT) 0.4 MG SL tablet Place 1 tablet under the tongue every 5 minutes as needed for Chest pain       No current facility-administered medications for this visit.       Allergies   Allergen Reactions    Clonidine Derivatives     Doxycycline     Lisinopril      States \"don't remember reaction but had something long time ago\"    Penicillins Swelling    Sulfa Antibiotics Swelling        Objective:   She is alert, oriented x 3, pleasant, well nourished, developed and in no acute distress.    Ht 1.524 m (5')   Wt 75.8 kg (167 lb)   BMI 32.61 kg/m²      Examination of the right and left knee shows:   There is not erythema.   ROM- decreased range of motion is noted.  There is mild to moderate pain associated with ROM testing.   Extensor Mechanism is intact.     X Rays: was not performed in the office today:       Diagnosis       ICD-10-CM    1. Primary osteoarthritis of both knees  M17.0 RI ARTHROCENTESIS ASPIR&/INJ MAJOR JT/BURSA W/O US     triamcinolone acetonide (KENALOG-40) injection 40 mg     BUPivacaine (MARCAINE) 0.25 % injection 5 mg     triamcinolone acetonide (KENALOG-40) injection 40 mg     BUPivacaine (MARCAINE) 0.25 % injection 5 mg

## 2024-04-18 ENCOUNTER — OFFICE VISIT (OUTPATIENT)
Dept: ORTHOPEDIC SURGERY | Age: 69
End: 2024-04-18
Payer: MEDICARE

## 2024-04-18 VITALS — BODY MASS INDEX: 32.61 KG/M2 | HEIGHT: 60 IN

## 2024-04-18 DIAGNOSIS — M17.11 PRIMARY OSTEOARTHRITIS OF RIGHT KNEE: ICD-10-CM

## 2024-04-18 DIAGNOSIS — M17.0 PRIMARY OSTEOARTHRITIS OF BOTH KNEES: Primary | ICD-10-CM

## 2024-04-18 PROCEDURE — 1123F ACP DISCUSS/DSCN MKR DOCD: CPT | Performed by: PHYSICIAN ASSISTANT

## 2024-04-18 PROCEDURE — 20610 DRAIN/INJ JOINT/BURSA W/O US: CPT | Performed by: PHYSICIAN ASSISTANT

## 2024-04-18 PROCEDURE — 3017F COLORECTAL CA SCREEN DOC REV: CPT | Performed by: PHYSICIAN ASSISTANT

## 2024-04-18 PROCEDURE — 1036F TOBACCO NON-USER: CPT | Performed by: PHYSICIAN ASSISTANT

## 2024-04-18 PROCEDURE — 1090F PRES/ABSN URINE INCON ASSESS: CPT | Performed by: PHYSICIAN ASSISTANT

## 2024-04-18 PROCEDURE — 99213 OFFICE O/P EST LOW 20 MIN: CPT | Performed by: PHYSICIAN ASSISTANT

## 2024-04-18 PROCEDURE — G8427 DOCREV CUR MEDS BY ELIG CLIN: HCPCS | Performed by: PHYSICIAN ASSISTANT

## 2024-04-18 PROCEDURE — G8417 CALC BMI ABV UP PARAM F/U: HCPCS | Performed by: PHYSICIAN ASSISTANT

## 2024-04-18 PROCEDURE — G8400 PT W/DXA NO RESULTS DOC: HCPCS | Performed by: PHYSICIAN ASSISTANT

## 2024-04-18 RX ORDER — TRIAMCINOLONE ACETONIDE 40 MG/ML
40 INJECTION, SUSPENSION INTRA-ARTICULAR; INTRAMUSCULAR ONCE
Status: COMPLETED | OUTPATIENT
Start: 2024-04-18 | End: 2024-04-18

## 2024-04-18 RX ORDER — BUPIVACAINE HYDROCHLORIDE 2.5 MG/ML
2 INJECTION, SOLUTION INFILTRATION; PERINEURAL ONCE
Status: COMPLETED | OUTPATIENT
Start: 2024-04-18 | End: 2024-04-18

## 2024-04-18 RX ADMIN — BUPIVACAINE HYDROCHLORIDE 5 MG: 2.5 INJECTION, SOLUTION INFILTRATION; PERINEURAL at 13:17

## 2024-04-18 RX ADMIN — TRIAMCINOLONE ACETONIDE 40 MG: 40 INJECTION, SUSPENSION INTRA-ARTICULAR; INTRAMUSCULAR at 13:18

## 2024-04-18 NOTE — PROGRESS NOTES
Subjective:      Patient ID: Breanna Maxwell is a 68 y.o.  female who is here for evaluation and treatment of right and left knee pain related to arthritis.   The most recent  injection performed 1/18/2024 helped relieve the knee symptoms.    Pain has gradually returned.   Denies recent injury.   Pain is 8/10.   Pain is worse with weightbearing activities.   Pain improves somewhat with rest and elevation.    She is ready to schedule right knee arthroplasty sometime in the late July of this year.    Review of Systems:   Denies fever or chills.  Denies numbness or tingling around the knee.    Past Medical History:   Diagnosis Date    Arthritis     CAD (coronary artery disease)     \"blocked artery\"  stents placed    Constipation     due to slow movement through bowels    Depression     Diabetes mellitus (HCC)     GERD (gastroesophageal reflux disease)     History of coronary artery stent placement     Hyperlipidemia     Hypertension     Vertigo     Vitamin D deficiency, unspecified        Family History   Problem Relation Age of Onset    High Blood Pressure Mother     High Cholesterol Mother        Past Surgical History:   Procedure Laterality Date    DIAGNOSTIC CARDIAC CATH LAB PROCEDURE  04/2019    stent    HYSTERECTOMY (CERVIX STATUS UNKNOWN)         Social History     Occupational History    Not on file   Tobacco Use    Smoking status: Never    Smokeless tobacco: Never   Vaping Use    Vaping Use: Never used   Substance and Sexual Activity    Alcohol use: No    Drug use: No    Sexual activity: Not on file       Current Outpatient Medications   Medication Sig Dispense Refill    methocarbamol (ROBAXIN-750) 750 MG tablet Take 1 tablet by mouth 3 times daily as needed (pain) 15 tablet 0    Psyllium (METAMUCIL FIBER PO) Take by mouth daily      ZINC PO Take by mouth daily (Patient not taking: Reported on 5/13/2023)      isosorbide mononitrate (IMDUR) 60 MG extended release tablet TAKE 1 TABLET BY MOUTH EVERY DAY 30

## 2024-04-22 ENCOUNTER — PREP FOR PROCEDURE (OUTPATIENT)
Dept: ORTHOPEDIC SURGERY | Age: 69
End: 2024-04-22

## 2024-04-22 ENCOUNTER — TELEPHONE (OUTPATIENT)
Dept: ORTHOPEDIC SURGERY | Age: 69
End: 2024-04-22

## 2024-04-22 PROBLEM — M17.11 OSTEOARTHRITIS OF RIGHT KNEE: Status: ACTIVE | Noted: 2024-04-22

## 2024-04-22 NOTE — TELEPHONE ENCOUNTER
Needs letter for her employer stating d.o.s., restrictions and how long she will be off.    Works for [employer].   Just make letter to them and send to her

## 2024-04-29 NOTE — PLAN OF CARE
after surgery to address pain, minimize swelling as often as possible. It is in my medical opinion that this patient is clear from all physical barriers prior to consideration for surgery, activity modifications prior to and post operatively have been discussed with this patient as well as discharge planning and is cleared for surgery from physical therapy perspective.    Modalities:    No modalities applied this session    Education/Home Exercise Program: Patient HEP program created electronically.  Refer to Digital Message Display access code:    Access Code: [email or web address]  URL: [email or web address]  Date: 05/02/2024  Prepared by: Ana Laura Welch    Exercises  - Seated Hamstring Stretch  - 1 x daily - 7 x weekly - 3 reps - 30 hold  - Standing Gastroc Stretch on Step  - 1 x daily - 7 x weekly - 3 reps - 30 hold  - Supine Quad Set on Towel Roll  - 7 x weekly - 10 reps - 10 hold  - Sidelying Hip Abduction  - 1 x daily - 7 x weekly - 3 sets - 10 reps  - Supine Active Straight Leg Raise  - 1 x daily - 7 x weekly - 3 sets - 10 reps  - Supine Bridge  - 1 x daily - 7 x weekly - 3 sets - 10 reps    ASSESSMENT     Assessment:   Pt. is a 68 y.o. female presenting today to Outpatient PT with signs and symptoms consistent with R knee OA.    Pt. presents with the functional impairments and activity limitations listed below and would benefit from Outpatient PT to address the below impairments as well as improve pain, and restore function.     Functional Impairments:   Noted lumbar/proximal hip/LE joint hypomobility, Decreased LE functional ROM, Decreased core/proximal hip strength and neuromuscular control, Decreased LE functional strength , Reduced balance/proprioceptive control, Decreased LE endurance, and Gait instability/impairment    Functional Activity Limitations (from functional questionnaire and intake):  Reduced ability to tolerate prolonged functional positions  Reduced ability or difficulty with changes of positions or transfers

## 2024-05-02 ENCOUNTER — HOSPITAL ENCOUNTER (OUTPATIENT)
Dept: PHYSICAL THERAPY | Age: 69
Setting detail: THERAPIES SERIES
Discharge: HOME | End: 2024-05-02
Attending: ORTHOPAEDIC SURGERY
Payer: MEDICARE

## 2024-05-02 DIAGNOSIS — M25.661 DECREASED ROM OF RIGHT KNEE: ICD-10-CM

## 2024-05-02 DIAGNOSIS — R26.89 POOR BALANCE: ICD-10-CM

## 2024-05-02 DIAGNOSIS — R29.898 DECREASED STRENGTH OF LOWER EXTREMITY: ICD-10-CM

## 2024-05-02 DIAGNOSIS — M25.561 PAIN IN JOINT OF RIGHT KNEE: Primary | ICD-10-CM

## 2024-05-02 DIAGNOSIS — R26.89 DECREASED FUNCTIONAL MOBILITY: ICD-10-CM

## 2024-05-02 DIAGNOSIS — R26.9 GAIT ABNORMALITY: ICD-10-CM

## 2024-05-02 PROCEDURE — 97530 THERAPEUTIC ACTIVITIES: CPT

## 2024-05-02 PROCEDURE — 97110 THERAPEUTIC EXERCISES: CPT

## 2024-05-02 PROCEDURE — 97161 PT EVAL LOW COMPLEX 20 MIN: CPT

## 2024-05-06 ENCOUNTER — HOSPITAL ENCOUNTER (OUTPATIENT)
Dept: PHYSICAL THERAPY | Age: 69
Setting detail: THERAPIES SERIES
End: 2024-05-06
Attending: ORTHOPAEDIC SURGERY
Payer: MEDICARE

## 2024-05-08 ENCOUNTER — HOSPITAL ENCOUNTER (OUTPATIENT)
Dept: CT IMAGING | Age: 69
Discharge: HOME | End: 2024-05-08
Attending: ORTHOPAEDIC SURGERY
Payer: MEDICARE

## 2024-05-08 ENCOUNTER — APPOINTMENT (OUTPATIENT)
Dept: PHYSICAL THERAPY | Age: 69
End: 2024-05-08
Attending: ORTHOPAEDIC SURGERY
Payer: MEDICARE

## 2024-05-08 DIAGNOSIS — M17.11 PRIMARY OSTEOARTHRITIS OF RIGHT KNEE: ICD-10-CM

## 2024-05-08 PROCEDURE — 73700 CT LOWER EXTREMITY W/O DYE: CPT

## 2024-05-13 ENCOUNTER — APPOINTMENT (OUTPATIENT)
Dept: PHYSICAL THERAPY | Age: 69
End: 2024-05-13
Attending: ORTHOPAEDIC SURGERY
Payer: MEDICARE

## 2024-05-15 ENCOUNTER — APPOINTMENT (OUTPATIENT)
Dept: PHYSICAL THERAPY | Age: 69
End: 2024-05-15
Attending: ORTHOPAEDIC SURGERY
Payer: MEDICARE

## 2024-07-11 ENCOUNTER — OFFICE VISIT (OUTPATIENT)
Dept: CARDIOLOGY CLINIC | Age: 69
End: 2024-07-11
Payer: MEDICARE

## 2024-07-11 VITALS
WEIGHT: 168 LBS | HEIGHT: 60 IN | BODY MASS INDEX: 32.98 KG/M2 | HEART RATE: 60 BPM | DIASTOLIC BLOOD PRESSURE: 64 MMHG | SYSTOLIC BLOOD PRESSURE: 136 MMHG

## 2024-07-11 DIAGNOSIS — I25.10 CORONARY ARTERY DISEASE INVOLVING NATIVE CORONARY ARTERY OF NATIVE HEART WITHOUT ANGINA PECTORIS: Primary | ICD-10-CM

## 2024-07-11 DIAGNOSIS — I10 ESSENTIAL HYPERTENSION: ICD-10-CM

## 2024-07-11 DIAGNOSIS — Z01.810 PREOP CARDIOVASCULAR EXAM: ICD-10-CM

## 2024-07-11 DIAGNOSIS — E78.5 HYPERLIPIDEMIA, UNSPECIFIED HYPERLIPIDEMIA TYPE: ICD-10-CM

## 2024-07-11 PROCEDURE — 3017F COLORECTAL CA SCREEN DOC REV: CPT | Performed by: INTERNAL MEDICINE

## 2024-07-11 PROCEDURE — 1036F TOBACCO NON-USER: CPT | Performed by: INTERNAL MEDICINE

## 2024-07-11 PROCEDURE — 1090F PRES/ABSN URINE INCON ASSESS: CPT | Performed by: INTERNAL MEDICINE

## 2024-07-11 PROCEDURE — 99214 OFFICE O/P EST MOD 30 MIN: CPT | Performed by: INTERNAL MEDICINE

## 2024-07-11 PROCEDURE — 3075F SYST BP GE 130 - 139MM HG: CPT | Performed by: INTERNAL MEDICINE

## 2024-07-11 PROCEDURE — G8427 DOCREV CUR MEDS BY ELIG CLIN: HCPCS | Performed by: INTERNAL MEDICINE

## 2024-07-11 PROCEDURE — 3078F DIAST BP <80 MM HG: CPT | Performed by: INTERNAL MEDICINE

## 2024-07-11 PROCEDURE — 93000 ELECTROCARDIOGRAM COMPLETE: CPT | Performed by: INTERNAL MEDICINE

## 2024-07-11 PROCEDURE — G8400 PT W/DXA NO RESULTS DOC: HCPCS | Performed by: INTERNAL MEDICINE

## 2024-07-11 PROCEDURE — G8417 CALC BMI ABV UP PARAM F/U: HCPCS | Performed by: INTERNAL MEDICINE

## 2024-07-11 PROCEDURE — 1123F ACP DISCUSS/DSCN MKR DOCD: CPT | Performed by: INTERNAL MEDICINE

## 2024-07-11 RX ORDER — AMLODIPINE BESYLATE 10 MG/1
10 TABLET ORAL DAILY
COMMUNITY

## 2024-07-15 ENCOUNTER — HOSPITAL ENCOUNTER (OUTPATIENT)
Dept: PREADMISSION TESTING | Age: 69
Discharge: HOME | End: 2024-07-19
Payer: MEDICARE

## 2024-07-15 DIAGNOSIS — M17.11 PRIMARY OSTEOARTHRITIS OF RIGHT KNEE: ICD-10-CM

## 2024-07-15 LAB
25(OH)D3 SERPL-MCNC: 28.3 NG/ML
ABO + RH BLD: NORMAL
ALBUMIN SERPL-MCNC: 4.1 G/DL (ref 3.4–5)
ANION GAP SERPL CALCULATED.3IONS-SCNC: 10 MMOL/L (ref 3–16)
APTT BLD: 29.7 SEC (ref 22.1–36.4)
BASOPHILS # BLD: 0.1 K/UL (ref 0–0.2)
BASOPHILS NFR BLD: 0.7 %
BLD GP AB SCN SERPL QL: NORMAL
BUN SERPL-MCNC: 16 MG/DL (ref 7–20)
CALCIUM SERPL-MCNC: 9.7 MG/DL (ref 8.3–10.6)
CHLORIDE SERPL-SCNC: 107 MMOL/L (ref 99–110)
CO2 SERPL-SCNC: 27 MMOL/L (ref 21–32)
CREAT SERPL-MCNC: 0.9 MG/DL (ref 0.6–1.2)
DEPRECATED RDW RBC AUTO: 14 % (ref 12.4–15.4)
EOSINOPHIL # BLD: 0.1 K/UL (ref 0–0.6)
EOSINOPHIL NFR BLD: 1.7 %
EST. AVERAGE GLUCOSE BLD GHB EST-MCNC: 111.2 MG/DL
GFR SERPLBLD CREATININE-BSD FMLA CKD-EPI: 68 ML/MIN/{1.73_M2}
GLUCOSE SERPL-MCNC: 130 MG/DL (ref 70–99)
HBA1C MFR BLD: 5.5 %
HCT VFR BLD AUTO: 39.1 % (ref 36–48)
HGB BLD-MCNC: 13 G/DL (ref 12–16)
INR PPP: 1.06 (ref 0.85–1.15)
LYMPHOCYTES # BLD: 2.6 K/UL (ref 1–5.1)
LYMPHOCYTES NFR BLD: 30.5 %
MCH RBC QN AUTO: 28.5 PG (ref 26–34)
MCHC RBC AUTO-ENTMCNC: 33.2 G/DL (ref 31–36)
MCV RBC AUTO: 85.9 FL (ref 80–100)
MONOCYTES # BLD: 0.7 K/UL (ref 0–1.3)
MONOCYTES NFR BLD: 7.8 %
NEUTROPHILS # BLD: 5 K/UL (ref 1.7–7.7)
NEUTROPHILS NFR BLD: 59.3 %
PLATELET # BLD AUTO: 271 K/UL (ref 135–450)
PMV BLD AUTO: 8.3 FL (ref 5–10.5)
POTASSIUM SERPL-SCNC: 3.6 MMOL/L (ref 3.5–5.1)
PREALB SERPL-MCNC: 24.2 MG/DL (ref 20–40)
PROTHROMBIN TIME: 14 SEC (ref 11.9–14.9)
RBC # BLD AUTO: 4.55 M/UL (ref 4–5.2)
SODIUM SERPL-SCNC: 144 MMOL/L (ref 136–145)
WBC # BLD AUTO: 8.4 K/UL (ref 4–11)

## 2024-07-15 PROCEDURE — 84134 ASSAY OF PREALBUMIN: CPT

## 2024-07-15 PROCEDURE — 86850 RBC ANTIBODY SCREEN: CPT

## 2024-07-15 PROCEDURE — 87641 MR-STAPH DNA AMP PROBE: CPT

## 2024-07-15 PROCEDURE — 82040 ASSAY OF SERUM ALBUMIN: CPT

## 2024-07-15 PROCEDURE — 85025 COMPLETE CBC W/AUTO DIFF WBC: CPT

## 2024-07-15 PROCEDURE — 80048 BASIC METABOLIC PNL TOTAL CA: CPT

## 2024-07-15 PROCEDURE — 82306 VITAMIN D 25 HYDROXY: CPT

## 2024-07-15 PROCEDURE — 85610 PROTHROMBIN TIME: CPT

## 2024-07-15 PROCEDURE — 83036 HEMOGLOBIN GLYCOSYLATED A1C: CPT

## 2024-07-15 PROCEDURE — 85730 THROMBOPLASTIN TIME PARTIAL: CPT

## 2024-07-15 PROCEDURE — 86900 BLOOD TYPING SEROLOGIC ABO: CPT

## 2024-07-15 PROCEDURE — 86901 BLOOD TYPING SEROLOGIC RH(D): CPT

## 2024-07-15 RX ORDER — ACETAMINOPHEN 500 MG
500 TABLET ORAL EVERY 6 HOURS PRN
COMMUNITY

## 2024-07-15 RX ORDER — ATORVASTATIN CALCIUM 40 MG/1
40 TABLET, FILM COATED ORAL DAILY
COMMUNITY

## 2024-07-15 RX ORDER — HYDROXYZINE PAMOATE 50 MG/1
50 CAPSULE ORAL 3 TIMES DAILY PRN
COMMUNITY

## 2024-07-15 NOTE — PROGRESS NOTES
Patient  and chelsey Banuelos  attended JET class on 7/15/2024 . Patient verified surgery for Total Knee replacement. Patient and chelsey Banuelos received patient information and educational JET folder including the following handouts: jet powerpoint, ERAS, incentive spirometry including purpose and how to perform, case management contact information, hand hygiene, preventing constipation, choices for home health care agency list, pre-operative showering techniques and the use of anti-septic 3 days before surgery.  Interviews completed by PT, OT, and Nurse Navigator.  Labs and Tests to be completed/completed as ordered/necessary by outpatient lab if not already completed.  Anti-septic bottle given to patient to take home. Patient and Chelsey Banuelos states no further questions or concerns. Patient provided with orthopedic office, after hours clinic, case management, and nurse navigator contact information.    Date Of Surgery: 7/23/2024  Surgeon: Dr Sagastume  Per Patient Will see/Saw Primary care provider on 7/1/2024.     Will see/Saw Specialist Cardiology on  7/11/2024 .    HISTORY OF JOINT REPLACEMENT(S): No history of joint replacement    DC Plan: Home    HOME ASSISTANCE - WHO WILL BE STAYING WITH YOU AT HOME FOR FIRST SEVERAL DAYS? Chelsey Banuelos DC TRANSPORTATION: [facility]    STEPS INTO HOME: 3+5-6    STEPS TO BATHROOM/BEDROOM: none - one level apartment    DME NEEDS:  Denies durable medical equipment needs at this time, already has a rolling walker. Also has a shower chair    LENGTH OF STAY HAS BEEN DISCUSSED WITH THE PATIENT, APPROPRIATE TO HIS/ HER PROCEDURE.  PATIENT HAS BEEN INFORMED THAT THEY WILL BE DISCHARGED WHEN THE PHYSICIAN DEEMS THEM MEDICALLY READY. MOST PATIENTS CAN EXPECT TO BE IN THE HOSPITAL ONE NIGHT OR 1-2 DAYS AS AN ADMISSION FOR THOSE WITH MEDICAL HEALTH ISSUES/COMPLICATIONS.    HOME CARE CHOICE(S): open to any agency, home health care list was provided to patient.    SNF/REHAB CHOICES (S):     Declined a need

## 2024-07-15 NOTE — DISCHARGE INSTR - COC
[facility] Continuity of Care Form    Patient Name:  Breanna Maxwell  : 1955    MRN:  [id number]    Admit date:  (Not on file)  Discharge date:  2024    Code Status Order: Prior  Advance Directives: Yes    Admitting Physician: Hayden Sagastume MD  PCP: Ginny Costello MD    Discharging Nurse: Scottie  Discharging Hospital Unit/Room#: No information available for this encounter.  Discharging Unit Phone Number: [phone]    Emergency Contact:        Past Surgical History:  Past Surgical History:   Procedure Laterality Date    DIAGNOSTIC CARDIAC CATH LAB PROCEDURE  2019    stent    HYSTERECTOMY (CERVIX STATUS UNKNOWN)         Immunization History:   Immunization History   Administered Date(s) Administered    COVID-19, PFIZER Bivalent, DO NOT Dilute, (age 12y+), IM, 30 mcg/0.3 mL 2022    COVID-19, PFIZER GRAY top, DO NOT Dilute, (age 12 y+), IM, 30 mcg/0.3 mL 2022    COVID-19, PFIZER PURPLE top, DILUTE for use, (age 12 y+), 30mcg/0.3mL 2020, 2021, 2021    COVID-19, PFIZER, ( formula), (age 12y+), IM, 30mcg/0.3mL 2023       Active Problems:  Principal Problem:    Osteoarthritis of right knee  Resolved Problems:    * No resolved hospital problems. *      Isolation/Infection:       Nurse Assessment:  Last Vital Signs:There were no vitals taken for this visit.  Last documented pain score (0-10 scale):    Last Weight:   Wt Readings from Last 1 Encounters:   24 76.2 kg (168 lb)     Mental Status:  oriented and alert     IV Access:  - None    Nursing Mobility/ADLs:  Walking   Assisted  Transfer  Assisted  Bathing  Assisted  Dressing  Assisted  Toileting  Assisted  Feeding  Independent  Med Admin  Independent  Med Delivery   whole    Wound Care Documentation and Therapy:  Keep glued Prineo dressing clean and intact. DO NOT remove. Prineo is waterproof for showering. Doctor will evaluated dressing for removal at office visit 2 weeks after surgery

## 2024-07-16 ENCOUNTER — TELEPHONE (OUTPATIENT)
Dept: ORTHOPEDIC SURGERY | Age: 69
End: 2024-07-16

## 2024-07-16 LAB — MRSA DNA SPEC QL NAA+PROBE: NORMAL

## 2024-07-16 RX ORDER — OMEGA-3 FATTY ACIDS/FISH OIL 300-1000MG
3 CAPSULE ORAL 2 TIMES DAILY
Status: ON HOLD | COMMUNITY
End: 2024-07-23 | Stop reason: HOSPADM

## 2024-07-16 NOTE — TELEPHONE ENCOUNTER
General Question     Subject: RT TOTAL NEED REPLACEMENT DENIAL  Patient and /or Facility Request: Breanna Maxwell   Contact Number: [phone]     MEMO CALLING FROM [facility] AUTHORIZATION DEPT CALLING REGARDING PATIENT'S TOTAL RT KNEE REPLACEMENT.    MEMO NEEDS TO KNOW THE REASON FOR THE DENIAL    PLEASE CALL BACK MEMO AT THE ABOVE NUMBER NUMBER.

## 2024-07-18 ENCOUNTER — TELEPHONE (OUTPATIENT)
Dept: ORTHOPEDIC SURGERY | Age: 69
End: 2024-07-18

## 2024-07-18 ENCOUNTER — OFFICE VISIT (OUTPATIENT)
Dept: ORTHOPEDIC SURGERY | Age: 69
End: 2024-07-18
Payer: MEDICARE

## 2024-07-18 DIAGNOSIS — M17.11 PRIMARY OSTEOARTHRITIS OF RIGHT KNEE: Primary | ICD-10-CM

## 2024-07-18 PROCEDURE — 3017F COLORECTAL CA SCREEN DOC REV: CPT | Performed by: ORTHOPAEDIC SURGERY

## 2024-07-18 PROCEDURE — 1123F ACP DISCUSS/DSCN MKR DOCD: CPT | Performed by: ORTHOPAEDIC SURGERY

## 2024-07-18 PROCEDURE — 1090F PRES/ABSN URINE INCON ASSESS: CPT | Performed by: ORTHOPAEDIC SURGERY

## 2024-07-18 PROCEDURE — G8427 DOCREV CUR MEDS BY ELIG CLIN: HCPCS | Performed by: ORTHOPAEDIC SURGERY

## 2024-07-18 PROCEDURE — G8417 CALC BMI ABV UP PARAM F/U: HCPCS | Performed by: ORTHOPAEDIC SURGERY

## 2024-07-18 PROCEDURE — 1036F TOBACCO NON-USER: CPT | Performed by: ORTHOPAEDIC SURGERY

## 2024-07-18 PROCEDURE — G8400 PT W/DXA NO RESULTS DOC: HCPCS | Performed by: ORTHOPAEDIC SURGERY

## 2024-07-18 PROCEDURE — 99214 OFFICE O/P EST MOD 30 MIN: CPT | Performed by: ORTHOPAEDIC SURGERY

## 2024-07-18 NOTE — PROGRESS NOTES
[facility]  Office Visit    Chief Complaint: Right knee pain    HPI:  Breanna Maxwell is a 68 y.o. who is here in follow-up of right knee pain due to osteoarthritis.  She is scheduled for right total knee arthroplasty next week.  For review, she has a long-term history of bilateral knee pain, more symptomatic on the right.  She has been treated in the past with steroid and viscosupplementation injections and continues to have pain.  She reports pain that is present on a daily basis.  She walks without assistive device. The patient has difficulty climbing stairs, difficulty getting out of a chair, difficulty with activities of daily living including hygiene and pain at night.  Pain level at rest is 7 and with activities 9-10/10.  She has been on ibuprofen in the past to help with pain.    She denies diabetes, blood thinners, pulmonary issues.  She has a history of coronary artery disease with a stent.  She has help at home.    Past Medical History:   Diagnosis Date    Arthritis     CAD (coronary artery disease)     \"blocked artery\"  stents placed    Constipation     due to slow movement through bowels    Depression     Diabetes mellitus (HCC)     GERD (gastroesophageal reflux disease)     History of coronary artery stent placement     Hyperlipidemia     Hypertension     Neuropathy     Vertigo     Vitamin D deficiency, unspecified         ROS:  Constitutional: denies fever, chills, weight loss  MSK: denies pain in other joints, muscle aches  Neurological: denies numbness, tingling, weakness    Exam:  Appearance: sitting in exam room chair, appears to be in no acute distress, awake and alert  Resp: unlabored breathing on room air  Skin: warm, dry and intact with out erythema or significant increased temperature  Neuro: grossly intact both lower extremities. Intact sensation to light touch. Motor exam 4+ to 5/5 in all major motor groups.  RLE: Examination reveals that active knee range of

## 2024-07-22 ENCOUNTER — ANESTHESIA EVENT (OUTPATIENT)
Dept: OPERATING ROOM | Age: 69
End: 2024-07-22
Payer: MEDICARE

## 2024-07-23 ENCOUNTER — HOSPITAL ENCOUNTER (OUTPATIENT)
Age: 69
Setting detail: OBSERVATION
Discharge: NURSING, REHAB OR LONG-TERM CARE | End: 2024-07-25
Attending: ORTHOPAEDIC SURGERY | Admitting: ORTHOPAEDIC SURGERY
Payer: MEDICARE

## 2024-07-23 ENCOUNTER — ANESTHESIA (OUTPATIENT)
Dept: OPERATING ROOM | Age: 69
End: 2024-07-23
Payer: MEDICARE

## 2024-07-23 ENCOUNTER — APPOINTMENT (OUTPATIENT)
Dept: GENERAL RADIOLOGY | Age: 69
End: 2024-07-23
Attending: ORTHOPAEDIC SURGERY
Payer: MEDICARE

## 2024-07-23 DIAGNOSIS — Z96.651 H/O TOTAL KNEE REPLACEMENT, RIGHT: Primary | ICD-10-CM

## 2024-07-23 PROBLEM — M17.11 ARTHRITIS OF RIGHT KNEE: Status: ACTIVE | Noted: 2024-07-23

## 2024-07-23 LAB
ABO + RH BLD: NORMAL
BLD GP AB SCN SERPL QL: NORMAL
GLUCOSE BLD-MCNC: 103 MG/DL (ref 70–99)
GLUCOSE BLD-MCNC: 119 MG/DL (ref 70–99)
GLUCOSE BLD-MCNC: 126 MG/DL (ref 70–99)
GLUCOSE BLD-MCNC: 138 MG/DL (ref 70–99)
GLUCOSE BLD-MCNC: 153 MG/DL (ref 70–99)
PERFORMED ON: ABNORMAL

## 2024-07-23 PROCEDURE — 6360000002 HC RX W HCPCS: Performed by: ANESTHESIOLOGY

## 2024-07-23 PROCEDURE — 6370000000 HC RX 637 (ALT 250 FOR IP): Performed by: ORTHOPAEDIC SURGERY

## 2024-07-23 PROCEDURE — 86850 RBC ANTIBODY SCREEN: CPT

## 2024-07-23 PROCEDURE — 6360000002 HC RX W HCPCS: Performed by: ORTHOPAEDIC SURGERY

## 2024-07-23 PROCEDURE — 2709999900 HC NON-CHARGEABLE SUPPLY: Performed by: ORTHOPAEDIC SURGERY

## 2024-07-23 PROCEDURE — 64999 UNLISTED PX NERVOUS SYSTEM: CPT | Performed by: ANESTHESIOLOGY

## 2024-07-23 PROCEDURE — 2720000010 HC SURG SUPPLY STERILE: Performed by: ORTHOPAEDIC SURGERY

## 2024-07-23 PROCEDURE — 2580000003 HC RX 258: Performed by: ANESTHESIOLOGY

## 2024-07-23 PROCEDURE — 3700000000 HC ANESTHESIA ATTENDED CARE: Performed by: ORTHOPAEDIC SURGERY

## 2024-07-23 PROCEDURE — 7100000000 HC PACU RECOVERY - FIRST 15 MIN: Performed by: ORTHOPAEDIC SURGERY

## 2024-07-23 PROCEDURE — 6360000002 HC RX W HCPCS

## 2024-07-23 PROCEDURE — 64447 NJX AA&/STRD FEMORAL NRV IMG: CPT | Performed by: ANESTHESIOLOGY

## 2024-07-23 PROCEDURE — G0378 HOSPITAL OBSERVATION PER HR: HCPCS

## 2024-07-23 PROCEDURE — 7100000001 HC PACU RECOVERY - ADDTL 15 MIN: Performed by: ORTHOPAEDIC SURGERY

## 2024-07-23 PROCEDURE — 27447 TOTAL KNEE ARTHROPLASTY: CPT | Performed by: ORTHOPAEDIC SURGERY

## 2024-07-23 PROCEDURE — 2580000003 HC RX 258: Performed by: ORTHOPAEDIC SURGERY

## 2024-07-23 PROCEDURE — 6370000000 HC RX 637 (ALT 250 FOR IP): Performed by: ANESTHESIOLOGY

## 2024-07-23 PROCEDURE — C9290 INJ, BUPIVACAINE LIPOSOME: HCPCS | Performed by: ORTHOPAEDIC SURGERY

## 2024-07-23 PROCEDURE — C1776 JOINT DEVICE (IMPLANTABLE): HCPCS | Performed by: ORTHOPAEDIC SURGERY

## 2024-07-23 PROCEDURE — 3700000001 HC ADD 15 MINUTES (ANESTHESIA): Performed by: ORTHOPAEDIC SURGERY

## 2024-07-23 PROCEDURE — 97116 GAIT TRAINING THERAPY: CPT

## 2024-07-23 PROCEDURE — 73560 X-RAY EXAM OF KNEE 1 OR 2: CPT

## 2024-07-23 PROCEDURE — 2500000003 HC RX 250 WO HCPCS

## 2024-07-23 PROCEDURE — 20985 CPTR-ASST DIR MS PX: CPT | Performed by: ORTHOPAEDIC SURGERY

## 2024-07-23 PROCEDURE — 97166 OT EVAL MOD COMPLEX 45 MIN: CPT

## 2024-07-23 PROCEDURE — 27447 TOTAL KNEE ARTHROPLASTY: CPT | Performed by: PHYSICIAN ASSISTANT

## 2024-07-23 PROCEDURE — A4217 STERILE WATER/SALINE, 500 ML: HCPCS | Performed by: ORTHOPAEDIC SURGERY

## 2024-07-23 PROCEDURE — 86901 BLOOD TYPING SEROLOGIC RH(D): CPT

## 2024-07-23 PROCEDURE — 3600000005 HC SURGERY LEVEL 5 BASE: Performed by: ORTHOPAEDIC SURGERY

## 2024-07-23 PROCEDURE — 86900 BLOOD TYPING SEROLOGIC ABO: CPT

## 2024-07-23 PROCEDURE — 3600000015 HC SURGERY LEVEL 5 ADDTL 15MIN: Performed by: ORTHOPAEDIC SURGERY

## 2024-07-23 PROCEDURE — 97162 PT EVAL MOD COMPLEX 30 MIN: CPT

## 2024-07-23 PROCEDURE — 97530 THERAPEUTIC ACTIVITIES: CPT

## 2024-07-23 DEVICE — CRUCIATE RETAINING FEMORAL
Type: IMPLANTABLE DEVICE | Site: KNEE | Status: FUNCTIONAL
Brand: TRIATHLON

## 2024-07-23 DEVICE — TIBIAL BEARING INSERT - CS
Type: IMPLANTABLE DEVICE | Site: KNEE | Status: FUNCTIONAL
Brand: TRIATHLON

## 2024-07-23 DEVICE — ASYMMETRIC PATELLA
Type: IMPLANTABLE DEVICE | Site: KNEE | Status: FUNCTIONAL
Brand: TRIATHLON

## 2024-07-23 DEVICE — CEMENT BNE RADIOPAQUE FAST SET ACRYL RESIN HI VISC SIMPLEXHV: Type: IMPLANTABLE DEVICE | Site: KNEE | Status: FUNCTIONAL

## 2024-07-23 DEVICE — PRIMARY TIBIAL BASEPLATE
Type: IMPLANTABLE DEVICE | Site: KNEE | Status: FUNCTIONAL
Brand: TRIATHLON

## 2024-07-23 RX ORDER — SODIUM CHLORIDE 9 MG/ML
INJECTION, SOLUTION INTRAVENOUS PRN
Status: DISCONTINUED | OUTPATIENT
Start: 2024-07-23 | End: 2024-07-23

## 2024-07-23 RX ORDER — LOSARTAN POTASSIUM 50 MG/1
50 TABLET ORAL DAILY
Status: DISCONTINUED | OUTPATIENT
Start: 2024-07-24 | End: 2024-07-25 | Stop reason: HOSPADM

## 2024-07-23 RX ORDER — HALOPERIDOL 5 MG/ML
1 INJECTION INTRAMUSCULAR
Status: DISCONTINUED | OUTPATIENT
Start: 2024-07-23 | End: 2024-07-23

## 2024-07-23 RX ORDER — KETOROLAC TROMETHAMINE 30 MG/ML
15 INJECTION, SOLUTION INTRAMUSCULAR; INTRAVENOUS
Status: ACTIVE | OUTPATIENT
Start: 2024-07-23 | End: 2024-07-24

## 2024-07-23 RX ORDER — ISOSORBIDE MONONITRATE 60 MG/1
60 TABLET, EXTENDED RELEASE ORAL DAILY
Status: DISCONTINUED | OUTPATIENT
Start: 2024-07-24 | End: 2024-07-25 | Stop reason: HOSPADM

## 2024-07-23 RX ORDER — DULOXETIN HYDROCHLORIDE 60 MG/1
60 CAPSULE, DELAYED RELEASE ORAL DAILY
Status: DISCONTINUED | OUTPATIENT
Start: 2024-07-24 | End: 2024-07-24

## 2024-07-23 RX ORDER — MELOXICAM 7.5 MG/1
7.5 TABLET ORAL DAILY
Qty: 5 TABLET | Refills: 0 | Status: SHIPPED | OUTPATIENT
Start: 2024-07-24 | End: 2024-07-29

## 2024-07-23 RX ORDER — OXYCODONE HYDROCHLORIDE 5 MG/1
5 TABLET ORAL EVERY 4 HOURS PRN
Status: DISCONTINUED | OUTPATIENT
Start: 2024-07-23 | End: 2024-07-25 | Stop reason: HOSPADM

## 2024-07-23 RX ORDER — NALOXONE HYDROCHLORIDE 0.4 MG/ML
INJECTION, SOLUTION INTRAMUSCULAR; INTRAVENOUS; SUBCUTANEOUS PRN
Status: DISCONTINUED | OUTPATIENT
Start: 2024-07-23 | End: 2024-07-23

## 2024-07-23 RX ORDER — ROPIVACAINE HYDROCHLORIDE 5 MG/ML
INJECTION, SOLUTION EPIDURAL; INFILTRATION; PERINEURAL
Status: DISCONTINUED | OUTPATIENT
Start: 2024-07-23 | End: 2024-07-23 | Stop reason: SDUPTHER

## 2024-07-23 RX ORDER — LIDOCAINE HYDROCHLORIDE 20 MG/ML
INJECTION, SOLUTION EPIDURAL; INFILTRATION; INTRACAUDAL; PERINEURAL PRN
Status: DISCONTINUED | OUTPATIENT
Start: 2024-07-23 | End: 2024-07-23 | Stop reason: SDUPTHER

## 2024-07-23 RX ORDER — ACETAMINOPHEN 500 MG
1000 TABLET ORAL ONCE
Status: COMPLETED | OUTPATIENT
Start: 2024-07-23 | End: 2024-07-23

## 2024-07-23 RX ORDER — EPHEDRINE SULFATE/0.9% NACL/PF 25 MG/5 ML
SYRINGE (ML) INTRAVENOUS PRN
Status: DISCONTINUED | OUTPATIENT
Start: 2024-07-23 | End: 2024-07-23 | Stop reason: SDUPTHER

## 2024-07-23 RX ORDER — FENTANYL CITRATE 0.05 MG/ML
50 INJECTION, SOLUTION INTRAMUSCULAR; INTRAVENOUS EVERY 5 MIN PRN
Status: DISCONTINUED | OUTPATIENT
Start: 2024-07-23 | End: 2024-07-23

## 2024-07-23 RX ORDER — ONDANSETRON 2 MG/ML
4 INJECTION INTRAMUSCULAR; INTRAVENOUS
Status: DISCONTINUED | OUTPATIENT
Start: 2024-07-23 | End: 2024-07-23

## 2024-07-23 RX ORDER — SODIUM CHLORIDE 450 MG/100ML
INJECTION, SOLUTION INTRAVENOUS CONTINUOUS
Status: DISCONTINUED | OUTPATIENT
Start: 2024-07-23 | End: 2024-07-25 | Stop reason: HOSPADM

## 2024-07-23 RX ORDER — PHENYLEPHRINE HCL IN 0.9% NACL 1 MG/10 ML
SYRINGE (ML) INTRAVENOUS PRN
Status: DISCONTINUED | OUTPATIENT
Start: 2024-07-23 | End: 2024-07-23 | Stop reason: SDUPTHER

## 2024-07-23 RX ORDER — FENTANYL CITRATE 50 UG/ML
50 INJECTION, SOLUTION INTRAMUSCULAR; INTRAVENOUS ONCE
Status: COMPLETED | OUTPATIENT
Start: 2024-07-23 | End: 2024-07-23

## 2024-07-23 RX ORDER — INSULIN LISPRO 100 [IU]/ML
0.08 INJECTION, SOLUTION INTRAVENOUS; SUBCUTANEOUS
Status: DISCONTINUED | OUTPATIENT
Start: 2024-07-23 | End: 2024-07-23

## 2024-07-23 RX ORDER — SODIUM CHLORIDE 0.9 % (FLUSH) 0.9 %
5-40 SYRINGE (ML) INJECTION EVERY 12 HOURS SCHEDULED
Status: DISCONTINUED | OUTPATIENT
Start: 2024-07-23 | End: 2024-07-23

## 2024-07-23 RX ORDER — SODIUM CHLORIDE 0.9 % (FLUSH) 0.9 %
5-40 SYRINGE (ML) INJECTION PRN
Status: DISCONTINUED | OUTPATIENT
Start: 2024-07-23 | End: 2024-07-23

## 2024-07-23 RX ORDER — MORPHINE SULFATE 2 MG/ML
2 INJECTION, SOLUTION INTRAMUSCULAR; INTRAVENOUS
Status: DISCONTINUED | OUTPATIENT
Start: 2024-07-23 | End: 2024-07-25 | Stop reason: HOSPADM

## 2024-07-23 RX ORDER — SODIUM CHLORIDE 0.9 % (FLUSH) 0.9 %
5-40 SYRINGE (ML) INJECTION EVERY 12 HOURS SCHEDULED
Status: DISCONTINUED | OUTPATIENT
Start: 2024-07-23 | End: 2024-07-23 | Stop reason: HOSPADM

## 2024-07-23 RX ORDER — MORPHINE SULFATE 4 MG/ML
4 INJECTION, SOLUTION INTRAMUSCULAR; INTRAVENOUS
Status: DISCONTINUED | OUTPATIENT
Start: 2024-07-23 | End: 2024-07-25 | Stop reason: HOSPADM

## 2024-07-23 RX ORDER — HYDROXYZINE HYDROCHLORIDE 10 MG/1
10 TABLET, FILM COATED ORAL ONCE
Status: COMPLETED | OUTPATIENT
Start: 2024-07-23 | End: 2024-07-23

## 2024-07-23 RX ORDER — SODIUM CHLORIDE 0.9 % (FLUSH) 0.9 %
5-40 SYRINGE (ML) INJECTION PRN
Status: DISCONTINUED | OUTPATIENT
Start: 2024-07-23 | End: 2024-07-23 | Stop reason: HOSPADM

## 2024-07-23 RX ORDER — MAGNESIUM HYDROXIDE 1200 MG/15ML
LIQUID ORAL CONTINUOUS PRN
Status: DISCONTINUED | OUTPATIENT
Start: 2024-07-23 | End: 2024-07-23 | Stop reason: HOSPADM

## 2024-07-23 RX ORDER — ROPIVACAINE HYDROCHLORIDE 5 MG/ML
30 INJECTION, SOLUTION EPIDURAL; INFILTRATION; PERINEURAL ONCE
Status: DISCONTINUED | OUTPATIENT
Start: 2024-07-23 | End: 2024-07-23

## 2024-07-23 RX ORDER — SODIUM CHLORIDE 0.9 % (FLUSH) 0.9 %
5-40 SYRINGE (ML) INJECTION EVERY 12 HOURS SCHEDULED
Status: DISCONTINUED | OUTPATIENT
Start: 2024-07-23 | End: 2024-07-25 | Stop reason: HOSPADM

## 2024-07-23 RX ORDER — INSULIN GLARGINE 100 [IU]/ML
0.25 INJECTION, SOLUTION SUBCUTANEOUS NIGHTLY
Status: DISCONTINUED | OUTPATIENT
Start: 2024-07-23 | End: 2024-07-23

## 2024-07-23 RX ORDER — MIDAZOLAM HYDROCHLORIDE 1 MG/ML
1 INJECTION INTRAMUSCULAR; INTRAVENOUS ONCE
Status: COMPLETED | OUTPATIENT
Start: 2024-07-23 | End: 2024-07-23

## 2024-07-23 RX ORDER — ACETAMINOPHEN 325 MG/1
650 TABLET ORAL EVERY 6 HOURS
Status: DISCONTINUED | OUTPATIENT
Start: 2024-07-23 | End: 2024-07-25 | Stop reason: HOSPADM

## 2024-07-23 RX ORDER — INSULIN LISPRO 100 [IU]/ML
0-4 INJECTION, SOLUTION INTRAVENOUS; SUBCUTANEOUS NIGHTLY
Status: DISCONTINUED | OUTPATIENT
Start: 2024-07-23 | End: 2024-07-25 | Stop reason: HOSPADM

## 2024-07-23 RX ORDER — PROPOFOL 10 MG/ML
INJECTION, EMULSION INTRAVENOUS PRN
Status: DISCONTINUED | OUTPATIENT
Start: 2024-07-23 | End: 2024-07-23 | Stop reason: SDUPTHER

## 2024-07-23 RX ORDER — DULOXETIN HYDROCHLORIDE 60 MG/1
60 CAPSULE, DELAYED RELEASE ORAL ONCE
Status: COMPLETED | OUTPATIENT
Start: 2024-07-23 | End: 2024-07-23

## 2024-07-23 RX ORDER — MELOXICAM 7.5 MG/1
15 TABLET ORAL ONCE
Status: COMPLETED | OUTPATIENT
Start: 2024-07-23 | End: 2024-07-23

## 2024-07-23 RX ORDER — SODIUM CHLORIDE 9 MG/ML
INJECTION, SOLUTION INTRAVENOUS PRN
Status: DISCONTINUED | OUTPATIENT
Start: 2024-07-23 | End: 2024-07-25 | Stop reason: HOSPADM

## 2024-07-23 RX ORDER — GLUCAGON 1 MG/ML
1 KIT INJECTION PRN
Status: DISCONTINUED | OUTPATIENT
Start: 2024-07-23 | End: 2024-07-25 | Stop reason: HOSPADM

## 2024-07-23 RX ORDER — CALCIUM CARBONATE 500 MG/1
500 TABLET, CHEWABLE ORAL 3 TIMES DAILY PRN
Status: DISCONTINUED | OUTPATIENT
Start: 2024-07-23 | End: 2024-07-25 | Stop reason: HOSPADM

## 2024-07-23 RX ORDER — DIPHENHYDRAMINE HYDROCHLORIDE 50 MG/ML
12.5 INJECTION INTRAMUSCULAR; INTRAVENOUS
Status: COMPLETED | OUTPATIENT
Start: 2024-07-23 | End: 2024-07-23

## 2024-07-23 RX ORDER — INSULIN LISPRO 100 [IU]/ML
0-4 INJECTION, SOLUTION INTRAVENOUS; SUBCUTANEOUS
Status: DISCONTINUED | OUTPATIENT
Start: 2024-07-23 | End: 2024-07-25 | Stop reason: HOSPADM

## 2024-07-23 RX ORDER — OXYCODONE HYDROCHLORIDE 5 MG/1
5-10 TABLET ORAL EVERY 4 HOURS PRN
Qty: 40 TABLET | Refills: 0 | Status: SHIPPED | OUTPATIENT
Start: 2024-07-23 | End: 2024-07-25

## 2024-07-23 RX ORDER — CYCLOBENZAPRINE HCL 10 MG
5 TABLET ORAL 3 TIMES DAILY PRN
Status: DISCONTINUED | OUTPATIENT
Start: 2024-07-23 | End: 2024-07-25 | Stop reason: HOSPADM

## 2024-07-23 RX ORDER — SODIUM CHLORIDE 0.9 % (FLUSH) 0.9 %
5-40 SYRINGE (ML) INJECTION PRN
Status: DISCONTINUED | OUTPATIENT
Start: 2024-07-23 | End: 2024-07-25 | Stop reason: HOSPADM

## 2024-07-23 RX ORDER — OXYCODONE HYDROCHLORIDE 10 MG/1
10 TABLET ORAL EVERY 4 HOURS PRN
Status: DISCONTINUED | OUTPATIENT
Start: 2024-07-23 | End: 2024-07-25 | Stop reason: HOSPADM

## 2024-07-23 RX ORDER — ONDANSETRON 2 MG/ML
4 INJECTION INTRAMUSCULAR; INTRAVENOUS EVERY 6 HOURS PRN
Status: DISCONTINUED | OUTPATIENT
Start: 2024-07-23 | End: 2024-07-25 | Stop reason: HOSPADM

## 2024-07-23 RX ORDER — MELOXICAM 7.5 MG/1
7.5 TABLET ORAL DAILY
Status: DISCONTINUED | OUTPATIENT
Start: 2024-07-24 | End: 2024-07-25 | Stop reason: HOSPADM

## 2024-07-23 RX ORDER — TRANEXAMIC ACID 650 MG/1
1950 TABLET ORAL ONCE
Status: COMPLETED | OUTPATIENT
Start: 2024-07-23 | End: 2024-07-23

## 2024-07-23 RX ORDER — MECLIZINE HYDROCHLORIDE 25 MG/1
25 TABLET ORAL 3 TIMES DAILY PRN
Status: DISCONTINUED | OUTPATIENT
Start: 2024-07-23 | End: 2024-07-25 | Stop reason: HOSPADM

## 2024-07-23 RX ORDER — DIPHENHYDRAMINE HCL 25 MG
12.5 TABLET ORAL EVERY 6 HOURS PRN
Status: DISCONTINUED | OUTPATIENT
Start: 2024-07-23 | End: 2024-07-25 | Stop reason: HOSPADM

## 2024-07-23 RX ORDER — POLYETHYLENE GLYCOL 3350 17 G/17G
17 POWDER, FOR SOLUTION ORAL DAILY PRN
Status: DISCONTINUED | OUTPATIENT
Start: 2024-07-23 | End: 2024-07-25 | Stop reason: HOSPADM

## 2024-07-23 RX ORDER — CEFUROXIME AXETIL 250 MG/1
250 TABLET ORAL 2 TIMES DAILY
Qty: 14 TABLET | Refills: 0 | Status: SHIPPED | OUTPATIENT
Start: 2024-07-23 | End: 2024-07-30

## 2024-07-23 RX ORDER — AMLODIPINE BESYLATE 5 MG/1
10 TABLET ORAL DAILY
Status: DISCONTINUED | OUTPATIENT
Start: 2024-07-24 | End: 2024-07-25 | Stop reason: HOSPADM

## 2024-07-23 RX ORDER — LORAZEPAM 2 MG/ML
0.5 INJECTION INTRAMUSCULAR
Status: DISCONTINUED | OUTPATIENT
Start: 2024-07-23 | End: 2024-07-23

## 2024-07-23 RX ORDER — ONDANSETRON 4 MG/1
4 TABLET, ORALLY DISINTEGRATING ORAL EVERY 8 HOURS PRN
Status: DISCONTINUED | OUTPATIENT
Start: 2024-07-23 | End: 2024-07-25 | Stop reason: HOSPADM

## 2024-07-23 RX ORDER — DEXTROSE MONOHYDRATE 100 MG/ML
INJECTION, SOLUTION INTRAVENOUS CONTINUOUS PRN
Status: DISCONTINUED | OUTPATIENT
Start: 2024-07-23 | End: 2024-07-25 | Stop reason: HOSPADM

## 2024-07-23 RX ORDER — BUPIVACAINE HYDROCHLORIDE 7.5 MG/ML
INJECTION, SOLUTION INTRASPINAL
Status: COMPLETED | OUTPATIENT
Start: 2024-07-23 | End: 2024-07-23

## 2024-07-23 RX ORDER — ATORVASTATIN CALCIUM 40 MG/1
40 TABLET, FILM COATED ORAL DAILY
Status: DISCONTINUED | OUTPATIENT
Start: 2024-07-24 | End: 2024-07-25 | Stop reason: HOSPADM

## 2024-07-23 RX ORDER — SODIUM CHLORIDE 9 MG/ML
INJECTION, SOLUTION INTRAVENOUS PRN
Status: DISCONTINUED | OUTPATIENT
Start: 2024-07-23 | End: 2024-07-23 | Stop reason: HOSPADM

## 2024-07-23 RX ORDER — DULOXETIN HYDROCHLORIDE 60 MG/1
60 CAPSULE, DELAYED RELEASE ORAL DAILY
Qty: 14 CAPSULE | Refills: 0 | Status: SHIPPED | OUTPATIENT
Start: 2024-07-24 | End: 2024-07-24 | Stop reason: HOSPADM

## 2024-07-23 RX ADMIN — EPHEDRINE SULFATE 10 MG: 5 INJECTION INTRAVENOUS at 08:23

## 2024-07-23 RX ADMIN — HYDROXYZINE HYDROCHLORIDE 10 MG: 10 TABLET ORAL at 10:25

## 2024-07-23 RX ADMIN — EPHEDRINE SULFATE 10 MG: 5 INJECTION INTRAVENOUS at 08:21

## 2024-07-23 RX ADMIN — MIDAZOLAM 1 MG: 1 INJECTION INTRAMUSCULAR; INTRAVENOUS at 07:47

## 2024-07-23 RX ADMIN — ACETAMINOPHEN 325MG 650 MG: 325 TABLET ORAL at 11:59

## 2024-07-23 RX ADMIN — SODIUM CHLORIDE: 9 INJECTION, SOLUTION INTRAVENOUS at 07:39

## 2024-07-23 RX ADMIN — FENTANYL CITRATE 50 MCG: 50 INJECTION INTRAMUSCULAR; INTRAVENOUS at 07:20

## 2024-07-23 RX ADMIN — Medication 100 MCG: at 08:29

## 2024-07-23 RX ADMIN — Medication 100 MCG: at 08:38

## 2024-07-23 RX ADMIN — FENTANYL CITRATE 50 MCG: 0.05 INJECTION, SOLUTION INTRAMUSCULAR; INTRAVENOUS at 09:28

## 2024-07-23 RX ADMIN — DIPHENHYDRAMINE HCL 12.5 MG: 25 TABLET ORAL at 21:16

## 2024-07-23 RX ADMIN — OXYCODONE HYDROCHLORIDE 10 MG: 10 TABLET ORAL at 16:43

## 2024-07-23 RX ADMIN — OXYCODONE HYDROCHLORIDE 10 MG: 10 TABLET ORAL at 12:00

## 2024-07-23 RX ADMIN — FENTANYL CITRATE 50 MCG: 0.05 INJECTION, SOLUTION INTRAMUSCULAR; INTRAVENOUS at 10:13

## 2024-07-23 RX ADMIN — CYCLOBENZAPRINE 5 MG: 10 TABLET, FILM COATED ORAL at 22:28

## 2024-07-23 RX ADMIN — DULOXETINE HYDROCHLORIDE 60 MG: 60 CAPSULE, DELAYED RELEASE ORAL at 06:44

## 2024-07-23 RX ADMIN — CYCLOBENZAPRINE 5 MG: 10 TABLET, FILM COATED ORAL at 15:01

## 2024-07-23 RX ADMIN — MELOXICAM 15 MG: 7.5 TABLET ORAL at 06:44

## 2024-07-23 RX ADMIN — SODIUM CHLORIDE: 4.5 INJECTION, SOLUTION INTRAVENOUS at 11:25

## 2024-07-23 RX ADMIN — CEFAZOLIN 2000 MG: 2 INJECTION, POWDER, FOR SOLUTION INTRAVENOUS at 16:57

## 2024-07-23 RX ADMIN — FENTANYL CITRATE 50 MCG: 0.05 INJECTION, SOLUTION INTRAMUSCULAR; INTRAVENOUS at 09:39

## 2024-07-23 RX ADMIN — LIDOCAINE HYDROCHLORIDE 20 MG: 20 INJECTION, SOLUTION EPIDURAL; INFILTRATION; INTRACAUDAL; PERINEURAL at 07:51

## 2024-07-23 RX ADMIN — ROPIVACAINE HYDROCHLORIDE 15 ML: 5 INJECTION, SOLUTION EPIDURAL; INFILTRATION; PERINEURAL at 07:23

## 2024-07-23 RX ADMIN — DIPHENHYDRAMINE HYDROCHLORIDE 12.5 MG: 50 INJECTION INTRAMUSCULAR; INTRAVENOUS at 09:13

## 2024-07-23 RX ADMIN — ROPIVACAINE HYDROCHLORIDE 15 ML: 5 INJECTION, SOLUTION EPIDURAL; INFILTRATION; PERINEURAL at 07:26

## 2024-07-23 RX ADMIN — PROPOFOL 30 MG: 10 INJECTION, EMULSION INTRAVENOUS at 07:51

## 2024-07-23 RX ADMIN — SODIUM CHLORIDE 2000 MG: 900 INJECTION INTRAVENOUS at 07:55

## 2024-07-23 RX ADMIN — OXYCODONE HYDROCHLORIDE 10 MG: 10 TABLET ORAL at 21:13

## 2024-07-23 RX ADMIN — MIDAZOLAM 1 MG: 1 INJECTION INTRAMUSCULAR; INTRAVENOUS at 07:20

## 2024-07-23 RX ADMIN — SODIUM CHLORIDE: 4.5 INJECTION, SOLUTION INTRAVENOUS at 22:26

## 2024-07-23 RX ADMIN — EPHEDRINE SULFATE 5 MG: 5 INJECTION INTRAVENOUS at 08:33

## 2024-07-23 RX ADMIN — ACETAMINOPHEN 325MG 650 MG: 325 TABLET ORAL at 16:44

## 2024-07-23 RX ADMIN — DIPHENHYDRAMINE HCL 12.5 MG: 25 TABLET ORAL at 13:35

## 2024-07-23 RX ADMIN — BUPIVACAINE HYDROCHLORIDE IN DEXTROSE 9 MG: 7.5 INJECTION, SOLUTION SUBARACHNOID at 07:49

## 2024-07-23 RX ADMIN — Medication 100 MCG: at 08:24

## 2024-07-23 RX ADMIN — PROPOFOL 100 MCG/KG/MIN: 10 INJECTION, EMULSION INTRAVENOUS at 07:52

## 2024-07-23 RX ADMIN — TRANEXAMIC ACID 1950 MG: 650 TABLET ORAL at 06:44

## 2024-07-23 RX ADMIN — Medication 100 MCG: at 08:52

## 2024-07-23 RX ADMIN — ACETAMINOPHEN 1000 MG: 500 TABLET ORAL at 06:44

## 2024-07-23 ASSESSMENT — LIFESTYLE VARIABLES: SMOKING_STATUS: 0

## 2024-07-23 NOTE — ANESTHESIA PROCEDURE NOTES
Peripheral Block    Patient location during procedure: pre-op  Reason for block: post-op pain management and at surgeon's request  Start time: 7/23/2024 7:20 AM  End time: 7/23/2024 7:23 AM  Staffing  Performed: anesthesiologist   Anesthesiologist: Dejan Robertson MD  Performed by: Dejan Robertson MD  Authorized by: Dejan Robertson MD    Preanesthetic Checklist  Completed: patient identified, IV checked, site marked, risks and benefits discussed, surgical/procedural consents, equipment checked, pre-op evaluation, timeout performed, anesthesia consent given, oxygen available and monitors applied/VS acknowledged  Peripheral Block   Patient position: supine  Prep: ChloraPrep  Provider prep: mask and sterile gloves  Patient monitoring: cardiac monitor, continuous pulse ox, frequent blood pressure checks, IV access, oxygen and responsive to questions  Block type: iPacks  Laterality: right  Injection technique: single-shot  Guidance: ultrasound guided    Needle   Needle type: short-bevel   Needle gauge: 20 G  Needle localization: ultrasound guidance  Needle length: 10 cm  Assessment   Injection assessment: negative aspiration for heme, no paresthesia on injection, local visualized surrounding nerve on ultrasound and no intravascular symptoms  Paresthesia pain: none  Slow fractionated injection: yes  Hemodynamics: stable  Outcomes: uncomplicated and patient tolerated procedure well    Additional Notes  Immediately prior to procedure a \"time out\" was called to verify the correct patient, allergies, laterality, procedure and equipment. Time out performed with Maria L Pino RN    Local Anesthetic: 0.5 %  Ropivacaine   Amount: 15 ml  in 5 ml increments after negative aspiration each time.        Medications Administered  ropivacaine (NAROPIN) injection 0.5% - Perineural   15 mL - 7/23/2024 7:23:00 AM

## 2024-07-23 NOTE — ANESTHESIA PROCEDURE NOTES
Peripheral Block    Patient location during procedure: pre-op  Reason for block: post-op pain management and at surgeon's request  Start time: 7/23/2024 7:23 AM  End time: 7/23/2024 7:26 AM  Staffing  Performed: anesthesiologist   Anesthesiologist: Dejan Robertson MD  Performed by: Dejan Robertson MD  Authorized by: Dejan Robertson MD    Preanesthetic Checklist  Completed: patient identified, IV checked, site marked, risks and benefits discussed, surgical/procedural consents, equipment checked, pre-op evaluation, timeout performed, anesthesia consent given, oxygen available and monitors applied/VS acknowledged  Peripheral Block   Patient position: supine  Prep: ChloraPrep  Provider prep: mask and sterile gloves  Patient monitoring: cardiac monitor, continuous pulse ox, frequent blood pressure checks and IV access  Block type: Femoral  Adductor canal  Laterality: right  Injection technique: single-shot  Guidance: ultrasound guided    Needle   Needle type: short-bevel   Needle gauge: 22 G  Needle localization: ultrasound guidance  Needle length: 5 cm  Assessment   Injection assessment: negative aspiration for heme, no paresthesia on injection, local visualized surrounding nerve on ultrasound and no intravascular symptoms  Paresthesia pain: none  Slow fractionated injection: yes  Hemodynamics: stable  Outcomes: patient tolerated procedure well and uncomplicated    Additional Notes  Sartorius and Vastus Medialis Muscle, Femoral artery and Saphenous nerve are identified; the tip of the needle and the spread of the local anesthetic around the Saphenous nerve are visualized. The Saphenous nerve appeared to be anatomically normal and there were no abnormal pathologically findings seen.   Medications Administered  ropivacaine (NAROPIN) injection 0.5% - Perineural   15 mL - 7/23/2024 7:26:00 AM

## 2024-07-23 NOTE — FLOWSHEET NOTE
Pt to PACU from OR, Pt is alert to speech and denies pain at this time, vital signs stable on nasal canula oxygen. Pt able to wiggle toes at this time, but has limited feeling related to spinal anesthetic.      07/23/24 0909   Vital Signs   Temp 97 °F (36.1 °C)   Pulse 76   Respirations 19   /68   MAP (Calculated) 87   Pain Assessment   Pain Assessment 0-10   Pain Level 0   Oxygen Therapy   SpO2 96 %   O2 Device Nasal cannula

## 2024-07-23 NOTE — ANESTHESIA PRE PROCEDURE
right ipack blocks + spinal +MAC)  Induction: intravenous.    MIPS: Postoperative opioids intended and Prophylactic antiemetics administered.  Anesthetic plan and risks discussed with patient.      Plan discussed with CRNA.                    Dejan Robertson MD   7/23/2024    This pre-anesthesia assessment may be used as a history and physical.    DOS STAFF ADDENDUM:    Pt seen and examined, chart reviewed (including anesthesia, drug and allergy history).  No interval changes to history and physical examination.  Anesthetic plan, risks, benefits, alternatives, and personnel involved discussed with patient.  Patient verbalized an understanding and agrees to proceed.      Dejan Robertson MD  July 23, 2024  7:38 AM

## 2024-07-23 NOTE — ANESTHESIA PROCEDURE NOTES
Spinal Block    Patient location during procedure: OR  End time: 7/23/2024 7:49 AM  Reason for block: primary anesthetic  Staffing  Performed: anesthesiologist, resident/CRNA and other anesthesia staff   Anesthesiologist: Dejan Robertson MD  Resident/CRNA: Sofia Pollard APRN - MEGHAN  Other anesthesia staff: Suzanne Lawson RN  Performed by: Suzanne Lawson RN  Authorized by: Dejan Robertson MD    Spinal Block  Patient position: sitting  Prep: ChloraPrep  Patient monitoring: continuous pulse ox, frequent blood pressure checks and oxygen  Approach: midline  Location: L3/L4  Provider prep: mask and sterile gloves  Local infiltration: lidocaine  Needle  Needle type: Pencan   Needle gauge: 25 G  Needle length: 3.5 in  Kit: [id number]  Expiration date: 3/31/2026  Assessment  Sensory level: T10  Swirl obtained: Yes  CSF: clear  Attempts: 1  Hemodynamics: stable  Preanesthetic Checklist  Completed: patient identified, IV checked, site marked, risks and benefits discussed, surgical/procedural consents, equipment checked, pre-op evaluation, timeout performed, anesthesia consent given, oxygen available, monitors applied/VS acknowledged, fire risk safety assessment completed and verbalized and blood product R/B/A discussed and consented

## 2024-07-23 NOTE — H&P
Update History & Physical    The patient's History and Physical of July 1, 2024 was reviewed with the patient and I examined the patient. There was no change. The surgical site was confirmed by the patient and me.       Plan: The risks, benefits, expected outcome, and alternative to the recommended procedure have been discussed with the patient. Patient understands and wants to proceed with the procedure.     Electronically signed by SHIRA GOODSON MD on 7/23/2024 at 7:43 AM

## 2024-07-23 NOTE — ANESTHESIA POSTPROCEDURE EVALUATION
Department of Anesthesiology  Postprocedure Note    Patient: Breanna Maxwell  MRN: [id number]  YOB: 1955  Date of evaluation: 7/23/2024    Procedure Summary       Date: 07/23/24 Room / Location: [address]    Anesthesia Start: 0739 Anesthesia Stop: 0911    Procedure: RIGHT TOTAL KNEE REPLACEMENT ROBOTIC ASSISTED (Right) Diagnosis:       Osteoarthritis of right knee      (Osteoarthritis of right knee [M17.11])    Surgeons: Hayden Sagastume MD Responsible Provider: Dejan Robertson MD    Anesthesia Type: MAC, regional, spinal ASA Status: 3            Anesthesia Type: No value filed.    Kofi Phase I: Kofi Score: 10    Kofi Phase II:      Anesthesia Post Evaluation    Patient location during evaluation: bedside  Patient participation: complete - patient participated  Level of consciousness: awake and alert  Pain score: 5  Nausea & Vomiting: no nausea  Cardiovascular status: hemodynamically stable  Respiratory status: acceptable  Hydration status: stable  Multimodal analgesia pain management approach  Pain management: adequate    No notable events documented.

## 2024-07-24 LAB
ANION GAP SERPL CALCULATED.3IONS-SCNC: 11 MMOL/L (ref 3–16)
BUN SERPL-MCNC: 21 MG/DL (ref 7–20)
CALCIUM SERPL-MCNC: 9 MG/DL (ref 8.3–10.6)
CHLORIDE SERPL-SCNC: 107 MMOL/L (ref 99–110)
CO2 SERPL-SCNC: 20 MMOL/L (ref 21–32)
CREAT SERPL-MCNC: 0.9 MG/DL (ref 0.6–1.2)
GFR SERPLBLD CREATININE-BSD FMLA CKD-EPI: 68 ML/MIN/{1.73_M2}
GLUCOSE BLD-MCNC: 134 MG/DL (ref 70–99)
GLUCOSE BLD-MCNC: 136 MG/DL (ref 70–99)
GLUCOSE BLD-MCNC: 142 MG/DL (ref 70–99)
GLUCOSE BLD-MCNC: 148 MG/DL (ref 70–99)
GLUCOSE SERPL-MCNC: 149 MG/DL (ref 70–99)
PERFORMED ON: ABNORMAL
POTASSIUM SERPL-SCNC: 3.8 MMOL/L (ref 3.5–5.1)
SODIUM SERPL-SCNC: 138 MMOL/L (ref 136–145)

## 2024-07-24 PROCEDURE — 36415 COLL VENOUS BLD VENIPUNCTURE: CPT

## 2024-07-24 PROCEDURE — 97530 THERAPEUTIC ACTIVITIES: CPT

## 2024-07-24 PROCEDURE — 80048 BASIC METABOLIC PNL TOTAL CA: CPT

## 2024-07-24 PROCEDURE — 97116 GAIT TRAINING THERAPY: CPT

## 2024-07-24 PROCEDURE — G0378 HOSPITAL OBSERVATION PER HR: HCPCS

## 2024-07-24 PROCEDURE — 97535 SELF CARE MNGMENT TRAINING: CPT

## 2024-07-24 PROCEDURE — 6370000000 HC RX 637 (ALT 250 FOR IP): Performed by: ORTHOPAEDIC SURGERY

## 2024-07-24 PROCEDURE — 6360000002 HC RX W HCPCS: Performed by: ORTHOPAEDIC SURGERY

## 2024-07-24 PROCEDURE — 2580000003 HC RX 258: Performed by: ORTHOPAEDIC SURGERY

## 2024-07-24 RX ADMIN — DIPHENHYDRAMINE HCL 12.5 MG: 25 TABLET ORAL at 07:36

## 2024-07-24 RX ADMIN — ACETAMINOPHEN 325MG 650 MG: 325 TABLET ORAL at 14:07

## 2024-07-24 RX ADMIN — CYCLOBENZAPRINE 5 MG: 10 TABLET, FILM COATED ORAL at 09:34

## 2024-07-24 RX ADMIN — ATORVASTATIN CALCIUM 40 MG: 40 TABLET, FILM COATED ORAL at 09:33

## 2024-07-24 RX ADMIN — LOSARTAN POTASSIUM 50 MG: 50 TABLET, FILM COATED ORAL at 09:33

## 2024-07-24 RX ADMIN — OXYCODONE HYDROCHLORIDE 10 MG: 10 TABLET ORAL at 05:58

## 2024-07-24 RX ADMIN — OXYCODONE HYDROCHLORIDE 10 MG: 10 TABLET ORAL at 01:20

## 2024-07-24 RX ADMIN — DULOXETINE HYDROCHLORIDE 60 MG: 60 CAPSULE, DELAYED RELEASE ORAL at 09:33

## 2024-07-24 RX ADMIN — OXYCODONE HYDROCHLORIDE 5 MG: 5 TABLET ORAL at 14:10

## 2024-07-24 RX ADMIN — MECLIZINE HYDROCHLORIDE 25 MG: 25 TABLET ORAL at 07:36

## 2024-07-24 RX ADMIN — CEFAZOLIN 2000 MG: 2 INJECTION, POWDER, FOR SOLUTION INTRAVENOUS at 00:08

## 2024-07-24 RX ADMIN — ACETAMINOPHEN 325MG 650 MG: 325 TABLET ORAL at 18:35

## 2024-07-24 RX ADMIN — APIXABAN 2.5 MG: 2.5 TABLET, FILM COATED ORAL at 21:42

## 2024-07-24 RX ADMIN — Medication 10 ML: at 21:42

## 2024-07-24 RX ADMIN — ACETAMINOPHEN 325MG 650 MG: 325 TABLET ORAL at 00:06

## 2024-07-24 RX ADMIN — OXYCODONE HYDROCHLORIDE 5 MG: 5 TABLET ORAL at 21:42

## 2024-07-24 RX ADMIN — AMLODIPINE BESYLATE 10 MG: 5 TABLET ORAL at 09:33

## 2024-07-24 RX ADMIN — MELOXICAM 7.5 MG: 7.5 TABLET ORAL at 05:58

## 2024-07-24 RX ADMIN — APIXABAN 2.5 MG: 2.5 TABLET, FILM COATED ORAL at 09:33

## 2024-07-24 RX ADMIN — ACETAMINOPHEN 325MG 650 MG: 325 TABLET ORAL at 05:58

## 2024-07-24 RX ADMIN — ISOSORBIDE MONONITRATE 60 MG: 60 TABLET, EXTENDED RELEASE ORAL at 09:33

## 2024-07-25 VITALS
HEART RATE: 87 BPM | DIASTOLIC BLOOD PRESSURE: 73 MMHG | TEMPERATURE: 97.5 F | RESPIRATION RATE: 18 BRPM | WEIGHT: 174.6 LBS | BODY MASS INDEX: 32.97 KG/M2 | SYSTOLIC BLOOD PRESSURE: 161 MMHG | OXYGEN SATURATION: 92 % | HEIGHT: 61 IN

## 2024-07-25 LAB
GLUCOSE BLD-MCNC: 118 MG/DL (ref 70–99)
GLUCOSE BLD-MCNC: 120 MG/DL (ref 70–99)
GLUCOSE BLD-MCNC: 134 MG/DL (ref 70–99)
PERFORMED ON: ABNORMAL

## 2024-07-25 PROCEDURE — G0378 HOSPITAL OBSERVATION PER HR: HCPCS

## 2024-07-25 PROCEDURE — 94761 N-INVAS EAR/PLS OXIMETRY MLT: CPT

## 2024-07-25 PROCEDURE — 97535 SELF CARE MNGMENT TRAINING: CPT

## 2024-07-25 PROCEDURE — 97116 GAIT TRAINING THERAPY: CPT

## 2024-07-25 PROCEDURE — 97530 THERAPEUTIC ACTIVITIES: CPT

## 2024-07-25 PROCEDURE — 6370000000 HC RX 637 (ALT 250 FOR IP): Performed by: ORTHOPAEDIC SURGERY

## 2024-07-25 PROCEDURE — 2580000003 HC RX 258: Performed by: ORTHOPAEDIC SURGERY

## 2024-07-25 PROCEDURE — 2700000000 HC OXYGEN THERAPY PER DAY

## 2024-07-25 PROCEDURE — 97110 THERAPEUTIC EXERCISES: CPT

## 2024-07-25 RX ORDER — OXYCODONE HYDROCHLORIDE 5 MG/1
5-10 TABLET ORAL EVERY 4 HOURS PRN
Qty: 40 TABLET | Refills: 0 | Status: SHIPPED | OUTPATIENT
Start: 2024-07-25 | End: 2024-08-01

## 2024-07-25 RX ADMIN — DIPHENHYDRAMINE HCL 12.5 MG: 25 TABLET ORAL at 03:34

## 2024-07-25 RX ADMIN — OXYCODONE HYDROCHLORIDE 5 MG: 5 TABLET ORAL at 16:25

## 2024-07-25 RX ADMIN — ISOSORBIDE MONONITRATE 60 MG: 60 TABLET, EXTENDED RELEASE ORAL at 09:06

## 2024-07-25 RX ADMIN — ACETAMINOPHEN 325MG 650 MG: 325 TABLET ORAL at 12:52

## 2024-07-25 RX ADMIN — DIPHENHYDRAMINE HCL 12.5 MG: 25 TABLET ORAL at 16:25

## 2024-07-25 RX ADMIN — OXYCODONE HYDROCHLORIDE 5 MG: 5 TABLET ORAL at 03:33

## 2024-07-25 RX ADMIN — ATORVASTATIN CALCIUM 40 MG: 40 TABLET, FILM COATED ORAL at 09:06

## 2024-07-25 RX ADMIN — CYCLOBENZAPRINE 5 MG: 10 TABLET, FILM COATED ORAL at 09:06

## 2024-07-25 RX ADMIN — OXYCODONE HYDROCHLORIDE 5 MG: 5 TABLET ORAL at 09:04

## 2024-07-25 RX ADMIN — Medication 10 ML: at 09:06

## 2024-07-25 RX ADMIN — POLYETHYLENE GLYCOL 3350 17 G: 17 POWDER, FOR SOLUTION ORAL at 09:31

## 2024-07-25 RX ADMIN — MECLIZINE HYDROCHLORIDE 25 MG: 25 TABLET ORAL at 09:31

## 2024-07-25 RX ADMIN — ACETAMINOPHEN 325MG 650 MG: 325 TABLET ORAL at 00:05

## 2024-07-25 RX ADMIN — MELOXICAM 7.5 MG: 7.5 TABLET ORAL at 09:06

## 2024-07-25 RX ADMIN — APIXABAN 2.5 MG: 2.5 TABLET, FILM COATED ORAL at 09:05

## 2024-07-25 RX ADMIN — ACETAMINOPHEN 325MG 650 MG: 325 TABLET ORAL at 05:42

## 2024-07-25 RX ADMIN — AMLODIPINE BESYLATE 10 MG: 5 TABLET ORAL at 09:04

## 2024-07-25 RX ADMIN — LOSARTAN POTASSIUM 50 MG: 50 TABLET, FILM COATED ORAL at 09:06

## 2024-07-25 NOTE — PLAN OF CARE
Problem: Chronic Conditions and Co-morbidities  Goal: Patient's chronic conditions and co-morbidity symptoms are monitored and maintained or improved  7/23/2024 2008 by Miley Mathur RN  Outcome: Progressing     Problem: Neurosensory - Adult  Goal: Achieves stable or improved neurological status  7/23/2024 2008 by Miley Mathur, RN  Outcome: Progressing     Problem: Discharge Planning  Goal: Discharge to home or other facility with appropriate resources  7/23/2024 2008 by Miley Mathur, RN  Outcome: Progressing     
  Problem: Chronic Conditions and Co-morbidities  Goal: Patient's chronic conditions and co-morbidity symptoms are monitored and maintained or improved  7/24/2024 2332 by Miley Mathur RN  Outcome: Progressing  Flowsheets  Taken 7/24/2024 1950 by Miley Mathur RN  Care Plan - Patient's Chronic Conditions and Co-Morbidity Symptoms are Monitored and Maintained or Improved: Monitor and assess patient's chronic conditions and comorbid symptoms for stability, deterioration, or improvement  Problem: Discharge Planning  Goal: Discharge to home or other facility with appropriate resources  7/24/2024 2332 by Miley Mathur RN  Outcome: Progressing  Flowsheets  Taken 7/24/2024 1950 by Miley Mathur RN  Discharge to home or other facility with appropriate resources:   Identify barriers to discharge with patient and caregiver   Arrange for needed discharge resources and transportation as appropriate  Problem: Neurosensory - Adult  Goal: Achieves stable or improved neurological status  7/24/2024 2332 by Miley Mathur RN  Outcome: Progressing  Achieves stable or improved neurological status: Assess for and report changes in neurological status   Problem: Neurosensory - Adult  Goal: Achieves maximal functionality and self care  Outcome: Progressing     Problem: Skin/Tissue Integrity - Adult  Goal: Incisions, wounds, or drain sites healing without S/S of infection  7/24/2024 2332 by Miley Mathur RN  Outcome: Progressing  Flowsheets  Taken 7/24/2024 1950 by Miley Mathur RN  Incisions, Wounds, or Drain Sites Healing Without Sign and Symptoms of Infection: TWICE DAILY: Assess and document skin integrity  Problem: Skin/Tissue Integrity - Adult  Goal: Skin integrity remains intact  Outcome: Progressing  Flowsheets (Taken 7/24/2024 1950)  Skin Integrity Remains Intact: Monitor for areas of redness and/or skin breakdown     Problem: Skin/Tissue Integrity - Adult  Goal: Oral mucous 
  Problem: Chronic Conditions and Co-morbidities  Goal: Patient's chronic conditions and co-morbidity symptoms are monitored and maintained or improved  7/25/2024 0809 by Scottie Elmore RN  Outcome: Progressing  Flowsheets (Taken 7/25/2024 0759)  Care Plan - Patient's Chronic Conditions and Co-Morbidity Symptoms are Monitored and Maintained or Improved: Monitor and assess patient's chronic conditions and comorbid symptoms for stability, deterioration, or improvement  7/24/2024 2332 by Miley Mathur RN  Outcome: Progressing  Flowsheets  Taken 7/24/2024 1950 by Miley Mathur RN  Care Plan - Patient's Chronic Conditions and Co-Morbidity Symptoms are Monitored and Maintained or Improved: Monitor and assess patient's chronic conditions and comorbid symptoms for stability, deterioration, or improvement  Taken 7/24/2024 1239 by Mellisa Loja RN  Care Plan - Patient's Chronic Conditions and Co-Morbidity Symptoms are Monitored and Maintained or Improved: Monitor and assess patient's chronic conditions and comorbid symptoms for stability, deterioration, or improvement     Problem: Discharge Planning  Goal: Discharge to home or other facility with appropriate resources  7/25/2024 0809 by Scottie Elmore RN  Outcome: Progressing  Flowsheets (Taken 7/25/2024 0759)  Discharge to home or other facility with appropriate resources: Identify barriers to discharge with patient and caregiver  7/24/2024 2332 by Miley Mathur RN  Outcome: Progressing  Flowsheets  Taken 7/24/2024 1950 by Miley Mathur RN  Discharge to home or other facility with appropriate resources:   Identify barriers to discharge with patient and caregiver   Arrange for needed discharge resources and transportation as appropriate  Taken 7/24/2024 1239 by Mellisa Loja RN  Discharge to home or other facility with appropriate resources: Identify barriers to discharge with patient and caregiver     Problem: Neurosensory - 
injury  Outcome: Progressing

## 2024-07-25 NOTE — CARE COORDINATION
Left message for Audrey at [facility] regarding possible discharge soon.    Electronically signed by Ingrid Max, NATHANIEL, LISW, Case Management on 7/25/2024 at 12:09 PM  [place] [phone]    
[facility] does not have a bed. [facility] can accept, pending [facility] precert.   Informed patient of above. She is agreeable to [facility].   Requested Case Management  complete [employer] precert.   Spoke with PT--patient has pain level of 10/10, \"fogginess\" mentally, processing slowly/not able to retain instructions provided; requires cues consistently.   Spoke with patient regarding precert being completed. She is in agreement with the plan for [facility].     Electronically signed by NATHANIEL Bahena, LISW, Case Management on 7/25/2024 at 4:39 PM  [place] [phone]    
Our Community authorization received via portal:      Payor approval ID:  [id number]     Our Community Approval ID:       [facility] Location:  [facility]    Dates Approved:  07/25 THRU 07/29/2024      NRD:  07/29/2024    Electronically signed by Diamond Genao, Case Management Assistant Diamond Genao on 7/25/2024 at 4:11 PM       
Spoke with patient regarding therapy recommendation for snf; list provided. Patient prefers [facility] at this facility is close to her home. She is agreeable to snf referrals in the area/in insurance network as well.   Referrals sent to [facility], [facility] and [facility].     Electronically signed by NATHANIEL Bahena, LISW, Case Management on 7/25/2024 at 12:26 PM  [place] [phone]    
Yes    Isabelle Pratt  Case Management Department  Ph: [phone]-Fax: [phone]

## 2024-07-26 NOTE — PROGRESS NOTES
PRE-OP INSTRUCTIONS     Do not eat or drink anything after 12:00 midnight prior to surgery.    This includes water, chewing gum, mints and ice chips.    You may brush your teeth and gargle the morning of surgery but DO  NOT SWALLOW THE WATER.    Take the following medications with a small sip of water on the morning of procedure...Follow your MD/Surgeons pre-procedure instructions regarding your medications     You may be asked to stop blood thinners such as:  Coumadin, Plavix, Fragmin and lovenox.  Please check with your doctor before stopping these or any other medications.    Aspirin, ibuprofen, advil and naproxen, any anti-inflammatory products should be stopped for a week prior to your surgery.    Do not smoke and do not drink any alcoholic beverages 24 hours prior to your surgery.    Please do not wear any jewelry or body piercings on the day of surgery.    Please wear something simple, loose fitting clothing to the hospital.  Do not wear any make-up(including eye make-up) or nail polish on your fingers and toes.    As part of our patient safety program to minimize surgical infections, we ask you to do the following:    Please notify your surgeon if you develop any illness between now and the day of your surgery.  This includes a cough, cold, fever, sore  throat, nausea, vomiting, diarrhea, etc.   Please notify your surgeon if you experience dizziness, shortness of  breath or blurred vision between now and the time of your surgery.     Please notify your surgeon of any open or redden areas that may look infected       DO NOT shave your operative site 96 hours(four days) prior to surgery.          Shower the week before surgery with an antibacterial soap such as:dial,safeguard, etc.       Three(3) days prior to your surgery, cleanse the operative site with Hibiclens(anti-microbial soap). This soap may dry your skin, please do not apply any oils or lotions 7/20,7/21,7/22,7/23    Please bring your 
    [facility] Pre-Admission Testing Electronic Communication Worksheet for OR/ENDO Procedures        Patient: Breanna Maxwell    DOS: 7/23/24    Transportation Confirmed: [x] YES    []  NO KAYODE MCADAMS    History and Physical:  [x] YES    []  NO  [] N/A  If yes, please list doctor or Urgent Care and date of H&P: DR. HOOKER-7/1/24    Additional Clearance(Cardiac, Pulmonary, etc):  [x] YES    []  NO DR. CARNEY-7/11/24    Pre-Admission Testing Visit:  [x] YES    []  NO If no, do labs/testing need to be done DOS?  [] YES    []  NO  7/15/24  Medication Reconciliation Complete:  [x] YES    []  NO        Additional Notes:                Interview Complete: [x] YES    []  NO          Juana Caro, RN  9:34 AM  
4 Eyes Admission Assessment     I agree as the admission nurse that 2 RN's have performed a thorough Head to Toe Skin Assessment on the patient. ALL assessment sites listed below have been assessed on admission.       Areas assessed by both nurses:   [x]   Head, Face, and Ears   [x]   Shoulders, Back, and Chest  [x]   Arms, Elbows, and Hands   [x]   Coccyx, Sacrum, and Ischum  [x]   Legs, Feet, and Heels        Does the Patient have Skin Breakdown?  No         Carlo Prevention initiated:  No   Wound Care Orders initiated:  No      [facility] nurse consulted for Pressure Injury (Stage 3,4, Unstageable, DTI, NWPT, and Complex wounds):  No      Nurse 1 eSignature: Electronically signed by Brianna Nava RN on 7/23/24 at 5:28 PM EDT    **SHARE this note so that the co-signing nurse is able to place an eSignature**    Nurse 2 eSignature: Electronically signed by Amarilis Morris RN on 7/23/24 at 7:44 PM EDT             
Attempted to ambulate pt with walker.  She stood at the side of the bed and took 1 step and said she felt too shaky to walk.  Used stedy x1 for pt safety.  Pt able to void.  Medicated with PRN Oxycodone for pain.  Still c/o itching to feet.  PRN Benadryl also given.  VSS.  No c/o dizziness when OOB.  Denies any needs.  Will continue to monitor.   
CLINICAL PHARMACY NOTE: MEDS TO BEDS    Retail pharmacy has been notified that the patient is either going to a SNF, ARU, or other inpatient facility.     All prescriptions sent to the retail pharmacy for this patient will be kept on profile unless told otherwise.    07/26/24 8:46 AM     
DISCHARGE SUMMARY     DATE OF DISCHARGE: 7/25/2024     DISCHARGE DESTINATION: skilled unit     FACILITY:   Discharging to Facility/ Agency   Name: [facility]  Address:  [address] , [place] [address]   Phone:  [phone]  Fax:  [phone]    INSURANCE PRECERT OBTAINED: yes    LILIA/PASAAR COMPLETED: yes    TRANSPORTATION:     Company Name:  [facility]     Time: 6pm    Phone Number: [phone]       COMMENTS: Informed patient of transport time. Called Vin per her request while in patient room regarding plan.   Informed La at [facility].  Rn aware. Report number [phone].    Electronically signed by NATHANIEL Bahena, LISW, Case Management on 7/25/2024 at 4:53 PM  [place] [phone]      
DOUG Aguilera recommending SNF at this time, unsafe to discharge home. I updated Dr Sagastume and requested printed prescriptions in preparation for SNF. I updated Ingrid CARTER.   
Huddle performed including Nurse navigator Elvi, Physical therapist Ale, and Occupational therapist Della. Discussed plan of care, discharge plan, and dme needs if applicable for orthopedic total joint patient.    
Huddle performed including Nurse navigator Elvi, Physical therapist Ale, and Occupational therapist Iraida. Discussed plan of care, discharge plan, and dme needs if applicable for orthopedic total joint patient.    
Met with patient  at bedside, patient is alert and oriented x4. Discussed role of nurse navigator.  Reviewed reasons to call with questions or concerns, importance of TEDS, Incentive spirometer and incentive spirometer at bedside, pain medication, and physical and occupational therapy. 2/4 bed rails up, bed locked and in lowest position, fall precautions in place, call light within reach.     Pulses present bilaterally +2 Moderate pedal, Right Knee Ace wrap assessed and is clean,dry, and intact, no signs of redness, drainage, or odor noted Ice in place. Rickey and scds on left lower extremity. weak dorsi and plantar flexions noted bilaterally, toes appear appropriate to ethnicity in color , Warm to touch bilaterally. patient denies numbness or tingling in extremities. Patient experiencing spasms 8/10 pain. RLE elevated on pillow and ice in place to right knee and right thigh. Dr Sagastume notified of spasms and flexeril ordered, see eMAR. Patient assisted to bathroom x1 assistance with rolling walker, first ambulation since surgery. Upon return to bed from bathroom patient reported moderate dizziness. Assisted patient back to bed and blood pressure taken 144/73. Lucia reports dizziness improving with rest, currently rates as mild dizziness. Updated bedside RN Brianna.    Recent Labs     07/23/24  0654 07/23/24  0910 07/23/24  1206   POCGLU 119* 103* 126*     Per Insulin Protocol, Lantus and prandial Humalog discontinued as POC glucose was below 110 and recheck next POC glucose at 9pm as patient has not yet triggered the insulin protocol    Vitals:    07/23/24 1013 07/23/24 1030 07/23/24 1104 07/23/24 1354   BP: (!) 150/68 (!) 137/52 (!) 160/92 (!) 144/73   Pulse: 69 67 76 67   Resp: 15 13  20   Temp:  97.2 °F (36.2 °C) 97.5 °F (36.4 °C) 96.9 °F (36.1 °C)   TempSrc:   Oral Axillary   SpO2: 94% 100% 90% 93%   Weight:       Height:           DC Plan: Home with rae Banuelos and [facility]. Vin to transport 
Met with patient and rae Banuelos at bedside, patient is alert and oriented x4. Discussed role of nurse navigator.  Reviewed reasons to call with questions or concerns, pain medication, and physical and occupational therapy. 2/4 bed rails up, bed locked and in lowest position, fall precautions in place, call light within reach.     Pulses present bilaterally +2 Moderate pedal, Right Knee prineo surgical glue dressing assessed and is clean,dry, and intact, no signs of redness, drainage, or odor noted Ice in place. Rickey and scds on bilateral lower extremities. weak dorsi and plantar flexions noted bilaterally, toes appear appropriate to ethnicity in color , Warm to touch bilaterally. patient denies numbness or tingling in extremities. Patient reports severe dizziness with standing at side of bed, unable to ambulate due to dizziness. History of Vertigo. Patient assisted back to bed by myself and OT Della. Vital signs checked, see below. Bedside Mellisa RILEY  updated and has patients prescribed medications at bedside including antihypertensives. I sent Dr Sagastume a message regarding     Recent Labs     07/23/24  1206 07/23/24  1555 07/23/24  2122 07/24/24  0656   POCGLU 126* 138* 153* 148*     Per Insulin Protocol, patient triggered the insulin protocol, check POC glucose ACHS    Vitals:    07/24/24 0608 07/24/24 0618 07/24/24 0658 07/24/24 0931   BP:  (!) 162/84 (!) 159/52 (!) 173/78   Pulse:  52 50 81   Resp:  18 16 16   Temp:   98.6 °F (37 °C) 98.3 °F (36.8 °C)   TempSrc:   Oral Oral   SpO2:  91% 91% 92%   Weight: 79.5 kg (175 lb 4.3 oz)      Height:         DC Plan: Home with rae Banuelos and [facility]. Vin to transport patient.  DME needs:Denies any DME needs at this time. Patient states they already has a rolling walker.    Elvi Jeronimo  Orthopedic Nurse Navigator  Phone number: [phone]    Future Appointments   Date Time Provider Department Center   8/5/2024  1:00 PM Hayden Sagastume MD [facility] 
Met with patient at bedside, patient is slightly drowsy but orientedx4. Discussed role of nurse navigator.  Reviewed reasons to call with questions or concerns, importance of Incentive spirometer and incentive spirometer at bedside, pain medication, and physical and occupational therapy. 2/4 bed rails up, bed locked and in lowest position, fall precautions in place, call light within reach.     Pulses present bilaterally +2 moderate pedal, Right Knee prineo surgical glue dressing assessed and is clean,dry, and intact, no signs of redness, drainage, or odor noted Ice in place. Rickey and scds on bilateral lower extremities. weak dorsi and plantar flexions noted bilaterally, BLEs appear appropriate to ethnicity in color , Warm to touch bilaterally. patient denies numbness or tingling in extremities. Dr Sagastume made aware of patient cognition, hypoxia requiring oxygen, and therapy recommending SNF. Per Dr Sagastume patient can discharge to SNF today if able to obtain placement.      Recent Labs     07/24/24  1533 07/24/24  2019 07/25/24  0649 07/25/24  1110   POCGLU 142* 134* 118* 134*       Vitals:    07/25/24 0648 07/25/24 0831 07/25/24 1055 07/25/24 1100   BP: (!) 153/75  (!) 170/98    Pulse: (!) 43  80 82   Resp: 16  18    Temp: 98.3 °F (36.8 °C)  98.4 °F (36.9 °C)    TempSrc: Oral  Oral    SpO2: 90% 91% (!) 88% 94%   Weight:       Height:           DC Plan: SNF - [place]  aware and sent referrals. Therapy recommending stretcher to transport patient. Patient still on oxygen 1lpm nasal cannula, weaning to 90% or above. Hypoxia noted with ambulation.  DME needs:n/a    Elvi Jeronimo  Orthopedic Nurse Navigator  Phone number: [phone]    Future Appointments   Date Time Provider Department [place]   8/5/2024  1:00 PM Hayden Sagastume MD [facility]   3/12/2025  1:00 PM Dariusz Acosta MD [facility]         
Notification sent to Dr Sagastume and medical assistant Diamond LA regarding abnormal preoperative labs and pertinent medical history.    
Occupational Therapy  Facility/Department: [address] ORTHOPEDICS  Occupational Daily Treatment Note      Name: Breanna Maxwell  : 1955  MRN: [id number]  Date of Service: 2024    Discharge Recommendations:  Patient would benefit from continued therapy after discharge, 2-3 sessions per week, 24 hour supervision or assist          Patient Diagnosis(es): The encounter diagnosis was H/O total knee replacement, right.  Past Medical History:  has a past medical history of Arthritis, CAD (coronary artery disease), Constipation, Depression, Diabetes mellitus (HCC), GERD (gastroesophageal reflux disease), History of coronary artery stent placement, Hyperlipidemia, Hypertension, Neuropathy, Vertigo, and Vitamin D deficiency, unspecified.  Past Surgical History:  has a past surgical history that includes Hysterectomy; Diagnostic Cardiac Cath Lab Procedure (2019); other surgical history; Colonoscopy; and Total knee arthroplasty (Right, 2024).       Breanna Maxwell scored a 18/24 on the AM-PAC ADL Inpatient form. Current research shows that an AM-PAC score of 18 or greater is typically associated with a discharge to the patient's home setting. Based on the patient's AM-PAC score, and their current ADL deficits, it is recommended that the patient have 2-3 sessions per week of Occupational Therapy at d/c to increase the patient's independence.  At this time, this patient demonstrates the endurance and safety to discharge home with home (home vs OP services) and a follow up treatment frequency of 2-3x/wk.   Please see assessment section for further patient specific details.    If patient discharges prior to next session this note will serve as a discharge summary.  Please see below for the latest assessment towards goals.   HOME HEALTH CARE: LEVEL 3 SAFETY       -Initial home health evaluation to occur within 24-48 hours, in patient home   -Home health agency to establish plan of care for patient over 60 day 
Occupational Therapy  Facility/Department: [address] ORTHOPEDICS  Occupational Therapy Daily Treatment Note    Name: Breanna Maxwell  : 1955  MRN: [id number]  Date of Service: 2024    Discharge Recommendations:  2-3 sessions per week, Patient would benefit from continued therapy after discharge, 24 hour supervision or assist, 3-5 sessions per week, Continue to assess pending progress     Breanna Maxwell scored a 17/24 on the AM-PAC ADL Inpatient form. Current research shows that an AM-PAC score of 17 or less is typically not associated with a discharge to the patient's home setting. Based on the patient's AM-PAC score and their current ADL deficits, it is recommended that the patient have 3-5 sessions per week of Occupational Therapy at d/c to increase the patient's independence.  Please see assessment section for further patient specific details.    If patient discharges prior to next session this note will serve as a discharge summary.  Please see below for the latest assessment towards goals.        Patient Diagnosis(es): The encounter diagnosis was H/O total knee replacement, right.  Past Medical History:  has a past medical history of Arthritis, CAD (coronary artery disease), Constipation, Depression, Diabetes mellitus (HCC), GERD (gastroesophageal reflux disease), History of coronary artery stent placement, Hyperlipidemia, Hypertension, Neuropathy, Vertigo, and Vitamin D deficiency, unspecified.  Past Surgical History:  has a past surgical history that includes Hysterectomy; Diagnostic Cardiac Cath Lab Procedure (2019); other surgical history; Colonoscopy; and Total knee arthroplasty (Right, 2024).    Assessment   Performance deficits / Impairments: Decreased functional mobility ;Decreased endurance;Decreased ADL status;Decreased balance;Decreased strength;Decreased high-level IADLs  Assessment: Pt tolerated session fairly well this date. Pt completes fxl transfers with CGA and cues for 
Occupational Therapy  OCCUPATIONAL THERAPY  Progress Note   Second Session    Patient Name: Breanna Maxwell  Medical Record Number: [id number]    Treatment Diagnosis: Impaired functional mobility    General  Chart Reviewed: Yes  Patient assessed for rehabilitation services?: Yes  Additional Pertinent Hx: Pt is a 69 yo F presenting s/p elective R TKR per Dr. Sagastume. WBAT. PMH including: CAD, HTN, HLD, acute CVA.  Response to previous treatment: Patient with no complaints from previous session  Family / Caregiver Present: Yes (S.O.)  Referring Practitioner: Hayden Sagastume MD  Diagnosis: R TKR     Restrictions/Precautions  Restrictions/Precautions: Weight Bearing, Fall Risk  Lower Extremity Weight Bearing Restrictions  Right Lower Extremity Weight Bearing: Weight Bearing As Tolerated     Position Activity Restriction  Other position/activity restrictions: h/o vertigo    Subjective: Patient supine in bed upon arrival to room with PT. Seen for cotx due to dizziness from earlier session.     Objective: Supine to sit with CGA. O2 sats seated on edge of bed. O2 sats 86% and HR 50. Placed on 2L of o2. Sit<>stand with CGA with cues for hand placement. Functional mobility with RW 10 feet with CGA, very slow, steady gait with no overt LOB noted. Good walker safety. O2 sats checked after mobility 90% on 2L and HR 89. Patient positioned with ice in place and right LE elevated for comfort.    Assessment: Discussed with OTR am pac score is 18. Anticipate that patien will progress to return home with 24/7 assist/supervision with improvement of dizziness. Patient would benefit from home OT to maximize IND.    Safety Device - Type of devices:  []  All fall risk precautions in place [] Bed alarm in place  [] Call light within reach [x] Chair alarm in place [] Positioning belt [x] Gait belt [] Patient at risk for falls [] Left in bed [x] Left in chair [] Telesitter in use [] Sitter present [x] Nurse notified []  None      Therapy Time   
Orders completed this shift:      [x] Surgical site and Neurovascular checks assessed with head to toe assessment and Q4Hr this shift per orders. See flowsheets for documentation.   [x] Insulin protocol implemented per orders, see eMAR for documentation.  [x] Patient ambulated  70 feet total going to the BR during the night . See flowsheet for documentation on how patient tolerated ambulation.  [x] Ice pack/pad in place to surgical site. Barrier in place between patient skin and ice pack/pad.   [x]Pain medication administered per orders for management of pain. See eMAR for documentation.   [x] Incentive spirometer performed by patient. Volume achieved 2250 mL. Encouraged patient to perform Q2hr while awake per order.  [x] IV fluids stopped per orders as patient is tolerating PO and has adequate urine output. See eMAR for documentation.   [x] Shift intake and output documented per shift, see flowsheet.  [x] Customized care plan and education charted on Qshift.    Electronically signed by Miley Mathur RN on 7/24/2024 at 6:34 AM    
Orders completed this shift:      [x] Surgical site and Neurovascular checks assessed with head to toe assessment and Q4Hr this shift per orders. See flowsheets for documentation.   [x] Insulin protocol implemented per orders, see eMAR for documentation.  [x] Patient using stedy x1 to go to the BR. Has gotten up 2 times this shift.   See flowsheet for documentation on how patient tolerated ambulation.  [x] Ice pack/pad in place to surgical site. Barrier in place between patient skin and ice pack/pad.   [x]Pain medication administered per orders for management of pain. See eMAR for documentation.   [x] Incentive spirometer performed by patient. Volume achieved 1500 mL. Encouraged patient to perform Q2hr while awake per order.  [x] IV fluids stopped per orders as patient is tolerating PO and has adequate urine output. See eMAR for documentation.   [x] Shift intake and output documented per shift, see flowsheet.  [x] Customized care plan and education charted on Qshift.    Electronically signed by Miley Mathur RN on 7/25/2024 at 4:05 AM    
Oxycodone 5 mg  given PO for right knee/leg pain.  Patient up to bathroom with walker and stand-by assistance. Patient tolerated activity well. Patient repositioned in bed. Fall/safety precautions in place.  Electronically signed by Brianna Nava RN on 7/23/2024 at 8:47 PM    
Patient admitted to room 3114. Patient alert and oriented x 4.  Respirations regular and unlabored on room air. Right leg/knee ace wrap/drsg dry and intact. Patient oriented to room mechanics. Fall/safety precautions in place. Call light in reach. Family at bedside.  Electronically signed by Brianna Nava RN on 7/23/2024 at 11:15 AM    
Patient currently up in chair, awake, a&ox4. Patient tolerating PO intake, take medications whole, no issues. Patient denies n/v. Patient IV flushed and infusing as ordered. Patient given Glycolax, haven't had BM in couple days. Patient used walker with PT/OT and tolerated well. Patient voiding, no issues. Patient incision to right knee, intact, dry and warm with some swelling. Patient rating pain at a 6/10, PRN pain medication given as ordered. Patient  at bedside. Patient able to make needs known, no other needs voiced at this time. Call light and bedside table within reach. Will continue to monitor and reassess.  
Patient transported out of facility with documented belongings via EMS stretcher. Prescriptions signed and placed in transfer packet.   
Physical Therapy  Facility/Department: [address] ORTHOPEDICS  Physical Therapy Initial Assessment    Name: Breanna Maxwell  : 1955  MRN: [id number]  Date of Service: 2024    Discharge Recommendations:  Continue to assess pending progress, 24 hour supervision or assist, Therapy recommended at discharge (2-3)   Breanna Maxwell scored a 15/24 on the AM-PAC short mobility form. Current research shows that an AM-PAC score of 18 or greater is typically associated with a discharge to the patient's home setting. Based on the patient's AM-PAC score and their current functional mobility deficits, it is recommended that the patient have 2-3 sessions per week of HOME Physical Therapy at d/c to increase the patient's independence.      PT Equipment Recommendations  Equipment Needed: No  Other: Has RW      Patient Diagnosis(es): The encounter diagnosis was H/O total knee replacement, right.  Past Medical History:  has a past medical history of Arthritis, CAD (coronary artery disease), Constipation, Depression, Diabetes mellitus (HCC), GERD (gastroesophageal reflux disease), History of coronary artery stent placement, Hyperlipidemia, Hypertension, Neuropathy, Vertigo, and Vitamin D deficiency, unspecified.  Past Surgical History:  has a past surgical history that includes Hysterectomy; Diagnostic Cardiac Cath Lab Procedure (2019); other surgical history; Colonoscopy; and Total knee arthroplasty (Right, 2024).    Assessment   Body Structures, Functions, Activity Limitations Requiring Skilled Therapeutic Intervention: Decreased functional mobility ;Decreased ROM;Decreased strength  Assessment: 69 yo female to hospital 24 for elective R TKR via Dr. Sagastume. PMHx as noted, including CAD, CVA.  Prior status: Lives in apartment with spouse, ~ 10 steps to enter. Independent in transfers, ADLs, and gait without device.  Status 24: Attempted to see in am but patient with dizziness, appeared moderately fatigued, 
Physical Therapy  Facility/Department: [address] ORTHOPEDICS  Physical Therapy Initial Assessment  This note serves as patient discharge summary if pt discharges prior to next PT visit      Name: Breanna Maxwell  : 1955  MRN: [id number]  Date of Service: 2024    Discharge Recommendations:  Continue to assess pending progress, 24 hour supervision or assist, Therapy recommended at discharge (2-3)     Breanna Maxwell scored a 14/24 on the AM-PAC short mobility form. Current research shows that an AM-PAC score of 18 or greater is typically associated with a discharge to the patient's home setting. Based on the patient's AM-PAC score and their current functional mobility deficits, it is recommended that the patient have 2-3 sessions per week of HOME Physical Therapy at d/c to increase the patient's independence.      PT Equipment Recommendations  Equipment Needed: No  Other: Has RW      Patient Diagnosis(es): The encounter diagnosis was H/O total knee replacement, right.  Past Medical History:  has a past medical history of Arthritis, CAD (coronary artery disease), Constipation, Depression, Diabetes mellitus (HCC), GERD (gastroesophageal reflux disease), History of coronary artery stent placement, Hyperlipidemia, Hypertension, Neuropathy, Vertigo, and Vitamin D deficiency, unspecified.  Past Surgical History:  has a past surgical history that includes Hysterectomy; Diagnostic Cardiac Cath Lab Procedure (2019); other surgical history; Colonoscopy; and Total knee arthroplasty (Right, 2024).    Assessment   Body Structures, Functions, Activity Limitations Requiring Skilled Therapeutic Intervention: Decreased functional mobility ;Decreased ROM;Decreased strength  Assessment: 69 yo female to hospital 24 for elective R TKR via Dr. Sagastume. PMHx as noted, including CAD, CVA.  Prior status: Lives in apartment with spouse, ~ 10 steps to enter. Independent in transfers, ADLs, and gait without device.  Status 
Pt AAO x4.  Ambulated to the BR with CGA and walker to void.   Rating pain 5/10 on pain scale which pt states is tolerable for her.  Assessment completed.  RLE with ace wrap from foot to upper thigh.  No drainage noted.  Pedal pulses palpable.  Fresh ice packs applied to right knee.  Pt able to achieve 2250 mL with IS.  Encouraged usage.  Denies any needs at this time.  Call light in reach.  Will monitor.   
Pt AAO x4.  States pain in right knee is tolerable at this time.  Assessment completed.  Right knee with prineo in place with no drainage noted.  Pedal pulses palpable.  1+ edema noted to BLANK LE.  No c/o dizziness at this time.  States it is more when she is OOB.  Fresh ice on right knee.  Denies any needs.  Will monitor.    
Pt ambulated to the BR with CGA and walker.  While in the BR started to c/o dizziness and assisted back to bed with stedy x1.  BP: 162/84 and HR 52.  Pt feels better once back in bed.  Will monitor.  
Pt is alert and oriented and states that pain is tolerable at this time, vital signs stable on room air. Pt able to move lower extremities bilaterally with moderate pushes and pulls in feet. Pt still complaining of itching in fett after IV benadryl given, Dr. Robertson notified and PO atarax ordered and given. Report called to Maia RILEY, all questions answered. Pt transferred to room 3114 with all documented belongings.   
Pt remains very drowsy oxygen saturation rechecked = 90 % on room air . Pt. Did incentive spironometer 10 times with lots of encouragement . Pt. Fell asleep before this nurse could leave the room.   
Pt was assisted from toilet to bedside chair per Della OT pt. Very sleepy . She used CGA with a gait belt when in the chair her oxygen was 84 % on room air . She was placed back on 2 liters of oxygen per nasal cannula. Della states, \"that since pt. Is still dizzy and drowsy nursing should use 1 assist and the GARIMA-STEDY. \"   
Pt. Worked with OT/PT she complains of dizziness and itching despite getting meclizine and benedryl . After getting dressed pt.'s B/P=173/86 and her heart rate =81 All AM medications given and flexiril for complaints of spasms.   
[facility] Orthopaedic Surgery  Progress Note    Breanna Maxwell is a 68 y.o. who is POD#1 following right total knee arthroplasty.  She is lying in bed today.  She reports dizziness.  She has vertigo at baseline.  She also reports itchiness in her hands and feet.  She has had this issue in the past as well.  Reports pain in the right knee that is currently controlled.    Exam:  Blood pressure (!) 151/62, pulse (!) 45, temperature 98 °F (36.7 °C), temperature source Oral, resp. rate 14, height 1.549 m (5' 1\"), weight 79.5 kg (175 lb 4.3 oz), SpO2 95 %.    Appearance: lying in hospital bed, appears to be in no acute distress, awake and alert  Resp: unlabored breathing on room air  Skin: warm, dry and intact with out erythema or significant increased temperature  RLE: Dressing is in place.  Thigh is soft and compressible.  She demonstrates active ankle plantarflexion and dorsiflexion.    Assessment:  Right total knee arthroplasty    Plan:  Activity started  Weight-bear as tolerated extremity  PT/OT  Pain control  Complete perioperative antibiotics  Discontinue Cymbalta and continue to monitor dizziness  DVT prophylaxis: Eliquis 2.5 mg twice daily  Disposition: Home with home health once acute issues are resolved    SHIRA GOODSON MD  7/24/2024     
Report called to Tan, floor nurse at [facility]. IV removed with no complications. Patient packed and ready. Patient denies any questions or concerns at this time. Writer name and number left with individual receiving report, in case any additional questions or concerns may arise.   
[facility] Orthopaedic Surgery  Progress Note    Breanna Maxwell is a 68 y.o. who is POD#2 following right total knee arthroplasty.  She is lying in bed today.  Dizziness is improving.  She also reports itchiness in her hands and feet.    Exam:  Blood pressure (!) 170/98, pulse 82, temperature 98.4 °F (36.9 °C), temperature source Oral, resp. rate 18, height 1.549 m (5' 1\"), weight 79.2 kg (174 lb 9.7 oz), SpO2 94 %.    Appearance: lying in hospital bed, appears to be in no acute distress, awake and alert  Resp: unlabored breathing on room air  Skin: warm, dry and intact with out erythema or significant increased temperature  RLE: Dressing is in place.  Thigh is soft and compressible.  She demonstrates active ankle plantarflexion and dorsiflexion.    Assessment:  Right total knee arthroplasty    Plan:  Activity started  Weight-bear as tolerated extremity  PT/OT  Pain control  DVT prophylaxis: Eliquis 2.5 mg twice daily  Disposition: Home with home health today    SHIRA GOODSON MD  7/25/2024     
Spoke with Dr Sagastume, monitor dizziness and discontinue Cymbalta.  
The patients OARRS report has been reviewed online and any prescribing of pain related medications is based on our findings.The patient has been issued narcotics to safely reduce postoperative pain and promote tolerance with physical therapies and ADL's. Reduction in dosing will be addressed with the next narcotic refill request. Dosing is adjusted for patients with history of chronic pain disorders.    
This RN was notified by bedside RN Brianna that patient is reporting intense itching and already received benadryl and atarax. Patient inquiring if there is any other medication she can have for itching. I sent a message to Dr Sagastume, awaiting response.   
Functional Limits  Hearing  Hearing: Within functional limits      Cognition   Orientation  Overall Orientation Status: Within Functional Limits  Cognition  Overall Cognitive Status: Exceptions  Arousal/Alertness: Appears intact  Following Commands: Appears intact  Attention Span: Appears intact  Safety Judgement: Decreased awareness of need for safety  Problem Solving: Assistance required to generate solutions;Assistance required to implement solutions;Assistance required to identify errors made;Assistance required to correct errors made;Decreased awareness of errors  Insights: Decreased awareness of deficits  Initiation: Does not require cues  Sequencing: Requires cues for some     Objective   Observation/Palpation  Observation: Prineo intact at R knee incision site; bilateral karishma hose (L one adjusted). Patient appears \"zoned out\"/foggy throughout session. This has been her presentation post op.  AROM RLE (degrees)  RLE AROM: WFL  RLE General AROM: except knee grossly 5-75 AAROM  AROM LLE (degrees)  LLE AROM : WFL  Bed mobility  Bed Mobility Comments: In BS chair at start and end of session.  Transfers  Sit to Stand: Contact guard assistance (cues for hand placement. From BS chair x 3, BSC x1.)  Stand to Sit: Contact guard assistance (cues for hand placement.  To BS chair x 2 to BSC.)  Comment: Therapist instructs and demonstrates car transfer technique to patient and fiance. Both verb understanding.  Ambulation  Surface: Level tile  Device: Rolling Walker (Patient's)  Assistance: Contact guard assistance  Quality of Gait: Increased time. Discontinuous, step through pattern. Antalgic at R LE stance phase of gait. Intermittent cues to remain within wh walker base.  Gait Deviations: Decreased step height;Decreased step length  Distance: 20', 40', 10' x 2.  Seated rests between all ambulations.  Comments: High effort throughout.   Patient sits on commode and urinates. Manages briefs and pericare, CGA for steadiness 
washing, rinsing, drying)?: A Little  How much help is needed for toileting (which includes using toilet, bedpan, or urinal)?: A Little  How much help is needed for putting on and taking off regular upper body clothing?: A Little  How much help is needed for taking care of personal grooming?: A Little  How much help for eating meals?: None  AM-[facility] Inpatient Daily Activity Raw Score: 18  AM-PAC Inpatient ADL T-Scale Score : 38.66  ADL Inpatient CMS 0-100% Score: 46.65  ADL Inpatient CMS G-Code Modifier : CK      Goals  Short Term Goals  Time Frame for Short Term Goals: Prior to D/C  Short Term Goal 1: Pt will complete bed mobility sup  Short Term Goal 2: Pt will complete functional mobility/txs using LRAD sup  Short Term Goal 3: Pt will complete toileting sup  Short Term Goal 4: Pt will complete dressing sup  Short Term Goal 5: Pt will complete grooming tasks in stance sinkside sup  Long Term Goals  Time Frame for Long Term Goals : STG = LTG  Patient Goals   Patient goals : to go home       Therapy Time   Individual Concurrent Group Co-treatment   Time In 1340         Time Out 1404         Minutes 24         Timed Code Treatment Minutes: 9 Minutes +15min roberto Lozano, OTR/L [id number]

## 2024-07-29 ENCOUNTER — TELEPHONE (OUTPATIENT)
Dept: ORTHOPEDICS UNIT | Age: 69
End: 2024-07-29

## 2024-07-29 NOTE — TELEPHONE ENCOUNTER
Spoke with Patient regarding post discharge from hospital. At [facility].     Incision status: No drainage, odor, or redness noted    Edema/Swelling/Teds: reports edema noted to operative site and using ice   reports wearing karishma hose as prescribed    Pain level and status:  6/10    Use of pain medications: states taking prescribed pain medication with relief    Blood thinner: Eliquis 2.5mg ; Verified with patient that they are taking their anticoagulant as prescribed twice a day.     Bowels: last bowel movement 7/28 and taking miralax    SNF therapy: Active and continues with therapy    Do you have all of your medications: Yes    Changes in medications: No    No other questions/concerns at this time. Encouraged patient to call Orthopedic Nurse Navigator Elvi Jeronimo or Orthopedic office if has any questions/concerns.      Follow up appointments:    Future Appointments   Date Time Provider Department Center   8/5/2024  1:00 PM Hayden Sagastume MD [facility]   3/12/2025  1:00 PM Dariusz Acosta MD [facility]     Electronically signed by Elvi Jeronimo RN on 7/29/2024 at 4:51 PM

## 2024-08-05 ENCOUNTER — OFFICE VISIT (OUTPATIENT)
Dept: ORTHOPEDIC SURGERY | Age: 69
End: 2024-08-05

## 2024-08-05 DIAGNOSIS — Z96.651 H/O TOTAL KNEE REPLACEMENT, RIGHT: ICD-10-CM

## 2024-08-05 DIAGNOSIS — Z96.651 HISTORY OF TOTAL KNEE REPLACEMENT, RIGHT: Primary | ICD-10-CM

## 2024-08-05 PROCEDURE — 99024 POSTOP FOLLOW-UP VISIT: CPT | Performed by: ORTHOPAEDIC SURGERY

## 2024-08-05 RX ORDER — TIZANIDINE 4 MG/1
4 TABLET ORAL 3 TIMES DAILY
Qty: 60 TABLET | Refills: 0 | Status: SHIPPED | OUTPATIENT
Start: 2024-08-05 | End: 2024-08-25

## 2024-08-05 RX ORDER — OXYCODONE HYDROCHLORIDE 5 MG/1
5-10 TABLET ORAL EVERY 4 HOURS PRN
Qty: 40 TABLET | Refills: 0 | Status: SHIPPED | OUTPATIENT
Start: 2024-08-05 | End: 2024-08-12

## 2024-08-05 NOTE — PROGRESS NOTES
[facility] Orthopaedics and Spine  Office Visit    Chief Complaint: Follow-up s/p right total knee arthroplasty    HPI:  Breanna Maxwell is a 68 y.o. who is here in follow-up of right total knee arthroplasty performed on July 23, 2024.  She is walking with use of walker and is active in home physical therapy.  She is taking oxycodone and tizanidine to help with pain.  She rates pain as up to 6/10.    Exam:  Appearance: sitting in exam room chair, appears to be in no acute distress, awake and alert  Resp: unlabored breathing on room air  Skin: warm, dry and intact with out erythema or significant increased temperature  Neuro: grossly intact both lower extremities. Intact sensation to light touch. Motor exam 4+ to 5/5 in all major motor groups.  Right knee: Incision is healing as expected.  Range of motion is 5 to 100 degrees.  Sensation is intact light touch.  There is brisk capillary refill. There is 5/5 muscle strength in all muscle groups.    Imaging:  3 views of the right knee were performed and reviewed today. Significant for total knee arthroplasty prosthesis in place with no signs of osteolysis, loosening, fracture, or dislocation.    Assessment:  S/p right total knee arthroplasty    Plan:  She is recovering well postoperatively.  She will continue working with home physical therapy and eventually transition to outpatient physical therapy.  Oxycodone and tizanidine refilled today.  She will follow-up in 4 weeks for repeat assessment and range of motion check.    This dictation was done with Dragon dictation and may contain mechanical errors related to translation.

## 2024-08-06 ENCOUNTER — TELEPHONE (OUTPATIENT)
Dept: ORTHOPEDIC SURGERY | Age: 69
End: 2024-08-06

## 2024-08-06 NOTE — TELEPHONE ENCOUNTER
Prescription Refill     Medication Name:  BLOOD THINNER  Pharmacy: [facility]  Patient Contact Number:  [phone]    PATIENT IS CALLING IN SHE HAS THREE PILLS LEFT SHE WANTS TO KNOW IF SHE SHOULD CONTINUE TAKING THIS MEDICATION OR IF SHE SHOULD TAKE ASPIRIN

## 2024-08-13 ENCOUNTER — TELEPHONE (OUTPATIENT)
Dept: ORTHOPEDIC SURGERY | Age: 69
End: 2024-08-13

## 2024-08-13 DIAGNOSIS — Z96.651 HISTORY OF TOTAL KNEE REPLACEMENT, RIGHT: Primary | ICD-10-CM

## 2024-08-14 ENCOUNTER — TELEPHONE (OUTPATIENT)
Dept: ORTHOPEDIC SURGERY | Age: 69
End: 2024-08-14

## 2024-08-14 NOTE — TELEPHONE ENCOUNTER
General Question     Subject: INQUIRY OF DRIVING-PT  Patient and /or Facility Request: [facility]  Contact Number: [phone]      PATIENT STATED THAT AFTER THE APPOINTMENT ON 8/23 SHE IS INQUIRING IF SHE COULD DRIVE HERSELF TO PT.     PLEASE ADVISE

## 2024-08-15 ENCOUNTER — TELEPHONE (OUTPATIENT)
Dept: ORTHOPEDIC SURGERY | Age: 69
End: 2024-08-15

## 2024-08-15 DIAGNOSIS — Z96.651 HISTORY OF TOTAL KNEE REPLACEMENT, RIGHT: Primary | ICD-10-CM

## 2024-08-15 RX ORDER — HYDROCODONE BITARTRATE AND ACETAMINOPHEN 7.5; 325 MG/1; MG/1
1 TABLET ORAL EVERY 6 HOURS PRN
Qty: 28 TABLET | Refills: 0 | Status: SHIPPED | OUTPATIENT
Start: 2024-08-15 | End: 2024-08-22

## 2024-08-15 NOTE — TELEPHONE ENCOUNTER
Other PATIENT HAS CONCERNS WITH MEDICATION OXYCODONE. PATIENT SAYS SHE HAS ITCHING ON ARMS AND LEGS AND SMALL BUMPS ON HER LEGS. REACHED OUT TO TRIAGE TEAM

## 2024-08-16 NOTE — PLAN OF CARE
for 8+ hours without pain or limitations  [] Progressing: [] Met: [] Not Met: [] Adjusted  5. Patient will ambulate with SP cane without pain or gait deviations for 1 hour  [] Progressing: [] Met: [] Not Met: [] Adjusted      TREATMENT PLAN     Frequency/Duration: 2x/week for  12  week for the following treatment interventions:    Interventions:  Therapeutic Exercise (13162) including: strength training, ROM, and functional mobility  Therapeutic Activities (74629) including: functional mobility training and education.  Neuromuscular Re-education (07049) activation and proprioception, including postural re-education.    Gait Training (18888) for normalization of ambulation patterns and AD training.   Manual Therapy (88299) as indicated to include: Passive Range of Motion, Gr I-IV mobilizations, Soft Tissue Mobilization, and Trigger Point Release  Modalities as needed that may include: Cryotherapy and Vasoneumatic Compression  Patient education on joint protection, postural re-education, activity modification, and progression of HEP    Plan: POC initiated as per evaluation    Electronically Signed by Ana Laura Welch PT, DPT  Date: 08/23/2024     Note: Portions of this note have been templated and/or copied from initial evaluation, reassessments and prior notes for documentation efficiency.

## 2024-08-20 DIAGNOSIS — Z96.651 HISTORY OF TOTAL KNEE REPLACEMENT, RIGHT: ICD-10-CM

## 2024-08-20 RX ORDER — TIZANIDINE 4 MG/1
4 TABLET ORAL 3 TIMES DAILY
Qty: 60 TABLET | Refills: 0 | Status: SHIPPED | OUTPATIENT
Start: 2024-08-20 | End: 2024-09-09

## 2024-08-20 RX ORDER — HYDROCODONE BITARTRATE AND ACETAMINOPHEN 7.5; 325 MG/1; MG/1
1 TABLET ORAL EVERY 6 HOURS PRN
Qty: 28 TABLET | Refills: 0 | Status: SHIPPED | OUTPATIENT
Start: 2024-08-20 | End: 2024-08-27

## 2024-08-20 NOTE — TELEPHONE ENCOUNTER
Prescription Refill     Medication Name:  MUSCLE RELAXER/ HYDROCODONE 7.5-325 MG   [facility] [place] [address] [phone]  Patient Contact Number:  [phone]

## 2024-08-23 ENCOUNTER — HOSPITAL ENCOUNTER (OUTPATIENT)
Dept: PHYSICAL THERAPY | Age: 69
Setting detail: THERAPIES SERIES
Discharge: HOME | End: 2024-08-23
Attending: ORTHOPAEDIC SURGERY
Payer: MEDICARE

## 2024-08-23 PROCEDURE — 97530 THERAPEUTIC ACTIVITIES: CPT

## 2024-08-23 PROCEDURE — 97161 PT EVAL LOW COMPLEX 20 MIN: CPT

## 2024-08-23 PROCEDURE — 97110 THERAPEUTIC EXERCISES: CPT

## 2024-08-26 ENCOUNTER — HOSPITAL ENCOUNTER (OUTPATIENT)
Dept: PHYSICAL THERAPY | Age: 69
Setting detail: THERAPIES SERIES
Discharge: HOME | End: 2024-08-26
Attending: ORTHOPAEDIC SURGERY
Payer: MEDICARE

## 2024-08-26 PROCEDURE — 97110 THERAPEUTIC EXERCISES: CPT

## 2024-08-26 PROCEDURE — 97112 NEUROMUSCULAR REEDUCATION: CPT

## 2024-08-26 NOTE — FLOWSHEET NOTE
[facility]- Outpatient Rehabilitation and Therapy [address], [address], [place] [address] office: [phone] fax: [phone]      Physical Therapy: TREATMENT/PROGRESS NOTE   Patient: Breanna Maxwell (68 y.o. female)   Examination Date: 2024   :  1955 MRN: [id number]   Visit #: 2   Insurance Allowable Auth Needed   [place] PC[place] []Yes    []No    Insurance: Payor: [facility] MEDICARE / Plan: [facility] MEDICARE ADVANTAGE / Product Type: *No Product type* /   Insurance ID: [id number] - (Medicare Managed)  Secondary Insurance (if applicable):    Treatment Diagnosis:     ICD-10-CM    1. Pain in joint of right knee  M25.561       2. Decreased strength of lower extremity  R29.898       3. Gait abnormality  R26.9       4. Poor balance  R26.89       5. Decreased ROM of right knee  M25.661       6. Decreased functional mobility  R26.89          Medical Diagnosis:  History of total knee replacement, right [Z96.651]   Referring Physician: Hayden Sagastume MD  PCP: Ginny Costello MD       Plan of care signed (Y/N):     Date of Patient follow up with Physician:      Progress Report/POC: NO  POC update due: (10 visits /OR AUTH LIMITS, whichever is less)  2024                                             Precautions/ Contra-indications:           Latex allergy:  NO  Pacemaker:    NO  Contraindications for Manipulation: NA  Date of Surgery: 24  Other:    Red Flags:  None    C-SSRS Triggered by Intake questionnaire:   Patient answered 'NO' to both behavioral questions on intake.  No further screening warranted    Preferred Language for Healthcare:   [x] English       [] other:    SUBJECTIVE EXAMINATION     Patient stated complaint: Pt notes increased soreness from heel slides using her theraband. She doesn't think she is doing the banded exercises correctly. She returned to driving today and did okay.     Pt is a 67 yo female s/p R TKA performed on 24. 1 month post-op today. Pt

## 2024-08-30 ENCOUNTER — HOSPITAL ENCOUNTER (OUTPATIENT)
Dept: PHYSICAL THERAPY | Age: 69
Setting detail: THERAPIES SERIES
Discharge: HOME | End: 2024-08-30
Attending: ORTHOPAEDIC SURGERY
Payer: MEDICARE

## 2024-08-30 PROCEDURE — 97140 MANUAL THERAPY 1/> REGIONS: CPT

## 2024-08-30 PROCEDURE — 97112 NEUROMUSCULAR REEDUCATION: CPT

## 2024-08-30 PROCEDURE — 97110 THERAPEUTIC EXERCISES: CPT

## 2024-08-30 NOTE — FLOWSHEET NOTE
[facility]- Outpatient Rehabilitation and Therapy [address], [address], [place] [address] office: [phone] fax: [phone]      Physical Therapy: TREATMENT/PROGRESS NOTE   Patient: Breanna Maxwell (68 y.o. female)   Examination Date: 2024   :  1955 MRN: [id number]   Visit #: 3   Insurance Allowable Auth Needed   [place] []Yes    []No    Insurance: Payor: [facility] MEDICARE / Plan: [facility] MEDICARE ADVANTAGE / Product Type: *No Product type* /   Insurance ID: [id number] - (Medicare Managed)  Secondary Insurance (if applicable):    Treatment Diagnosis:     ICD-10-CM    1. Pain in joint of right knee  M25.561       2. Decreased strength of lower extremity  R29.898       3. Gait abnormality  R26.9       4. Poor balance  R26.89       5. Decreased ROM of right knee  M25.661       6. Decreased functional mobility  R26.89          Medical Diagnosis:  History of total knee replacement, right [Z96.651]   Referring Physician: Hayden Sagastume MD  PCP: Ginny Costello MD       Plan of care signed (Y/N):     Date of Patient follow up with Physician:      Progress Report/POC: NO  POC update due: (10 visits /OR AUTH LIMITS, whichever is less)  2024                                             Precautions/ Contra-indications:           Latex allergy:  NO  Pacemaker:    NO  Contraindications for Manipulation: NA  Date of Surgery: 24  Other:    Red Flags:  None    C-SSRS Triggered by Intake questionnaire:   Patient answered 'NO' to both behavioral questions on intake.  No further screening warranted    Preferred Language for Healthcare:   [x] English       [] other:    SUBJECTIVE EXAMINATION     Patient stated complaint: Pt notes increased pain/soreness of her L knee more than her R on occasion. She has tried to use a cane at home, but she feels more unstable with it and would prefer to use nothing. She presents with a walker today.     Pt is a 67 yo female s/p R TKA  - 3 sets - 20 hold  - Hip Abduction with Resistance Loop  - 1 x daily - 7 x weekly - 2 sets - 10 reps  - Hip Extension with Resistance Loop  - 1 x daily - 7 x weekly - 2 sets - 10 reps    ASSESSMENT     Today's Assessment: Patient had fair  tolerance to today's session, reporting improved ROM, muscular fatigue, increased pain, increased soreness, challenge, and difficulty with program completed. Able to progress  intensity, resistance, and exercise difficulty on quad/hip strengthening, ROM, and balance. Continues to display deficits in tightness, stiffness, weakness, decreased dynamic stabilty, decreased NM control, and decreased balance control which required ongoing skilled physical therapy and decision making.  Good tolerance to manual interventions with the exception of knee ext stretching. She is unable to tolerate extension overpressure. Improved tolerance to LAQ this date, and significant challenge with SL balance. Pt needed extensive verbal/visual cuing for proper SP cane use with walking. Opted to use cane on R side considering L knee is bothering her more than her surgical knee. Will progress R knee strengthening/ROM as tolerated.     Medical Necessity Documentation:  I certify that this patient meets the below criteria necessary for medical necessity for care and/or justification of therapy services:  The patient has functional impairments and/or activity limitations and would benefit from continued outpatient therapy services to address the deficits outlined in the patients goals  The patient has a musculoskeletal condition(s) with a corresponding ICD-10 code that is of complexity and severity that require skilled therapeutic intervention. This has a direct and significant impact on the need for therapy and significantly impacts the rate of recovery.       Return to Play: NA    Prognosis for POC: [x] Good [] Fair  [] Poor    Patient requires continued skilled intervention: [x] Yes  [] No      CHARGE

## 2024-09-03 ENCOUNTER — TELEPHONE (OUTPATIENT)
Dept: ORTHOPEDIC SURGERY | Age: 69
End: 2024-09-03

## 2024-09-03 NOTE — TELEPHONE ENCOUNTER
General Question     Subject: LT KNEE INQUIRY   Patient and /or Facility Request: N[facility] RAGSDALE  Contact Number: [phone]    PATIENT CALLED TO SPEAK WITH CLINICAL TO INQUIRE OF LT KNEE.     SHE INQUIRES IF IT WOULD BE OKAY TO RECEIVE A CORTISONE INJECTION IN KNEE BY MARY YOUNG.    PLEASE ADVISE

## 2024-09-04 ENCOUNTER — OFFICE VISIT (OUTPATIENT)
Dept: ORTHOPEDIC SURGERY | Age: 69
End: 2024-09-04
Payer: MEDICARE

## 2024-09-04 ENCOUNTER — HOSPITAL ENCOUNTER (OUTPATIENT)
Dept: PHYSICAL THERAPY | Age: 69
Setting detail: THERAPIES SERIES
Discharge: HOME | End: 2024-09-04
Attending: ORTHOPAEDIC SURGERY
Payer: MEDICARE

## 2024-09-04 DIAGNOSIS — M17.12 ARTHRITIS OF LEFT KNEE: Primary | ICD-10-CM

## 2024-09-04 PROCEDURE — 20610 DRAIN/INJ JOINT/BURSA W/O US: CPT | Performed by: PHYSICIAN ASSISTANT

## 2024-09-04 PROCEDURE — 99213 OFFICE O/P EST LOW 20 MIN: CPT | Performed by: PHYSICIAN ASSISTANT

## 2024-09-04 PROCEDURE — 97112 NEUROMUSCULAR REEDUCATION: CPT

## 2024-09-04 PROCEDURE — 1123F ACP DISCUSS/DSCN MKR DOCD: CPT | Performed by: PHYSICIAN ASSISTANT

## 2024-09-04 PROCEDURE — 97110 THERAPEUTIC EXERCISES: CPT

## 2024-09-04 RX ORDER — TRIAMCINOLONE ACETONIDE 40 MG/ML
40 INJECTION, SUSPENSION INTRA-ARTICULAR; INTRAMUSCULAR ONCE
Status: COMPLETED | OUTPATIENT
Start: 2024-09-04 | End: 2024-09-04

## 2024-09-04 RX ORDER — BUPIVACAINE HYDROCHLORIDE 2.5 MG/ML
2 INJECTION, SOLUTION INFILTRATION; PERINEURAL ONCE
Status: COMPLETED | OUTPATIENT
Start: 2024-09-04 | End: 2024-09-04

## 2024-09-04 RX ADMIN — TRIAMCINOLONE ACETONIDE 40 MG: 40 INJECTION, SUSPENSION INTRA-ARTICULAR; INTRAMUSCULAR at 13:17

## 2024-09-04 RX ADMIN — BUPIVACAINE HYDROCHLORIDE 5 MG: 2.5 INJECTION, SOLUTION INFILTRATION; PERINEURAL at 13:17

## 2024-09-04 NOTE — FLOWSHEET NOTE
minutes # CPT Code (UNTIMED) #     Therex (55239)  30 2  EVAL:LOW (14337 - Typically 20 minutes face-to-face)     Neuromusc. Re-ed (03453) 8 1  Re-Eval (58919)     Manual (98557)    Estim Unattended (96978)     Ther. Act (58447)    [facility] T[facility] (22598)     Gait (92490)    Dry Needle 1-2 muscle (71557)     Aquatic Therex (55125)    Dry Needle 3+ muscle (20561)     Iontophoresis (70466)    VASO (62061)     Ultrasound (16592)    Group Therapy (08760)     Estim Attended (82621)    Canalith Repositioning (03795)     Other:    Other:    Total Timed Code Tx Minutes 38 3       Total Treatment Minutes 38        Charge Justification:  (81402) THERAPEUTIC EXERCISE - Provided verbal/tactile cueing for activities related to strengthening, flexibility, endurance, ROM performed to prevent loss of range of motion, maintain or improve muscular strength or increase flexibility, following either an injury or surgery.   (37660) NEUROMUSCULAR RE-EDUCATION - Therapeutic procedure, 1 or more areas, each 15 minutes; neuromuscular reeducation of movement, balance, coordination, kinesthetic sense, posture, and/or proprioception for sitting and/or standing activities       GOALS   GOALS:  Patient stated goal:\"Exercise\"  [] Progressing: [] Met: [] Not Met: [] Adjusted     Therapist goals for Patient:   Short Term Goals: To be achieved in: 2 weeks  1. Independent in HEP and progression per patient tolerance, in order to prevent re-injury.   [] Progressing: [] Met: [] Not Met: [] Adjusted  2. Patient will have a decrease in pain to <1/10 to facilitate improvement in movement, function, and ADLs as indicated by Functional Deficits.  [] Progressing: [] Met: [] Not Met: [] Adjusted     Long Term Goals: To be achieved in: 12 weeks  1. Disability index score of 27/96 or less for the WOMAC to assist with reaching prior level of function with activities such as ADLs/IADLS.  [] Progressing: [] Met: [] Not Met: [] Adjusted  2. Patient will demonstrate

## 2024-09-05 ENCOUNTER — TELEPHONE (OUTPATIENT)
Dept: ORTHOPEDIC SURGERY | Age: 69
End: 2024-09-05

## 2024-09-05 DIAGNOSIS — Z96.651 HISTORY OF TOTAL KNEE REPLACEMENT, RIGHT: ICD-10-CM

## 2024-09-05 RX ORDER — HYDROCODONE BITARTRATE AND ACETAMINOPHEN 7.5; 325 MG/1; MG/1
1 TABLET ORAL EVERY 8 HOURS PRN
Qty: 21 TABLET | Refills: 0 | Status: SHIPPED | OUTPATIENT
Start: 2024-09-05 | End: 2024-09-12

## 2024-09-05 NOTE — TELEPHONE ENCOUNTER
Prescription Refill     Medication Name:  PAIN MED - DOWN TO 3 TABS    Pharmacy: [id number] [place] [address] [address] [phone] - F [phone]     Patient Contact Number:   [phone]

## 2024-09-05 NOTE — TELEPHONE ENCOUNTER
General Question     Subject: COVID   Patient and /or Facility Request: Breanna Maxwell   Contact Number: [phone]     PATIENT CALLED WANTING TO CLARIFY IF IT WOULD BE OKAY IF SHE GOT A COVID SHOT TOMORROW     PLEASE CALL PATIENT BACK AT THE ABOVE NUMBER TO FURTHER ASSIST

## 2024-09-06 ENCOUNTER — HOSPITAL ENCOUNTER (OUTPATIENT)
Dept: PHYSICAL THERAPY | Age: 69
Setting detail: THERAPIES SERIES
Discharge: HOME | End: 2024-09-06
Attending: ORTHOPAEDIC SURGERY
Payer: MEDICARE

## 2024-09-06 PROCEDURE — 97110 THERAPEUTIC EXERCISES: CPT

## 2024-09-06 PROCEDURE — 97112 NEUROMUSCULAR REEDUCATION: CPT

## 2024-09-06 NOTE — FLOWSHEET NOTE
such as ADLs/IADLS.  [] Progressing: [] Met: [] Not Met: [] Adjusted  2. Patient will demonstrate increased AROM of R knee flexion to 110 without pain to allow for proper joint functioning to enable patient to descend 11 stairs reciprocally.   [] Progressing: [] Met: [] Not Met: [] Adjusted  3. 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   [] Progressing: [] Met: [] Not Met: [] Adjusted  4. Patient will return to sleeping for 8+ hours without pain or limitations  [] Progressing: [] Met: [] Not Met: [] Adjusted  5. Patient will ambulate with SP cane without pain or gait deviations for 1 hour  [] Progressing: [] Met: [] Not Met: [] Adjusted      TREATMENT PLAN     Frequency/Duration: 2x/week for  12  week for the following treatment interventions:    Interventions:  Therapeutic Exercise (55258) including: strength training, ROM, and functional mobility  Therapeutic Activities (81998) including: functional mobility training and education.  Neuromuscular Re-education (76607) activation and proprioception, including postural re-education.    Gait Training (25747) for normalization of ambulation patterns and AD training.   Manual Therapy (05064) as indicated to include: Passive Range of Motion, Gr I-IV mobilizations, Soft Tissue Mobilization, and Trigger Point Release  Modalities as needed that may include: Cryotherapy and Vasoneumatic Compression  Patient education on joint protection, postural re-education, activity modification, and progression of HEP    Plan: Cont POC- Continue emphasis/focus on exercise progression, improving proper muscle recruitment and activation/motor control patterns, modulating pain, promoting relaxation, increasing ROM, reduce/eliminate soft tissue swelling/inflammation/restriction, improving soft tissue

## 2024-09-09 ENCOUNTER — OFFICE VISIT (OUTPATIENT)
Dept: ORTHOPEDIC SURGERY | Age: 69
End: 2024-09-09

## 2024-09-09 ENCOUNTER — HOSPITAL ENCOUNTER (OUTPATIENT)
Dept: PHYSICAL THERAPY | Age: 69
Setting detail: THERAPIES SERIES
End: 2024-09-09
Attending: ORTHOPAEDIC SURGERY
Payer: MEDICARE

## 2024-09-09 VITALS — HEIGHT: 61 IN | WEIGHT: 174 LBS | BODY MASS INDEX: 32.85 KG/M2

## 2024-09-09 DIAGNOSIS — Z96.651 HISTORY OF TOTAL KNEE REPLACEMENT, RIGHT: Primary | ICD-10-CM

## 2024-09-09 PROCEDURE — 99024 POSTOP FOLLOW-UP VISIT: CPT | Performed by: ORTHOPAEDIC SURGERY

## 2024-09-11 ENCOUNTER — HOSPITAL ENCOUNTER (OUTPATIENT)
Dept: PHYSICAL THERAPY | Age: 69
Setting detail: THERAPIES SERIES
Discharge: HOME | End: 2024-09-11
Attending: ORTHOPAEDIC SURGERY
Payer: MEDICARE

## 2024-09-11 ENCOUNTER — TELEPHONE (OUTPATIENT)
Dept: ORTHOPEDIC SURGERY | Age: 69
End: 2024-09-11

## 2024-09-11 PROCEDURE — 97112 NEUROMUSCULAR REEDUCATION: CPT

## 2024-09-11 PROCEDURE — 97110 THERAPEUTIC EXERCISES: CPT

## 2024-09-16 ENCOUNTER — TELEPHONE (OUTPATIENT)
Dept: ORTHOPEDIC SURGERY | Age: 69
End: 2024-09-16

## 2024-09-16 ENCOUNTER — HOSPITAL ENCOUNTER (OUTPATIENT)
Dept: PHYSICAL THERAPY | Age: 69
Setting detail: THERAPIES SERIES
Discharge: HOME | End: 2024-09-16
Attending: ORTHOPAEDIC SURGERY
Payer: MEDICARE

## 2024-09-16 ENCOUNTER — PREP FOR PROCEDURE (OUTPATIENT)
Dept: ORTHOPEDIC SURGERY | Age: 69
End: 2024-09-16

## 2024-09-16 DIAGNOSIS — Z96.651 HISTORY OF TOTAL KNEE REPLACEMENT, RIGHT: ICD-10-CM

## 2024-09-16 DIAGNOSIS — M17.12 ARTHRITIS OF LEFT KNEE: Primary | ICD-10-CM

## 2024-09-16 PROCEDURE — 97112 NEUROMUSCULAR REEDUCATION: CPT

## 2024-09-16 PROCEDURE — 97110 THERAPEUTIC EXERCISES: CPT

## 2024-09-16 RX ORDER — HYDROCODONE BITARTRATE AND ACETAMINOPHEN 7.5; 325 MG/1; MG/1
1 TABLET ORAL 2 TIMES DAILY
Qty: 14 TABLET | Refills: 0 | Status: SHIPPED | OUTPATIENT
Start: 2024-09-16 | End: 2024-09-23

## 2024-09-18 ENCOUNTER — HOSPITAL ENCOUNTER (OUTPATIENT)
Dept: PHYSICAL THERAPY | Age: 69
Setting detail: THERAPIES SERIES
Discharge: HOME | End: 2024-09-18
Attending: ORTHOPAEDIC SURGERY
Payer: MEDICARE

## 2024-09-18 PROCEDURE — 97110 THERAPEUTIC EXERCISES: CPT

## 2024-09-18 PROCEDURE — 97112 NEUROMUSCULAR REEDUCATION: CPT

## 2024-09-23 ENCOUNTER — HOSPITAL ENCOUNTER (OUTPATIENT)
Dept: PHYSICAL THERAPY | Age: 69
Setting detail: THERAPIES SERIES
Discharge: HOME | End: 2024-09-23
Attending: ORTHOPAEDIC SURGERY
Payer: MEDICARE

## 2024-09-23 DIAGNOSIS — Z96.651 HISTORY OF TOTAL KNEE REPLACEMENT, RIGHT: ICD-10-CM

## 2024-09-23 PROCEDURE — 97110 THERAPEUTIC EXERCISES: CPT

## 2024-09-23 PROCEDURE — 97530 THERAPEUTIC ACTIVITIES: CPT

## 2024-09-23 RX ORDER — HYDROCODONE BITARTRATE AND ACETAMINOPHEN 7.5; 325 MG/1; MG/1
1 TABLET ORAL 2 TIMES DAILY
Qty: 14 TABLET | Refills: 0 | Status: SHIPPED | OUTPATIENT
Start: 2024-09-23 | End: 2024-09-30

## 2024-09-25 ENCOUNTER — HOSPITAL ENCOUNTER (OUTPATIENT)
Dept: PHYSICAL THERAPY | Age: 69
Setting detail: THERAPIES SERIES
Discharge: HOME | End: 2024-09-25
Attending: ORTHOPAEDIC SURGERY
Payer: MEDICARE

## 2024-09-25 PROCEDURE — 97112 NEUROMUSCULAR REEDUCATION: CPT

## 2024-09-25 PROCEDURE — 97110 THERAPEUTIC EXERCISES: CPT

## 2024-09-26 ENCOUNTER — OFFICE VISIT (OUTPATIENT)
Dept: ORTHOPEDIC SURGERY | Age: 69
End: 2024-09-26
Payer: MEDICARE

## 2024-09-26 ENCOUNTER — TELEPHONE (OUTPATIENT)
Dept: CARDIOLOGY CLINIC | Age: 69
End: 2024-09-26

## 2024-09-26 DIAGNOSIS — M17.11 PRIMARY OSTEOARTHRITIS OF RIGHT KNEE: Primary | ICD-10-CM

## 2024-09-26 PROBLEM — M17.12 DEGENERATIVE ARTHRITIS OF LEFT KNEE: Status: ACTIVE | Noted: 2024-09-16

## 2024-09-26 PROCEDURE — 99213 OFFICE O/P EST LOW 20 MIN: CPT | Performed by: ORTHOPAEDIC SURGERY

## 2024-09-26 PROCEDURE — 1123F ACP DISCUSS/DSCN MKR DOCD: CPT | Performed by: ORTHOPAEDIC SURGERY

## 2024-09-30 ENCOUNTER — HOSPITAL ENCOUNTER (OUTPATIENT)
Dept: PHYSICAL THERAPY | Age: 69
Setting detail: THERAPIES SERIES
Discharge: HOME | End: 2024-09-30
Attending: ORTHOPAEDIC SURGERY
Payer: MEDICARE

## 2024-09-30 DIAGNOSIS — Z96.651 HISTORY OF TOTAL KNEE REPLACEMENT, RIGHT: ICD-10-CM

## 2024-09-30 PROCEDURE — 97112 NEUROMUSCULAR REEDUCATION: CPT

## 2024-09-30 PROCEDURE — 97110 THERAPEUTIC EXERCISES: CPT

## 2024-09-30 RX ORDER — HYDROCODONE BITARTRATE AND ACETAMINOPHEN 7.5; 325 MG/1; MG/1
1 TABLET ORAL 2 TIMES DAILY
Qty: 14 TABLET | Refills: 0 | Status: SHIPPED | OUTPATIENT
Start: 2024-09-30 | End: 2024-10-07

## 2024-09-30 NOTE — FLOWSHEET NOTE
Device Used: no AD    Balance:  [x] WNL      [] NT       [x] Dysfunction noted  Comment:     Falls Risk Assessment (30 days):   Falls Risk assessed and no intervention required.  Time Up and Go (TUG):   Not Assessed     Exercises/Interventions      Knee flex Knee ext   Eval 8/23 92 Lacking 8   8/26 105 Lacking 6   8/30  Lacking 5   9/4 104    9/6 113 at steps Lacking 5 with OP   9/16 114 Lacking 4   9/18  Lacking 3 actively                 Therapeutic Ex (85066)  resistance Sets/time Reps Notes/Cues/Progressions   Nu step  5 min  L5   Heel slides w/ strap      LAQ     Seated HS stretch      Calf stretch at incline board     Knee flexion at steps     Leg press- RLE only #55 2 15    Quantum ext  HS curls #20   #20 SL 2  2 10  10 Up with 2, down with 1   Standing hip abd/ext Green versaloop 2 10 B for each    KB squats #12 2 10                         Manual Intervention (06608)  TIME     TFJ PA/AP mobs- grade 3      Patellar mobs- lateral sup/inf- grade 3      Heel slide OP      Knee extension OP   Poor tolerance          NMR re-education (10455) resistance Sets/time Reps CUES NEEDED   Quad set with heel prop + manual OP    SLR- flex #1 3 10    Step and over Slow and controlled with min UE assist   Heel taps- lateral/6'' box 2 10 each    Slider lunges- posterior 2 10  LLE on slider   Ambulation practice without AD   VC for forward progression of cane and L foot, cane on R side due to increased L knee pain   SL balance  30'' x 3 R   Fingertips          Therapeutic Activity (33462)  Sets/time     Patient education/PN   PN deficits and improvement since eval, Improvements since PREHAB eval, cont strengthening prior to L TKA                                   Modalities:    No modalities applied this session    Education/Home Exercise Program: Patient HEP program created electronically.  Refer to [employer] access code:    Access Code: [email or web address]  URL: [email or web address]  Date: 08/23/2024  Prepared by: Ana Laura

## 2024-09-30 NOTE — TELEPHONE ENCOUNTER
Prescription Refill     Medication Name:  Hydrocodone- Acetaminophen   Pharmacy: [facility] [address] [place] [address] [place] [address]  Patient Contact Number:  [phone]

## 2024-10-02 ENCOUNTER — HOSPITAL ENCOUNTER (OUTPATIENT)
Dept: PHYSICAL THERAPY | Age: 69
Setting detail: THERAPIES SERIES
Discharge: HOME | End: 2024-10-02
Attending: ORTHOPAEDIC SURGERY
Payer: MEDICARE

## 2024-10-02 PROCEDURE — 97110 THERAPEUTIC EXERCISES: CPT

## 2024-10-02 PROCEDURE — 97112 NEUROMUSCULAR REEDUCATION: CPT

## 2024-10-02 NOTE — FLOWSHEET NOTE
HEP    Plan: Cont POC- Continue emphasis/focus on exercise progression, improving proper muscle recruitment and activation/motor control patterns, modulating pain, promoting relaxation, increasing ROM, reduce/eliminate soft tissue swelling/inflammation/restriction, improving soft tissue extensibility, allowing for proper ROM, static and dynamic balance, and kinesthetic sense and proprioception. Next visit plan to progress weights, progress reps, add new exercises, and adjust HEP     Cont knee ext/flexion ROM, Progress RLE strengthening    Electronically Signed by Ana Laura Welch PT, DPT  Date: 10/02/2024     Note: Portions of this note have been templated and/or copied from initial evaluation, reassessments and prior notes for documentation efficiency.

## 2024-10-07 ENCOUNTER — HOSPITAL ENCOUNTER (OUTPATIENT)
Dept: PHYSICAL THERAPY | Age: 69
Setting detail: THERAPIES SERIES
Discharge: HOME | End: 2024-10-07
Attending: ORTHOPAEDIC SURGERY
Payer: MEDICARE

## 2024-10-07 ENCOUNTER — TELEPHONE (OUTPATIENT)
Dept: ORTHOPEDIC SURGERY | Age: 69
End: 2024-10-07

## 2024-10-07 DIAGNOSIS — Z96.651 HISTORY OF TOTAL KNEE REPLACEMENT, RIGHT: ICD-10-CM

## 2024-10-07 PROCEDURE — 97110 THERAPEUTIC EXERCISES: CPT

## 2024-10-07 PROCEDURE — 97112 NEUROMUSCULAR REEDUCATION: CPT

## 2024-10-07 RX ORDER — HYDROCODONE BITARTRATE AND ACETAMINOPHEN 7.5; 325 MG/1; MG/1
1 TABLET ORAL 2 TIMES DAILY
Qty: 14 TABLET | Refills: 0 | Status: SHIPPED | OUTPATIENT
Start: 2024-10-07 | End: 2024-10-14

## 2024-10-07 NOTE — TELEPHONE ENCOUNTER
Prescription Refill     Medication Name:  HYDROCODONE AND MUSCLE RELAXER  Pharmacy: [facility]  Patient Contact Number:  [phone]

## 2024-10-07 NOTE — FLOWSHEET NOTE
sleeping (5-6 hours). Pt able to navigate stairs leading with L up and R down. She walks without the walker at the house at times with walking using furniture. Uses the walker when she leaves the house. She has started driving again. She can sit for 30 minutes before needed to change position. She can walk 10 minutes before she needs to take a break.      Test used Initial score  PREHAB 4/29/24 Post-op eval  8/23/24 10/07/2024     Pain Summary VAS 8-9/10 with stairs, 0/10 currently 4/10 consistent 0/10 R  5/10 L   Functional questionnaire WOMAC 54/96 57/96 41/96   Other:                Pain:  Pain location: Global knee  Patient describes pain to be intermittent and Sharp  Pain decreases with: Sitting, Resting, and Medication- hydrocodone, muscle relaxers as needed  Pain increases with: Activity and Movement, Standing, Prolonged standing, Walking, Prolonged walking, Prolonged sitting, and Impact     Relevant Medical History:   Medical History    Diagnosis Date Comment Source   Arthritis      CAD (coronary artery disease)  \"blocked artery\"  stents placed    Diabetes mellitus (HCC)      Hyperlipidemia      Hypertension         Other Medical History    Diagnosis Date Comment Source   Constipation  due to slow movement through bowels    Depression      GERD (gastroesophageal reflux disease)      History of coronary artery stent placement      Vertigo      Vitamin D deficiency, unspecified             Living Status: Lives with rae  Will you have someone available to stay with and care for you after surgery? intermittent care by rae  Comments:   How may stairs will you have to climb to get in to your place of residence? multiple flights with railing on both sides  Stairs within home: Multiple flights  Do you have a first floor bathroom? No  Do you have a place to sleep on the first floor? No  Do you use ambulatory aids? None  What DME is available? Raised toilet, shower handles    Occupation/School:  Work/School

## 2024-10-09 ENCOUNTER — HOSPITAL ENCOUNTER (OUTPATIENT)
Dept: PHYSICAL THERAPY | Age: 69
Setting detail: THERAPIES SERIES
Discharge: HOME | End: 2024-10-09
Attending: ORTHOPAEDIC SURGERY
Payer: MEDICARE

## 2024-10-09 PROCEDURE — 97110 THERAPEUTIC EXERCISES: CPT

## 2024-10-09 PROCEDURE — 97112 NEUROMUSCULAR REEDUCATION: CPT

## 2024-10-09 NOTE — FLOWSHEET NOTE
Adjusted  2. Patient will have a decrease in pain to <1/10 to facilitate improvement in movement, function, and ADLs as indicated by Functional Deficits.  [x] Progressing: [] Met: [] Not Met: [] Adjusted     Long Term Goals: To be achieved in: 12 weeks  1. Disability index score of 27/96 or less for the WOMAC to assist with reaching prior level of function with activities such as ADLs/IADLS.  [] Progressing: [] Met: [] Not Met: [] Adjusted  2. Patient will demonstrate increased AROM of R knee flexion to 110 without pain to allow for proper joint functioning to enable patient to descend 11 stairs reciprocally.   [] Progressing: [x] Met: [] Not Met: [] Adjusted  3. 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   [x] Progressing: [] Met: [] Not Met: [] Adjusted  4. Patient will return to sleeping for 8+ hours without pain or limitations  [x] Progressing: [] Met: [] Not Met: [] Adjusted  5. Patient will ambulate with SP cane without pain or gait deviations for 1 hour  [] Progressing: [x] Met: [] Not Met: [] Adjusted      TREATMENT PLAN     Frequency/Duration: 2x/week for  12  week for the following treatment interventions:    Interventions:  Therapeutic Exercise (23647) including: strength training, ROM, and functional mobility  Therapeutic Activities (51192) including: functional mobility training and education.  Neuromuscular Re-education (90430) activation and proprioception, including postural re-education.    Gait Training (41434) for normalization of ambulation patterns and AD training.   Manual Therapy (08259) as indicated to include: Passive Range of Motion, Gr I-IV mobilizations, Soft Tissue Mobilization, and Trigger Point Release  Modalities as needed that may include: Cryotherapy and Vasoneumatic Compression  Patient education on

## 2024-10-14 ENCOUNTER — HOSPITAL ENCOUNTER (OUTPATIENT)
Dept: PHYSICAL THERAPY | Age: 69
Setting detail: THERAPIES SERIES
Discharge: HOME | End: 2024-10-14
Attending: ORTHOPAEDIC SURGERY
Payer: MEDICARE

## 2024-10-14 ENCOUNTER — HOSPITAL ENCOUNTER (OUTPATIENT)
Dept: CT IMAGING | Age: 69
Discharge: HOME | End: 2024-10-14
Attending: ORTHOPAEDIC SURGERY
Payer: MEDICARE

## 2024-10-14 DIAGNOSIS — Z96.651 HISTORY OF TOTAL KNEE REPLACEMENT, RIGHT: ICD-10-CM

## 2024-10-14 DIAGNOSIS — M17.12 ARTHRITIS OF LEFT KNEE: ICD-10-CM

## 2024-10-14 PROCEDURE — 97112 NEUROMUSCULAR REEDUCATION: CPT

## 2024-10-14 PROCEDURE — 97110 THERAPEUTIC EXERCISES: CPT

## 2024-10-14 PROCEDURE — 73700 CT LOWER EXTREMITY W/O DYE: CPT

## 2024-10-14 RX ORDER — HYDROCODONE BITARTRATE AND ACETAMINOPHEN 7.5; 325 MG/1; MG/1
1 TABLET ORAL DAILY
Qty: 7 TABLET | Refills: 0 | Status: SHIPPED | OUTPATIENT
Start: 2024-10-14 | End: 2024-10-21

## 2024-10-14 NOTE — TELEPHONE ENCOUNTER
Prescription Refill     Medication Name: Hydrocodone-Acetaminophen     2. Tizanidine 4 mg     Pharmacy: [facility] [address] [place] [address]  Patient Contact Number:  [phone]

## 2024-10-14 NOTE — FLOWSHEET NOTE
order to prevent re-injury.   [] Progressing: [x] Met: [] Not Met: [] Adjusted  2. Patient will have a decrease in pain to <1/10 to facilitate improvement in movement, function, and ADLs as indicated by Functional Deficits.  [x] Progressing: [] Met: [] Not Met: [] Adjusted     Long Term Goals: To be achieved in: 12 weeks  1. Disability index score of 27/96 or less for the WOMAC to assist with reaching prior level of function with activities such as ADLs/IADLS.  [] Progressing: [] Met: [] Not Met: [] Adjusted  2. Patient will demonstrate increased AROM of R knee flexion to 110 without pain to allow for proper joint functioning to enable patient to descend 11 stairs reciprocally.   [] Progressing: [x] Met: [] Not Met: [] Adjusted  3. 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   [x] Progressing: [] Met: [] Not Met: [] Adjusted  4. Patient will return to sleeping for 8+ hours without pain or limitations  [x] Progressing: [] Met: [] Not Met: [] Adjusted  5. Patient will ambulate with SP cane without pain or gait deviations for 1 hour  [] Progressing: [x] Met: [] Not Met: [] Adjusted      TREATMENT PLAN     Frequency/Duration: 2x/week for  12  week for the following treatment interventions:    Interventions:  Therapeutic Exercise (92171) including: strength training, ROM, and functional mobility  Therapeutic Activities (97471) including: functional mobility training and education.  Neuromuscular Re-education (85641) activation and proprioception, including postural re-education.    Gait Training (33713) for normalization of ambulation patterns and AD training.   Manual Therapy (50799) as indicated to include: Passive Range of Motion, Gr I-IV mobilizations, Soft Tissue Mobilization, and Trigger Point Release  Modalities as needed that may

## 2024-10-15 ENCOUNTER — TELEPHONE (OUTPATIENT)
Dept: ORTHOPEDIC SURGERY | Age: 69
End: 2024-10-15

## 2024-10-15 NOTE — TELEPHONE ENCOUNTER
General Question     Subject: JET CLASS   Patient and /or Facility Request: Breanna Maxwell   Contact Number: [phone]     PATIENT CALLED WANTING TO KNOW WHEN THE JET CLASS WILL BE PERTAINING TO HER SURGERY SHE HAS SCHEDULED ON 12/10/24    PLEASE CALL PATIENT BACK AT THE ABOVE NUMBER TO FURTHER ASSIST

## 2024-10-16 ENCOUNTER — HOSPITAL ENCOUNTER (OUTPATIENT)
Dept: PHYSICAL THERAPY | Age: 69
Setting detail: THERAPIES SERIES
Discharge: HOME | End: 2024-10-16
Attending: ORTHOPAEDIC SURGERY
Payer: MEDICARE

## 2024-10-16 PROCEDURE — 97112 NEUROMUSCULAR REEDUCATION: CPT

## 2024-10-16 PROCEDURE — 97110 THERAPEUTIC EXERCISES: CPT

## 2024-10-16 NOTE — FLOWSHEET NOTE
[facility]- Outpatient Rehabilitation and Therapy [address], [address], [place] [address] office: [phone] fax: [phone]      Physical Therapy: TREATMENT/PROGRESS NOTE   Patient: Breanna Maxwell (68 y.o. female)   Examination Date: 10/16/2024   :  1955 MRN: [id number]   Visit #:   Insurance Allowable Auth Needed   Approval dates:24-24  Approved visits:24  []Yes    []No    Insurance: Payor: [facility] MEDICARE / Plan: [facility] MEDICARE ADVANTAGE / Product Type: *No Product type* /   Insurance ID: [id number] - (Medicare Managed)  Secondary Insurance (if applicable):    Treatment Diagnosis:     ICD-10-CM    1. Pain in joint of right knee  M25.561       2. Decreased strength of lower extremity  R29.898       3. Gait abnormality  R26.9       4. Poor balance  R26.89       5. Decreased ROM of right knee  M25.661       6. Decreased functional mobility  R26.89          Medical Diagnosis:  History of total knee replacement, right [Z96.651]   Referring Physician: Hayden Sagastume MD  PCP: Ginny Costello MD       Plan of care signed (Y/N):     Date of Patient follow up with Physician:      Progress Report/POC: NO  POC update due: (10 visits /OR AUTH LIMITS, whichever is less)  10/23/2024                                             Precautions/ Contra-indications:           Latex allergy:  NO  Pacemaker:    NO  Contraindications for Manipulation: NA  Date of Surgery: 24  Other:    Red Flags:  None    C-SSRS Triggered by Intake questionnaire:   Patient answered 'NO' to both behavioral questions on intake.  No further screening warranted    Preferred Language for Healthcare:   [x] English       [] other:    SUBJECTIVE EXAMINATION     Patient stated complaint: Pt states she believes the weather is making her pain worse.     Pt is a 69 yo female s/p R TKA performed on 24. 1 month post-op today. Pt notes increased difficulty with sleeping (5-6 hours). Pt able

## 2024-10-21 ENCOUNTER — OFFICE VISIT (OUTPATIENT)
Dept: ORTHOPEDIC SURGERY | Age: 69
End: 2024-10-21

## 2024-10-21 ENCOUNTER — HOSPITAL ENCOUNTER (OUTPATIENT)
Dept: PHYSICAL THERAPY | Age: 69
Setting detail: THERAPIES SERIES
Discharge: HOME | End: 2024-10-21
Attending: ORTHOPAEDIC SURGERY
Payer: MEDICARE

## 2024-10-21 DIAGNOSIS — Z96.651 HISTORY OF TOTAL KNEE REPLACEMENT, RIGHT: Primary | ICD-10-CM

## 2024-10-21 PROCEDURE — 97112 NEUROMUSCULAR REEDUCATION: CPT

## 2024-10-21 PROCEDURE — 99024 POSTOP FOLLOW-UP VISIT: CPT | Performed by: ORTHOPAEDIC SURGERY

## 2024-10-21 PROCEDURE — 97110 THERAPEUTIC EXERCISES: CPT

## 2024-10-21 RX ORDER — HYDROCODONE BITARTRATE AND ACETAMINOPHEN 7.5; 325 MG/1; MG/1
1 TABLET ORAL DAILY
Qty: 7 TABLET | Refills: 0 | Status: SHIPPED | OUTPATIENT
Start: 2024-10-21 | End: 2024-10-28

## 2024-10-21 NOTE — FLOWSHEET NOTE
R  Assistive Device Used: no AD    Balance:  [x] WNL      [] NT       [x] Dysfunction noted  Comment:     Falls Risk Assessment (30 days):   Falls Risk assessed and no intervention required.  Time Up and Go (TUG):   Not Assessed     Exercises/Interventions      Knee flex Knee ext   Eval 8/23 92 Lacking 8   8/26 105 Lacking 6   8/30  Lacking 5   9/4 104    9/6 113 at steps Lacking 5 with OP   9/16 114 Lacking 4   9/18  Lacking 3 actively                 Therapeutic Ex (44136)  resistance Sets/time Reps Notes/Cues/Progressions   Nu step  5 min  L7, to prevent pain of L knee   Heel slides w/ strap      LAQ      HS stretch at stairs  30'' x 2     Calf stretch at incline board     Knee flexion at steps  10'' x 10    Leg press- RLE only #65 2 15    Quantum ext  HS curls #20 DL  #25 SL 2  2 15  15    Standing hip abd/ext Green versaloop 3 10 B for each    KB squats #20 3 10                         Manual Intervention (87071)  TIME     TFJ PA/AP mobs- grade 3      Patellar mobs- lateral sup/inf- grade 3      Heel slide OP      Knee extension OP   Poor tolerance          NMR re-education (71526) resistance Sets/time Reps CUES NEEDED   Quad set with heel prop + manual OP    SLR- flex    Step and over Slow and controlled with min UE assist   Heel taps- lateral/6'' box 2 10 each    Slider lunges- posterior LLE on slider   Ambulation practice without AD   VC for forward progression of cane and L foot, cane on R side due to increased L knee pain   Sport cord marching- 4 ways  20 each     SL balance  30'' x 3 R   Fingertips          Therapeutic Activity (81701)  Sets/time     Patient education/PN   PN deficits and improvement since eval, Improvements since PREHAB eval, cont strengthening prior to L TKA                                   Modalities:    No modalities applied this session    Education/Home Exercise Program: Patient HEP program created electronically.  Refer to [employer] access code:    Access Code: [email or web address]  URL:

## 2024-10-21 NOTE — PROGRESS NOTES
[facility]  Office Visit    Chief Complaint: Follow-up s/p right total knee arthroplasty    HPI:  Breanna Maxwell is a 68 y.o. who is here in follow-up of right total knee arthroplasty performed on July 23, 2024.  She is finishing physical therapy this week.  She walks without assistive device. She rates pain in the right knee as 4/10.    Exam:  Appearance: sitting in exam room chair, appears to be in no acute distress, awake and alert  Resp: unlabored breathing on room air  Skin: warm, dry and intact with out erythema or significant increased temperature  Neuro: grossly intact both lower extremities. Intact sensation to light touch. Motor exam 4+ to 5/5 in all major motor groups.  Right knee: Incision is healed.  Range of motion is 0-120 degrees.  Sensation is intact light touch.  There is brisk capillary refill. There is 5/5 muscle strength in all muscle groups.    Imaging:  3 views of the right knee were performed and reviewed today. Significant for total knee arthroplasty prosthesis in place with no signs of osteolysis, loosening, fracture, or dislocation.     Assessment:  S/p right total knee arthroplasty    Plan:  She is recovering well postoperatively.  She will continue working with physical therapy and transition to self-directed exercises.  She will wean off of pain medication and today was her last prescription for this.  She will follow-up on an annual basis for the right knee.  She has left knee replacement scheduled in 6 weeks.    This dictation was done with [employer] dictation and may contain mechanical errors related to translation.

## 2024-10-23 ENCOUNTER — HOSPITAL ENCOUNTER (OUTPATIENT)
Dept: PHYSICAL THERAPY | Age: 69
Setting detail: THERAPIES SERIES
Discharge: HOME | End: 2024-10-23
Attending: ORTHOPAEDIC SURGERY
Payer: MEDICARE

## 2024-10-23 PROCEDURE — 97112 NEUROMUSCULAR REEDUCATION: CPT

## 2024-10-23 PROCEDURE — 97110 THERAPEUTIC EXERCISES: CPT

## 2024-10-23 NOTE — FLOWSHEET NOTE
[facility]- Outpatient Rehabilitation and Therapy [address], [address], [place] [address] office: [phone] fax: [phone]      Physical Therapy: TREATMENT/PROGRESS NOTE   Patient: Breanna Maxwell (68 y.o. female)   Examination Date: 10/23/2024   :  1955 MRN: [id number]   Visit #:   Insurance Allowable Auth Needed   Approval dates:24-24  Approved visits:24  []Yes    []No    Insurance: Payor: [facility] MEDICARE / Plan: [facility] MEDICARE ADVANTAGE / Product Type: *No Product type* /   Insurance ID: [id number] - (Medicare Managed)  Secondary Insurance (if applicable):    Treatment Diagnosis:     ICD-10-CM    1. Pain in joint of right knee  M25.561       2. Decreased strength of lower extremity  R29.898       3. Gait abnormality  R26.9       4. Poor balance  R26.89       5. Decreased ROM of right knee  M25.661       6. Decreased functional mobility  R26.89          Medical Diagnosis:  History of total knee replacement, right [Z96.651]   Referring Physician: Hayden Sagastume MD  PCP: Ginny Costello MD       Plan of care signed (Y/N):     Date of Patient follow up with Physician:      Progress Report/POC: NO  POC update due: (10 visits /OR AUTH LIMITS, whichever is less)  10/23/2024                                             Precautions/ Contra-indications:           Latex allergy:  NO  Pacemaker:    NO  Contraindications for Manipulation: NA  Date of Surgery: 24  Other:    Red Flags:  None    C-SSRS Triggered by Intake questionnaire:   Patient answered 'NO' to both behavioral questions on intake.  No further screening warranted    Preferred Language for Healthcare:   [x] English       [] other:    SUBJECTIVE EXAMINATION     Patient stated complaint: Pt states she is stiff this morning.     Pt is a 67 yo female s/p R TKA performed on 24. 1 month post-op today. Pt notes increased difficulty with sleeping (5-6 hours). Pt able to navigate stairs

## 2024-10-23 NOTE — FLOWSHEET NOTE
[facility]- Outpatient Rehabilitation and Therapy [address], [address], [place] [address] office: [phone] fax: [phone]      Physical Therapy: TREATMENT/PROGRESS NOTE   Patient: Breanna Maxwell (68 y.o. female)   Examination Date: 10/23/2024   :  1955 MRN: [id number]   Visit #:   Insurance Allowable Auth Needed   Approval dates:24-24  Approved visits:24  []Yes    []No    Insurance: Payor: [facility] MEDICARE / Plan: [facility] MEDICARE ADVANTAGE / Product Type: *No Product type* /   Insurance ID: [id number] - (Medicare Managed)  Secondary Insurance (if applicable):    Treatment Diagnosis:     ICD-10-CM    1. Pain in joint of right knee  M25.561       2. Decreased strength of lower extremity  R29.898       3. Gait abnormality  R26.9       4. Poor balance  R26.89       5. Decreased ROM of right knee  M25.661       6. Decreased functional mobility  R26.89          Medical Diagnosis:  History of total knee replacement, right [Z96.651]   Referring Physician: Hayden Sagastume MD  PCP: Ginny Costello MD       Plan of care signed (Y/N):     Date of Patient follow up with Physician:      Progress Report/POC: NO  POC update due: (10 visits /OR AUTH LIMITS, whichever is less)  10/23/2024                                             Precautions/ Contra-indications:           Latex allergy:  NO  Pacemaker:    NO  Contraindications for Manipulation: NA  Date of Surgery: 24  Other:    Red Flags:  None    C-SSRS Triggered by Intake questionnaire:   Patient answered 'NO' to both behavioral questions on intake.  No further screening warranted    Preferred Language for Healthcare:   [x] English       [] other:    SUBJECTIVE EXAMINATION     Patient stated complaint: Pt states she is stiff this morning.     Pt is a 69 yo female s/p R TKA performed on 24. 1 month post-op today. Pt notes increased difficulty with sleeping (5-6 hours). Pt able to navigate stairs

## 2024-11-07 ENCOUNTER — APPOINTMENT (OUTPATIENT)
Dept: GENERAL RADIOLOGY | Age: 69
End: 2024-11-07
Payer: MEDICARE

## 2024-11-07 ENCOUNTER — HOSPITAL ENCOUNTER (EMERGENCY)
Age: 69
Discharge: HOME | End: 2024-11-07
Attending: EMERGENCY MEDICINE
Payer: MEDICARE

## 2024-11-07 VITALS
WEIGHT: 157.63 LBS | BODY MASS INDEX: 29.76 KG/M2 | RESPIRATION RATE: 16 BRPM | HEART RATE: 82 BPM | DIASTOLIC BLOOD PRESSURE: 78 MMHG | SYSTOLIC BLOOD PRESSURE: 176 MMHG | HEIGHT: 61 IN | OXYGEN SATURATION: 100 % | TEMPERATURE: 98.6 F

## 2024-11-07 DIAGNOSIS — U07.1 ACUTE COVID-19: Primary | ICD-10-CM

## 2024-11-07 DIAGNOSIS — B34.9 VIRAL SYNDROME: ICD-10-CM

## 2024-11-07 LAB
FLUAV RNA UPPER RESP QL NAA+PROBE: NEGATIVE
FLUBV AG NPH QL: NEGATIVE
SARS-COV-2 RDRP RESP QL NAA+PROBE: DETECTED

## 2024-11-07 PROCEDURE — 71045 X-RAY EXAM CHEST 1 VIEW: CPT

## 2024-11-07 PROCEDURE — 99284 EMERGENCY DEPT VISIT MOD MDM: CPT

## 2024-11-07 PROCEDURE — 87635 SARS-COV-2 COVID-19 AMP PRB: CPT

## 2024-11-07 PROCEDURE — 87804 INFLUENZA ASSAY W/OPTIC: CPT

## 2024-11-07 RX ORDER — BENZONATATE 100 MG/1
100 CAPSULE ORAL 2 TIMES DAILY PRN
Qty: 20 CAPSULE | Refills: 0 | Status: SHIPPED | OUTPATIENT
Start: 2024-11-07 | End: 2024-11-14

## 2024-11-07 ASSESSMENT — PAIN DESCRIPTION - LOCATION: LOCATION: THROAT

## 2024-11-07 ASSESSMENT — LIFESTYLE VARIABLES
HOW OFTEN DO YOU HAVE A DRINK CONTAINING ALCOHOL: NEVER
HOW MANY STANDARD DRINKS CONTAINING ALCOHOL DO YOU HAVE ON A TYPICAL DAY: PATIENT DOES NOT DRINK

## 2024-11-07 NOTE — ED NOTES
D/C: Order noted for d/c. Pt confirmed d/c paperwork has correct name. Discharge and education instructions reviewed with patient. Teach-back successful.  Pt verbalized understanding and denied questions at this time. No acute distress noted. Patient instructed to follow-up as noted - return to emergency department if symptoms worsen. Patient verbalized understanding. Discharged per EDMD with discharge instructions. Pt discharged to private vehicle. Patient stable upon departure. Thanked patient for [facility] for care. Provider aware of patient pain at time of discharge. Pt. A&OX4 and ambulatory.

## 2024-11-07 NOTE — ED NOTES
Pt. C/o sore throat, chills, body ache, HA, cough, congestions. Pt. Was around grandchild who had croup and COVID before the croup. Pt. A&OX4 and ambulatory.

## 2024-11-07 NOTE — ED PROVIDER NOTES
[facility] Emergency Department - [facility], Mumtaz Prather MD, am the primary clinician of record.    CHIEF COMPLAINT  Chief Complaint   Patient presents with    Cough     Pt. C/o sore throat, chills, body ache, HA, cough, congestions. Pt. Was around grandchild who had croup and COVID before the croup.         HISTORY OF PRESENT ILLNESS  Breanna Maxwell is a 69 y.o. female  who presents to the ED complaining of about 2 days of cough and cold symptoms with generalized bodyaches nasal congestion and a mild sore throat.  She does not have any chest pains or shortness of breath though.  Her grandchild was reportedly recently at [facility] where he was diagnosed with croup because of COVID.  The patient has had COVID before and has felt similar.  She was previously on Eliquis but is not on that medication anymore, she does take aspirin.  She has had chills and sweats but is not sure about a measured fever.  She is afebrile here.    No other complaints, modifying factors or associated symptoms.     I have reviewed the following from the nursing documentation.    Past Medical History:   Diagnosis Date    Arthritis     CAD (coronary artery disease)     \"blocked artery\"  stents placed    Constipation     due to slow movement through bowels    Depression     Diabetes mellitus (HCC)     GERD (gastroesophageal reflux disease)     History of coronary artery stent placement     Hyperlipidemia     Hypertension     Neuropathy     Vertigo     Vitamin D deficiency, unspecified      Past Surgical History:   Procedure Laterality Date    COLONOSCOPY      DIAGNOSTIC CARDIAC CATH LAB PROCEDURE  04/2019    stent    HYSTERECTOMY (CERVIX STATUS UNKNOWN)      OTHER SURGICAL HISTORY      fatty tumor removed from left arm    TOTAL KNEE ARTHROPLASTY Right 7/23/2024    RIGHT TOTAL KNEE REPLACEMENT ROBOTIC ASSISTED performed by Hayden Sagastume MD at [facility] OR     Family History   Problem Relation Age of Onset    Heart Disease

## 2024-11-18 ENCOUNTER — TELEPHONE (OUTPATIENT)
Dept: ORTHOPEDIC SURGERY | Age: 69
End: 2024-11-18

## 2024-11-18 NOTE — TELEPHONE ENCOUNTER
General Question     Subject: UPDATE ON HOME PHONE NUMBER  Patient and /or Facility Request: Breanna Maxwell   Contact Number: [phone]     PATIENT CALLED IN TO UPDATE HER HOE PHONE NUMBER. FOR CELL PHONE IS MESSED UP.     PLEASE ADVISE

## 2024-11-19 ENCOUNTER — TELEPHONE (OUTPATIENT)
Dept: ORTHOPEDIC SURGERY | Age: 69
End: 2024-11-19

## 2024-11-19 NOTE — TELEPHONE ENCOUNTER
Other PT IS REQUESTING A SALBADOR L BACK FROM OFFICE TO CONFIRM IF HER PHYSICAL DATE IS OK.    PT STATES SHE GETS SURGERY 12/10/2024    PT NOT ABLE TO GET PHYSICAL UNTIL 12/02/2024    PT WANTS TO KNOW IF THIS IS OK.

## 2024-11-20 RX ORDER — ASPIRIN 81 MG/1
81 TABLET ORAL DAILY
COMMUNITY

## 2024-11-20 NOTE — PROGRESS NOTES
PRE-OP INSTRUCTIONS     Do not eat or drink anything after 12:00 midnight prior to surgery or per Dr Sagastume  This includes water, chewing gum, mints and ice chips.    You may brush your teeth and gargle the morning of surgery but DO  NOT SWALLOW THE WATER.    Take the following medications with a small sip of water on the morning of procedure...Follow your MD/Surgeons pre-procedure instructions regarding your medications     You may be asked to stop blood thinners such as:  Coumadin, Plavix, Fragmin and lovenox.  Please check with your doctor before stopping these or any other medications.    Aspirin, ibuprofen, advil and naproxen, any anti-inflammatory products should be stopped for a week prior to your surgery.    Do not smoke and do not drink any alcoholic beverages 24 hours prior to your surgery.    Please do not wear any jewelry or body piercings on the day of surgery.    Please wear something simple, loose fitting clothing to the hospital.  Do not wear any make-up(including eye make-up) or nail polish on your fingers and toes.    As part of our patient safety program to minimize surgical infections, we ask you to do the following:    Please notify your surgeon if you develop any illness between now and the day of your surgery.  This includes a cough, cold, fever, sore  throat, nausea, vomiting, diarrhea, etc.   Please notify your surgeon if you experience dizziness, shortness of  breath or blurred vision between now and the time of your surgery.     Please notify your surgeon of any open or redden areas that may look infected       DO NOT shave your operative site 96 hours(four days) prior to surgery.          Shower the week before surgery with an antibacterial soap such as:dial,safeguard, etc.       Three(3) days prior to your surgery, cleanse the operative site with Hibiclens(anti-microbial soap). This soap may dry your skin, please do not apply any oils or lotions     Please bring your insurance card and

## 2024-11-25 ENCOUNTER — TELEPHONE (OUTPATIENT)
Dept: ORTHOPEDIC SURGERY | Age: 69
End: 2024-11-25

## 2024-11-25 ENCOUNTER — HOSPITAL ENCOUNTER (OUTPATIENT)
Age: 69
Discharge: HOME | End: 2024-11-25
Payer: MEDICARE

## 2024-11-25 ENCOUNTER — TELEPHONE (OUTPATIENT)
Dept: ORTHOPEDICS UNIT | Age: 69
End: 2024-11-25

## 2024-11-25 DIAGNOSIS — M17.12 ARTHRITIS OF LEFT KNEE: ICD-10-CM

## 2024-11-25 LAB
25(OH)D3 SERPL-MCNC: 23.8 NG/ML
ABO + RH BLD: NORMAL
ALBUMIN SERPL-MCNC: 4.2 G/DL (ref 3.4–5)
ANION GAP SERPL CALCULATED.3IONS-SCNC: 10 MMOL/L (ref 3–16)
APTT BLD: 30 SEC (ref 22.1–36.4)
BASOPHILS # BLD: 0.1 K/UL (ref 0–0.2)
BASOPHILS NFR BLD: 0.6 %
BLD GP AB SCN SERPL QL: NORMAL
BUN SERPL-MCNC: 13 MG/DL (ref 7–20)
CALCIUM SERPL-MCNC: 10.3 MG/DL (ref 8.3–10.6)
CHLORIDE SERPL-SCNC: 109 MMOL/L (ref 99–110)
CO2 SERPL-SCNC: 25 MMOL/L (ref 21–32)
CREAT SERPL-MCNC: 0.9 MG/DL (ref 0.6–1.2)
DEPRECATED RDW RBC AUTO: 15.1 % (ref 12.4–15.4)
EKG ATRIAL RATE: 66 BPM
EKG DIAGNOSIS: NORMAL
EKG P AXIS: 21 DEGREES
EKG P-R INTERVAL: 216 MS
EKG Q-T INTERVAL: 430 MS
EKG QRS DURATION: 90 MS
EKG QTC CALCULATION (BAZETT): 450 MS
EKG R AXIS: 67 DEGREES
EKG T AXIS: 24 DEGREES
EKG VENTRICULAR RATE: 66 BPM
EOSINOPHIL # BLD: 0.1 K/UL (ref 0–0.6)
EOSINOPHIL NFR BLD: 1.4 %
EST. AVERAGE GLUCOSE BLD GHB EST-MCNC: 111.2 MG/DL
GFR SERPLBLD CREATININE-BSD FMLA CKD-EPI: 68 ML/MIN/{1.73_M2}
GLUCOSE SERPL-MCNC: 114 MG/DL (ref 70–99)
HBA1C MFR BLD: 5.5 %
HCT VFR BLD AUTO: 37.1 % (ref 36–48)
HGB BLD-MCNC: 12.5 G/DL (ref 12–16)
INR PPP: 1.01 (ref 0.85–1.15)
LYMPHOCYTES # BLD: 2.8 K/UL (ref 1–5.1)
LYMPHOCYTES NFR BLD: 29.2 %
MCH RBC QN AUTO: 29.1 PG (ref 26–34)
MCHC RBC AUTO-ENTMCNC: 33.8 G/DL (ref 31–36)
MCV RBC AUTO: 86.1 FL (ref 80–100)
MONOCYTES # BLD: 0.7 K/UL (ref 0–1.3)
MONOCYTES NFR BLD: 7.7 %
MRSA DNA SPEC QL NAA+PROBE: NORMAL
NEUTROPHILS # BLD: 5.8 K/UL (ref 1.7–7.7)
NEUTROPHILS NFR BLD: 61.1 %
PLATELET # BLD AUTO: 309 K/UL (ref 135–450)
PMV BLD AUTO: 8.1 FL (ref 5–10.5)
POTASSIUM SERPL-SCNC: 3.8 MMOL/L (ref 3.5–5.1)
PREALB SERPL-MCNC: 19.9 MG/DL (ref 20–40)
PROTHROMBIN TIME: 13.5 SEC (ref 11.9–14.9)
RBC # BLD AUTO: 4.31 M/UL (ref 4–5.2)
SODIUM SERPL-SCNC: 144 MMOL/L (ref 136–145)
WBC # BLD AUTO: 9.5 K/UL (ref 4–11)

## 2024-11-25 PROCEDURE — 36415 COLL VENOUS BLD VENIPUNCTURE: CPT

## 2024-11-25 PROCEDURE — 85610 PROTHROMBIN TIME: CPT

## 2024-11-25 PROCEDURE — 80048 BASIC METABOLIC PNL TOTAL CA: CPT

## 2024-11-25 PROCEDURE — 87641 MR-STAPH DNA AMP PROBE: CPT

## 2024-11-25 PROCEDURE — 93010 ELECTROCARDIOGRAM REPORT: CPT | Performed by: INTERNAL MEDICINE

## 2024-11-25 PROCEDURE — 86901 BLOOD TYPING SEROLOGIC RH(D): CPT

## 2024-11-25 PROCEDURE — 86900 BLOOD TYPING SEROLOGIC ABO: CPT

## 2024-11-25 PROCEDURE — 86850 RBC ANTIBODY SCREEN: CPT

## 2024-11-25 PROCEDURE — 83036 HEMOGLOBIN GLYCOSYLATED A1C: CPT

## 2024-11-25 PROCEDURE — 82306 VITAMIN D 25 HYDROXY: CPT

## 2024-11-25 PROCEDURE — 82040 ASSAY OF SERUM ALBUMIN: CPT

## 2024-11-25 PROCEDURE — 93005 ELECTROCARDIOGRAM TRACING: CPT

## 2024-11-25 PROCEDURE — 85730 THROMBOPLASTIN TIME PARTIAL: CPT

## 2024-11-25 PROCEDURE — 84134 ASSAY OF PREALBUMIN: CPT

## 2024-11-25 PROCEDURE — 85025 COMPLETE CBC W/AUTO DIFF WBC: CPT

## 2024-11-25 NOTE — TELEPHONE ENCOUNTER
Vitamin D level is low at 23.8.  Instructed patient to take over-the-counter Vitamin D [id number] IUs daily.    I called the patient and informed her.

## 2024-11-25 NOTE — TELEPHONE ENCOUNTER
Date Of Surgery: 12/10/2024  Surgeon: Dr Sagastume    HISTORY OF JOINT REPLACEMENT(S): Total Knee Replacement    DC Plan: Home    HOME ASSISTANCE - WHO WILL BE STAYING WITH YOU AT HOME FOR FIRST SEVERAL DAYS? Chelsey Banuelos    HARSHA TRANSPORTATION: Chelsey Banuelos    STEPS INTO HOME: 8    STEPS TO BATHROOM/BEDROOM: none - one level    DME NEEDS:  Denies durable medical equipment needs at this time, already has a rolling walker. Also has a shower chair.    LENGTH OF STAY HAS BEEN DISCUSSED WITH THE PATIENT, APPROPRIATE TO HIS/ HER PROCEDURE.  PATIENT HAS BEEN INFORMED THAT THEY WILL BE DISCHARGED WHEN THE PHYSICIAN DEEMS THEM MEDICALLY READY. MOST PATIENTS CAN EXPECT TO BE IN THE HOSPITAL ONE NIGHT OR 1-2 DAYS AS AN ADMISSION FOR THOSE WITH MEDICAL HEALTH ISSUES/COMPLICATIONS.    HOME CARE CHOICE(S): Prefers to use [facility] agency. If not covered then open to any covered agency.    SNF/REHAB CHOICES (S):     Declined a need for skilled nursing facility.    MEDS TO BEDS FROM [employer]: Patient is agreeable to have medications filled via meds to beds program with [facility].    Patient states no further questions or concerns.   Facesheet with discharge needs provided to [facility] / .    Future Appointments   Date Time Provider Department Center   12/5/2024  9:15 AM Hayden Sagastume MD [facility]   12/10/2024  7:50 AM Hayden Sagastume MD [facility]   1/2/2025 10:30 AM Hayden Sagastume MD [facility]   3/12/2025  1:00 PM Dariusz Acosta MD [facility]

## 2024-12-02 NOTE — DISCHARGE INSTR - COC
[facility] Continuity of Care Form    Patient Name:  Breanna Maxwell  : 1955    MRN:  [id number]    Admit date:  (Not on file)  Discharge date:  ***    Code Status Order: Prior  Advance Directives: {YES OR NO:}    Admitting Physician: Hayden Sagastume MD  PCP: Ginny Costello MD    Discharging Nurse: ***  Discharging Hospital Unit/Room#: No information available for this encounter.  Discharging Unit Phone Number: ***    Emergency Contact:        Past Surgical History:  Past Surgical History:   Procedure Laterality Date    COLONOSCOPY      DIAGNOSTIC CARDIAC CATH LAB PROCEDURE  2019    stent    HYSTERECTOMY (CERVIX STATUS UNKNOWN)      OTHER SURGICAL HISTORY      fatty tumor removed from left arm    TOTAL KNEE ARTHROPLASTY Right 2024    RIGHT TOTAL KNEE REPLACEMENT ROBOTIC ASSISTED performed by Hayden Sagastume MD at [facility] OR       Immunization History:   Immunization History   Administered Date(s) Administered    COVID-19, PFIZER Bivalent, DO NOT Dilute, (age 12y+), IM, 30 mcg/0.3 mL 2022    COVID-19, PFIZER GRAY top, DO NOT Dilute, (age 12 y+), IM, 30 mcg/0.3 mL 2022    COVID-19, PFIZER PURPLE top, DILUTE for use, (age 12 y+), 30mcg/0.3mL 2020, 2021, 2021    COVID-19, PFIZER, (age 12y+), IM, 30mcg/0.3mL 2023, 2024       Active Problems:  Principal Problem:    Degenerative arthritis of left knee  Resolved Problems:    * No resolved hospital problems. *      Isolation/Infection:       Nurse Assessment:  Last Vital Signs:Ht 1.549 m (5' 1\")   Wt 71.2 kg (157 lb)   BMI 29.66 kg/m²   Last documented pain score (0-10 scale):    Last Weight:   Wt Readings from Last 1 Encounters:   24 71.5 kg (157 lb 10.1 oz)     Mental Status:  {IP PT MENTAL STATUS:}     IV Access:  { [facility] IV ACCESS:296363377}    Nursing Mobility/ADLs:  Walking   {[facility] DME ADLs:316596761}  Transfer  {[facility] DME ADLs:576198022}  Bathing  {[facility] DME ADLs:050071165}  Dressing

## 2024-12-05 ENCOUNTER — OFFICE VISIT (OUTPATIENT)
Dept: ORTHOPEDIC SURGERY | Age: 69
End: 2024-12-05
Payer: MEDICARE

## 2024-12-05 VITALS — HEIGHT: 61 IN | BODY MASS INDEX: 29.66 KG/M2

## 2024-12-05 DIAGNOSIS — M17.12 ARTHRITIS OF LEFT KNEE: Primary | ICD-10-CM

## 2024-12-05 PROCEDURE — 1160F RVW MEDS BY RX/DR IN RCRD: CPT | Performed by: ORTHOPAEDIC SURGERY

## 2024-12-05 PROCEDURE — 1159F MED LIST DOCD IN RCRD: CPT | Performed by: ORTHOPAEDIC SURGERY

## 2024-12-05 PROCEDURE — 1123F ACP DISCUSS/DSCN MKR DOCD: CPT | Performed by: ORTHOPAEDIC SURGERY

## 2024-12-05 PROCEDURE — 99214 OFFICE O/P EST MOD 30 MIN: CPT | Performed by: ORTHOPAEDIC SURGERY

## 2024-12-05 NOTE — PROGRESS NOTES
[facility]  Office Visit    Chief Complaint: Left knee pain    HPI:  Breanna Maxwell is a 69 y.o. who is here in follow-up of left knee pain due to osteoarthritis.  She is scheduled for left total knee arthroplasty next week.  For review, she underwent right total knee arthroplasty on July 23, 2024 and her right knee is doing well. She has a long-term history of bilateral knee pain due to osteoarthritis.  She has been treated in the past with steroid and viscosupplementation injections and continues to have pain in the left knee.  She reports pain that is present on a daily basis.  She walks with the use of a cane. The patient has difficulty climbing stairs, difficulty getting out of a chair, difficulty with activities of daily living including hygiene and pain at night.  Pain level at rest is 7 and with activities 9-10/10.  She has been on ibuprofen in the past to help with pain.  Steroid injections are no longer helpful for the left knee.    She denies diabetes, blood thinners, pulmonary issues.  She has a history of coronary artery disease with a stent.  She has help at home.    Past Medical History:   Diagnosis Date    Arthritis     CAD (coronary artery disease)     \"blocked artery\"  stents placed    Constipation     due to slow movement through bowels    Depression     Diabetes mellitus (HCC)     GERD (gastroesophageal reflux disease)     History of coronary artery stent placement     Hyperlipidemia     Hypertension     Neuropathy     Vertigo     Vitamin D deficiency, unspecified         ROS:  Constitutional: denies fever, chills, weight loss  MSK: denies pain in other joints, muscle aches  Neurological: denies numbness, tingling, weakness    Exam:  Appearance: sitting in exam room chair, appears to be in no acute distress, awake and alert  Resp: unlabored breathing on room air  Skin: warm, dry and intact with out erythema or significant increased temperature  Neuro: grossly

## 2024-12-10 ENCOUNTER — APPOINTMENT (OUTPATIENT)
Age: 69
DRG: 242 | End: 2024-12-10
Attending: INTERNAL MEDICINE
Payer: MEDICARE

## 2024-12-10 ENCOUNTER — APPOINTMENT (OUTPATIENT)
Dept: GENERAL RADIOLOGY | Age: 69
DRG: 242 | End: 2024-12-10
Payer: MEDICARE

## 2024-12-10 ENCOUNTER — HOSPITAL ENCOUNTER (INPATIENT)
Age: 69
LOS: 5 days | Discharge: HOME | DRG: 242 | End: 2024-12-15
Attending: EMERGENCY MEDICINE | Admitting: STUDENT IN AN ORGANIZED HEALTH CARE EDUCATION/TRAINING PROGRAM
Payer: MEDICARE

## 2024-12-10 ENCOUNTER — HOSPITAL ENCOUNTER (OUTPATIENT)
Age: 69
Setting detail: SURGERY ADMIT
Discharge: ANOTHER HOSPITAL | DRG: 242 | End: 2024-12-10
Attending: ORTHOPAEDIC SURGERY | Admitting: ORTHOPAEDIC SURGERY
Payer: MEDICARE

## 2024-12-10 VITALS
WEIGHT: 156.97 LBS | HEART RATE: 39 BPM | OXYGEN SATURATION: 93 % | BODY MASS INDEX: 29.64 KG/M2 | SYSTOLIC BLOOD PRESSURE: 143 MMHG | RESPIRATION RATE: 18 BRPM | DIASTOLIC BLOOD PRESSURE: 49 MMHG | HEIGHT: 61 IN

## 2024-12-10 DIAGNOSIS — R07.9 CHEST PAIN, UNSPECIFIED TYPE: Primary | ICD-10-CM

## 2024-12-10 DIAGNOSIS — R06.02 SHORTNESS OF BREATH: ICD-10-CM

## 2024-12-10 DIAGNOSIS — J81.0 ACUTE PULMONARY EDEMA: ICD-10-CM

## 2024-12-10 DIAGNOSIS — I44.1 MOBITZ TYPE II BLOCK: ICD-10-CM

## 2024-12-10 DIAGNOSIS — I44.2 COMPLETE HEART BLOCK (HCC): Primary | ICD-10-CM

## 2024-12-10 DIAGNOSIS — Z95.0 S/P PLACEMENT OF CARDIAC PACEMAKER: ICD-10-CM

## 2024-12-10 PROBLEM — I20.89 ANGINA OF EFFORT (HCC): Status: ACTIVE | Noted: 2024-12-10

## 2024-12-10 PROBLEM — I44.30 ATRIOVENTRICULAR BLOCK: Status: ACTIVE | Noted: 2024-12-10

## 2024-12-10 PROBLEM — R06.09 DOE (DYSPNEA ON EXERTION): Status: ACTIVE | Noted: 2024-12-10

## 2024-12-10 LAB
ALBUMIN SERPL-MCNC: 3.9 G/DL (ref 3.4–5)
ALBUMIN/GLOB SERPL: 1.6 {RATIO} (ref 1.1–2.2)
ALP SERPL-CCNC: 222 U/L (ref 40–129)
ALT SERPL-CCNC: 24 U/L (ref 10–40)
ANION GAP SERPL CALCULATED.3IONS-SCNC: 11 MMOL/L (ref 3–16)
AST SERPL-CCNC: 25 U/L (ref 15–37)
BASOPHILS # BLD: 0 K/UL (ref 0–0.2)
BASOPHILS NFR BLD: 0.5 %
BILIRUB SERPL-MCNC: 0.9 MG/DL (ref 0–1)
BUN SERPL-MCNC: 24 MG/DL (ref 7–20)
CALCIUM SERPL-MCNC: 10.3 MG/DL (ref 8.3–10.6)
CHLORIDE SERPL-SCNC: 114 MMOL/L (ref 99–110)
CO2 SERPL-SCNC: 19 MMOL/L (ref 21–32)
CREAT SERPL-MCNC: 1 MG/DL (ref 0.6–1.2)
DEPRECATED RDW RBC AUTO: 15.5 % (ref 12.4–15.4)
ECHO AO ROOT DIAM: 2.6 CM
ECHO AO ROOT INDEX: 1.53 CM/M2
ECHO AR MAX VEL PISA: 2.6 M/S
ECHO AV AREA PEAK VELOCITY: 2.8 CM2
ECHO AV AREA VTI: 2.8 CM2
ECHO AV AREA/BSA PEAK VELOCITY: 1.6 CM2/M2
ECHO AV AREA/BSA VTI: 1.6 CM2/M2
ECHO AV CUSP MM: 2 CM
ECHO AV MEAN GRADIENT: 8 MMHG
ECHO AV MEAN VELOCITY: 1.3 M/S
ECHO AV PEAK GRADIENT: 14 MMHG
ECHO AV PEAK VELOCITY: 1.9 M/S
ECHO AV VELOCITY RATIO: 0.74
ECHO AV VTI: 51.9 CM
ECHO BSA: 1.75 M2
ECHO LA AREA 2C: 19.4 CM2
ECHO LA AREA 4C: 20.2 CM2
ECHO LA DIAMETER INDEX: 2 CM/M2
ECHO LA DIAMETER: 3.4 CM
ECHO LA MAJOR AXIS: 5.9 CM
ECHO LA MINOR AXIS: 5.8 CM
ECHO LA TO AORTIC ROOT RATIO: 1.31
ECHO LA VOL BP: 54 ML (ref 22–52)
ECHO LA VOL MOD A2C: 55 ML (ref 22–52)
ECHO LA VOL MOD A4C: 56 ML (ref 22–52)
ECHO LA VOL/BSA BIPLANE: 32 ML/M2 (ref 16–34)
ECHO LA VOLUME INDEX MOD A2C: 32 ML/M2 (ref 16–34)
ECHO LA VOLUME INDEX MOD A4C: 33 ML/M2 (ref 16–34)
ECHO LV E' SEPTAL VELOCITY: 13.9 CM/S
ECHO LV EDV A4C: 80 ML
ECHO LV EDV INDEX A4C: 47 ML/M2
ECHO LV EF PHYSICIAN: 68 %
ECHO LV EJECTION FRACTION A4C: 70 %
ECHO LV ESV A4C: 24 ML
ECHO LV ESV INDEX A4C: 14 ML/M2
ECHO LV FRACTIONAL SHORTENING: 35 % (ref 28–44)
ECHO LV INTERNAL DIMENSION DIASTOLE INDEX: 2.88 CM/M2
ECHO LV INTERNAL DIMENSION DIASTOLIC: 4.9 CM (ref 3.9–5.3)
ECHO LV INTERNAL DIMENSION SYSTOLIC INDEX: 1.88 CM/M2
ECHO LV INTERNAL DIMENSION SYSTOLIC: 3.2 CM
ECHO LV IVSD: 0.9 CM (ref 0.6–0.9)
ECHO LV MASS 2D: 153 G (ref 67–162)
ECHO LV MASS INDEX 2D: 90 G/M2 (ref 43–95)
ECHO LV POSTERIOR WALL DIASTOLIC: 0.9 CM (ref 0.6–0.9)
ECHO LV RELATIVE WALL THICKNESS RATIO: 0.37
ECHO LVOT AREA: 3.8 CM2
ECHO LVOT AV VTI INDEX: 0.77
ECHO LVOT DIAM: 2.2 CM
ECHO LVOT MEAN GRADIENT: 4 MMHG
ECHO LVOT PEAK GRADIENT: 8 MMHG
ECHO LVOT PEAK VELOCITY: 1.4 M/S
ECHO LVOT STROKE VOLUME INDEX: 89 ML/M2
ECHO LVOT SV: 151.2 ML
ECHO LVOT VTI: 39.8 CM
ECHO MV A VELOCITY: 1.26 M/S
ECHO MV AREA VTI: 4.4 CM2
ECHO MV E DECELERATION TIME (DT): 180 MS
ECHO MV E VELOCITY: 1 M/S
ECHO MV E/A RATIO: 0.79
ECHO MV E/E' SEPTAL: 7.19
ECHO MV LVOT VTI INDEX: 0.86
ECHO MV MAX VELOCITY: 1.6 M/S
ECHO MV MEAN GRADIENT: 5 MMHG
ECHO MV MEAN VELOCITY: 1 M/S
ECHO MV PEAK GRADIENT: 10 MMHG
ECHO MV VTI: 34.3 CM
ECHO PV ACCELERATION TIME (AT): 85 MS
ECHO PV MAX VELOCITY: 1.4 M/S
ECHO PV PEAK GRADIENT: 8 MMHG
ECHO RA AREA 4C: 15 CM2
ECHO RA END SYSTOLIC VOLUME APICAL 4 CHAMBER INDEX BSA: 21 ML/M2
ECHO RA VOLUME: 35 ML
ECHO RV FREE WALL PEAK S': 17.6 CM/S
ECHO RV INTERNAL DIMENSION: 2.8 CM
ECHO RV TAPSE: 2.9 CM (ref 1.7–?)
ECHO TV E WAVE: 0.7 M/S
ECHO TV PEAK GRADIENT: 4 MMHG
ECHO TV REGURGITANT MAX VELOCITY: 3.49 M/S
ECHO TV REGURGITANT PEAK GRADIENT: 49 MMHG
EKG ATRIAL RATE: 78 BPM
EKG ATRIAL RATE: 78 BPM
EKG DIAGNOSIS: NORMAL
EKG DIAGNOSIS: NORMAL
EKG P AXIS: 40 DEGREES
EKG P AXIS: 41 DEGREES
EKG P-R INTERVAL: 206 MS
EKG P-R INTERVAL: 206 MS
EKG Q-T INTERVAL: 440 MS
EKG Q-T INTERVAL: 480 MS
EKG QRS DURATION: 100 MS
EKG QRS DURATION: 82 MS
EKG QTC CALCULATION (BAZETT): 355 MS
EKG QTC CALCULATION (BAZETT): 387 MS
EKG R AXIS: 12 DEGREES
EKG R AXIS: 3 DEGREES
EKG T AXIS: 13 DEGREES
EKG T AXIS: 33 DEGREES
EKG VENTRICULAR RATE: 39 BPM
EKG VENTRICULAR RATE: 39 BPM
EOSINOPHIL # BLD: 0.1 K/UL (ref 0–0.6)
EOSINOPHIL NFR BLD: 0.9 %
GFR SERPLBLD CREATININE-BSD FMLA CKD-EPI: 60 ML/MIN/{1.73_M2}
GLUCOSE BLD-MCNC: 100 MG/DL (ref 70–99)
GLUCOSE BLD-MCNC: 114 MG/DL (ref 70–99)
GLUCOSE SERPL-MCNC: 118 MG/DL (ref 70–99)
HCT VFR BLD AUTO: 34 % (ref 36–48)
HGB BLD-MCNC: 11.4 G/DL (ref 12–16)
INR PPP: 1.13 (ref 0.85–1.15)
LYMPHOCYTES # BLD: 2.5 K/UL (ref 1–5.1)
LYMPHOCYTES NFR BLD: 26.7 %
MAGNESIUM SERPL-MCNC: 2.25 MG/DL (ref 1.8–2.4)
MCH RBC QN AUTO: 29.5 PG (ref 26–34)
MCHC RBC AUTO-ENTMCNC: 33.5 G/DL (ref 31–36)
MCV RBC AUTO: 88.1 FL (ref 80–100)
MONOCYTES # BLD: 0.7 K/UL (ref 0–1.3)
MONOCYTES NFR BLD: 7.1 %
NEUTROPHILS # BLD: 6 K/UL (ref 1.7–7.7)
NEUTROPHILS NFR BLD: 64.8 %
NT-PROBNP SERPL-MCNC: 2916 PG/ML (ref 0–124)
PERFORMED ON: ABNORMAL
PERFORMED ON: ABNORMAL
PLATELET # BLD AUTO: 268 K/UL (ref 135–450)
PMV BLD AUTO: 8.3 FL (ref 5–10.5)
POTASSIUM SERPL-SCNC: 3.3 MMOL/L (ref 3.5–5.1)
PROT SERPL-MCNC: 6.3 G/DL (ref 6.4–8.2)
PROTHROMBIN TIME: 14.8 SEC (ref 11.9–14.9)
RBC # BLD AUTO: 3.86 M/UL (ref 4–5.2)
SODIUM SERPL-SCNC: 144 MMOL/L (ref 136–145)
TROPONIN, HIGH SENSITIVITY: 25 NG/L (ref 0–14)
TROPONIN, HIGH SENSITIVITY: 32 NG/L (ref 0–14)
WBC # BLD AUTO: 9.2 K/UL (ref 4–11)

## 2024-12-10 PROCEDURE — 85025 COMPLETE CBC W/AUTO DIFF WBC: CPT

## 2024-12-10 PROCEDURE — 6370000000 HC RX 637 (ALT 250 FOR IP): Performed by: STUDENT IN AN ORGANIZED HEALTH CARE EDUCATION/TRAINING PROGRAM

## 2024-12-10 PROCEDURE — 6360000002 HC RX W HCPCS: Performed by: STUDENT IN AN ORGANIZED HEALTH CARE EDUCATION/TRAINING PROGRAM

## 2024-12-10 PROCEDURE — 71045 X-RAY EXAM CHEST 1 VIEW: CPT

## 2024-12-10 PROCEDURE — 93005 ELECTROCARDIOGRAM TRACING: CPT | Performed by: PHYSICIAN ASSISTANT

## 2024-12-10 PROCEDURE — 93005 ELECTROCARDIOGRAM TRACING: CPT | Performed by: ANESTHESIOLOGY

## 2024-12-10 PROCEDURE — 94760 N-INVAS EAR/PLS OXIMETRY 1: CPT

## 2024-12-10 PROCEDURE — 83880 ASSAY OF NATRIURETIC PEPTIDE: CPT

## 2024-12-10 PROCEDURE — 93010 ELECTROCARDIOGRAM REPORT: CPT | Performed by: INTERNAL MEDICINE

## 2024-12-10 PROCEDURE — 83735 ASSAY OF MAGNESIUM: CPT

## 2024-12-10 PROCEDURE — 99285 EMERGENCY DEPT VISIT HI MDM: CPT

## 2024-12-10 PROCEDURE — 6360000002 HC RX W HCPCS: Performed by: PHYSICIAN ASSISTANT

## 2024-12-10 PROCEDURE — 99222 1ST HOSP IP/OBS MODERATE 55: CPT | Performed by: INTERNAL MEDICINE

## 2024-12-10 PROCEDURE — 2100000000 HC CCU R&B

## 2024-12-10 PROCEDURE — 80053 COMPREHEN METABOLIC PANEL: CPT

## 2024-12-10 PROCEDURE — 84484 ASSAY OF TROPONIN QUANT: CPT

## 2024-12-10 PROCEDURE — 6370000000 HC RX 637 (ALT 250 FOR IP): Performed by: REGISTERED NURSE

## 2024-12-10 PROCEDURE — 93307 TTE W/O DOPPLER COMPLETE: CPT

## 2024-12-10 PROCEDURE — 85610 PROTHROMBIN TIME: CPT

## 2024-12-10 PROCEDURE — 93307 TTE W/O DOPPLER COMPLETE: CPT | Performed by: INTERNAL MEDICINE

## 2024-12-10 RX ORDER — ROPIVACAINE HYDROCHLORIDE 5 MG/ML
60 INJECTION, SOLUTION EPIDURAL; INFILTRATION; PERINEURAL ONCE
Status: DISCONTINUED | OUTPATIENT
Start: 2024-12-10 | End: 2024-12-10 | Stop reason: HOSPADM

## 2024-12-10 RX ORDER — ACETAMINOPHEN 160 MG
2000 TABLET,DISINTEGRATING ORAL DAILY
COMMUNITY

## 2024-12-10 RX ORDER — POLYETHYLENE GLYCOL 3350 17 G/17G
17 POWDER, FOR SOLUTION ORAL DAILY PRN
Status: DISCONTINUED | OUTPATIENT
Start: 2024-12-10 | End: 2024-12-15 | Stop reason: HOSPADM

## 2024-12-10 RX ORDER — SODIUM CHLORIDE 0.9 % (FLUSH) 0.9 %
5-40 SYRINGE (ML) INJECTION EVERY 12 HOURS SCHEDULED
Status: DISCONTINUED | OUTPATIENT
Start: 2024-12-10 | End: 2024-12-15 | Stop reason: HOSPADM

## 2024-12-10 RX ORDER — ISOSORBIDE MONONITRATE 60 MG/1
60 TABLET, EXTENDED RELEASE ORAL DAILY
Status: DISCONTINUED | OUTPATIENT
Start: 2024-12-10 | End: 2024-12-15 | Stop reason: HOSPADM

## 2024-12-10 RX ORDER — DOBUTAMINE HYDROCHLORIDE 200 MG/100ML
2.5 INJECTION INTRAVENOUS CONTINUOUS
Status: DISCONTINUED | OUTPATIENT
Start: 2024-12-10 | End: 2024-12-10

## 2024-12-10 RX ORDER — DULOXETIN HYDROCHLORIDE 60 MG/1
60 CAPSULE, DELAYED RELEASE ORAL ONCE
Status: DISCONTINUED | OUTPATIENT
Start: 2024-12-10 | End: 2024-12-10 | Stop reason: HOSPADM

## 2024-12-10 RX ORDER — LOSARTAN POTASSIUM 50 MG/1
50 TABLET ORAL DAILY
Status: DISCONTINUED | OUTPATIENT
Start: 2024-12-10 | End: 2024-12-12

## 2024-12-10 RX ORDER — SODIUM CHLORIDE 0.9 % (FLUSH) 0.9 %
5-40 SYRINGE (ML) INJECTION PRN
Status: DISCONTINUED | OUTPATIENT
Start: 2024-12-10 | End: 2024-12-15 | Stop reason: HOSPADM

## 2024-12-10 RX ORDER — ACETAMINOPHEN 500 MG
1000 TABLET ORAL ONCE
Status: DISCONTINUED | OUTPATIENT
Start: 2024-12-10 | End: 2024-12-10 | Stop reason: HOSPADM

## 2024-12-10 RX ORDER — SODIUM CHLORIDE 9 MG/ML
INJECTION, SOLUTION INTRAVENOUS PRN
Status: DISCONTINUED | OUTPATIENT
Start: 2024-12-10 | End: 2024-12-15 | Stop reason: HOSPADM

## 2024-12-10 RX ORDER — SODIUM CHLORIDE 9 MG/ML
INJECTION, SOLUTION INTRAVENOUS PRN
Status: DISCONTINUED | OUTPATIENT
Start: 2024-12-10 | End: 2024-12-10 | Stop reason: HOSPADM

## 2024-12-10 RX ORDER — TRANEXAMIC ACID 650 MG/1
1950 TABLET ORAL ONCE
Status: DISCONTINUED | OUTPATIENT
Start: 2024-12-10 | End: 2024-12-10 | Stop reason: HOSPADM

## 2024-12-10 RX ORDER — SODIUM CHLORIDE 0.9 % (FLUSH) 0.9 %
5-40 SYRINGE (ML) INJECTION PRN
Status: DISCONTINUED | OUTPATIENT
Start: 2024-12-10 | End: 2024-12-10 | Stop reason: HOSPADM

## 2024-12-10 RX ORDER — ATORVASTATIN CALCIUM 40 MG/1
40 TABLET, FILM COATED ORAL DAILY
Status: DISCONTINUED | OUTPATIENT
Start: 2024-12-10 | End: 2024-12-15 | Stop reason: HOSPADM

## 2024-12-10 RX ORDER — POTASSIUM CHLORIDE 1500 MG/1
40 TABLET, EXTENDED RELEASE ORAL PRN
Status: DISCONTINUED | OUTPATIENT
Start: 2024-12-10 | End: 2024-12-15 | Stop reason: HOSPADM

## 2024-12-10 RX ORDER — FUROSEMIDE 10 MG/ML
40 INJECTION INTRAMUSCULAR; INTRAVENOUS ONCE
Status: COMPLETED | OUTPATIENT
Start: 2024-12-10 | End: 2024-12-10

## 2024-12-10 RX ORDER — SODIUM CHLORIDE 0.9 % (FLUSH) 0.9 %
5-40 SYRINGE (ML) INJECTION EVERY 12 HOURS SCHEDULED
Status: DISCONTINUED | OUTPATIENT
Start: 2024-12-10 | End: 2024-12-10 | Stop reason: HOSPADM

## 2024-12-10 RX ORDER — LIDOCAINE 4 G/G
2 PATCH TOPICAL DAILY
Status: DISCONTINUED | OUTPATIENT
Start: 2024-12-10 | End: 2024-12-15 | Stop reason: HOSPADM

## 2024-12-10 RX ORDER — ONDANSETRON 4 MG/1
4 TABLET, ORALLY DISINTEGRATING ORAL EVERY 8 HOURS PRN
Status: DISCONTINUED | OUTPATIENT
Start: 2024-12-10 | End: 2024-12-15 | Stop reason: HOSPADM

## 2024-12-10 RX ORDER — MAGNESIUM SULFATE IN WATER 40 MG/ML
2000 INJECTION, SOLUTION INTRAVENOUS PRN
Status: DISCONTINUED | OUTPATIENT
Start: 2024-12-10 | End: 2024-12-15 | Stop reason: HOSPADM

## 2024-12-10 RX ORDER — POTASSIUM CHLORIDE 7.45 MG/ML
10 INJECTION INTRAVENOUS PRN
Status: DISCONTINUED | OUTPATIENT
Start: 2024-12-10 | End: 2024-12-15 | Stop reason: HOSPADM

## 2024-12-10 RX ORDER — ASPIRIN 81 MG/1
81 TABLET ORAL DAILY
Status: DISCONTINUED | OUTPATIENT
Start: 2024-12-10 | End: 2024-12-15 | Stop reason: HOSPADM

## 2024-12-10 RX ORDER — MECLIZINE HCL 12.5 MG 12.5 MG/1
25 TABLET ORAL 3 TIMES DAILY PRN
Status: DISCONTINUED | OUTPATIENT
Start: 2024-12-10 | End: 2024-12-15 | Stop reason: HOSPADM

## 2024-12-10 RX ORDER — ACETAMINOPHEN 325 MG/1
650 TABLET ORAL EVERY 6 HOURS PRN
Status: DISCONTINUED | OUTPATIENT
Start: 2024-12-10 | End: 2024-12-15 | Stop reason: HOSPADM

## 2024-12-10 RX ORDER — ONDANSETRON 2 MG/ML
4 INJECTION INTRAMUSCULAR; INTRAVENOUS EVERY 6 HOURS PRN
Status: DISCONTINUED | OUTPATIENT
Start: 2024-12-10 | End: 2024-12-15 | Stop reason: HOSPADM

## 2024-12-10 RX ORDER — MELOXICAM 7.5 MG/1
15 TABLET ORAL ONCE
Status: DISCONTINUED | OUTPATIENT
Start: 2024-12-10 | End: 2024-12-10 | Stop reason: HOSPADM

## 2024-12-10 RX ORDER — ACETAMINOPHEN 650 MG/1
650 SUPPOSITORY RECTAL EVERY 6 HOURS PRN
Status: DISCONTINUED | OUTPATIENT
Start: 2024-12-10 | End: 2024-12-15 | Stop reason: HOSPADM

## 2024-12-10 RX ORDER — ENOXAPARIN SODIUM 100 MG/ML
40 INJECTION SUBCUTANEOUS DAILY
Status: DISCONTINUED | OUTPATIENT
Start: 2024-12-10 | End: 2024-12-15 | Stop reason: HOSPADM

## 2024-12-10 RX ADMIN — FUROSEMIDE 40 MG: 10 INJECTION, SOLUTION INTRAMUSCULAR; INTRAVENOUS at 19:46

## 2024-12-10 RX ADMIN — DOBUTAMINE HYDROCHLORIDE 2.5 MCG/KG/MIN: 200 INJECTION INTRAVENOUS at 14:50

## 2024-12-10 RX ADMIN — ASPIRIN 81 MG: 81 TABLET, COATED ORAL at 16:39

## 2024-12-10 RX ADMIN — ATORVASTATIN CALCIUM 40 MG: 40 TABLET, FILM COATED ORAL at 16:39

## 2024-12-10 RX ADMIN — LOSARTAN POTASSIUM 50 MG: 50 TABLET, FILM COATED ORAL at 16:39

## 2024-12-10 RX ADMIN — POTASSIUM CHLORIDE 40 MEQ: 1500 TABLET, EXTENDED RELEASE ORAL at 16:39

## 2024-12-10 RX ADMIN — FUROSEMIDE 40 MG: 10 INJECTION, SOLUTION INTRAMUSCULAR; INTRAVENOUS at 14:43

## 2024-12-10 RX ADMIN — ISOSORBIDE MONONITRATE 60 MG: 60 TABLET, EXTENDED RELEASE ORAL at 16:39

## 2024-12-10 ASSESSMENT — PAIN - FUNCTIONAL ASSESSMENT
PAIN_FUNCTIONAL_ASSESSMENT: NONE - DENIES PAIN
PAIN_FUNCTIONAL_ASSESSMENT: 0-10

## 2024-12-10 ASSESSMENT — PAIN DESCRIPTION - LOCATION: LOCATION: CHEST

## 2024-12-10 ASSESSMENT — PAIN DESCRIPTION - ORIENTATION: ORIENTATION: RIGHT

## 2024-12-10 ASSESSMENT — PAIN SCALES - GENERAL: PAINLEVEL_OUTOF10: 3

## 2024-12-10 ASSESSMENT — PAIN DESCRIPTION - DESCRIPTORS: DESCRIPTORS: PRESSURE

## 2024-12-10 NOTE — H&P
amLODIPine (NORVASC) 10 MG tablet Take 1 tablet by mouth daily    Amanda Degroot MD   methocarbamol (ROBAXIN-750) 750 MG tablet Take 1 tablet by mouth 3 times daily as needed (pain) 5/13/23   Jackelyn Goldsmith MD   isosorbide mononitrate (IMDUR) 60 MG extended release tablet TAKE 1 TABLET BY MOUTH EVERY DAY 10/20/20   Dariusz Acosta MD   meclizine (ANTIVERT) 25 MG tablet Take 1 tablet by mouth 3 times daily as needed for Dizziness 1/16/19   Jovanny Du MD   hydrocortisone 1 % cream Apply topically as needed Apply topically 2 times daily.    Amanda Degroot MD   fluticasone (FLONASE) 50 MCG/ACT nasal spray 1 spray by Each Nostril route daily as needed    Amanda Degroot MD   Insulin Detemir (LEVEMIR SC) Inject 14 Units into the skin nightly    Amanda Degroot MD   losartan (COZAAR) 50 MG tablet Take 1 tablet by mouth daily    Amanda Degroot MD   Omega-3 Fatty Acids (FISH OIL PO) Take 1,000 mg by mouth daily    Amanda Degroot MD   nitroGLYCERIN (NITROSTAT) 0.4 MG SL tablet Place 1 tablet under the tongue every 5 minutes as needed for Chest pain    ProviderAmanda MD       Physical Exam:    Physical Exam     General: NAD  Eyes: EOMI  ENT: neck supple; + JVD  Cardiovascular: Bradycardic but regular  Respiratory: Clear to auscultation  Gastrointestinal: Soft, non tender  Genitourinary: no suprapubic tenderness  Musculoskeletal: + bilateral LE edema  Skin: warm, dry  Neuro: Alert.  Psych: Mood appropriate.       Past Medical History:   PMHx   Past Medical History:   Diagnosis Date    Arthritis     CAD (coronary artery disease)     \"blocked artery\"  stents placed    Constipation     due to slow movement through bowels    Depression     Diabetes mellitus (HCC)     GERD (gastroesophageal reflux disease)     History of coronary artery stent placement     Hyperlipidemia     Hypertension     Neuropathy     Vertigo     Vitamin D deficiency, unspecified      PSHX:

## 2024-12-10 NOTE — PROGRESS NOTES
Pts heart rate is 39, pt states she feels \"winded\". Anesthesia made aware. EKG ordered. Will monitor.

## 2024-12-10 NOTE — ED PROVIDER NOTES
[facility] EMERGENCY DEPARTMENT  EMERGENCY DEPARTMENT ENCOUNTER        Pt Name: Breanna Maxwell  MRN: [id number]  Birthdate 1955  Date of evaluation: 12/10/2024  Provider: Christian Max PA-C  PCP: Ginny Costello MD  Note Started: 2:14 PM EST 12/10/24       I have seen and evaluated this patient with my supervising physician Dr. Garcia.      CHIEF COMPLAINT       Chief Complaint   Patient presents with    Bradycardia     Pt was seen in pre-op today for a knee replacement. Upon arrival pt Hr was around 39BPM.        HISTORY OF PRESENT ILLNESS: 1 or more Elements     History From: Patient            Chief Complaint: Shortness of breath 1 week    Breanna Maxwell is a 69 y.o. female who presents being transferred down from being assessed for potential knee replacement today.  She has been feeling short of breath, they did see abnormal low heart rate and EKG and sent her down to the ER.  Reportedly patient has been short of breath for the last week or so, can only step a few steps before feeling worse with her shortness of breath.  Feels like there is more edema in her legs than usual.  No pain in the chest or syncope or near syncope.  No recent fevers or cough other than patient having history of recent COVID-19 infection diagnosed on the seventh of last month in November.  She also has a history of coronary artery disease with previous stent placement she states.  No nausea or vomiting or abdominal pain.  No recent fall or contusion.  No other acute complaint.  Patient not anticoagulated.  States that she takes a aspirin daily.  No beta-blocker or recent blood pressure change.  Nursing Notes were all reviewed and agreed with or any disagreements were addressed in the HPI.    REVIEW OF SYSTEMS :      Review of Systems  Positive as above  Positives and Pertinent negatives as per HPI.     SURGICAL HISTORY     Past Surgical History:   Procedure Laterality Date    COLONOSCOPY

## 2024-12-10 NOTE — CONSULTS
Pain  amLODIPine (NORVASC) 10 MG tablet, Take 1 tablet by mouth daily  methocarbamol (ROBAXIN-750) 750 MG tablet, Take 1 tablet by mouth 3 times daily as needed (pain)  isosorbide mononitrate (IMDUR) 60 MG extended release tablet, TAKE 1 TABLET BY MOUTH EVERY DAY  meclizine (ANTIVERT) 25 MG tablet, Take 1 tablet by mouth 3 times daily as needed for Dizziness  fluticasone (FLONASE) 50 MCG/ACT nasal spray, 1 spray by Each Nostril route daily as needed  Insulin Detemir (LEVEMIR SC), Inject 14 Units into the skin nightly  losartan (COZAAR) 50 MG tablet, Take 1 tablet by mouth daily  Multiple Vitamin (MULTIVITAMIN PO), Take 1 tablet by mouth daily  hydrocortisone 1 % cream, Apply topically as needed Apply topically 2 times daily.  Omega-3 Fatty Acids (FISH OIL PO), Take 1,000 mg by mouth daily  nitroGLYCERIN (NITROSTAT) 0.4 MG SL tablet, Place 1 tablet under the tongue every 5 minutes as needed for Chest pain    Allergies   Allergen Reactions    Clonidine Derivatives Itching    Doxycycline Itching    Penicillins Swelling     Lips swelling    Sulfa Antibiotics Swelling     Lips swelling    Lisinopril Itching and Rash     States \"don't remember reaction but had something long time ago\"      Social History     Tobacco Use    Smoking status: Never    Smokeless tobacco: Never   Substance Use Topics    Alcohol use: No      Family History   Problem Relation Age of Onset    Heart Disease Mother     High Blood Pressure Mother     High Cholesterol Mother     No Known Problems Father     Cancer Sister     Lung Cancer Sister         Review of Systems:  Constitutional: No Fever or Weight Loss  Eyes: No decreased vision  ENT: No Headaches, Hearing Loss or Vertigo  Cardiovascular:  chest pain, dyspnea on exertion 3-4 days  Respiratory: No cough or wheezing    Gastrointestinal: No abdominal pain, appetite loss, blood in stools, constipation, diarrhea or heartburn  Genitourinary: No dysuria, trouble voiding, or  study.      Signature      ------------------------------------------------------------------   Electronically signed by Deep Santoyo MD   (Interpreting physician) on 01/15/2019 at 03:00 PM   ------------------------------------------------------------------  No results for input(s): \"NA\", \"K\", \"CL\", \"CO2\", \"PHOS\", \"BUN\", \"CREATININE\" in the last 72 hours.    Invalid input(s): \"CA\"  No results for input(s): \"WBC\", \"HGB\", \"HCT\", \"MCV\", \"PLT\" in the last 72 hours.  Lab Results   Component Value Date/Time    TROPONINI <0.01 01/14/2019 07:00 AM         Assessment:     Principal Problem:    Degenerative arthritis of left knee  Active Problems:    Mobitz type 1 second degree AV block    TREVINO (dyspnea on exertion)    Angina of effort (HCC)  Resolved Problems:    * No resolved hospital problems. *      Plan:     Admit for monitoring. Echo then decision about cath or pacer depending on monitoring.   Unclear whether av block from covid, boosters, or cad or other reason. Symptoms could all be rate related.   Spoke to Dr. Benton who will admit.

## 2024-12-10 NOTE — ED TRIAGE NOTES
Pt was seen in pre-op today for a knee replacement. Upon arrival to preop pt Hr was around 35BPM. EKG was done in pre-op showing heart-block. Pt was seen by cardiologist in pre-op. Pt states cardiac hx and previous stent placed. Pre-op nurse pt was aslo hypoglycemic and gave pt apple juice. Pt is A&Ox4

## 2024-12-10 NOTE — ED PROVIDER NOTES
In addition to the advanced practice provider, I personally saw Breanna Maxwell and performed a substantive portion of the visit including all aspects of the medical decision making. I made/approved the management plan and take responsibility for the patient management    Briefly, this is a 69 y.o. female here for to the ER for evaluation of positive bradycardia and near syncope, the patient was found to have heart rates of 30-40 on arrival, positive weakness exertional dyspnea and fatigue.  No headache no syncope.  History of coronary artery disease and stent placement.  Mild dyspnea..    On exam, GCS is 15, normal skin perfusion marked bradycardia, no pulse deficit, very mild rhonchi    EKG  EKG was reviewed by emergency department physician in the absence of a cardiologist    Marked bradycardia, high degree AV block, nonspecific ST segment change      Screenings   Lanie Coma Scale  Eye Opening: Spontaneous  Best Verbal Response: Oriented  Best Motor Response: Obeys commands  Lanie Coma Scale Score: 15      Is this patient to be included in the SEP-1 Core Measure due to severe sepsis or septic shock?   No     Exclusion criteria - the patient is NOT to be included for SEP-1 Core Measure due to:  Infection is not suspected      MDM  Patient resents ER for evaluation of high degree AV block dopamine was initiated, she does have some mild decompensated congestive heart failure she is being ruled out for cardiac ischemia, chart reviewed, case discussed with cardiology the admitting hospitalist, patient is aware of disposition management.  Her blood pressure has remained stable within normal mean arterial pressure normal cerebral perfusion pressure.  Further management per the admitting and consulting physicians    I Dr. Garcia am the primary clinician of record.    Critical Care:  I have discussed the case with the advanced practice provider. I have personally performed a history, physical exam, and my own

## 2024-12-10 NOTE — PROGRESS NOTES
Patient placed on portable telemetry monitor. Instructed patient and family member to call with any change in symptoms. Door open. Cardiology called again. States they are finishing rounding and will be down to see patient.

## 2024-12-10 NOTE — PLAN OF CARE
Plan of care     Appears to be in chf and her hx now is swollen feet for two weeks and her neck veins are clearly elevated now able to sit her up.  She will likely need a pacer, but needs her hypokalemic hyperchloremic acidosis and fluid excess improved.   Will reassess. She received lasix and review with Dr. Benton.

## 2024-12-10 NOTE — PROGRESS NOTES
Pt states she feels hypoglycemic. Blood sugar 100. Dr. Gandara notified. Instructed to give 2 oz of apple juice. Will monitor.

## 2024-12-10 NOTE — PLAN OF CARE
Problem: Chronic Conditions and Co-morbidities  Goal: Patient's chronic conditions and co-morbidity symptoms are monitored and maintained or improved  Outcome: Progressing  Flowsheets (Taken 12/10/2024 1610)  Care Plan - Patient's Chronic Conditions and Co-Morbidity Symptoms are Monitored and Maintained or Improved:   Monitor and assess patient's chronic conditions and comorbid symptoms for stability, deterioration, or improvement   Collaborate with multidisciplinary team to address chronic and comorbid conditions and prevent exacerbation or deterioration     Problem: Pain  Goal: Verbalizes/displays adequate comfort level or baseline comfort level  Outcome: Progressing     Problem: Safety - Adult  Goal: Free from fall injury  Outcome: Progressing     Problem: ABCDS Injury Assessment  Goal: Absence of physical injury  Outcome: Progressing

## 2024-12-11 ENCOUNTER — APPOINTMENT (OUTPATIENT)
Dept: GENERAL RADIOLOGY | Age: 69
DRG: 242 | End: 2024-12-11
Payer: MEDICARE

## 2024-12-11 LAB
ANION GAP SERPL CALCULATED.3IONS-SCNC: 13 MMOL/L (ref 3–16)
BASOPHILS # BLD: 0.1 K/UL (ref 0–0.2)
BASOPHILS NFR BLD: 0.7 %
BUN SERPL-MCNC: 20 MG/DL (ref 7–20)
CALCIUM SERPL-MCNC: 9.7 MG/DL (ref 8.3–10.6)
CHLORIDE SERPL-SCNC: 114 MMOL/L (ref 99–110)
CO2 SERPL-SCNC: 19 MMOL/L (ref 21–32)
CREAT SERPL-MCNC: 0.9 MG/DL (ref 0.6–1.2)
DEPRECATED RDW RBC AUTO: 15.8 % (ref 12.4–15.4)
EOSINOPHIL # BLD: 0.1 K/UL (ref 0–0.6)
EOSINOPHIL NFR BLD: 1.1 %
GFR SERPLBLD CREATININE-BSD FMLA CKD-EPI: 68 ML/MIN/{1.73_M2}
GLUCOSE SERPL-MCNC: 117 MG/DL (ref 70–99)
HCT VFR BLD AUTO: 31.9 % (ref 36–48)
HGB BLD-MCNC: 10.7 G/DL (ref 12–16)
LYMPHOCYTES # BLD: 1.7 K/UL (ref 1–5.1)
LYMPHOCYTES NFR BLD: 18.6 %
MCH RBC QN AUTO: 29.3 PG (ref 26–34)
MCHC RBC AUTO-ENTMCNC: 33.5 G/DL (ref 31–36)
MCV RBC AUTO: 87.6 FL (ref 80–100)
MONOCYTES # BLD: 0.6 K/UL (ref 0–1.3)
MONOCYTES NFR BLD: 7.2 %
NEUTROPHILS # BLD: 6.6 K/UL (ref 1.7–7.7)
NEUTROPHILS NFR BLD: 72.4 %
PLATELET # BLD AUTO: 255 K/UL (ref 135–450)
PMV BLD AUTO: 8.4 FL (ref 5–10.5)
POTASSIUM SERPL-SCNC: 3.6 MMOL/L (ref 3.5–5.1)
RBC # BLD AUTO: 3.64 M/UL (ref 4–5.2)
SODIUM SERPL-SCNC: 146 MMOL/L (ref 136–145)
WBC # BLD AUTO: 9.1 K/UL (ref 4–11)

## 2024-12-11 PROCEDURE — 2100000000 HC CCU R&B

## 2024-12-11 PROCEDURE — 85025 COMPLETE CBC W/AUTO DIFF WBC: CPT

## 2024-12-11 PROCEDURE — 6370000000 HC RX 637 (ALT 250 FOR IP)

## 2024-12-11 PROCEDURE — 6360000002 HC RX W HCPCS: Performed by: STUDENT IN AN ORGANIZED HEALTH CARE EDUCATION/TRAINING PROGRAM

## 2024-12-11 PROCEDURE — 6370000000 HC RX 637 (ALT 250 FOR IP): Performed by: REGISTERED NURSE

## 2024-12-11 PROCEDURE — 80048 BASIC METABOLIC PNL TOTAL CA: CPT

## 2024-12-11 PROCEDURE — 71045 X-RAY EXAM CHEST 1 VIEW: CPT

## 2024-12-11 PROCEDURE — 94760 N-INVAS EAR/PLS OXIMETRY 1: CPT

## 2024-12-11 PROCEDURE — 36415 COLL VENOUS BLD VENIPUNCTURE: CPT

## 2024-12-11 PROCEDURE — 6370000000 HC RX 637 (ALT 250 FOR IP): Performed by: STUDENT IN AN ORGANIZED HEALTH CARE EDUCATION/TRAINING PROGRAM

## 2024-12-11 PROCEDURE — 2580000003 HC RX 258: Performed by: STUDENT IN AN ORGANIZED HEALTH CARE EDUCATION/TRAINING PROGRAM

## 2024-12-11 RX ORDER — GUAIFENESIN/DEXTROMETHORPHAN 100-10MG/5
5 SYRUP ORAL EVERY 4 HOURS PRN
Status: DISCONTINUED | OUTPATIENT
Start: 2024-12-11 | End: 2024-12-15 | Stop reason: HOSPADM

## 2024-12-11 RX ORDER — HYDRALAZINE HYDROCHLORIDE 20 MG/ML
5 INJECTION INTRAMUSCULAR; INTRAVENOUS EVERY 6 HOURS PRN
Status: DISCONTINUED | OUTPATIENT
Start: 2024-12-11 | End: 2024-12-15 | Stop reason: HOSPADM

## 2024-12-11 RX ORDER — FUROSEMIDE 10 MG/ML
40 INJECTION INTRAMUSCULAR; INTRAVENOUS ONCE
Status: COMPLETED | OUTPATIENT
Start: 2024-12-11 | End: 2024-12-11

## 2024-12-11 RX ORDER — HYDROCHLOROTHIAZIDE 25 MG/1
25 TABLET ORAL DAILY
Status: DISCONTINUED | OUTPATIENT
Start: 2024-12-11 | End: 2024-12-12

## 2024-12-11 RX ORDER — BENZONATATE 100 MG/1
100 CAPSULE ORAL 3 TIMES DAILY PRN
Status: DISCONTINUED | OUTPATIENT
Start: 2024-12-11 | End: 2024-12-15 | Stop reason: HOSPADM

## 2024-12-11 RX ORDER — POTASSIUM CHLORIDE 1500 MG/1
40 TABLET, EXTENDED RELEASE ORAL ONCE
Status: COMPLETED | OUTPATIENT
Start: 2024-12-11 | End: 2024-12-11

## 2024-12-11 RX ADMIN — ISOSORBIDE MONONITRATE 60 MG: 60 TABLET, EXTENDED RELEASE ORAL at 09:27

## 2024-12-11 RX ADMIN — ASPIRIN 81 MG: 81 TABLET, COATED ORAL at 14:00

## 2024-12-11 RX ADMIN — GUAIFENESIN SYRUP AND DEXTROMETHORPHAN 5 ML: 100; 10 SYRUP ORAL at 08:59

## 2024-12-11 RX ADMIN — HYDROCHLOROTHIAZIDE 25 MG: 25 TABLET ORAL at 09:27

## 2024-12-11 RX ADMIN — SODIUM CHLORIDE, PRESERVATIVE FREE 10 ML: 5 INJECTION INTRAVENOUS at 09:28

## 2024-12-11 RX ADMIN — HYDRALAZINE HYDROCHLORIDE 5 MG: 20 INJECTION INTRAMUSCULAR; INTRAVENOUS at 14:01

## 2024-12-11 RX ADMIN — BENZONATATE 100 MG: 100 CAPSULE ORAL at 05:50

## 2024-12-11 RX ADMIN — LOSARTAN POTASSIUM 50 MG: 50 TABLET, FILM COATED ORAL at 12:13

## 2024-12-11 RX ADMIN — GUAIFENESIN SYRUP AND DEXTROMETHORPHAN 5 ML: 100; 10 SYRUP ORAL at 17:55

## 2024-12-11 RX ADMIN — ATORVASTATIN CALCIUM 40 MG: 40 TABLET, FILM COATED ORAL at 20:55

## 2024-12-11 RX ADMIN — POTASSIUM CHLORIDE 40 MEQ: 1500 TABLET, EXTENDED RELEASE ORAL at 09:27

## 2024-12-11 RX ADMIN — ENOXAPARIN SODIUM 40 MG: 100 INJECTION SUBCUTANEOUS at 14:01

## 2024-12-11 RX ADMIN — TIZANIDINE 4 MG: 4 TABLET ORAL at 19:51

## 2024-12-11 RX ADMIN — FUROSEMIDE 40 MG: 10 INJECTION, SOLUTION INTRAMUSCULAR; INTRAVENOUS at 09:27

## 2024-12-11 RX ADMIN — TIZANIDINE 4 MG: 4 TABLET ORAL at 13:59

## 2024-12-11 RX ADMIN — SODIUM CHLORIDE, PRESERVATIVE FREE 10 ML: 5 INJECTION INTRAVENOUS at 20:56

## 2024-12-11 ASSESSMENT — PAIN SCALES - GENERAL
PAINLEVEL_OUTOF10: 0
PAINLEVEL_OUTOF10: 5
PAINLEVEL_OUTOF10: 0
PAINLEVEL_OUTOF10: 4
PAINLEVEL_OUTOF10: 0

## 2024-12-11 ASSESSMENT — PAIN DESCRIPTION - DESCRIPTORS
DESCRIPTORS: CRAMPING
DESCRIPTORS: CRAMPING

## 2024-12-11 ASSESSMENT — PAIN DESCRIPTION - ONSET: ONSET: SUDDEN

## 2024-12-11 ASSESSMENT — PAIN - FUNCTIONAL ASSESSMENT
PAIN_FUNCTIONAL_ASSESSMENT: PREVENTS OR INTERFERES SOME ACTIVE ACTIVITIES AND ADLS
PAIN_FUNCTIONAL_ASSESSMENT: PREVENTS OR INTERFERES SOME ACTIVE ACTIVITIES AND ADLS

## 2024-12-11 ASSESSMENT — PAIN DESCRIPTION - LOCATION
LOCATION: HIP;LEG
LOCATION: HIP

## 2024-12-11 ASSESSMENT — PAIN DESCRIPTION - FREQUENCY: FREQUENCY: INTERMITTENT

## 2024-12-11 ASSESSMENT — PAIN DESCRIPTION - ORIENTATION
ORIENTATION: LEFT
ORIENTATION: LEFT

## 2024-12-11 ASSESSMENT — PAIN DESCRIPTION - PAIN TYPE: TYPE: ACUTE PAIN

## 2024-12-11 NOTE — PLAN OF CARE
Problem: Chronic Conditions and Co-morbidities  Goal: Patient's chronic conditions and co-morbidity symptoms are monitored and maintained or improved  12/10/2024 2148 by La Monique RN  Outcome: Progressing  Flowsheets (Taken 12/10/2024 2148)  Care Plan - Patient's Chronic Conditions and Co-Morbidity Symptoms are Monitored and Maintained or Improved:   Monitor and assess patient's chronic conditions and comorbid symptoms for stability, deterioration, or improvement   Collaborate with multidisciplinary team to address chronic and comorbid conditions and prevent exacerbation or deterioration  12/10/2024 1810 by Kristin Hart RN  Outcome: Progressing  Flowsheets (Taken 12/10/2024 1610)  Care Plan - Patient's Chronic Conditions and Co-Morbidity Symptoms are Monitored and Maintained or Improved:   Monitor and assess patient's chronic conditions and comorbid symptoms for stability, deterioration, or improvement   Collaborate with multidisciplinary team to address chronic and comorbid conditions and prevent exacerbation or deterioration     Problem: Pain  Goal: Verbalizes/displays adequate comfort level or baseline comfort level  12/10/2024 2148 by La Monique RN  Outcome: Progressing  Flowsheets (Taken 12/10/2024 2148)  Verbalizes/displays adequate comfort level or baseline comfort level:   Encourage patient to monitor pain and request assistance   Administer analgesics based on type and severity of pain and evaluate response   Implement non-pharmacological measures as appropriate and evaluate response   Assess pain using appropriate pain scale  12/10/2024 1810 by Kristin Hart RN  Outcome: Progressing     Problem: Safety - Adult  Goal: Free from fall injury  12/10/2024 1810 by Kristin Hart RN  Outcome: Progressing     Problem: ABCDS Injury Assessment  Goal: Absence of physical injury  12/10/2024 1810 by Kristin Hart RN  Outcome: Progressing

## 2024-12-11 NOTE — CARE COORDINATION
12/11/24 1231   Service Assessment   Patient Orientation Alert and Oriented   Cognition Alert   History Provided By Patient   Primary Caregiver Self   Accompanied By/Relationship no one   Support Systems Spouse/Significant Other   Patient's Healthcare Decision Maker is: Legal Next of Kin   PCP Verified by CM Yes  (Ginny Truong  last visit was 3-2024)   Last Visit to PCP Within last year   Prior Functional Level Independent in ADLs/IADLs   Current Functional Level Assistance with the following:;Mobility   Can patient return to prior living arrangement Unknown at present   Ability to make needs known: Good   Family able to assist with home care needs: Yes   Would you like for me to discuss the discharge plan with any other family members/significant others, and if so, who? No   Financial Resources Medicare   Community Resources None   Discharge Planning   Type of Residence Apartment   Living Arrangements Spouse/Significant Other   Current Services Prior To Admission Durable Medical Equipment   Current DME Prior to Arrival Walker;Cane;Shower Chair   Potential Assistance Needed Outpatient PT/OT;Home Care;Extended Care Facility   DME Ordered? No   Potential Assistance Purchasing Medications No   Type of Home Care Services None   Patient expects to be discharged to: Apartment   One/Two Story Residence One story   History of falls? 0   Services At/After Discharge   Transition of Care Consult (CM Consult) N/A   Services At/After Discharge None    Resource Information Provided? No   Mode of Transport at Discharge Other (see comment)  (Sign other will transport him)   Confirm Follow Up Transport Family

## 2024-12-11 NOTE — DISCHARGE INSTRUCTIONS
Extra Heart Failure Education/ Tools/ Resources:     https://[employer].com/publication/?f=106705   --- this is American Heart Association interactive Healthier Living with Heart Failure guidebook.  Please click hyperlink or copy / paste link into search bar. The QR Code is also available below. Use your mouse to scroll through the pages.  Lots of information about weight monitoring, diet tips, activity, meds, etc    Heart Failure Tools and Resources QR Code is below. It includes multiple resources to include symptom tracker, med tracker, further HF info, and access to a HF Support Network online Community    HF [place] Vane  -- this is a free smart phone vane available for iPhone and Android download.  Use your phone to track sodium / fluid intake, zone tool symptom tracking, weights, medications, etc. Click on this hyperlink  HF [place] V[place]   for QR code for easy download or the link is also found in the below HF Tools and Resources.      DASH (Dietary Approach to Stop Hypertension) diet --  https://www.nhlbi.nih.gov/education/dash-eating-plan -- this diet is a flexible eating plan that promotes heart healthy eating style.  Click on hyperlink or copy / paste link into search bar.  Lots of low sodium recipes and tips.    https://www.[employer]langoo/recipes  -- more free recipes

## 2024-12-11 NOTE — DISCHARGE INSTR - COC
weight.  Dehydration:  having difficulty or a decrease in urination, dizziness, worsening fatigue, or new onset/worsening of generalized weakness.     Please continue a LOW SODIUM diet and LIMIT fluid intake to 48 - 64 ounces ( 1.5 - 2 liters) per day.     Call Ginny Costello [phone] and [facility] [phone] and/or [facility] Heart Failure Resource Line @ [phone] with any questions or concerns.   Please continue heart failure education to patient and family/support system.  See After Visit Summary for hospital follow up appointment details.  Consider spiritual care referral for support and/or completion of advance directives [phone].  Consider: Home Care Vitals telehealth program if patient agreeable and able to participate, palliative care consult for ongoing goals of care, end of life, and/or chronic disease management discussions, and referral to [facility] ([phone]) once SNF/HHC complete.      Patient's personal belongings (please select all that are sent with patient):  {CHP DME Belongings:943064764}    RN SIGNATURE:  {Esignature:[id number]}    CASE MANAGEMENT/SOCIAL WORK SECTION    Inpatient Status Date: ***    Readmission Risk Assessment Score:  Readmission Risk              Risk of Unplanned Readmission:  13           Discharging to Facility/ Agency   Name:   Address:  Phone:  Fax:    Dialysis Facility (if applicable)   Name:  Address:  Dialysis Schedule:  Phone:  Fax:    / signature: {Esignature:[id number]}    PHYSICIAN SECTION    Prognosis: {Prognosis:7904760617}    Condition at Discharge: { Patient Condition:118217282}    Rehab Potential (if transferring to Rehab): {Prognosis:5618091275}    Recommended Labs or Other Treatments After Discharge: ***    Physician Certification: I certify the above information and transfer of Breanna Maxwell  is necessary for the continuing treatment of the diagnosis listed and that she

## 2024-12-11 NOTE — PLAN OF CARE
Message in Perfect Serve to Dr Sagastume to see if TKA will be able to be done this admission as questioned by Dr Benton.

## 2024-12-11 NOTE — CARE COORDINATION
Case Management Assessment  Initial Evaluation    Date/Time of Evaluation: 12/11/2024 12:34 PM  Assessment Completed by: RICH Pederson    If patient is discharged prior to next notation, then this note serves as note for discharge by case management.    Patient Name: Breanna Maxwell                   YOB: 1955  Diagnosis: Complete heart block (HCC) [I44.2]  Atrioventricular block [I44.30]  Acute pulmonary edema [J81.0]  AV block, 2nd degree [I44.1]                   Date / Time: 12/10/2024  1:22 PM    Patient Admission Status: Inpatient   Readmission Risk (Low < 19, Mod (19-27), High > 27): Readmission Risk Score: 12.9    Current PCP: Ginny Costello MD  PCP verified by CM? Yes (Ginny Truong  last visit was 3-2024)    Chart Reviewed: Yes      History Provided by: Patient  Patient Orientation: Alert and Oriented    Patient Cognition: Alert    Hospitalization in the last 30 days (Readmission):  No    If yes, Readmission Assessment in  Navigator will be completed.    Advance Directives:      Code Status: Full Code   Patient's Primary Decision Maker is: Legal Next of Kin      Discharge Planning:    Patient lives with: Spouse/Significant Other Type of Home: Apartment  Primary Care Giver: Self  Patient Support Systems include: Spouse/Significant Other   Current Financial resources: Medicare  Current community resources: None  Current services prior to admission: Durable Medical Equipment            Current DME: Walker, Cane, Shower Chair            Type of Home Care services:  None    ADLS  Prior functional level: Independent in ADLs/IADLs  Current functional level: Assistance with the following:, Mobility    PT AM-PAC:   /24  OT AM-PAC:   /24    Family can provide assistance at DC: Yes  Would you like Case Management to discuss the discharge plan with any other family members/significant others, and if so, who? No  Plans to Return to Present Housing: Unknown at present  Other

## 2024-12-12 PROBLEM — I44.1 MOBITZ TYPE II BLOCK: Status: ACTIVE | Noted: 2024-12-10

## 2024-12-12 LAB
ANION GAP SERPL CALCULATED.3IONS-SCNC: 12 MMOL/L (ref 3–16)
BASOPHILS # BLD: 0.1 K/UL (ref 0–0.2)
BASOPHILS NFR BLD: 0.8 %
BUN SERPL-MCNC: 22 MG/DL (ref 7–20)
CALCIUM SERPL-MCNC: 9.9 MG/DL (ref 8.3–10.6)
CHLORIDE SERPL-SCNC: 112 MMOL/L (ref 99–110)
CO2 SERPL-SCNC: 20 MMOL/L (ref 21–32)
CREAT SERPL-MCNC: 1.1 MG/DL (ref 0.6–1.2)
DEPRECATED RDW RBC AUTO: 15.9 % (ref 12.4–15.4)
ECHO BSA: 1.75 M2
EOSINOPHIL # BLD: 0.1 K/UL (ref 0–0.6)
EOSINOPHIL NFR BLD: 0.9 %
GFR SERPLBLD CREATININE-BSD FMLA CKD-EPI: 54 ML/MIN/{1.73_M2}
GLUCOSE BLD-MCNC: 92 MG/DL (ref 70–99)
GLUCOSE BLD-MCNC: 97 MG/DL (ref 70–99)
GLUCOSE SERPL-MCNC: 88 MG/DL (ref 70–99)
HCT VFR BLD AUTO: 32.4 % (ref 36–48)
HGB BLD-MCNC: 10.9 G/DL (ref 12–16)
LYMPHOCYTES # BLD: 1.9 K/UL (ref 1–5.1)
LYMPHOCYTES NFR BLD: 21 %
MCH RBC QN AUTO: 29.4 PG (ref 26–34)
MCHC RBC AUTO-ENTMCNC: 33.7 G/DL (ref 31–36)
MCV RBC AUTO: 87.3 FL (ref 80–100)
MONOCYTES # BLD: 0.8 K/UL (ref 0–1.3)
MONOCYTES NFR BLD: 8.9 %
NEUTROPHILS # BLD: 6.3 K/UL (ref 1.7–7.7)
NEUTROPHILS NFR BLD: 68.4 %
PERFORMED ON: NORMAL
PERFORMED ON: NORMAL
PLATELET # BLD AUTO: 227 K/UL (ref 135–450)
PMV BLD AUTO: 8.3 FL (ref 5–10.5)
POTASSIUM SERPL-SCNC: 4.4 MMOL/L (ref 3.5–5.1)
RBC # BLD AUTO: 3.71 M/UL (ref 4–5.2)
SODIUM SERPL-SCNC: 144 MMOL/L (ref 136–145)
WBC # BLD AUTO: 9.2 K/UL (ref 4–11)

## 2024-12-12 PROCEDURE — 33208 INSRT HEART PM ATRIAL & VENT: CPT | Performed by: INTERNAL MEDICINE

## 2024-12-12 PROCEDURE — C1894 INTRO/SHEATH, NON-LASER: HCPCS | Performed by: INTERNAL MEDICINE

## 2024-12-12 PROCEDURE — 85025 COMPLETE CBC W/AUTO DIFF WBC: CPT

## 2024-12-12 PROCEDURE — 6370000000 HC RX 637 (ALT 250 FOR IP): Performed by: REGISTERED NURSE

## 2024-12-12 PROCEDURE — 6360000002 HC RX W HCPCS: Performed by: REGISTERED NURSE

## 2024-12-12 PROCEDURE — 02H63JZ INSERTION OF PACEMAKER LEAD INTO RIGHT ATRIUM, PERCUTANEOUS APPROACH: ICD-10-PCS | Performed by: INTERNAL MEDICINE

## 2024-12-12 PROCEDURE — 02HK3JZ INSERTION OF PACEMAKER LEAD INTO RIGHT VENTRICLE, PERCUTANEOUS APPROACH: ICD-10-PCS | Performed by: INTERNAL MEDICINE

## 2024-12-12 PROCEDURE — 6370000000 HC RX 637 (ALT 250 FOR IP): Performed by: INTERNAL MEDICINE

## 2024-12-12 PROCEDURE — 2580000003 HC RX 258: Performed by: STUDENT IN AN ORGANIZED HEALTH CARE EDUCATION/TRAINING PROGRAM

## 2024-12-12 PROCEDURE — 6360000002 HC RX W HCPCS: Performed by: INTERNAL MEDICINE

## 2024-12-12 PROCEDURE — 2100000000 HC CCU R&B

## 2024-12-12 PROCEDURE — 99153 MOD SED SAME PHYS/QHP EA: CPT | Performed by: INTERNAL MEDICINE

## 2024-12-12 PROCEDURE — C1887 CATHETER, GUIDING: HCPCS | Performed by: INTERNAL MEDICINE

## 2024-12-12 PROCEDURE — 6360000002 HC RX W HCPCS: Performed by: STUDENT IN AN ORGANIZED HEALTH CARE EDUCATION/TRAINING PROGRAM

## 2024-12-12 PROCEDURE — C1898 LEAD, PMKR, OTHER THAN TRANS: HCPCS | Performed by: INTERNAL MEDICINE

## 2024-12-12 PROCEDURE — 93005 ELECTROCARDIOGRAM TRACING: CPT | Performed by: INTERNAL MEDICINE

## 2024-12-12 PROCEDURE — C1785 PMKR, DUAL, RATE-RESP: HCPCS | Performed by: INTERNAL MEDICINE

## 2024-12-12 PROCEDURE — 2709999900 HC NON-CHARGEABLE SUPPLY: Performed by: INTERNAL MEDICINE

## 2024-12-12 PROCEDURE — 94761 N-INVAS EAR/PLS OXIMETRY MLT: CPT

## 2024-12-12 PROCEDURE — 99152 MOD SED SAME PHYS/QHP 5/>YRS: CPT | Performed by: INTERNAL MEDICINE

## 2024-12-12 PROCEDURE — C1769 GUIDE WIRE: HCPCS | Performed by: INTERNAL MEDICINE

## 2024-12-12 PROCEDURE — 6370000000 HC RX 637 (ALT 250 FOR IP): Performed by: STUDENT IN AN ORGANIZED HEALTH CARE EDUCATION/TRAINING PROGRAM

## 2024-12-12 PROCEDURE — C1892 INTRO/SHEATH,FIXED,PEEL-AWAY: HCPCS | Performed by: INTERNAL MEDICINE

## 2024-12-12 PROCEDURE — 0JH606Z INSERTION OF PACEMAKER, DUAL CHAMBER INTO CHEST SUBCUTANEOUS TISSUE AND FASCIA, OPEN APPROACH: ICD-10-PCS | Performed by: INTERNAL MEDICINE

## 2024-12-12 PROCEDURE — 80048 BASIC METABOLIC PNL TOTAL CA: CPT

## 2024-12-12 PROCEDURE — 2580000003 HC RX 258: Performed by: INTERNAL MEDICINE

## 2024-12-12 PROCEDURE — 36415 COLL VENOUS BLD VENIPUNCTURE: CPT

## 2024-12-12 DEVICE — LEAD 3830 US MKT/ 69CM MRI LBBAP
Type: IMPLANTABLE DEVICE | Status: FUNCTIONAL
Brand: SELECTSECURE™ MRI SURESCAN™

## 2024-12-12 DEVICE — LEAD 5076-52 MRI US RCMCRD
Type: IMPLANTABLE DEVICE | Status: FUNCTIONAL
Brand: CAPSUREFIX NOVUS MRI™ SURESCAN®

## 2024-12-12 DEVICE — IPG W1DR01 AZURE XT DR MRI USA
Type: IMPLANTABLE DEVICE | Status: FUNCTIONAL
Brand: AZURE™ XT DR MRI SURESCAN™

## 2024-12-12 RX ORDER — FUROSEMIDE 10 MG/ML
40 INJECTION INTRAMUSCULAR; INTRAVENOUS ONCE
Status: COMPLETED | OUTPATIENT
Start: 2024-12-12 | End: 2024-12-12

## 2024-12-12 RX ORDER — FENTANYL CITRATE 50 UG/ML
INJECTION, SOLUTION INTRAMUSCULAR; INTRAVENOUS PRN
Status: DISCONTINUED | OUTPATIENT
Start: 2024-12-12 | End: 2024-12-12 | Stop reason: HOSPADM

## 2024-12-12 RX ORDER — LOSARTAN POTASSIUM 100 MG/1
100 TABLET ORAL DAILY
Status: DISCONTINUED | OUTPATIENT
Start: 2024-12-13 | End: 2024-12-15 | Stop reason: HOSPADM

## 2024-12-12 RX ORDER — BUPIVACAINE HYDROCHLORIDE 5 MG/ML
INJECTION, SOLUTION EPIDURAL; INTRACAUDAL PRN
Status: DISCONTINUED | OUTPATIENT
Start: 2024-12-12 | End: 2024-12-12 | Stop reason: HOSPADM

## 2024-12-12 RX ORDER — HYDROCHLOROTHIAZIDE 25 MG/1
50 TABLET ORAL DAILY
Status: DISCONTINUED | OUTPATIENT
Start: 2024-12-13 | End: 2024-12-12

## 2024-12-12 RX ORDER — MIDAZOLAM HYDROCHLORIDE 1 MG/ML
INJECTION, SOLUTION INTRAMUSCULAR; INTRAVENOUS PRN
Status: DISCONTINUED | OUTPATIENT
Start: 2024-12-12 | End: 2024-12-12 | Stop reason: HOSPADM

## 2024-12-12 RX ORDER — HYDROCHLOROTHIAZIDE 25 MG/1
25 TABLET ORAL ONCE
Status: COMPLETED | OUTPATIENT
Start: 2024-12-12 | End: 2024-12-12

## 2024-12-12 RX ORDER — KETOROLAC TROMETHAMINE 15 MG/ML
15 INJECTION, SOLUTION INTRAMUSCULAR; INTRAVENOUS ONCE
Status: COMPLETED | OUTPATIENT
Start: 2024-12-12 | End: 2024-12-12

## 2024-12-12 RX ORDER — CARVEDILOL 12.5 MG/1
12.5 TABLET ORAL 2 TIMES DAILY WITH MEALS
Status: DISCONTINUED | OUTPATIENT
Start: 2024-12-12 | End: 2024-12-13

## 2024-12-12 RX ADMIN — KETOROLAC TROMETHAMINE 15 MG: 15 INJECTION, SOLUTION INTRAMUSCULAR; INTRAVENOUS at 03:06

## 2024-12-12 RX ADMIN — HYDRALAZINE HYDROCHLORIDE 5 MG: 20 INJECTION INTRAMUSCULAR; INTRAVENOUS at 02:12

## 2024-12-12 RX ADMIN — ENOXAPARIN SODIUM 40 MG: 100 INJECTION SUBCUTANEOUS at 08:09

## 2024-12-12 RX ADMIN — HYDRALAZINE HYDROCHLORIDE 5 MG: 20 INJECTION INTRAMUSCULAR; INTRAVENOUS at 10:23

## 2024-12-12 RX ADMIN — ACETAMINOPHEN 650 MG: 325 TABLET ORAL at 22:13

## 2024-12-12 RX ADMIN — CARVEDILOL 12.5 MG: 12.5 TABLET, FILM COATED ORAL at 17:49

## 2024-12-12 RX ADMIN — HYDROCHLOROTHIAZIDE 25 MG: 25 TABLET ORAL at 08:08

## 2024-12-12 RX ADMIN — ISOSORBIDE MONONITRATE 60 MG: 60 TABLET, EXTENDED RELEASE ORAL at 08:09

## 2024-12-12 RX ADMIN — TIZANIDINE 4 MG: 4 TABLET ORAL at 02:32

## 2024-12-12 RX ADMIN — TIZANIDINE 4 MG: 4 TABLET ORAL at 17:50

## 2024-12-12 RX ADMIN — FUROSEMIDE 40 MG: 10 INJECTION, SOLUTION INTRAMUSCULAR; INTRAVENOUS at 17:49

## 2024-12-12 RX ADMIN — LOSARTAN POTASSIUM 50 MG: 50 TABLET, FILM COATED ORAL at 08:09

## 2024-12-12 RX ADMIN — ASPIRIN 81 MG: 81 TABLET, COATED ORAL at 08:09

## 2024-12-12 RX ADMIN — TIZANIDINE 4 MG: 4 TABLET ORAL at 22:13

## 2024-12-12 RX ADMIN — HYDROCHLOROTHIAZIDE 25 MG: 25 TABLET ORAL at 08:48

## 2024-12-12 RX ADMIN — SODIUM CHLORIDE, PRESERVATIVE FREE 10 ML: 5 INJECTION INTRAVENOUS at 08:09

## 2024-12-12 RX ADMIN — ATORVASTATIN CALCIUM 40 MG: 40 TABLET, FILM COATED ORAL at 08:09

## 2024-12-12 RX ADMIN — TIZANIDINE 4 MG: 4 TABLET ORAL at 12:22

## 2024-12-12 ASSESSMENT — PAIN SCALES - GENERAL
PAINLEVEL_OUTOF10: 4
PAINLEVEL_OUTOF10: 0
PAINLEVEL_OUTOF10: 5
PAINLEVEL_OUTOF10: 0

## 2024-12-12 ASSESSMENT — PAIN DESCRIPTION - ORIENTATION
ORIENTATION: LEFT

## 2024-12-12 ASSESSMENT — PAIN DESCRIPTION - DESCRIPTORS
DESCRIPTORS: ACHING;SORE
DESCRIPTORS: SPASM
DESCRIPTORS: SPASM

## 2024-12-12 ASSESSMENT — PAIN SCALES - WONG BAKER: WONGBAKER_NUMERICALRESPONSE: NO HURT

## 2024-12-12 ASSESSMENT — PAIN DESCRIPTION - LOCATION
LOCATION: SHOULDER
LOCATION: HIP
LOCATION: HIP;KNEE

## 2024-12-12 NOTE — PLAN OF CARE
Problem: Chronic Conditions and Co-morbidities  Goal: Patient's chronic conditions and co-morbidity symptoms are monitored and maintained or improved  Outcome: Progressing     Problem: Pain  Goal: Verbalizes/displays adequate comfort level or baseline comfort level  Outcome: Progressing     Problem: Safety - Adult  Goal: Free from fall injury  Outcome: Progressing     Problem: ABCDS Injury Assessment  Goal: Absence of physical injury  Outcome: Progressing     Problem: Respiratory - Adult  Goal: Achieves optimal ventilation and oxygenation  Outcome: Progressing     Problem: Cardiovascular - Adult  Goal: Maintains optimal cardiac output and hemodynamic stability  Outcome: Progressing  Goal: Absence of cardiac dysrhythmias or at baseline  Outcome: Progressing     Problem: Musculoskeletal - Adult  Goal: Return mobility to safest level of function  Outcome: Progressing  Goal: Maintain proper alignment of affected body part  Outcome: Progressing  Goal: Return ADL status to a safe level of function  Outcome: Progressing     Problem: Metabolic/Fluid and Electrolytes - Adult  Goal: Electrolytes maintained within normal limits  Outcome: Progressing  Goal: Hemodynamic stability and optimal renal function maintained  Outcome: Progressing  Goal: Glucose maintained within prescribed range  Outcome: Progressing     Problem: Hematologic - Adult  Goal: Maintains hematologic stability  Outcome: Progressing

## 2024-12-12 NOTE — H&P
Medications Planned:  Midazolam (Versed) and Fentanyl intravenously       Luke Vidal MD  [facility]   Office: [phone]  Fax: [phone]

## 2024-12-12 NOTE — FLOWSHEET NOTE
12/12/24 1600   Vitals   Pulse 60   Respirations 24   BP (!) 172/77   MAP (Calculated) 109   MAP (mmHg) 103   BP Location Right lower arm   BP Upper/Lower Lower   BP Method Automatic   Patient Position Turns self     Notified Dr. Vidal of patient's HTN.  MD to look at medications and adjust/add meds.

## 2024-12-13 ENCOUNTER — APPOINTMENT (OUTPATIENT)
Dept: GENERAL RADIOLOGY | Age: 69
DRG: 242 | End: 2024-12-13
Payer: MEDICARE

## 2024-12-13 DIAGNOSIS — Z95.0 CARDIAC PACEMAKER IN SITU: Primary | ICD-10-CM

## 2024-12-13 DIAGNOSIS — I44.1 AV BLOCK, 2ND DEGREE: ICD-10-CM

## 2024-12-13 LAB
ANION GAP SERPL CALCULATED.3IONS-SCNC: 10 MMOL/L (ref 3–16)
BASOPHILS # BLD: 0 K/UL (ref 0–0.2)
BASOPHILS NFR BLD: 0.6 %
BUN SERPL-MCNC: 25 MG/DL (ref 7–20)
CALCIUM SERPL-MCNC: 9.3 MG/DL (ref 8.3–10.6)
CHLORIDE SERPL-SCNC: 111 MMOL/L (ref 99–110)
CO2 SERPL-SCNC: 21 MMOL/L (ref 21–32)
CREAT SERPL-MCNC: 0.9 MG/DL (ref 0.6–1.2)
DEPRECATED RDW RBC AUTO: 15.7 % (ref 12.4–15.4)
EKG ATRIAL RATE: 64 BPM
EKG DIAGNOSIS: NORMAL
EKG P AXIS: 41 DEGREES
EKG Q-T INTERVAL: 432 MS
EKG QRS DURATION: 94 MS
EKG QTC CALCULATION (BAZETT): 445 MS
EKG R AXIS: -1 DEGREES
EKG T AXIS: 102 DEGREES
EKG VENTRICULAR RATE: 64 BPM
EOSINOPHIL # BLD: 0.1 K/UL (ref 0–0.6)
EOSINOPHIL NFR BLD: 1.7 %
GFR SERPLBLD CREATININE-BSD FMLA CKD-EPI: 68 ML/MIN/{1.73_M2}
GLUCOSE SERPL-MCNC: 111 MG/DL (ref 70–99)
HCT VFR BLD AUTO: 30.5 % (ref 36–48)
HGB BLD-MCNC: 10.3 G/DL (ref 12–16)
LYMPHOCYTES # BLD: 1.1 K/UL (ref 1–5.1)
LYMPHOCYTES NFR BLD: 13.7 %
MAGNESIUM SERPL-MCNC: 1.53 MG/DL (ref 1.8–2.4)
MCH RBC QN AUTO: 29.7 PG (ref 26–34)
MCHC RBC AUTO-ENTMCNC: 33.7 G/DL (ref 31–36)
MCV RBC AUTO: 88.1 FL (ref 80–100)
MONOCYTES # BLD: 0.7 K/UL (ref 0–1.3)
MONOCYTES NFR BLD: 8.5 %
NEUTROPHILS # BLD: 6.2 K/UL (ref 1.7–7.7)
NEUTROPHILS NFR BLD: 75.5 %
PLATELET # BLD AUTO: 199 K/UL (ref 135–450)
PMV BLD AUTO: 8.2 FL (ref 5–10.5)
POTASSIUM SERPL-SCNC: 3.5 MMOL/L (ref 3.5–5.1)
RBC # BLD AUTO: 3.46 M/UL (ref 4–5.2)
SODIUM SERPL-SCNC: 142 MMOL/L (ref 136–145)
WBC # BLD AUTO: 8.2 K/UL (ref 4–11)

## 2024-12-13 PROCEDURE — 6370000000 HC RX 637 (ALT 250 FOR IP): Performed by: INTERNAL MEDICINE

## 2024-12-13 PROCEDURE — 83735 ASSAY OF MAGNESIUM: CPT

## 2024-12-13 PROCEDURE — 6360000002 HC RX W HCPCS: Performed by: STUDENT IN AN ORGANIZED HEALTH CARE EDUCATION/TRAINING PROGRAM

## 2024-12-13 PROCEDURE — 6370000000 HC RX 637 (ALT 250 FOR IP): Performed by: STUDENT IN AN ORGANIZED HEALTH CARE EDUCATION/TRAINING PROGRAM

## 2024-12-13 PROCEDURE — 80048 BASIC METABOLIC PNL TOTAL CA: CPT

## 2024-12-13 PROCEDURE — 94761 N-INVAS EAR/PLS OXIMETRY MLT: CPT

## 2024-12-13 PROCEDURE — 36415 COLL VENOUS BLD VENIPUNCTURE: CPT

## 2024-12-13 PROCEDURE — 6370000000 HC RX 637 (ALT 250 FOR IP): Performed by: REGISTERED NURSE

## 2024-12-13 PROCEDURE — 2580000003 HC RX 258: Performed by: STUDENT IN AN ORGANIZED HEALTH CARE EDUCATION/TRAINING PROGRAM

## 2024-12-13 PROCEDURE — 93010 ELECTROCARDIOGRAM REPORT: CPT | Performed by: INTERNAL MEDICINE

## 2024-12-13 PROCEDURE — 85025 COMPLETE CBC W/AUTO DIFF WBC: CPT

## 2024-12-13 PROCEDURE — 71046 X-RAY EXAM CHEST 2 VIEWS: CPT

## 2024-12-13 PROCEDURE — 94640 AIRWAY INHALATION TREATMENT: CPT

## 2024-12-13 PROCEDURE — 2100000000 HC CCU R&B

## 2024-12-13 PROCEDURE — 99232 SBSQ HOSP IP/OBS MODERATE 35: CPT | Performed by: INTERNAL MEDICINE

## 2024-12-13 RX ORDER — FUROSEMIDE 20 MG/1
20 TABLET ORAL DAILY
Qty: 30 TABLET | Refills: 0 | Status: SHIPPED | OUTPATIENT
Start: 2024-12-13 | End: 2024-12-15

## 2024-12-13 RX ORDER — FUROSEMIDE 10 MG/ML
40 INJECTION INTRAMUSCULAR; INTRAVENOUS ONCE
Status: COMPLETED | OUTPATIENT
Start: 2024-12-13 | End: 2024-12-13

## 2024-12-13 RX ORDER — LOSARTAN POTASSIUM 100 MG/1
100 TABLET ORAL DAILY
Qty: 30 TABLET | Refills: 0 | Status: SHIPPED | OUTPATIENT
Start: 2024-12-13

## 2024-12-13 RX ORDER — CARVEDILOL 25 MG/1
25 TABLET ORAL 2 TIMES DAILY WITH MEALS
Status: DISCONTINUED | OUTPATIENT
Start: 2024-12-14 | End: 2024-12-15 | Stop reason: HOSPADM

## 2024-12-13 RX ORDER — GUAIFENESIN/DEXTROMETHORPHAN 100-10MG/5
5 SYRUP ORAL EVERY 4 HOURS PRN
Qty: 120 ML | Refills: 0 | Status: SHIPPED | OUTPATIENT
Start: 2024-12-13 | End: 2024-12-23

## 2024-12-13 RX ORDER — POTASSIUM CHLORIDE 1500 MG/1
40 TABLET, EXTENDED RELEASE ORAL ONCE
Status: DISCONTINUED | OUTPATIENT
Start: 2024-12-13 | End: 2024-12-13

## 2024-12-13 RX ORDER — POTASSIUM CHLORIDE 1500 MG/1
20 TABLET, EXTENDED RELEASE ORAL ONCE
Status: COMPLETED | OUTPATIENT
Start: 2024-12-13 | End: 2024-12-13

## 2024-12-13 RX ORDER — CARVEDILOL 12.5 MG/1
12.5 TABLET ORAL 2 TIMES DAILY WITH MEALS
Qty: 60 TABLET | Refills: 3 | Status: SHIPPED | OUTPATIENT
Start: 2024-12-13 | End: 2024-12-15 | Stop reason: HOSPADM

## 2024-12-13 RX ORDER — HYDROCODONE BITARTRATE AND ACETAMINOPHEN 7.5; 325 MG/1; MG/1
1 TABLET ORAL EVERY 6 HOURS PRN
Qty: 12 TABLET | Refills: 0 | Status: SHIPPED | OUTPATIENT
Start: 2024-12-13 | End: 2024-12-16

## 2024-12-13 RX ORDER — IPRATROPIUM BROMIDE AND ALBUTEROL SULFATE 2.5; .5 MG/3ML; MG/3ML
1 SOLUTION RESPIRATORY (INHALATION) ONCE
Status: COMPLETED | OUTPATIENT
Start: 2024-12-13 | End: 2024-12-13

## 2024-12-13 RX ORDER — OXYCODONE HYDROCHLORIDE 5 MG/1
5 TABLET ORAL EVERY 6 HOURS PRN
Qty: 12 TABLET | Refills: 0 | Status: SHIPPED | OUTPATIENT
Start: 2024-12-13 | End: 2024-12-13 | Stop reason: HOSPADM

## 2024-12-13 RX ADMIN — ACETAMINOPHEN 650 MG: 325 TABLET ORAL at 04:20

## 2024-12-13 RX ADMIN — TIZANIDINE 4 MG: 4 TABLET ORAL at 10:26

## 2024-12-13 RX ADMIN — IPRATROPIUM BROMIDE AND ALBUTEROL SULFATE 1 DOSE: .5; 2.5 SOLUTION RESPIRATORY (INHALATION) at 12:27

## 2024-12-13 RX ADMIN — ATORVASTATIN CALCIUM 40 MG: 40 TABLET, FILM COATED ORAL at 09:57

## 2024-12-13 RX ADMIN — CARVEDILOL 12.5 MG: 12.5 TABLET, FILM COATED ORAL at 09:57

## 2024-12-13 RX ADMIN — ENOXAPARIN SODIUM 40 MG: 100 INJECTION SUBCUTANEOUS at 09:57

## 2024-12-13 RX ADMIN — ACETAMINOPHEN 650 MG: 325 TABLET ORAL at 16:10

## 2024-12-13 RX ADMIN — MECLIZINE 25 MG: 12.5 TABLET ORAL at 21:49

## 2024-12-13 RX ADMIN — GUAIFENESIN SYRUP AND DEXTROMETHORPHAN 5 ML: 100; 10 SYRUP ORAL at 04:50

## 2024-12-13 RX ADMIN — SODIUM CHLORIDE, PRESERVATIVE FREE 10 ML: 5 INJECTION INTRAVENOUS at 09:58

## 2024-12-13 RX ADMIN — MAGNESIUM SULFATE HEPTAHYDRATE 2000 MG: 40 INJECTION, SOLUTION INTRAVENOUS at 10:04

## 2024-12-13 RX ADMIN — HYDRALAZINE HYDROCHLORIDE 5 MG: 20 INJECTION INTRAMUSCULAR; INTRAVENOUS at 04:20

## 2024-12-13 RX ADMIN — SODIUM CHLORIDE, PRESERVATIVE FREE 10 ML: 5 INJECTION INTRAVENOUS at 21:00

## 2024-12-13 RX ADMIN — POTASSIUM CHLORIDE 20 MEQ: 1500 TABLET, EXTENDED RELEASE ORAL at 11:42

## 2024-12-13 RX ADMIN — POTASSIUM CHLORIDE 40 MEQ: 1500 TABLET, EXTENDED RELEASE ORAL at 09:57

## 2024-12-13 RX ADMIN — TIZANIDINE 4 MG: 4 TABLET ORAL at 04:20

## 2024-12-13 RX ADMIN — CARVEDILOL 12.5 MG: 12.5 TABLET, FILM COATED ORAL at 17:55

## 2024-12-13 RX ADMIN — GUAIFENESIN SYRUP AND DEXTROMETHORPHAN 5 ML: 100; 10 SYRUP ORAL at 22:41

## 2024-12-13 RX ADMIN — ACETAMINOPHEN 650 MG: 325 TABLET ORAL at 10:26

## 2024-12-13 RX ADMIN — FUROSEMIDE 40 MG: 10 INJECTION, SOLUTION INTRAMUSCULAR; INTRAVENOUS at 17:55

## 2024-12-13 RX ADMIN — TIZANIDINE 4 MG: 4 TABLET ORAL at 16:10

## 2024-12-13 RX ADMIN — LOSARTAN POTASSIUM 100 MG: 100 TABLET, FILM COATED ORAL at 09:57

## 2024-12-13 RX ADMIN — ASPIRIN 81 MG: 81 TABLET, COATED ORAL at 09:57

## 2024-12-13 RX ADMIN — ACETAMINOPHEN 650 MG: 325 TABLET ORAL at 21:49

## 2024-12-13 RX ADMIN — ISOSORBIDE MONONITRATE 60 MG: 60 TABLET, EXTENDED RELEASE ORAL at 09:57

## 2024-12-13 RX ADMIN — FUROSEMIDE 40 MG: 10 INJECTION, SOLUTION INTRAMUSCULAR; INTRAVENOUS at 11:43

## 2024-12-13 ASSESSMENT — PAIN DESCRIPTION - DESCRIPTORS
DESCRIPTORS: ACHING
DESCRIPTORS: ACHING;SORE
DESCRIPTORS: ACHING;CRAMPING;SORE
DESCRIPTORS: ACHING;DISCOMFORT

## 2024-12-13 ASSESSMENT — PAIN SCALES - GENERAL
PAINLEVEL_OUTOF10: 3
PAINLEVEL_OUTOF10: 3
PAINLEVEL_OUTOF10: 0
PAINLEVEL_OUTOF10: 0
PAINLEVEL_OUTOF10: 6
PAINLEVEL_OUTOF10: 6
PAINLEVEL_OUTOF10: 4
PAINLEVEL_OUTOF10: 4

## 2024-12-13 ASSESSMENT — PAIN DESCRIPTION - LOCATION
LOCATION: SHOULDER;KNEE
LOCATION: LEG;ARM
LOCATION: KNEE;SHOULDER
LOCATION: CHEST

## 2024-12-13 ASSESSMENT — PAIN DESCRIPTION - PAIN TYPE
TYPE: ACUTE PAIN
TYPE: ACUTE PAIN

## 2024-12-13 ASSESSMENT — PAIN DESCRIPTION - ORIENTATION
ORIENTATION: RIGHT;LEFT
ORIENTATION: LEFT;RIGHT
ORIENTATION: LEFT
ORIENTATION: RIGHT;LEFT

## 2024-12-13 ASSESSMENT — PAIN SCALES - WONG BAKER: WONGBAKER_NUMERICALRESPONSE: HURTS LITTLE MORE

## 2024-12-13 ASSESSMENT — PAIN - FUNCTIONAL ASSESSMENT
PAIN_FUNCTIONAL_ASSESSMENT: ACTIVITIES ARE NOT PREVENTED
PAIN_FUNCTIONAL_ASSESSMENT: ACTIVITIES ARE NOT PREVENTED

## 2024-12-13 ASSESSMENT — PAIN DESCRIPTION - FREQUENCY
FREQUENCY: CONTINUOUS
FREQUENCY: CONTINUOUS

## 2024-12-13 ASSESSMENT — PAIN DESCRIPTION - ONSET
ONSET: ON-GOING
ONSET: ON-GOING

## 2024-12-13 NOTE — PLAN OF CARE
Problem: Chronic Conditions and Co-morbidities  Goal: Patient's chronic conditions and co-morbidity symptoms are monitored and maintained or improved  Outcome: Adequate for Discharge     Problem: Pain  Goal: Verbalizes/displays adequate comfort level or baseline comfort level  Outcome: Adequate for Discharge     Problem: Safety - Adult  Goal: Free from fall injury  Outcome: Adequate for Discharge     Problem: ABCDS Injury Assessment  Goal: Absence of physical injury  Outcome: Adequate for Discharge     Problem: Respiratory - Adult  Goal: Achieves optimal ventilation and oxygenation  Outcome: Adequate for Discharge     Problem: Cardiovascular - Adult  Goal: Maintains optimal cardiac output and hemodynamic stability  Outcome: Adequate for Discharge  Goal: Absence of cardiac dysrhythmias or at baseline  Outcome: Adequate for Discharge     Problem: Musculoskeletal - Adult  Goal: Return mobility to safest level of function  Outcome: Adequate for Discharge  Goal: Maintain proper alignment of affected body part  Outcome: Adequate for Discharge  Goal: Return ADL status to a safe level of function  Outcome: Adequate for Discharge     Problem: Metabolic/Fluid and Electrolytes - Adult  Goal: Electrolytes maintained within normal limits  Outcome: Adequate for Discharge  Goal: Hemodynamic stability and optimal renal function maintained  Outcome: Adequate for Discharge  Goal: Glucose maintained within prescribed range  Outcome: Adequate for Discharge     Problem: Hematologic - Adult  Goal: Maintains hematologic stability  Outcome: Adequate for Discharge

## 2024-12-14 ENCOUNTER — APPOINTMENT (OUTPATIENT)
Dept: GENERAL RADIOLOGY | Age: 69
DRG: 242 | End: 2024-12-14
Payer: MEDICARE

## 2024-12-14 PROBLEM — I50.32 CHRONIC HEART FAILURE WITH PRESERVED EJECTION FRACTION (HCC): Status: ACTIVE | Noted: 2024-12-14

## 2024-12-14 PROBLEM — I44.2 COMPLETE HEART BLOCK (HCC): Status: ACTIVE | Noted: 2024-12-14

## 2024-12-14 LAB
ANION GAP SERPL CALCULATED.3IONS-SCNC: 11 MMOL/L (ref 3–16)
BASOPHILS # BLD: 0 K/UL (ref 0–0.2)
BASOPHILS NFR BLD: 0.7 %
BUN SERPL-MCNC: 20 MG/DL (ref 7–20)
CALCIUM SERPL-MCNC: 9.5 MG/DL (ref 8.3–10.6)
CHLORIDE SERPL-SCNC: 107 MMOL/L (ref 99–110)
CO2 SERPL-SCNC: 24 MMOL/L (ref 21–32)
CREAT SERPL-MCNC: 0.8 MG/DL (ref 0.6–1.2)
DEPRECATED RDW RBC AUTO: 15.7 % (ref 12.4–15.4)
EOSINOPHIL # BLD: 0.1 K/UL (ref 0–0.6)
EOSINOPHIL NFR BLD: 2.2 %
GFR SERPLBLD CREATININE-BSD FMLA CKD-EPI: 79 ML/MIN/{1.73_M2}
GLUCOSE BLD-MCNC: 114 MG/DL (ref 70–99)
GLUCOSE BLD-MCNC: 114 MG/DL (ref 70–99)
GLUCOSE BLD-MCNC: 127 MG/DL (ref 70–99)
GLUCOSE BLD-MCNC: 99 MG/DL (ref 70–99)
GLUCOSE SERPL-MCNC: 108 MG/DL (ref 70–99)
HCT VFR BLD AUTO: 34.4 % (ref 36–48)
HGB BLD-MCNC: 11.5 G/DL (ref 12–16)
LYMPHOCYTES # BLD: 1.5 K/UL (ref 1–5.1)
LYMPHOCYTES NFR BLD: 24 %
MCH RBC QN AUTO: 29.2 PG (ref 26–34)
MCHC RBC AUTO-ENTMCNC: 33.6 G/DL (ref 31–36)
MCV RBC AUTO: 87 FL (ref 80–100)
MONOCYTES # BLD: 0.6 K/UL (ref 0–1.3)
MONOCYTES NFR BLD: 10 %
NEUTROPHILS # BLD: 4 K/UL (ref 1.7–7.7)
NEUTROPHILS NFR BLD: 63.1 %
PERFORMED ON: ABNORMAL
PERFORMED ON: NORMAL
PLATELET # BLD AUTO: 240 K/UL (ref 135–450)
PMV BLD AUTO: 8.2 FL (ref 5–10.5)
POTASSIUM SERPL-SCNC: 3.6 MMOL/L (ref 3.5–5.1)
RBC # BLD AUTO: 3.95 M/UL (ref 4–5.2)
SODIUM SERPL-SCNC: 142 MMOL/L (ref 136–145)
WBC # BLD AUTO: 6.3 K/UL (ref 4–11)

## 2024-12-14 PROCEDURE — 6360000002 HC RX W HCPCS: Performed by: STUDENT IN AN ORGANIZED HEALTH CARE EDUCATION/TRAINING PROGRAM

## 2024-12-14 PROCEDURE — 6370000000 HC RX 637 (ALT 250 FOR IP): Performed by: INTERNAL MEDICINE

## 2024-12-14 PROCEDURE — 80048 BASIC METABOLIC PNL TOTAL CA: CPT

## 2024-12-14 PROCEDURE — 71046 X-RAY EXAM CHEST 2 VIEWS: CPT

## 2024-12-14 PROCEDURE — 6370000000 HC RX 637 (ALT 250 FOR IP): Performed by: NURSE PRACTITIONER

## 2024-12-14 PROCEDURE — 2580000003 HC RX 258: Performed by: STUDENT IN AN ORGANIZED HEALTH CARE EDUCATION/TRAINING PROGRAM

## 2024-12-14 PROCEDURE — 6370000000 HC RX 637 (ALT 250 FOR IP): Performed by: STUDENT IN AN ORGANIZED HEALTH CARE EDUCATION/TRAINING PROGRAM

## 2024-12-14 PROCEDURE — 85025 COMPLETE CBC W/AUTO DIFF WBC: CPT

## 2024-12-14 PROCEDURE — 99233 SBSQ HOSP IP/OBS HIGH 50: CPT | Performed by: NURSE PRACTITIONER

## 2024-12-14 PROCEDURE — 36415 COLL VENOUS BLD VENIPUNCTURE: CPT

## 2024-12-14 PROCEDURE — 94760 N-INVAS EAR/PLS OXIMETRY 1: CPT

## 2024-12-14 PROCEDURE — 6360000002 HC RX W HCPCS

## 2024-12-14 PROCEDURE — 2100000000 HC CCU R&B

## 2024-12-14 PROCEDURE — 6370000000 HC RX 637 (ALT 250 FOR IP): Performed by: REGISTERED NURSE

## 2024-12-14 RX ORDER — BENZOCAINE/MENTHOL 6 MG-10 MG
LOZENGE MUCOUS MEMBRANE 2 TIMES DAILY
Status: DISCONTINUED | OUTPATIENT
Start: 2024-12-14 | End: 2024-12-14

## 2024-12-14 RX ORDER — FUROSEMIDE 10 MG/ML
40 INJECTION INTRAMUSCULAR; INTRAVENOUS ONCE
Status: DISCONTINUED | OUTPATIENT
Start: 2024-12-14 | End: 2024-12-14

## 2024-12-14 RX ORDER — FUROSEMIDE 10 MG/ML
40 INJECTION INTRAMUSCULAR; INTRAVENOUS ONCE
Status: COMPLETED | OUTPATIENT
Start: 2024-12-14 | End: 2024-12-14

## 2024-12-14 RX ORDER — BENZOCAINE/MENTHOL 6 MG-10 MG
LOZENGE MUCOUS MEMBRANE 2 TIMES DAILY
Status: DISCONTINUED | OUTPATIENT
Start: 2024-12-14 | End: 2024-12-15 | Stop reason: HOSPADM

## 2024-12-14 RX ORDER — AMLODIPINE BESYLATE 10 MG/1
10 TABLET ORAL DAILY
Status: DISCONTINUED | OUTPATIENT
Start: 2024-12-14 | End: 2024-12-15 | Stop reason: HOSPADM

## 2024-12-14 RX ADMIN — ENOXAPARIN SODIUM 40 MG: 100 INJECTION SUBCUTANEOUS at 09:06

## 2024-12-14 RX ADMIN — FUROSEMIDE 40 MG: 10 INJECTION, SOLUTION INTRAMUSCULAR; INTRAVENOUS at 06:56

## 2024-12-14 RX ADMIN — ISOSORBIDE MONONITRATE 60 MG: 60 TABLET, EXTENDED RELEASE ORAL at 09:05

## 2024-12-14 RX ADMIN — SODIUM CHLORIDE, PRESERVATIVE FREE 10 ML: 5 INJECTION INTRAVENOUS at 09:06

## 2024-12-14 RX ADMIN — CARVEDILOL 25 MG: 25 TABLET, FILM COATED ORAL at 09:05

## 2024-12-14 RX ADMIN — HYDROCORTISONE: 0.01 CREAM TOPICAL at 16:14

## 2024-12-14 RX ADMIN — TIZANIDINE 4 MG: 4 TABLET ORAL at 10:12

## 2024-12-14 RX ADMIN — SODIUM CHLORIDE, PRESERVATIVE FREE 10 ML: 5 INJECTION INTRAVENOUS at 19:34

## 2024-12-14 RX ADMIN — HYDRALAZINE HYDROCHLORIDE 5 MG: 20 INJECTION INTRAMUSCULAR; INTRAVENOUS at 04:06

## 2024-12-14 RX ADMIN — AMLODIPINE BESYLATE 10 MG: 10 TABLET ORAL at 10:13

## 2024-12-14 RX ADMIN — ATORVASTATIN CALCIUM 40 MG: 40 TABLET, FILM COATED ORAL at 09:05

## 2024-12-14 RX ADMIN — TIZANIDINE 4 MG: 4 TABLET ORAL at 04:10

## 2024-12-14 RX ADMIN — TIZANIDINE 4 MG: 4 TABLET ORAL at 16:15

## 2024-12-14 RX ADMIN — LOSARTAN POTASSIUM 100 MG: 100 TABLET, FILM COATED ORAL at 09:05

## 2024-12-14 RX ADMIN — ASPIRIN 81 MG: 81 TABLET, COATED ORAL at 09:05

## 2024-12-14 RX ADMIN — CARVEDILOL 25 MG: 25 TABLET, FILM COATED ORAL at 16:15

## 2024-12-14 RX ADMIN — TIZANIDINE 4 MG: 4 TABLET ORAL at 22:06

## 2024-12-14 RX ADMIN — EMPAGLIFLOZIN 10 MG: 10 TABLET, FILM COATED ORAL at 09:07

## 2024-12-14 ASSESSMENT — PAIN SCALES - WONG BAKER
WONGBAKER_NUMERICALRESPONSE: NO HURT

## 2024-12-14 ASSESSMENT — PAIN DESCRIPTION - ORIENTATION: ORIENTATION: RIGHT;LEFT;MID

## 2024-12-14 ASSESSMENT — PAIN SCALES - GENERAL
PAINLEVEL_OUTOF10: 3
PAINLEVEL_OUTOF10: 0
PAINLEVEL_OUTOF10: 3
PAINLEVEL_OUTOF10: 2
PAINLEVEL_OUTOF10: 0
PAINLEVEL_OUTOF10: 0
PAINLEVEL_OUTOF10: 2

## 2024-12-14 ASSESSMENT — PAIN DESCRIPTION - LOCATION
LOCATION: BACK
LOCATION: BACK

## 2024-12-14 ASSESSMENT — PAIN DESCRIPTION - DESCRIPTORS: DESCRIPTORS: ACHING

## 2024-12-14 ASSESSMENT — PAIN - FUNCTIONAL ASSESSMENT: PAIN_FUNCTIONAL_ASSESSMENT: ACTIVITIES ARE NOT PREVENTED

## 2024-12-14 NOTE — PLAN OF CARE
Problem: Respiratory - Adult  Goal: Achieves optimal ventilation and oxygenation  Outcome: Progressing     Problem: Cardiovascular - Adult  Goal: Maintains optimal cardiac output and hemodynamic stability  Outcome: Progressing     Problem: Skin/Tissue Integrity - Adult  Goal: Incisions, wounds, or drain sites healing without S/S of infection  Outcome: Progressing     Problem: Musculoskeletal - Adult  Goal: Return mobility to safest level of function  Outcome: Progressing  Goal: Return ADL status to a safe level of function  Outcome: Progressing     Problem: Genitourinary - Adult  Goal: Absence of urinary retention  Outcome: Progressing  Goal: Urinary catheter remains patent  Outcome: Progressing     Problem: Anxiety  Goal: Will report anxiety at manageable levels  Description: INTERVENTIONS:  1. Administer medication as ordered  2. Teach and rehearse alternative coping skills  3. Provide emotional support with 1:1 interaction with staff  Outcome: Progressing     Problem: Coping  Goal: Pt/Family able to verbalize concerns and demonstrate effective coping strategies  Description: INTERVENTIONS:  1. Assist patient/family to identify coping skills, available support systems and cultural and spiritual values  2. Provide emotional support, including active listening and acknowledgement of concerns of patient and caregivers  3. Reduce environmental stimuli, as able  4. Instruct patient/family in relaxation techniques, as appropriate  5. Assess for spiritual pain/suffering and initiate Spiritual Care, Psychosocial Clinical Specialist consults as needed  Outcome: Progressing

## 2024-12-14 NOTE — FLOWSHEET NOTE
12/14/24 0841   Mobility   Location change/Off floor Back to room   Transport Method Bed     Returning to room 1305 at this time.    Electronically signed by Allegra Murry RN on 12/14/2024 at 8:42 AM     "29 year old  Chief Complaint   Patient presents with    Follow Up     Celexa       There were no vitals taken for this visit. There is no height or weight on file to calculate BMI.  Patient Active Problem List   Diagnosis    Anxiety and depression       Wt Readings from Last 2 Encounters:   10/15/24 91.6 kg (202 lb)   07/30/19 87 kg (191 lb 12 oz)     BP Readings from Last 3 Encounters:   10/15/24 133/62   07/30/19 136/82   06/27/19 133/83         Current Outpatient Medications   Medication Sig Dispense Refill    citalopram (CELEXA) 10 MG tablet Take 1 tablet (10 mg) by mouth daily. 30 tablet 0    clonazePAM (KLONOPIN) 0.5 MG tablet Take 1 tablet (0.5 mg) by mouth 2 times daily as needed for anxiety (Patient not taking: Reported on 10/28/2024) 42 tablet 0    escitalopram (LEXAPRO) 10 MG tablet TAKE 1 TABLET(10 MG) BY MOUTH DAILY (Patient not taking: Reported on 10/28/2024) 30 tablet 0     No current facility-administered medications for this visit.       Social History     Tobacco Use    Smoking status: Never    Smokeless tobacco: Never   Substance Use Topics    Alcohol use: Yes     Alcohol/week: 6.0 standard drinks of alcohol     Types: 6 Standard drinks or equivalent per week     Comment: Weekends only    Drug use: Yes     Types: Marijuana     Comment: \"Monthly at most\"       Health Maintenance Due   Topic Date Due    ADVANCE CARE PLANNING  Never done    HIV SCREENING  Never done    HEPATITIS C SCREENING  Never done       No results found for: \"PAP\"      October 28, 2024 12:50 PM    "

## 2024-12-14 NOTE — FLOWSHEET NOTE
12/14/24 0825   Mobility   Location change/Off floor Out of room  (To CXR)   Transport Method Bed     Pt headed to CXR.    Electronically signed by Allegra Murry RN on 12/14/2024 at 8:26 AM

## 2024-12-15 VITALS
OXYGEN SATURATION: 94 % | TEMPERATURE: 98 F | RESPIRATION RATE: 19 BRPM | SYSTOLIC BLOOD PRESSURE: 168 MMHG | HEART RATE: 60 BPM | DIASTOLIC BLOOD PRESSURE: 64 MMHG | HEIGHT: 61 IN | WEIGHT: 150 LBS | BODY MASS INDEX: 28.32 KG/M2

## 2024-12-15 LAB
ANION GAP SERPL CALCULATED.3IONS-SCNC: 12 MMOL/L (ref 3–16)
BUN SERPL-MCNC: 20 MG/DL (ref 7–20)
CALCIUM SERPL-MCNC: 9.9 MG/DL (ref 8.3–10.6)
CHLORIDE SERPL-SCNC: 105 MMOL/L (ref 99–110)
CO2 SERPL-SCNC: 26 MMOL/L (ref 21–32)
CREAT SERPL-MCNC: 1 MG/DL (ref 0.6–1.2)
GFR SERPLBLD CREATININE-BSD FMLA CKD-EPI: 60 ML/MIN/{1.73_M2}
GLUCOSE BLD-MCNC: 110 MG/DL (ref 70–99)
GLUCOSE SERPL-MCNC: 145 MG/DL (ref 70–99)
PERFORMED ON: ABNORMAL
POTASSIUM SERPL-SCNC: 4.2 MMOL/L (ref 3.5–5.1)
SODIUM SERPL-SCNC: 143 MMOL/L (ref 136–145)

## 2024-12-15 PROCEDURE — 6370000000 HC RX 637 (ALT 250 FOR IP): Performed by: INTERNAL MEDICINE

## 2024-12-15 PROCEDURE — 6360000002 HC RX W HCPCS: Performed by: STUDENT IN AN ORGANIZED HEALTH CARE EDUCATION/TRAINING PROGRAM

## 2024-12-15 PROCEDURE — 80048 BASIC METABOLIC PNL TOTAL CA: CPT

## 2024-12-15 PROCEDURE — 6370000000 HC RX 637 (ALT 250 FOR IP): Performed by: REGISTERED NURSE

## 2024-12-15 PROCEDURE — 2580000003 HC RX 258: Performed by: STUDENT IN AN ORGANIZED HEALTH CARE EDUCATION/TRAINING PROGRAM

## 2024-12-15 PROCEDURE — 6370000000 HC RX 637 (ALT 250 FOR IP): Performed by: STUDENT IN AN ORGANIZED HEALTH CARE EDUCATION/TRAINING PROGRAM

## 2024-12-15 PROCEDURE — 6370000000 HC RX 637 (ALT 250 FOR IP): Performed by: NURSE PRACTITIONER

## 2024-12-15 PROCEDURE — 99232 SBSQ HOSP IP/OBS MODERATE 35: CPT | Performed by: NURSE PRACTITIONER

## 2024-12-15 PROCEDURE — 94760 N-INVAS EAR/PLS OXIMETRY 1: CPT

## 2024-12-15 PROCEDURE — 36415 COLL VENOUS BLD VENIPUNCTURE: CPT

## 2024-12-15 RX ORDER — FUROSEMIDE 20 MG/1
20 TABLET ORAL DAILY PRN
Qty: 30 TABLET | Refills: 0 | Status: SHIPPED
Start: 2024-12-15

## 2024-12-15 RX ORDER — CARVEDILOL 25 MG/1
25 TABLET ORAL 2 TIMES DAILY WITH MEALS
Qty: 60 TABLET | Refills: 3 | Status: SHIPPED | OUTPATIENT
Start: 2024-12-15

## 2024-12-15 RX ADMIN — ATORVASTATIN CALCIUM 40 MG: 40 TABLET, FILM COATED ORAL at 08:20

## 2024-12-15 RX ADMIN — SODIUM CHLORIDE, PRESERVATIVE FREE 10 ML: 5 INJECTION INTRAVENOUS at 08:32

## 2024-12-15 RX ADMIN — ASPIRIN 81 MG: 81 TABLET, COATED ORAL at 08:20

## 2024-12-15 RX ADMIN — TIZANIDINE 4 MG: 4 TABLET ORAL at 04:34

## 2024-12-15 RX ADMIN — ISOSORBIDE MONONITRATE 60 MG: 60 TABLET, EXTENDED RELEASE ORAL at 08:20

## 2024-12-15 RX ADMIN — HYDROCORTISONE: 0.01 CREAM TOPICAL at 08:31

## 2024-12-15 RX ADMIN — ENOXAPARIN SODIUM 40 MG: 100 INJECTION SUBCUTANEOUS at 08:20

## 2024-12-15 RX ADMIN — LOSARTAN POTASSIUM 100 MG: 100 TABLET, FILM COATED ORAL at 08:21

## 2024-12-15 RX ADMIN — AMLODIPINE BESYLATE 10 MG: 10 TABLET ORAL at 08:20

## 2024-12-15 RX ADMIN — ACETAMINOPHEN 650 MG: 325 TABLET ORAL at 02:11

## 2024-12-15 RX ADMIN — CARVEDILOL 25 MG: 25 TABLET, FILM COATED ORAL at 08:20

## 2024-12-15 RX ADMIN — EMPAGLIFLOZIN 10 MG: 10 TABLET, FILM COATED ORAL at 08:21

## 2024-12-15 NOTE — PROGRESS NOTES
[facility]   Daily Progress Note      Admit Date:  12/10/2024    CC: SOB    HPI:   Breanna Maxwell is a 69 y.o. female with PMH CAD/stents, DM, GERD, HLP, HTN, neuropathy.  Follows with Dr. Acosta    EP consulted for AV block > DC PPM 12/13/24.  Cardiology to follow for HF. EF preserved without RWMA suggestive AV block not R/T ischemia.  IV lasix for hypervolemia. Started on GDMT.     BP has been elevated and pt getting PRN hydralazine    Today she is feeling \"blessed\"  Mild SOB with exertion, denies CP.   BLE edema improved.     Review of Systems:   General: Denies fever, chills, fatigue, weakness  RESP: Denies cough, sputum,, wheeze, snoring  CARD: Denies palpitations,  murmur  GI:Denies nausea, vomiting, heartburn, loss of appetite, change in bowels  VASC: Denies claudication, leg cramps  NEURO: Denies numbness, tingling, weakness,change in mood or memory  HEME: Denies abn bruising, bleeding, anemia    Objective:   BP (!) 167/66   Pulse 60   Temp 97.8 °F (36.6 °C) (Oral)   Resp 14   Ht 1.549 m (5' 1\")   Wt 68 kg (150 lb)   SpO2 93%   BMI 28.34 kg/m²         Intake/Output Summary (Last 24 hours) at 12/14/2024 0914  Last data filed at 12/14/2024 0600  Gross per 24 hour   Intake 360 ml   Output 1600 ml   Net -1240 ml     I/O since adm: Neg 5.1L    WEIGHT:Admit Weight - Scale: 70.8 kg (156 lb)         Today  Weight - Scale: 68 kg (150 lb)   DRY WEIGHT:  Wt Readings from Last 3 Encounters:   12/14/24 68 kg (150 lb)   12/10/24 71.2 kg (156 lb 15.5 oz)   11/07/24 71.5 kg (157 lb 10.1 oz)       Physical Exam:  GEN: Appears well, no acute distress  SKIN: Brown, warm, dry.   LUNG: AP diameter normal. Clear bilateral. No wheeze, rales, or ronchi. Respiratory effort normal.  HEART: S1S2 A/R. No JVD. No carotid bruit. No murmur, rub or gallop.  ABD: Soft, nontender. +BS X 4 quads. No hepatomegaly.   EXT: Radial and pedal pulses 2+ and symmetric. Without varicosities. Trace pedal/ankle edema.  MUSCSKEL: 
    V2.0  [facility] [place], [place] Hospitalist Progress Note      Name:  Breanna Maxwell /Age/Sex: 1955  (69 y.o. female)   MRN & CSN:  [id number] & [id number] Encounter Date/Time: 2024 8:20 AM EST    Location:  F5G-1646/1305-01 PCP: Ginny Costello MD       Hospital Day: 4    Assessment and Plan:   Breanna Maxwell is a 69 y.o. female p/w 2-1 AV block      Plan:  2-1 AV block  S/p PPM placement  -Discussed with EP . S/p PPM placement.  Noted during procedure yesterday that patient appeared to have significantly elevated venous pressures.  Suspect patient is much higher up on volume than she appears.  Advised aggressive further diuresis prior to discharge as suspect patient would be high risk of readmission otherwise  -Maintain potassium >4.0, Mg >2.0  -telemetry strip  @ 0000 independently interpreted as paced rhythm  Acute on chronic HFpEF  -Elevated proBNP, LE swelling  -Strict I's/O's, daily weights, low-sodium diet  -Improved lower extremity swelling and shortness of breath today.  As noted above, continuing with aggressive diuresis.  Patient appears to be more volume overloaded than her peripheral exam would suggest  CAD  -Known history of CAD  -echocardiogram EF 65-70%; normal wall motion. No LHC planned per interventional cardiology.  -Continue Imdur, aspirin, statin  -Initiating carvedilol  Diabetes mellitus  -Carb controlled diet  -Addition of sliding scale insulin of hyperglycemic  Hypertension  -Continue losartan, Imdur  -Discussed with cardiology : Started on carvedilol, losartan dose increased.  Would recommend loop diuretic at discharge  Osteoarthritis  -Patient initially presented for total knee arthroplasty  -Reviewed ortho note : no plan for surgery this admission. Pt will reschedule as OP    Ppx: lovenox  Dispo: home; pending adequate diuresis    Subjective:     Chief Complaint: Bradycardia (Pt was seen in pre-op today for a knee replacement. Upon arrival pt Hr was around 
    V2.0  [facility] [place]      Name:  Breanna Maxwell /Age/Sex: 1955  (69 y.o. female)   MRN & CSN:  [id number] & [id number] Encounter Date/Time: 2024 8:20 AM EST    Location:  U1P-4234/1305- PCP: Ginny Costello MD       Hospital Day: 2    Assessment and Plan:   Breanna Maxwell is a 69 y.o. female p/w 2-1 AV block      Plan:  2-1 AV block  -Discussed with EP . Planning for PPM this afternoon vs. Tomorrow. Pending BP control  -Holding any beta-blocker or calcium channel blockers  -Maintain potassium >4.0, Mg >2.0  -telemetry strip  @ 0000 independently interpreted as 2:1 AV block with a PVC  HFpEF  -Elevated proBNP, LE swelling  -Strict I's/O's, daily weights, low-sodium diet  -Giving additional dose of lasix this AM  CAD  -Known history of CAD  -echocardiogram EF 65-70%; normal wall motion. No LHC planned per interventional cardiology.  -Continue Imdur, aspirin, statin  Diabetes mellitus  -Carb controlled diet  -Addition of sliding scale insulin of hyperglycemic  Hypertension  -Continue losartan, Imdur  -Starting on HCTZ. Hydralazine PRN  -Holding amlodipine in setting of AV block  Osteoarthritis  -Patient initially presented for total knee arthroplasty  -Unclear if ortho planning to proceed with surgery after PPM is placed or if want to wait and reschedule as outpatient.     Ongoing threat to bodily function without continued treatment of AV block.    Ppx: lovenox  Dispo: home; unclear      Subjective:     Chief Complaint: Bradycardia (Pt was seen in pre-op today for a knee replacement. Upon arrival pt Hr was around 39BPM. )     Interval Hx:  Pt reports continued shortness of breath, maybe slightly improved. Gets fairly winded with low levels of exertion, even turning in the bed. Denies chest pain, N/V, lightheadedness/dizziness      Review of Systems:    Review of Systems    10 point ROS negative except as stated above in \"subjective\" section    Objective: 
    V2.0  [facility] [place] Hospitalist Progress Note      Name:  Breanna Maxwell /Age/Sex: 1955  (69 y.o. female)   MRN & CSN:  [id number] & [id number] Encounter Date/Time: 2024 8:20 AM EST    Location:  I1N-8659/1305-01 PCP: Ginny Costello MD       Hospital Day: 3    Assessment and Plan:   Breanna Maxwell is a 69 y.o. female p/w 2-1 AV block      Plan:  2-1 AV block  -Discussed with EP . PPM placement today ~ 1230.  -Holding any beta-blocker or calcium channel blockers  -Maintain potassium >4.0, Mg >2.0  -telemetry strip  @ 0000 independently interpreted as 2:1 AV block with a PVC  HFpEF  -Elevated proBNP, LE swelling  -Strict I's/O's, daily weights, low-sodium diet  -Improved lower extremity swelling and shortness of breath today.  Holding off on further Lasix  CAD  -Known history of CAD  -echocardiogram EF 65-70%; normal wall motion. No LHC planned per interventional cardiology.  -Continue Imdur, aspirin, statin  Diabetes mellitus  -Carb controlled diet  -Addition of sliding scale insulin of hyperglycemic  Hypertension  -Continue losartan, Imdur  -Increasing HCTZ to 50 mg daily. Hydralazine PRN  -Holding amlodipine in setting of AV block  Osteoarthritis  -Patient initially presented for total knee arthroplasty  -Reviewed ortho note : no plan for surgery this admission. Pt will reschedule as OP    Ongoing threat to bodily function without continued treatment of AV block.    Ppx: lovenox  Dispo: home; discussed with patient discharging postprocedure versus monitoring overnight and discharging in a.m.  Patient stated she would feel more comfortable discharging tomorrow morning.  Plan for DC in a.m.    Subjective:     Chief Complaint: Bradycardia (Pt was seen in pre-op today for a knee replacement. Upon arrival pt Hr was around 39BPM. )     Interval Hx:  Pt reports improvement in her shortness of breath. Denies shortness of breath, chest pressure, N/V, abdominal pain, diarrhea. Denies 
    V2.0  [facility] [place] Hospitalist Progress Note      Name:  Breanna Maxwell /Age/Sex: 1955  (69 y.o. female)   MRN & CSN:  [id number] & [id number] Encounter Date/Time: 2024 8:20 AM EST    Location:  L1A-7845/1305- PCP: Ginny Costello MD       Hospital Day: 5    Assessment and Plan:   Breanna Maxwell is a 69 y.o. female p/w 2-1 AV block      Plan:  2-1 AV block  S/p PPM placement  -Discussed with EP . S/p PPM placement.  Noted during procedure yesterday that patient appeared to have significantly elevated venous pressures.  Suspect patient is much higher up on volume than she appears.  Advised aggressive further diuresis prior to discharge as suspect patient would be high risk of readmission otherwise  -Maintain potassium >4.0, Mg >2.0  -telemetry strip  @ 0000 independently interpreted as paced rhythm  Acute on chronic HFpEF  -Elevated proBNP, LE swelling  -Strict I's/O's, daily weights, low-sodium diet  -Patient reports improvement in her shortness of breath.  Denies significant lower extremity edema  -Discussed with cardiology : Will continue to diurese.  Jardiance added.  Anticipate transition to oral diuretic tomorrow  -Monitor toxicity of Lasix and Jardiance with BMP for SILVA  CAD  -Known history of CAD  -echocardiogram EF 65-70%; normal wall motion. No LHC planned per interventional cardiology.  -Continue Imdur, aspirin, statin  -Initiating carvedilol  Diabetes mellitus  -Carb controlled diet  -Addition of sliding scale insulin of hyperglycemic  Hypertension  -Continue losartan, Imdur  -Discussed with cardiology : Increased carvedilol to 25 mg twice daily.  Resuming amlodipine  Osteoarthritis  -Patient initially presented for total knee arthroplasty  -Reviewed ortho note : no plan for surgery this admission. Pt will reschedule as OP    Ppx: lovenox  Dispo: home; pending adequate diuresis.  Anticipate DC tomorrow    Subjective:     Chief Complaint: Bradycardia (Pt 
  Physician Progress Note      PATIENT:               NENO RAGSDALE  [facility] #:                  [id number]  :                       1955  ADMIT DATE:       12/10/2024 1:22 PM  DISCH DATE:  RESPONDING  PROVIDER #:        Mitul Benton MD          QUERY TEXT:    Pt admitted with bradycardia and heart block.  Has HFpEF documented. If   possible, please document in progress notes and discharge summary further   specificity regarding the acuity of CHF:    The medical record reflects the following:  Risk Factors: CAD, HTN, heart block  Clinical Indicators: lower extremity edema, Pro BNP 2916, CXR shows   \"Cardiomegaly and pulmonary vascular congestion in keeping with CHF\"  Treatment: IV Lasix x 2, strict I's/O's, daily weights, low-sodium diet  Options provided:  -- Acute on Chronic Diastolic CHF/HFpEF  -- Acute Diastolic CHF/HFpEF  -- Chronic Diastolic CHF/HFpEF  -- Other - I will add my own diagnosis  -- Disagree - Not applicable / Not valid  -- Disagree - Clinically unable to determine / Unknown  -- Refer to Clinical Documentation Reviewer    PROVIDER RESPONSE TEXT:    This patient is in acute on chronic diastolic CHF/HFpEF.    Query created by: Francine Raman on 2024 7:05 AM      QUERY TEXT:    Patient admitted with bradycardia and heart block.  Complete Heart Block   documented by ED provider and 2-1 AV block documented in H&P and    progress note.   If possible, please document in progress notes and discharge   summary the highest level of heart block being evaluated/treated:    The medical record reflects the following:  Risk Factors: bradycardia with heart block  Clinical Indicators:  Complete Heart Block documented by ED provider and 2-1   AV block documented in H&P and  progress note.  Treatment: telemetry, PPM insertion pending  Options provided:  -- Complete Heart Block  -- 2-1 AV block  -- Other - I will add my own diagnosis  -- Disagree - Not applicable / Not valid  -- Disagree - 
4 Eyes Skin Assessment     NAME:  Breanna Maxwell  YOB: 1955  MEDICAL RECORD NUMBER:  [id number]    The patient is being assessed for  Transfer to New Unit    I agree that at least one RN has performed a thorough Head to Toe Skin Assessment on the patient. ALL assessment sites listed below have been assessed.      Areas assessed by both nurses:    Head, Face, Ears, Shoulders, Back, Chest, Arms, Elbows, Hands, Sacrum. Buttock, Coccyx, Ischium, Legs. Feet and Heels, and Under Medical Devices         Does the Patient have a Wound? No noted wound(s)       Carlo Prevention initiated by RN: Yes  Wound Care Orders initiated by RN: No    Pressure Injury (Stage 3,4, Unstageable, DTI, NWPT, and Complex wounds) if present, place Wound referral order by RN under : No    New Ostomies, if present place, Ostomy referral order under : No     Nurse 1 eSignature: Electronically signed by Kristin Hart RN on 12/10/24 at 5:01 PM EST    **SHARE this note so that the co-signing nurse can place an eSignature**    Nurse 2 eSignature: Electronically signed by Nevin Cardoso RN on 12/10/24 at 5:02 PM EST   
CLINICAL PHARMACY NOTE: MEDS TO BEDS    Total # of Prescriptions Filled: 2   The following medications were delivered to the patient:  Current Discharge Medication List        START taking these medications    Details   carvedilol (COREG) 12.5 MG tablet Take 1 tablet by mouth 2 times daily (with meals)  Qty: 60 tablet, Refills: 3      guaiFENesin-dextromethorphan (ROBITUSSIN DM) 100-10 MG/5ML syrup Take 5 mLs by mouth every 4 hours as needed for Cough  Qty: 120 mL, Refills: 0      HYDROcodone-acetaminophen (NORCO) 7.5-325 MG per tablet Take 1 tablet by mouth every 6 hours as needed for Pain for up to 3 days. Intended supply: 3 days. Take lowest dose possible to manage pain Max Daily Amount: 4 tablets  Qty: 12 tablet, Refills: 0    Comments: Reduce doses taken as pain becomes manageable  Associated Diagnoses: S/P placement of cardiac pacemaker      furosemide (LASIX) 20 MG tablet Take 1 tablet by mouth daily  Qty: 30 tablet, Refills: 0               Additional Documentation:  Hydrocodone-Acetaminophen 7.5-325 and Furosamide delivered at bedside  
CLINICAL PHARMACY NOTE: MEDS TO BEDS    Total # of Prescriptions Filled: 4   The following medications were delivered to the patient:  Current Discharge Medication List        START taking these medications    Details   carvedilol (COREG) 12.5 MG tablet Take 1 tablet by mouth 2 times daily (with meals)  Qty: 60 tablet, Refills: 3      guaiFENesin-dextromethorphan (ROBITUSSIN DM) 100-10 MG/5ML syrup Take 5 mLs by mouth every 4 hours as needed for Cough  Qty: 120 mL, Refills: 0      oxyCODONE (ROXICODONE) 5 MG immediate release tablet Take 1 tablet by mouth every 6 hours as needed for Pain for up to 3 days. Intended supply: 3 days. Take lowest dose possible to manage pain Max Daily Amount: 20 mg  Qty: 12 tablet, Refills: 0    Comments: Reduce doses taken as pain becomes manageable  Associated Diagnoses: S/P placement of cardiac pacemaker         Tizanidine 4mg  Losartan 100    Additional Documentation:  All meds except Oxycodone delivered at bedside upon delivery pt stated she was allergic to Oxycodone   
Consent completed for placement of permanent pacemaker. Signed by patient.   
Discharge instructions gone over with patient. Questions answered. Patient meds from pharmacy at bedside. PIV taken out. Awaiting patients transport.  
Informed pt plan for DC after pacemaker and she will call Dr John office for rescheduling TKA in a few weeks. Pt agreeable.   
Medication Reconciliation    List of medications patient is currently taking is complete.     Source of information: 1. Conversation with patient at bedside                                      2. EPIC records        Notes regarding home medications:   1. Patient reports having morning medications today PTA except for aspirin and vitamins. Was instructed to hold aspirin and vitamins for surgery.    Marcelo Fisher [facility]   12/10/2024  3:06 PM    
[facility]   Electrophysiology Follow up     Date: 2024  Reason for Consultation: Heart failure, heart block  Consult Requesting Physician: Mitul Benton MD     S:   - Patient is feeling much better with regard to shortness of breath  - Telemetry reviewed, atrial sensed, ventricular paced rhythm    Physical Examination:  BP (!) 168/71   Pulse 60   Temp 98.1 °F (36.7 °C)   Resp 16   Ht 1.549 m (5' 1\")   Wt 70 kg (154 lb 5.2 oz)   SpO2 98%   BMI 29.16 kg/m²   Temp  Av.2 °F (36.8 °C)  Min: 98 °F (36.7 °C)  Max: 98.4 °F (36.9 °C)  Pulse  Av.7  Min: 60  Max: 72  BP  Min: 116/41  Max: 168/71  SpO2  Av.2 %  Min: 92 %  Max: 98 %    Intake/Output Summary (Last 24 hours) at 2024 1805  Last data filed at 2024 1000  Gross per 24 hour   Intake 240 ml   Output 800 ml   Net -560 ml     No acute distress  Diminished aeration, nonlabored  Heart is regular rate, regular rhythm, no murmurs, no lower extremity swelling, no dependent edema to hips or thighs  Abdomen is soft and nontender  Strength is grossly preserved  No focal neurologic deficits    Labs:    Lab Results   Component Value Date/Time     2024 06:06 AM    K 3.5 2024 06:06 AM     2024 06:06 AM    CO2 21 2024 06:06 AM    BUN 25 2024 06:06 AM    CREATININE 0.9 2024 06:06 AM    GLUCOSE 111 2024 06:06 AM    CALCIUM 9.3 2024 06:06 AM      No results found for: \"TSH\", \"TSHFT4\", \"TSHELE\", \"QRN1QQA\", \"TSHHS\"  Lab Results   Component Value Date/Time    ALKPHOS 222 12/10/2024 01:47 PM    ALT 24 12/10/2024 01:47 PM    AST 25 12/10/2024 01:47 PM    BILITOT 0.9 12/10/2024 01:47 PM    BILIDIR 0.0 2020 09:07 AM      Lab Results   Component Value Date    TROPONINI <0.01 2019    TROPHS 25 (H) 12/10/2024        EKG 2024  Atrial sensed ventricular paced rhythm     ECHO 12/10/2024    Left Ventricle: Normal left ventricular systolic function with a visually estimated 
NAME:  Breanna Maxewll  YOB: 1955  MEDICAL RECORD NUMBER:  [id number]    Shift Summary: Pt was hypertensive overnight, Hydralazine and 1x dose lasix was given. Otherwise night was uneventful.    Family updated: No    Rhythm: Paced     Most recent vitals:   Visit Vitals  BP (!) 167/66   Pulse 60   Temp 97.8 °F (36.6 °C) (Oral)   Resp 14   Ht 1.549 m (5' 1\")   Wt 68 kg (150 lb)   SpO2 93%   BMI 28.34 kg/m²           No data found.    No data found.      Respiratory support needed (if any):  - RA    Admission weight Weight - Scale: 70.8 kg (156 lb) (12/10/24 1354)    Today's weight    Wt Readings from Last 1 Encounters:   12/14/24 68 kg (150 lb)        Sotelo need assessed each shift: N/A - no sotelo present, using purewick  UOP >30ml/hr: yes  Last documented BM (in last 48 hrs):  No data found.             Restraints (in use currently or dc'd in last 12 hrs): No    Order current and documentation up to date? No    Lines/Drains reviewed @ bedside.  Peripheral IV 12/10/24 Left Antecubital (Active)   Number of days: 3         Drip rates at handoff:    sodium chloride         Lab Data:   CBC:   Recent Labs     12/13/24  0606 12/14/24  0650   WBC 8.2 6.3   HGB 10.3* 11.5*   HCT 30.5* 34.4*   MCV 88.1 87.0    240     BMP:    Recent Labs     12/13/24  0606 12/14/24  0650    142   K 3.5 3.6   CO2 21 24   BUN 25* 20   CREATININE 0.9 0.8     LIVR: No results for input(s): \"AST\", \"ALT\" in the last 72 hours.  PT/INR: No results for input(s): \"INR\" in the last 72 hours.    Invalid input(s): \"PROT\"  APTT: No results for input(s): \"APTT\" in the last 72 hours.  ABG: No results for input(s): \"PHART\", \"YKT5JPB\", \"PO2ART\" in the last 72 hours.    Any consults during the shift? No    Any signed and held orders to be released?  No        4 Eyes Skin Assessment       The patient is being assessed for  Shift Handoff    I agree that at least one RN has performed a thorough Head to Toe Skin Assessment on the 
NAME:  Breanna Maxwell  YOB: 1955  MEDICAL RECORD NUMBER:  [id number]    Shift Summary: Pt remains on RA. HR sustained 30s while asleep, upon awakening HR increases to 40/50s. BP remains slightly elevated. Licodaine patches added for pulled back muscle, tessalon pearls added for cough. NPO since midnight for potential pacemaker    Family updated: No    Rhythm:  2nd degree AVB type 2    Most recent vitals:   Visit Vitals  BP (!) 165/80   Pulse (!) 38   Temp 97.5 °F (36.4 °C) (Oral)   Resp 20   Ht 1.549 m (5' 1\")   Wt 71.6 kg (157 lb 13.6 oz)   SpO2 96%   BMI 29.83 kg/m²           No data found.    No data found.      Respiratory support needed (if any):  - RA    Admission weight Weight - Scale: 70.8 kg (156 lb) (12/10/24 1354)    Today's weight    Wt Readings from Last 1 Encounters:   12/11/24 71.6 kg (157 lb 13.6 oz)        Sotelo need assessed each shift: N/A - no sotelo present  UOP >30ml/hr: YES  Last documented BM (in last 48 hrs):  No data found.             Restraints (in use currently or dc'd in last 12 hrs): No        Lines/Drains reviewed @ bedside.  Peripheral IV 12/10/24 Right Antecubital (Active)   Number of days: 0       Peripheral IV 12/10/24 Left Antecubital (Active)   Number of days: 0         Drip rates at handoff:    sodium chloride         Lab Data:   CBC:   Recent Labs     12/10/24  1347 12/11/24  0358   WBC 9.2 9.1   HGB 11.4* 10.7*   HCT 34.0* 31.9*   MCV 88.1 87.6    255     BMP:    Recent Labs     12/10/24  1347 12/11/24  0358    146*   K 3.3* 3.6   CO2 19* 19*   BUN 24* 20   CREATININE 1.0 0.9     LIVR:   Recent Labs     12/10/24  1347   AST 25   ALT 24     PT/INR:   Recent Labs     12/10/24  1347   INR 1.13     APTT: No results for input(s): \"APTT\" in the last 72 hours.  ABG: No results for input(s): \"PHART\", \"FAQ1DDM\", \"PO2ART\" in the last 72 hours.    Any consults during the shift? No    Any signed and held orders to be released?  No        4 Eyes Skin 
NAME:  Breanna Maxwell  YOB: 1955  MEDICAL RECORD NUMBER:  [id number]    Shift Summary: Patient slept off and on overnight; prns given for arthritic pain and discomfort. Hypertensive overnight; hydralazine given PRN. HR mainly in the 30's. 2LNC added while sleeping. Plan for PPM today or tomorrow. Not on schedule yet.     Family updated: No    Rhythm:  2:1 Block    Most recent vitals:   Visit Vitals  BP (!) 170/59   Pulse (!) 36   Temp 98.9 °F (37.2 °C) (Axillary)   Resp 19   Ht 1.549 m (5' 1\")   Wt 70.2 kg (154 lb 12.2 oz)   SpO2 99%   BMI 29.24 kg/m²           No data found.    No data found.      Respiratory support needed (if any):  - RA    Admission weight Weight - Scale: 70.8 kg (156 lb) (12/10/24 1354)    Today's weight    Wt Readings from Last 1 Encounters:   12/12/24 70.2 kg (154 lb 12.2 oz)        Sotelo need assessed each shift: N/A - no sotelo present  UOP >30ml/hr: YES  Last documented BM (in last 48 hrs):  No data found.             Restraints (in use currently or dc'd in last 12 hrs): No    Order current and documentation up to date? No    Lines/Drains reviewed @ bedside.  Peripheral IV 12/10/24 Right Antecubital (Active)   Number of days: 1       Peripheral IV 12/10/24 Left Antecubital (Active)   Number of days: 1         Drip rates at handoff:    sodium chloride         Lab Data:   CBC:   Recent Labs     12/11/24  0358 12/12/24  0713   WBC 9.1 9.2   HGB 10.7* 10.9*   HCT 31.9* 32.4*   MCV 87.6 87.3    227     BMP:    Recent Labs     12/10/24  1347 12/11/24  0358    146*   K 3.3* 3.6   CO2 19* 19*   BUN 24* 20   CREATININE 1.0 0.9     LIVR:   Recent Labs     12/10/24  1347   AST 25   ALT 24     PT/INR:   Recent Labs     12/10/24  1347   INR 1.13     APTT: No results for input(s): \"APTT\" in the last 72 hours.  ABG: No results for input(s): \"PHART\", \"CMA6CNU\", \"PO2ART\" in the last 72 hours.    Any consults during the shift? No    Any signed and held orders to be released?  
NAME:  Breanna Maxwell  YOB: 1955  MEDICAL RECORD NUMBER:  [id number]    Shift Summary: Uneventful overnight. Zanaflex given x2. Pacer functions without issue. Plan to d/c today.     Family updated: No    Rhythm:  AV paced    Most recent vitals:   Visit Vitals  BP (!) 170/59   Pulse 60   Temp 98.3 °F (36.8 °C) (Axillary)   Resp 17   Ht 1.549 m (5' 1\")   Wt 68 kg (150 lb)   SpO2 95%   BMI 28.34 kg/m²           No data found.    No data found.      Respiratory support needed (if any):  - RA    Admission weight Weight - Scale: 70.8 kg (156 lb) (12/10/24 1354)    Today's weight    Wt Readings from Last 1 Encounters:   12/14/24 68 kg (150 lb)        Sotelo need assessed each shift: N/A - no sotelo present  UOP >30ml/hr: YES  Last documented BM (in last 48 hrs):  Patient Vitals for the past 48 hrs:   Last BM (including prior to admit)   12/14/24 0800 12/13/24                Restraints (in use currently or dc'd in last 12 hrs): No    Order current and documentation up to date? No    Lines/Drains reviewed @ bedside.  Peripheral IV 12/10/24 Left Antecubital (Active)   Number of days: 4         Drip rates at handoff:    sodium chloride         Lab Data:   CBC:   Recent Labs     12/13/24  0606 12/14/24  0650   WBC 8.2 6.3   HGB 10.3* 11.5*   HCT 30.5* 34.4*   MCV 88.1 87.0    240     BMP:    Recent Labs     12/13/24  0606 12/14/24  0650    142   K 3.5 3.6   CO2 21 24   BUN 25* 20   CREATININE 0.9 0.8     LIVR: No results for input(s): \"AST\", \"ALT\" in the last 72 hours.  PT/INR: No results for input(s): \"INR\" in the last 72 hours.    Invalid input(s): \"PROT\"  APTT: No results for input(s): \"APTT\" in the last 72 hours.  ABG: No results for input(s): \"PHART\", \"[id number]\", \"PO2ART\" in the last 72 hours.    Any consults during the shift? No    Any signed and held orders to be released?  No        4 Eyes Skin Assessment       The patient is being assessed for  Shift Handoff    I agree that at least one RN 
NAME:  Breanna Maxwell  YOB: 1955  MEDICAL RECORD NUMBER:  [id number]    Shift Summary: pt admitted to CVU from ED, Verdick at bedside, plan is to diuresis and have a pacemaker put in tomorrow and to keep pt NPO at midnight. Pt has a purewick and is voiding. K replacement initiated, meds given tolerated well.     Family updated: No    Rhythm:  AV block    Most recent vitals:   Visit Vitals  BP (!) 180/70   Pulse 50   Temp 97.6 °F (36.4 °C) (Oral)   Resp 18   Ht 1.549 m (5' 1\")   Wt 71.2 kg (157 lb)   SpO2 92%   BMI 29.66 kg/m²           No data found.    No data found.      Respiratory support needed (if any):  - RA    Admission weight Weight - Scale: 70.8 kg (156 lb) (12/10/24 1354)    Today's weight    Wt Readings from Last 1 Encounters:   12/10/24 71.2 kg (157 lb)        Sotelo need assessed each shift: N/A - no sotelo present  UOP >30ml/hr: YES  Last documented BM (in last 48 hrs):  No data found.             Restraints (in use currently or dc'd in last 12 hrs): No    Order current and documentation up to date? Yes    Lines/Drains reviewed @ bedside.  Peripheral IV 12/10/24 Right Antecubital (Active)   Number of days: 0       Peripheral IV 12/10/24 Left Antecubital (Active)   Number of days: 0         Drip rates at handoff:    sodium chloride         Lab Data:   CBC:   Recent Labs     12/10/24  1347   WBC 9.2   HGB 11.4*   HCT 34.0*   MCV 88.1        BMP:    Recent Labs     12/10/24  1347      K 3.3*   CO2 19*   BUN 24*   CREATININE 1.0     LIVR:   Recent Labs     12/10/24  1347   AST 25   ALT 24     PT/INR:   Recent Labs     12/10/24  1347   INR 1.13     APTT: No results for input(s): \"APTT\" in the last 72 hours.  ABG: No results for input(s): \"PHART\", \"FXK3XBP\", \"PO2ART\" in the last 72 hours.    Any consults during the shift? No    Any signed and held orders to be released?  No        4 Eyes Skin Assessment       The patient is being assessed for  Shift Handoff    I agree that at 
NAME:  Breanna Maxwell  YOB: 1955  MEDICAL RECORD NUMBER:  [id number]    Shift Summary: Restarted Juardiance and amlodipine. HTN resolving, BP stable. Downgraded to MS. Plan to discharge Sunday.    Family updated: Yes:   at bedside    Rhythm: Normal Sinus Rhythm     Most recent vitals:   Visit Vitals  /62   Pulse 60   Temp 98 °F (36.7 °C) (Oral)   Resp 17   Ht 1.549 m (5' 1\")   Wt 68 kg (150 lb)   SpO2 97%   BMI 28.34 kg/m²           No data found.    No data found.      Respiratory support needed (if any):  - RA    Admission weight Weight - Scale: 70.8 kg (156 lb) (12/10/24 1354)    Today's weight    Wt Readings from Last 1 Encounters:   12/14/24 68 kg (150 lb)        Sotelo need assessed each shift: N/A - no sotelo present does have purewick  UOP >30ml/hr: YES  Last documented BM (in last 48 hrs):  Patient Vitals for the past 48 hrs:   Last BM (including prior to admit)   12/14/24 0800 12/13/24                Restraints (in use currently or dc'd in last 12 hrs): No    Order current and documentation up to date? Yes    Lines/Drains reviewed @ bedside.  Peripheral IV 12/10/24 Left Antecubital (Active)   Number of days: 4         Drip rates at handoff:    sodium chloride         Lab Data:   CBC:   Recent Labs     12/13/24  0606 12/14/24  0650   WBC 8.2 6.3   HGB 10.3* 11.5*   HCT 30.5* 34.4*   MCV 88.1 87.0    240     BMP:    Recent Labs     12/13/24  0606 12/14/24  0650    142   K 3.5 3.6   CO2 21 24   BUN 25* 20   CREATININE 0.9 0.8     LIVR: No results for input(s): \"AST\", \"ALT\" in the last 72 hours.  PT/INR: No results for input(s): \"INR\" in the last 72 hours.    Invalid input(s): \"PROT\"  APTT: No results for input(s): \"APTT\" in the last 72 hours.  ABG: No results for input(s): \"PHART\", \"PET0VHR\", \"PO2ART\" in the last 72 hours.    Any consults during the shift? No    Any signed and held orders to be released?  No        4 Eyes Skin Assessment       The patient is being 
NAME:  Breanna Maxwell  YOB: 1955  MEDICAL RECORD NUMBER:  [id number]    Shift Summary: VSS overnight. PRN tylenol and flexeril given for pain control.     Family updated: No    Rhythm:  v paced    Most recent vitals:   Visit Vitals  BP (!) 141/59   Pulse 60   Temp 98.4 °F (36.9 °C) (Oral)   Resp 17   Ht 1.549 m (5' 1\")   Wt 70 kg (154 lb 5.2 oz)   SpO2 97%   BMI 29.16 kg/m²           No data found.    No data found.      Respiratory support needed (if any):  - RA    Admission weight Weight - Scale: 70.8 kg (156 lb) (12/10/24 1354)    Today's weight    Wt Readings from Last 1 Encounters:   12/13/24 70 kg (154 lb 5.2 oz)        Sotelo need assessed each shift: N/A - no sotelo present  UOP >30ml/hr: YES  Last documented BM (in last 48 hrs):  No data found.             Restraints (in use currently or dc'd in last 12 hrs): No    Order current and documentation up to date? No    Lines/Drains reviewed @ bedside.  Peripheral IV 12/10/24 Left Antecubital (Active)   Number of days: 2         Drip rates at handoff:    sodium chloride         Lab Data:   CBC:   Recent Labs     12/12/24  0713 12/13/24  0606   WBC 9.2 8.2   HGB 10.9* 10.3*   HCT 32.4* 30.5*   MCV 87.3 88.1    199     BMP:    Recent Labs     12/12/24  0713 12/13/24  0606    142   K 4.4 3.5   CO2 20* 21   BUN 22* 25*   CREATININE 1.1 0.9     LIVR:   Recent Labs     12/10/24  1347   AST 25   ALT 24     PT/INR:   Recent Labs     12/10/24  1347   INR 1.13     APTT: No results for input(s): \"APTT\" in the last 72 hours.  ABG: No results for input(s): \"PHART\", \"[id number]\", \"PO2ART\" in the last 72 hours.    Any consults during the shift? No    Any signed and held orders to be released?  No        4 Eyes Skin Assessment       The patient is being assessed for  Shift Handoff    I agree that at least one RN has performed a thorough Head to Toe Skin Assessment on the patient. ALL assessment sites listed below have been assessed.      Areas assessed 
NAME:  Breanna Maxwell  YOB: 1955  MEDICAL RECORD NUMBER:  [id number]    Shift Summary:   -Hypertensive during shift, HCTZ increased, lasix, and coreg added.   -Hydralazine given x1  -Pacemaker placement today. Dressing c/d/I  -post op EKG completed      Family updated: No patient updated family independtly     Rhythm: Paced     Most recent vitals:   Visit Vitals  BP (!) 164/72   Pulse 84   Temp 98.4 °F (36.9 °C) (Oral)   Resp 23   Ht 1.549 m (5' 1\")   Wt 70.2 kg (154 lb 12.2 oz)   SpO2 96%   BMI 29.24 kg/m²           No data found.    No data found.      Respiratory support needed (if any):  - RA    Admission weight Weight - Scale: 70.8 kg (156 lb) (12/10/24 1354)    Today's weight    Wt Readings from Last 1 Encounters:   12/12/24 70.2 kg (154 lb 12.2 oz)        Sotelo need assessed each shift: N/A - no sotelo present  UOP >30ml/hr: YES  Last documented BM (in last 48 hrs):  No data found.             Restraints (in use currently or dc'd in last 12 hrs): No    Order current and documentation up to date? No    Lines/Drains reviewed @ bedside.  Peripheral IV 12/10/24 Left Antecubital (Active)   Number of days: 2         Drip rates at handoff:    sodium chloride         Lab Data:   CBC:   Recent Labs     12/11/24  0358 12/12/24  0713   WBC 9.1 9.2   HGB 10.7* 10.9*   HCT 31.9* 32.4*   MCV 87.6 87.3    227     BMP:    Recent Labs     12/11/24  0358 12/12/24  0713   * 144   K 3.6 4.4   CO2 19* 20*   BUN 20 22*   CREATININE 0.9 1.1     LIVR:   Recent Labs     12/10/24  1347   AST 25   ALT 24     PT/INR:   Recent Labs     12/10/24  1347   INR 1.13     APTT: No results for input(s): \"APTT\" in the last 72 hours.  ABG: No results for input(s): \"PHART\", \"BSC1OLS\", \"PO2ART\" in the last 72 hours.    Any consults during the shift? No    Any signed and held orders to be released?  No        4 Eyes Skin Assessment       The patient is being assessed for  Shift Handoff    I agree that at least one RN has 
Patient VSS, she has been up ambulating in the room. Following pacemaker precautions to RODOLFO. Prn electrolyte replacement given. Safety precautions in place  
Patient admitted to room from ED, bedside report given at bedside by OSVALDO Wilson Patient placed on CVU monitors, oriented to room, call light, bed rails, phone, lights and bathroom. Patient instructed about the schedule of the day including: vital sign frequency, lab draws, possible tests, frequency of MD and staff rounds, including RN/MD rounding together at bedside, daily weights, and I &O's. Bed alarm in place, patient aware of placement and reason.  Bed locked, in lowest position, side rails up 2/4, call light within reach. Pt has no family at bedside but son was in pre-op but went home. Pt was shaking and stated that she has not eaten all day. Check BG on POC.    
Per Dr Sagastume no plan for knee surgery this admission. Pt can be discharged and call office for further planning  
Per hospitalist Dr Benton to stop the dobutamine gtt and discontinued the order.  
Pt c/o itching to bilat heels, requesting cortisone cream. RN notified hospitalist. N.O. received. Pt expressing gratitude.    Electronically signed by Allegra Murry RN on 12/14/2024 at 3:18 PM    
RN spoke with [employer] specialist, stating device interrogation completed 12/13/2024 and report was sent to EP Dr. Vidal, device is working properly and 'passed' interrogation. RN requested a copy of the report be sent to nurses station to be put into patient chart. Awaiting at this time.    Electronically signed by Allegra Murry RN on 12/14/2024 at 10:16 AM    
for  Shift Handoff    I agree that at least one RN has performed a thorough Head to Toe Skin Assessment on the patient. ALL assessment sites listed below have been assessed.      Areas assessed by both nurses: Head, Face, Ears, Shoulders, Back, Chest, Arms, Elbows, Hands, Sacrum. Buttock, Coccyx, Ischium, Legs. Feet and Heels, and Under Medical Devices         Does the Patient have a Wound? No noted wound(s)    Wound Care Orders initiated by RN: No       Carlo Prevention initiated by RN: No    Pressure Injury (Stage 3,4, Unstageable, DTI, NWPT, and Complex wounds) if present, place Wound referral order by RN under : No    New Ostomies, if present place, Ostomy referral order under : No     Nurse 1 eSignature: Electronically signed by Aline Mistry RN on 12/13/24 at 6:59 PM EST    **SHARE this note so that the co-signing nurse can place an eSignature**    Nurse 2 eSignature: Electronically signed by Shagufta Rich RN on 12/14/24 at 5:47 AM EST          
held orders to be released?  No        4 Eyes Skin Assessment       The patient is being assessed for  Shift Handoff    I agree that at least one RN has performed a thorough Head to Toe Skin Assessment on the patient. ALL assessment sites listed below have been assessed.      Areas assessed by both nurses: Head, Face, Ears, Shoulders, Back, Chest, Arms, Elbows, Hands, Sacrum. Buttock, Coccyx, Ischium, Legs. Feet and Heels, and Under Medical Devices         Does the Patient have a Wound? No noted wound(s)    Wound Care Orders initiated by RN: No       Carlo Prevention initiated by RN: No    Pressure Injury (Stage 3,4, Unstageable, DTI, NWPT, and Complex wounds) if present, place Wound referral order by RN under : No    New Ostomies, if present place, Ostomy referral order under : No     Nurse 1 eSignature: Electronically signed by Ashely Thomas RN on 12/11/24 at 8:11 PM EST    **SHARE this note so that the co-signing nurse can place an eSignature**    Nurse 2 eSignature: Electronically signed by Noemi Ramírez RN on 12/11/24 at 7:31 PM EST          
nontender. +BS X 4 quads. No hepatomegaly.   EXT: Radial and pedal pulses 2+ and symmetric. Without varicosities. No edema.  MUSCSKEL: Good ROM X4 extremities. No deformity.   NEURO: A/O X3. Calm and cooperative.     Telemetry:AV paced    Medications:    empagliflozin  10 mg Oral Daily    amLODIPine  10 mg Oral Daily    hydrocortisone   Topical BID    carvedilol  25 mg Oral BID WC    losartan  100 mg Oral Daily    aspirin  81 mg Oral Daily    atorvastatin  40 mg Oral Daily    isosorbide mononitrate  60 mg Oral Daily    sodium chloride flush  5-40 mL IntraVENous 2 times per day    enoxaparin  40 mg SubCUTAneous Daily    lidocaine  2 patch TransDERmal Daily      sodium chloride       benzonatate, guaiFENesin-dextromethorphan, hydrALAZINE, tiZANidine, meclizine, sodium chloride flush, sodium chloride, potassium chloride **OR** potassium alternative oral replacement **OR** potassium chloride, magnesium sulfate, ondansetron **OR** ondansetron, polyethylene glycol, acetaminophen **OR** acetaminophen    Lab Data: I have reviewed all labs below today.   CBC:   Recent Labs     12/12/24  0713 12/13/24  0606 12/14/24  0650   WBC 9.2 8.2 6.3   HGB 10.9* 10.3* 11.5*   HCT 32.4* 30.5* 34.4*   MCV 87.3 88.1 87.0    199 240     BMP:   Recent Labs     12/12/24  0713 12/13/24  0606 12/14/24  0650    142 142   K 4.4 3.5 3.6   * 111* 107   CO2 20* 21 24   BUN 22* 25* 20   CREATININE 1.1 0.9 0.8     GLUCOSE:   Recent Labs     12/12/24  0713 12/13/24  0606 12/14/24  0650   GLUCOSE 88 111* 108*     LIVER PROFILE:   Lab Results   Component Value Date/Time    AST 25 12/10/2024 01:47 PM    ALT 24 12/10/2024 01:47 PM    LIPASE 29.0 08/12/2018 09:40 AM    BILIDIR 0.0 02/20/2020 09:07 AM    BILITOT 0.9 12/10/2024 01:47 PM    ALKPHOS 222 12/10/2024 01:47 PM     PT/INR:   Lab Results   Component Value Date/Time    PROTIME 14.8 12/10/2024 01:47 PM    INR 1.13 12/10/2024 01:47 PM    INR 1.01 11/25/2024 11:45 AM    INR 1.06

## 2024-12-15 NOTE — DISCHARGE SUMMARY
HISTORY: ORDERING SYSTEM PROVIDED HISTORY: post ppm insertion TECHNOLOGIST PROVIDED HISTORY: Reason for exam:->post ppm insertion Reason for Exam: Bradycardia FINDINGS: The lungs appear clear.  Heart is of normal size.  There is a transvenous pacemaker in place with distal tips overlying the right atrium and right ventricle.  There is no change from prior examination.  There is no pneumothorax.  Bony structures are unremarkable.  Visualized upper abdomen appears normal.     1. No acute cardiopulmonary process. 2. Pacemaker in place. No pneumothorax.     XR CHEST (2 VW)    Result Date: 12/13/2024  EXAMINATION: TWO XRAY VIEWS OF THE CHEST 12/13/2024 8:47 am COMPARISON: 12/11/2024 HISTORY: ORDERING SYSTEM PROVIDED HISTORY: pacemaker implantation TECHNOLOGIST PROVIDED HISTORY: Reason for exam:->pacemaker implantation FINDINGS: Transvenous pacer in place.  Stable cardiomegaly.  Pulmonary vascular congestion persists.  No pneumothorax.  No focal airspace disease.  No pleural effusion.     Transvenous pacer in place, no pneumothorax Similar appearing pulmonary vascular congestion     Electrophysiology procedure    Result Date: 12/12/2024  Images from the original result were not included. [facility]    Electrophysiology Procedure Note   Date of Procedure: 12/12/2024 Patient's Name: Breanna Maxwell YOB: 1955 Medical Record Number: [id number] Referring Physician: Mitul Benton MD Procedure Performed by: Luke Vidal MD Procedures performed: Insertion of dual-chamber pacemaker under fluoroscopic guidance IV sedation  Fentanyl 50 mcg and Versed 2 Mg Sedation was administered by trained nursing staff under direct physician supervision Programming and analysis of dual-chamber pacemaker EBL less than 10 mL Indication of the procedure: 2:1 AV block Transient high grade block HPI: Breanna Maxwell is a 69 y.o. female who is currently admitted for decompensated diastolic congestive heart failure

## 2024-12-15 NOTE — PLAN OF CARE
Problem: Respiratory - Adult  Goal: Achieves optimal ventilation and oxygenation  Outcome: Adequate for Discharge     Problem: Cardiovascular - Adult  Goal: Maintains optimal cardiac output and hemodynamic stability  Outcome: Adequate for Discharge     Problem: Skin/Tissue Integrity - Adult  Goal: Incisions, wounds, or drain sites healing without S/S of infection  Outcome: Adequate for Discharge  Flowsheets (Taken 12/15/2024 0800)  Incisions, Wounds, or Drain Sites Healing Without Sign and Symptoms of Infection: TWICE DAILY: Assess and document skin integrity     Problem: Musculoskeletal - Adult  Goal: Return mobility to safest level of function  Outcome: Adequate for Discharge  Goal: Return ADL status to a safe level of function  Outcome: Adequate for Discharge     Problem: Genitourinary - Adult  Goal: Absence of urinary retention  Outcome: Adequate for Discharge  Goal: Urinary catheter remains patent  Outcome: Adequate for Discharge     Problem: Anxiety  Goal: Will report anxiety at manageable levels  Description: INTERVENTIONS:  1. Administer medication as ordered  2. Teach and rehearse alternative coping skills  3. Provide emotional support with 1:1 interaction with staff  Outcome: Adequate for Discharge  Flowsheets (Taken 12/15/2024 0800)  Will report anxiety at manageable levels: Administer medication as ordered     Problem: Coping  Goal: Pt/Family able to verbalize concerns and demonstrate effective coping strategies  Description: INTERVENTIONS:  1. Assist patient/family to identify coping skills, available support systems and cultural and spiritual values  2. Provide emotional support, including active listening and acknowledgement of concerns of patient and caregivers  3. Reduce environmental stimuli, as able  4. Instruct patient/family in relaxation techniques, as appropriate  5. Assess for spiritual pain/suffering and initiate Spiritual Care, Psychosocial Clinical Specialist consults as needed  Outcome:

## 2024-12-16 ENCOUNTER — TELEPHONE (OUTPATIENT)
Dept: ORTHOPEDIC SURGERY | Age: 69
End: 2024-12-16

## 2024-12-16 NOTE — TELEPHONE ENCOUNTER
,Surgery and/or Procedure Scheduling     Contact Name: Breanna Maxwell   Surgical/Procedure Request: NEED TO RESCHEDULE SX   Patient Contact Number: [phone]      PLEASE CALL ASAP AS Pt NEEDS TO RESCHEDULE SX.

## 2024-12-16 NOTE — TELEPHONE ENCOUNTER
Spoke with patient, I will pascale her on the cancellation list and call her back when we have an opening.

## 2024-12-17 ENCOUNTER — HOSPITAL ENCOUNTER (EMERGENCY)
Age: 69
Discharge: HOME | End: 2024-12-17
Attending: STUDENT IN AN ORGANIZED HEALTH CARE EDUCATION/TRAINING PROGRAM
Payer: MEDICARE

## 2024-12-17 ENCOUNTER — APPOINTMENT (OUTPATIENT)
Dept: GENERAL RADIOLOGY | Age: 69
End: 2024-12-17
Payer: MEDICARE

## 2024-12-17 VITALS
OXYGEN SATURATION: 96 % | WEIGHT: 154.54 LBS | SYSTOLIC BLOOD PRESSURE: 157 MMHG | HEIGHT: 61 IN | RESPIRATION RATE: 17 BRPM | TEMPERATURE: 98.3 F | HEART RATE: 62 BPM | DIASTOLIC BLOOD PRESSURE: 72 MMHG | BODY MASS INDEX: 29.18 KG/M2

## 2024-12-17 DIAGNOSIS — J06.9 ACUTE UPPER RESPIRATORY INFECTION: Primary | ICD-10-CM

## 2024-12-17 DIAGNOSIS — R05.9 COUGH, UNSPECIFIED TYPE: ICD-10-CM

## 2024-12-17 LAB
ANION GAP SERPL CALCULATED.3IONS-SCNC: 12 MMOL/L (ref 3–16)
BASE EXCESS BLDV CALC-SCNC: 3.5 MMOL/L (ref -3–3)
BASOPHILS # BLD: 0.1 K/UL (ref 0–0.2)
BASOPHILS NFR BLD: 0.6 %
BUN SERPL-MCNC: 18 MG/DL (ref 7–20)
CALCIUM SERPL-MCNC: 9.8 MG/DL (ref 8.3–10.6)
CHLORIDE SERPL-SCNC: 105 MMOL/L (ref 99–110)
CK SERPL-CCNC: 68 U/L (ref 26–192)
CO2 BLDV-SCNC: 25 MMOL/L
CO2 SERPL-SCNC: 23 MMOL/L (ref 21–32)
CREAT SERPL-MCNC: 0.9 MG/DL (ref 0.6–1.2)
DEPRECATED RDW RBC AUTO: 15.5 % (ref 12.4–15.4)
EKG ATRIAL RATE: 65 BPM
EKG DIAGNOSIS: NORMAL
EKG P AXIS: 31 DEGREES
EKG P-R INTERVAL: 224 MS
EKG Q-T INTERVAL: 390 MS
EKG QRS DURATION: 90 MS
EKG QTC CALCULATION (BAZETT): 405 MS
EKG R AXIS: -19 DEGREES
EKG T AXIS: 11 DEGREES
EKG VENTRICULAR RATE: 65 BPM
EOSINOPHIL # BLD: 0.1 K/UL (ref 0–0.6)
EOSINOPHIL NFR BLD: 0.8 %
FLUAV RNA UPPER RESP QL NAA+PROBE: NEGATIVE
FLUBV AG NPH QL: NEGATIVE
GFR SERPLBLD CREATININE-BSD FMLA CKD-EPI: 68 ML/MIN/{1.73_M2}
GLUCOSE SERPL-MCNC: 117 MG/DL (ref 70–99)
HCO3 BLDV-SCNC: 26.2 MMOL/L (ref 23–29)
HCT VFR BLD AUTO: 34.6 % (ref 36–48)
HGB BLD-MCNC: 11.5 G/DL (ref 12–16)
LYMPHOCYTES # BLD: 2.2 K/UL (ref 1–5.1)
LYMPHOCYTES NFR BLD: 22.6 %
MCH RBC QN AUTO: 29.3 PG (ref 26–34)
MCHC RBC AUTO-ENTMCNC: 33.3 G/DL (ref 31–36)
MCV RBC AUTO: 88.1 FL (ref 80–100)
MONOCYTES # BLD: 1 K/UL (ref 0–1.3)
MONOCYTES NFR BLD: 10.2 %
NEUTROPHILS # BLD: 6.5 K/UL (ref 1.7–7.7)
NEUTROPHILS NFR BLD: 65.8 %
NT-PROBNP SERPL-MCNC: 359 PG/ML (ref 0–124)
O2 THERAPY: ABNORMAL
PCO2 BLDV: 31.4 MMHG (ref 40–50)
PH BLDV: 7.53 [PH] (ref 7.35–7.45)
PLATELET # BLD AUTO: 276 K/UL (ref 135–450)
PMV BLD AUTO: 7.8 FL (ref 5–10.5)
PO2 BLDV: 53.3 MMHG (ref 25–40)
POTASSIUM SERPL-SCNC: 3.6 MMOL/L (ref 3.5–5.1)
RBC # BLD AUTO: 3.92 M/UL (ref 4–5.2)
SAO2 % BLDV: 91 %
SARS-COV-2 RDRP RESP QL NAA+PROBE: NOT DETECTED
SODIUM SERPL-SCNC: 140 MMOL/L (ref 136–145)
TROPONIN, HIGH SENSITIVITY: 30 NG/L (ref 0–14)
TROPONIN, HIGH SENSITIVITY: 31 NG/L (ref 0–14)
WBC # BLD AUTO: 9.9 K/UL (ref 4–11)

## 2024-12-17 PROCEDURE — 87635 SARS-COV-2 COVID-19 AMP PRB: CPT

## 2024-12-17 PROCEDURE — 71046 X-RAY EXAM CHEST 2 VIEWS: CPT

## 2024-12-17 PROCEDURE — 83880 ASSAY OF NATRIURETIC PEPTIDE: CPT

## 2024-12-17 PROCEDURE — 85025 COMPLETE CBC W/AUTO DIFF WBC: CPT

## 2024-12-17 PROCEDURE — 93010 ELECTROCARDIOGRAM REPORT: CPT | Performed by: INTERNAL MEDICINE

## 2024-12-17 PROCEDURE — 6360000002 HC RX W HCPCS: Performed by: STUDENT IN AN ORGANIZED HEALTH CARE EDUCATION/TRAINING PROGRAM

## 2024-12-17 PROCEDURE — 6370000000 HC RX 637 (ALT 250 FOR IP): Performed by: STUDENT IN AN ORGANIZED HEALTH CARE EDUCATION/TRAINING PROGRAM

## 2024-12-17 PROCEDURE — 82803 BLOOD GASES ANY COMBINATION: CPT

## 2024-12-17 PROCEDURE — 93005 ELECTROCARDIOGRAM TRACING: CPT | Performed by: STUDENT IN AN ORGANIZED HEALTH CARE EDUCATION/TRAINING PROGRAM

## 2024-12-17 PROCEDURE — 84484 ASSAY OF TROPONIN QUANT: CPT

## 2024-12-17 PROCEDURE — 99285 EMERGENCY DEPT VISIT HI MDM: CPT

## 2024-12-17 PROCEDURE — 80048 BASIC METABOLIC PNL TOTAL CA: CPT

## 2024-12-17 PROCEDURE — 82550 ASSAY OF CK (CPK): CPT

## 2024-12-17 PROCEDURE — 96374 THER/PROPH/DIAG INJ IV PUSH: CPT

## 2024-12-17 PROCEDURE — 87804 INFLUENZA ASSAY W/OPTIC: CPT

## 2024-12-17 PROCEDURE — 36415 COLL VENOUS BLD VENIPUNCTURE: CPT

## 2024-12-17 RX ORDER — BENZONATATE 100 MG/1
100 CAPSULE ORAL 3 TIMES DAILY PRN
Status: DISCONTINUED | OUTPATIENT
Start: 2024-12-17 | End: 2024-12-17 | Stop reason: HOSPADM

## 2024-12-17 RX ORDER — ONDANSETRON 4 MG/1
4 TABLET, FILM COATED ORAL 3 TIMES DAILY PRN
Qty: 30 TABLET | Refills: 0 | Status: SHIPPED | OUTPATIENT
Start: 2024-12-17

## 2024-12-17 RX ORDER — NAPROXEN 500 MG/1
500 TABLET ORAL 2 TIMES DAILY WITH MEALS
Qty: 20 TABLET | Refills: 0 | Status: SHIPPED | OUTPATIENT
Start: 2024-12-17 | End: 2024-12-27

## 2024-12-17 RX ORDER — KETOROLAC TROMETHAMINE 30 MG/ML
30 INJECTION, SOLUTION INTRAMUSCULAR; INTRAVENOUS ONCE
Status: COMPLETED | OUTPATIENT
Start: 2024-12-17 | End: 2024-12-17

## 2024-12-17 RX ORDER — BENZONATATE 100 MG/1
100 CAPSULE ORAL 2 TIMES DAILY PRN
Qty: 14 CAPSULE | Refills: 0 | Status: SHIPPED | OUTPATIENT
Start: 2024-12-17 | End: 2024-12-24

## 2024-12-17 RX ADMIN — BENZONATATE 100 MG: 100 CAPSULE ORAL at 05:26

## 2024-12-17 RX ADMIN — KETOROLAC TROMETHAMINE 30 MG: 30 INJECTION, SOLUTION INTRAMUSCULAR at 06:30

## 2024-12-17 ASSESSMENT — PAIN - FUNCTIONAL ASSESSMENT
PAIN_FUNCTIONAL_ASSESSMENT: 0-10
PAIN_FUNCTIONAL_ASSESSMENT: ACTIVITIES ARE NOT PREVENTED

## 2024-12-17 ASSESSMENT — PAIN DESCRIPTION - DESCRIPTORS
DESCRIPTORS: DISCOMFORT

## 2024-12-17 ASSESSMENT — PAIN DESCRIPTION - LOCATION
LOCATION: GENERALIZED

## 2024-12-17 ASSESSMENT — PAIN SCALES - GENERAL
PAINLEVEL_OUTOF10: 8
PAINLEVEL_OUTOF10: 9
PAINLEVEL_OUTOF10: 9

## 2024-12-17 ASSESSMENT — PAIN DESCRIPTION - PAIN TYPE: TYPE: ACUTE PAIN

## 2024-12-17 NOTE — ED PROVIDER NOTES
0554 EKG 12 Lead  Atrial sensed ventricular paced rhythm, consistent with prior [PB]   0603 WBC: 9.9 [PB]   0603 Hemoglobin Quant(!): 11.5 [PB]   0611 pH, Ta(!): 7.530  Respiratory alkalosis [PB]   0611 pCO2, Ta(!): 31.4 [PB]   0611 pO2, Ta(!): 53.3 [PB]   0611 Bicarbonate, Venous: 26.2 [PB]   0617 Potassium: 3.6 [PB]   0617 Creatinine: 0.9 [PB]   0617 Glucose(!): 117 [PB]   0617 Total CK: 68 [PB]   0617 Sodium: 140 [PB]   0618 Troponin, High Sensitivity(!): 31  Consistent with prior.  Will obtain repeat [PB]   0623 NT Pro-BNP(!): 359  Decrease from prior.  Patient with known history of heart failure [PB]   0627 BP(!): 152/63  Improving spontaneously [PB]   0645 Troponin, High Sensitivity(!): 30  Downtrending. [PB]   0650 XR CHEST (2 VW)  No consolidation noted.  Atelectasis versus pleural effusions.  Patient's complaints, physical exam, and lab work more consistent with viral process [PB]   0652 Patient was revisited at bedside.  Remains in stable condition.  Still complaining of bodyaches.  Denies chest pain or shortness of breath.  Discussed all results and plan for discharge including continued home care and follow-up with patient's primary care provider.  Red flag signs/symptoms discussed, and strict return precautions given.  Utilized shared decision-making regarding disposition of discharge, and patient is a good candidate for outpatient management.  Patient verbalized agreement with plan for discharge. [PB]      ED Course User Index  [PB] Satish Krishna DO   All charted times are approximate.    During the patient's ED course, the patient was given:  Medications   benzonatate (TESSALON) capsule 100 mg (100 mg Oral Given 12/17/24 0526)   ketorolac (TORADOL) injection 30 mg (30 mg IntraVENous Given 12/17/24 0630)     Social determinants of health:   None applicable    Chronic conditions potentially affecting care:   hypertension, diabetes mellitus, CHF, CAD, and depression/anxiety    I, Dr. Krishna,

## 2024-12-19 ENCOUNTER — NURSE ONLY (OUTPATIENT)
Dept: CARDIOLOGY CLINIC | Age: 69
End: 2024-12-19

## 2024-12-19 DIAGNOSIS — I44.1 MOBITZ TYPE II BLOCK: ICD-10-CM

## 2024-12-19 DIAGNOSIS — Z95.0 CARDIAC PACEMAKER IN SITU: Primary | ICD-10-CM

## 2024-12-19 DIAGNOSIS — I44.1 MOBITZ TYPE 1 SECOND DEGREE AV BLOCK: ICD-10-CM

## 2024-12-19 DIAGNOSIS — I50.32 CHRONIC HEART FAILURE WITH PRESERVED EJECTION FRACTION (HCC): ICD-10-CM

## 2024-12-19 DIAGNOSIS — I44.2 COMPLETE HEART BLOCK (HCC): ICD-10-CM

## 2024-12-19 DIAGNOSIS — I44.30 ATRIOVENTRICULAR BLOCK: ICD-10-CM

## 2024-12-27 ENCOUNTER — PREP FOR PROCEDURE (OUTPATIENT)
Dept: ORTHOPEDIC SURGERY | Age: 69
End: 2024-12-27

## 2024-12-27 ENCOUNTER — TELEPHONE (OUTPATIENT)
Dept: ORTHOPEDIC SURGERY | Age: 69
End: 2024-12-27

## 2024-12-27 PROBLEM — M17.12 OSTEOARTHRITIS OF LEFT KNEE: Status: ACTIVE | Noted: 2024-12-27

## 2024-12-30 ENCOUNTER — TELEPHONE (OUTPATIENT)
Dept: ORTHOPEDIC SURGERY | Age: 69
End: 2024-12-30

## 2024-12-30 ENCOUNTER — PREP FOR PROCEDURE (OUTPATIENT)
Dept: ORTHOPEDIC SURGERY | Age: 69
End: 2024-12-30

## 2024-12-30 DIAGNOSIS — M17.12 ARTHRITIS OF LEFT KNEE: Primary | ICD-10-CM

## 2024-12-30 NOTE — PROGRESS NOTES
PRE-OP INSTRUCTIONS     Do not eat or drink anything after 12:00 midnight prior to surgery.    This includes water, chewing gum, mints and ice chips.    You may brush your teeth and gargle the morning of surgery but DO  NOT SWALLOW THE WATER.    Take the following medications with a small sip of water on the morning of procedure...Follow your MD/Surgeons pre-procedure instructions regarding your medications     You may be asked to stop blood thinners such as:  Coumadin, Plavix, Fragmin and lovenox.  Please check with your doctor before stopping these or any other medications.    Aspirin, ibuprofen, advil and naproxen, any anti-inflammatory products should be stopped for a week prior to your surgery.    Do not smoke and do not drink any alcoholic beverages 24 hours prior to your surgery.    Please do not wear any jewelry or body piercings on the day of surgery.    Please wear something simple, loose fitting clothing to the hospital.  Do not wear any make-up(including eye make-up) or nail polish on your fingers and toes.    As part of our patient safety program to minimize surgical infections, we ask you to do the following:    Please notify your surgeon if you develop any illness between now and the day of your surgery.  This includes a cough, cold, fever, sore  throat, nausea, vomiting, diarrhea, etc.   Please notify your surgeon if you experience dizziness, shortness of  breath or blurred vision between now and the time of your surgery.     Please notify your surgeon of any open or redden areas that may look infected       DO NOT shave your operative site 96 hours(four days) prior to surgery.          Shower the week before surgery with an antibacterial soap such as:dial,safeguard, etc.       Three(3) days prior to your surgery, cleanse the operative site with Hibiclens(anti-microbial soap). This soap may dry your skin, please do not apply any oils or lotions     Please bring your insurance card and picture ID

## 2024-12-30 NOTE — TELEPHONE ENCOUNTER
INSURANCE VERIFICATION   LVM TO VERIFY IF INSURANCE WILL BE THE SAME NEXT YEAR OR CHANGING BEFORE SX.

## 2024-12-31 ENCOUNTER — HOSPITAL ENCOUNTER (OUTPATIENT)
Age: 69
Discharge: HOME | End: 2024-12-31
Payer: MEDICARE

## 2024-12-31 LAB
25(OH)D3 SERPL-MCNC: 27.7 NG/ML
ABO + RH BLD: NORMAL
ALBUMIN SERPL-MCNC: 4.1 G/DL (ref 3.4–5)
ANION GAP SERPL CALCULATED.3IONS-SCNC: 9 MMOL/L (ref 3–16)
APTT BLD: 34.5 SEC (ref 22.1–36.4)
BASOPHILS # BLD: 0.1 K/UL (ref 0–0.2)
BASOPHILS NFR BLD: 1 %
BLD GP AB SCN SERPL QL: NORMAL
BUN SERPL-MCNC: 18 MG/DL (ref 7–20)
CALCIUM SERPL-MCNC: 10.2 MG/DL (ref 8.3–10.6)
CHLORIDE SERPL-SCNC: 107 MMOL/L (ref 99–110)
CO2 SERPL-SCNC: 25 MMOL/L (ref 21–32)
CREAT SERPL-MCNC: 1.1 MG/DL (ref 0.6–1.2)
DEPRECATED RDW RBC AUTO: 16.6 % (ref 12.4–15.4)
EOSINOPHIL # BLD: 0.2 K/UL (ref 0–0.6)
EOSINOPHIL NFR BLD: 2.5 %
GFR SERPLBLD CREATININE-BSD FMLA CKD-EPI: 54 ML/MIN/{1.73_M2}
GLUCOSE SERPL-MCNC: 132 MG/DL (ref 70–99)
HCT VFR BLD AUTO: 36.7 % (ref 36–48)
HGB BLD-MCNC: 12.3 G/DL (ref 12–16)
INR PPP: 1.13 (ref 0.85–1.15)
LYMPHOCYTES # BLD: 2.5 K/UL (ref 1–5.1)
LYMPHOCYTES NFR BLD: 35.3 %
MCH RBC QN AUTO: 29 PG (ref 26–34)
MCHC RBC AUTO-ENTMCNC: 33.4 G/DL (ref 31–36)
MCV RBC AUTO: 86.7 FL (ref 80–100)
MONOCYTES # BLD: 0.5 K/UL (ref 0–1.3)
MONOCYTES NFR BLD: 6.6 %
NEUTROPHILS # BLD: 3.8 K/UL (ref 1.7–7.7)
NEUTROPHILS NFR BLD: 54.6 %
PLATELET # BLD AUTO: 364 K/UL (ref 135–450)
PMV BLD AUTO: 7.2 FL (ref 5–10.5)
POTASSIUM SERPL-SCNC: 4.4 MMOL/L (ref 3.5–5.1)
PREALB SERPL-MCNC: 25.3 MG/DL (ref 20–40)
PROTHROMBIN TIME: 14.7 SEC (ref 11.9–14.9)
RBC # BLD AUTO: 4.24 M/UL (ref 4–5.2)
SODIUM SERPL-SCNC: 141 MMOL/L (ref 136–145)
WBC # BLD AUTO: 7 K/UL (ref 4–11)

## 2024-12-31 PROCEDURE — 82040 ASSAY OF SERUM ALBUMIN: CPT

## 2024-12-31 PROCEDURE — 82306 VITAMIN D 25 HYDROXY: CPT

## 2024-12-31 PROCEDURE — 86901 BLOOD TYPING SEROLOGIC RH(D): CPT

## 2024-12-31 PROCEDURE — 83036 HEMOGLOBIN GLYCOSYLATED A1C: CPT

## 2024-12-31 PROCEDURE — 86900 BLOOD TYPING SEROLOGIC ABO: CPT

## 2024-12-31 PROCEDURE — 87641 MR-STAPH DNA AMP PROBE: CPT

## 2024-12-31 PROCEDURE — 85730 THROMBOPLASTIN TIME PARTIAL: CPT

## 2024-12-31 PROCEDURE — 36415 COLL VENOUS BLD VENIPUNCTURE: CPT

## 2024-12-31 PROCEDURE — 85025 COMPLETE CBC W/AUTO DIFF WBC: CPT

## 2024-12-31 PROCEDURE — 80048 BASIC METABOLIC PNL TOTAL CA: CPT

## 2024-12-31 PROCEDURE — 85610 PROTHROMBIN TIME: CPT

## 2024-12-31 PROCEDURE — 86850 RBC ANTIBODY SCREEN: CPT

## 2024-12-31 PROCEDURE — 84134 ASSAY OF PREALBUMIN: CPT

## 2024-12-31 NOTE — PROGRESS NOTES
Notification sent to Dr Sagastume and medical assistant Diamond LA regarding abnormal preoperative labs and pertinent medical history.

## 2025-01-01 LAB
EST. AVERAGE GLUCOSE BLD GHB EST-MCNC: 96.8 MG/DL
HBA1C MFR BLD: 5 %
MRSA DNA SPEC QL NAA+PROBE: NORMAL

## 2025-01-03 ENCOUNTER — TELEPHONE (OUTPATIENT)
Dept: ORTHOPEDIC SURGERY | Age: 70
End: 2025-01-03

## 2025-01-03 NOTE — TELEPHONE ENCOUNTER
General Question     Subject: SURGERY TIME AND ARRIVAL TIME  Patient and /or Facility Request: Breanna Maxwell   Contact Number: [phone]      PATIENT CALLED WANTING TO CONFIRM HER SURGERY TIME AND ARRIVAL TIME    PLEASE CALL BACK THE ABOVE NUMBER

## 2025-01-08 NOTE — PROGRESS NOTES
[facility]      Cardiology Progress Note    Breanna Maxwell  1955 January 14, 2025      CC: \"I got a pacemaker.\"     HPI:  The patient is 69 y.o. female with a past medical history significant for CAD, HTN, HLD and diabetes. Stress test 3/19/19 showed ischemia. 4/2/19 underwent LHC with PCI to mid LAD. F/u 4/15/19 reported shortness of breath likely related to Brilinta. Has been participating in cardiac rehab.     4/2022, she is here for follow up for CAD. Labs [facility] 1/24/22. She currently denies any new or recurrent cardiac sounding complaints. She note off/on bilateral feet edema at times but is not wearing compression stockings to work.     Today, she is here for hospital follow up. She was admitted on 12/10/2024 when she was found to have a HR of 39 and increased dyspnea. She was sent to the emergency room. She was found to have complete heart block and underwent PPM. Patient denies exertional chest pain/pressure, dyspnea at rest, TREVINO, PND, orthopnea, palpitations, lightheadedness, weight changes, changes in LE edema, and syncope. Patient reports compliance to her medications.       Past Medical History:   Diagnosis Date    Arthritis     CAD (coronary artery disease)     \"blocked artery\"  stents placed    Constipation     due to slow movement through bowels    Depression     Diabetes mellitus (HCC)     GERD (gastroesophageal reflux disease)     History of coronary artery stent placement     Hyperlipidemia     Hypertension     Neuropathy     Pacemaker     Vertigo     Vitamin D deficiency, unspecified     Wears glasses      Past Surgical History:   Procedure Laterality Date    COLONOSCOPY      DIAGNOSTIC CARDIAC CATH LAB PROCEDURE  04/2019    stent    EP DEVICE PROCEDURE Left 12/12/2024    Insert PPM dual performed by Luke Vidal MD at [facility] CARDIAC CATH LAB    HYSTERECTOMY (CERVIX STATUS UNKNOWN)      OTHER SURGICAL HISTORY      fatty tumor removed from left arm    TOTAL KNEE

## 2025-01-14 ENCOUNTER — OFFICE VISIT (OUTPATIENT)
Dept: CARDIOLOGY CLINIC | Age: 70
End: 2025-01-14
Payer: MEDICARE

## 2025-01-14 ENCOUNTER — TELEPHONE (OUTPATIENT)
Dept: ORTHOPEDIC SURGERY | Age: 70
End: 2025-01-14

## 2025-01-14 VITALS
DIASTOLIC BLOOD PRESSURE: 80 MMHG | SYSTOLIC BLOOD PRESSURE: 138 MMHG | BODY MASS INDEX: 29.45 KG/M2 | OXYGEN SATURATION: 99 % | WEIGHT: 156 LBS | HEIGHT: 61 IN | HEART RATE: 62 BPM

## 2025-01-14 DIAGNOSIS — I10 ESSENTIAL HYPERTENSION: ICD-10-CM

## 2025-01-14 DIAGNOSIS — Z01.810 PREOP CARDIOVASCULAR EXAM: ICD-10-CM

## 2025-01-14 DIAGNOSIS — I25.10 CORONARY ARTERY DISEASE INVOLVING NATIVE CORONARY ARTERY OF NATIVE HEART WITHOUT ANGINA PECTORIS: Primary | ICD-10-CM

## 2025-01-14 DIAGNOSIS — I44.2 CHB (COMPLETE HEART BLOCK) (HCC): ICD-10-CM

## 2025-01-14 DIAGNOSIS — E78.2 MIXED HYPERLIPIDEMIA: ICD-10-CM

## 2025-01-14 PROCEDURE — G8427 DOCREV CUR MEDS BY ELIG CLIN: HCPCS | Performed by: INTERNAL MEDICINE

## 2025-01-14 PROCEDURE — 1036F TOBACCO NON-USER: CPT | Performed by: INTERNAL MEDICINE

## 2025-01-14 PROCEDURE — 93000 ELECTROCARDIOGRAM COMPLETE: CPT | Performed by: INTERNAL MEDICINE

## 2025-01-14 PROCEDURE — 99214 OFFICE O/P EST MOD 30 MIN: CPT | Performed by: INTERNAL MEDICINE

## 2025-01-14 PROCEDURE — 3075F SYST BP GE 130 - 139MM HG: CPT | Performed by: INTERNAL MEDICINE

## 2025-01-14 PROCEDURE — 3079F DIAST BP 80-89 MM HG: CPT | Performed by: INTERNAL MEDICINE

## 2025-01-14 PROCEDURE — G8400 PT W/DXA NO RESULTS DOC: HCPCS | Performed by: INTERNAL MEDICINE

## 2025-01-14 PROCEDURE — G8417 CALC BMI ABV UP PARAM F/U: HCPCS | Performed by: INTERNAL MEDICINE

## 2025-01-14 PROCEDURE — 1111F DSCHRG MED/CURRENT MED MERGE: CPT | Performed by: INTERNAL MEDICINE

## 2025-01-14 PROCEDURE — 1090F PRES/ABSN URINE INCON ASSESS: CPT | Performed by: INTERNAL MEDICINE

## 2025-01-14 PROCEDURE — 1123F ACP DISCUSS/DSCN MKR DOCD: CPT | Performed by: INTERNAL MEDICINE

## 2025-01-14 PROCEDURE — 3017F COLORECTAL CA SCREEN DOC REV: CPT | Performed by: INTERNAL MEDICINE

## 2025-01-16 ENCOUNTER — TELEPHONE (OUTPATIENT)
Dept: ORTHOPEDIC SURGERY | Age: 70
End: 2025-01-16

## 2025-01-16 NOTE — TELEPHONE ENCOUNTER
General Question     Subject: REQ A CALL BACK.. SX ARRIVAL TIME/ SX TIME  Patient and /or Facility Request: Breanna Maxwell   Contact Number: [phone]    PATIENT CALLED IN TO SEE WHAT HER ARRIVAL AND SX TIME.     PLEASE CALL HER [phone]...    PLEASE ADVISE

## 2025-01-17 NOTE — DISCHARGE INSTR - COC
[facility] Continuity of Care Form    Patient Name:  Breanna Maxwell  : 1955    MRN:  [id number]    Admit date:  (Not on file)  Discharge date:  2025    Code Status Order: Prior  Advance Directives: No    Admitting Physician: Hayden Sagastume MD  PCP: Ginny Costello MD    Discharging Nurse: Sruthi  Discharging Hospital Unit/Room#: No information available for this encounter.  Discharging Unit Phone Number: [phone]    Emergency Contact:        Past Surgical History:  Past Surgical History:   Procedure Laterality Date    COLONOSCOPY      DIAGNOSTIC CARDIAC CATH LAB PROCEDURE  2019    stent    EP DEVICE PROCEDURE Left 2024    Insert PPM dual performed by Luke Vidal MD at [facility] CARDIAC CATH LAB    HYSTERECTOMY (CERVIX STATUS UNKNOWN)      OTHER SURGICAL HISTORY      fatty tumor removed from left arm    TOTAL KNEE ARTHROPLASTY Right 2024    RIGHT TOTAL KNEE REPLACEMENT ROBOTIC ASSISTED performed by Hayden Sagastume MD at [facility] OR       Immunization History:   Immunization History   Administered Date(s) Administered    COVID-19, PFIZER Bivalent, DO NOT Dilute, (age 12y+), IM, 30 mcg/0.3 mL 2022    COVID-19, PFIZER GRAY top, DO NOT Dilute, (age 12 y+), IM, 30 mcg/0.3 mL 2022    COVID-19, PFIZER PURPLE top, DILUTE for use, (age 12 y+), 30mcg/0.3mL 2020, 2021, 2021    COVID-19, PFIZER, (age 12y+), IM, 30mcg/0.3mL 2023, 2024       Active Problems:  Principal Problem:    Osteoarthritis of left knee  Resolved Problems:    * No resolved hospital problems. *      Isolation/Infection:       Nurse Assessment:  Last Vital Signs:Ht 1.549 m (5' 1\")   Wt 68 kg (150 lb)   BMI 28.34 kg/m²   Last documented pain score (0-10 scale):    Last Weight:   Wt Readings from Last 1 Encounters:   25 70.8 kg (156 lb)     Mental Status:  oriented and alert     IV Access:  - None    Nursing Mobility/ADLs:  Walking   Assisted  Transfer

## 2025-01-21 ENCOUNTER — ANESTHESIA EVENT (OUTPATIENT)
Dept: OPERATING ROOM | Age: 70
End: 2025-01-21
Payer: MEDICARE

## 2025-01-22 ENCOUNTER — ANESTHESIA (OUTPATIENT)
Dept: OPERATING ROOM | Age: 70
End: 2025-01-22
Payer: MEDICARE

## 2025-01-22 ENCOUNTER — APPOINTMENT (OUTPATIENT)
Dept: GENERAL RADIOLOGY | Age: 70
End: 2025-01-22
Attending: ORTHOPAEDIC SURGERY
Payer: MEDICARE

## 2025-01-22 ENCOUNTER — HOSPITAL ENCOUNTER (OUTPATIENT)
Age: 70
Setting detail: OBSERVATION
Discharge: HOME | End: 2025-01-23
Attending: ORTHOPAEDIC SURGERY | Admitting: ORTHOPAEDIC SURGERY
Payer: MEDICARE

## 2025-01-22 DIAGNOSIS — M17.12 ARTHRITIS OF LEFT KNEE: Primary | ICD-10-CM

## 2025-01-22 LAB
ABO + RH BLD: NORMAL
BLD GP AB SCN SERPL QL: NORMAL
GLUCOSE BLD-MCNC: 101 MG/DL (ref 70–99)
GLUCOSE BLD-MCNC: 115 MG/DL (ref 70–99)
GLUCOSE BLD-MCNC: 119 MG/DL (ref 70–99)
PERFORMED ON: ABNORMAL

## 2025-01-22 PROCEDURE — 7100000001 HC PACU RECOVERY - ADDTL 15 MIN: Performed by: ORTHOPAEDIC SURGERY

## 2025-01-22 PROCEDURE — 97535 SELF CARE MNGMENT TRAINING: CPT

## 2025-01-22 PROCEDURE — 73560 X-RAY EXAM OF KNEE 1 OR 2: CPT

## 2025-01-22 PROCEDURE — 6370000000 HC RX 637 (ALT 250 FOR IP): Performed by: ORTHOPAEDIC SURGERY

## 2025-01-22 PROCEDURE — 97165 OT EVAL LOW COMPLEX 30 MIN: CPT

## 2025-01-22 PROCEDURE — 97530 THERAPEUTIC ACTIVITIES: CPT

## 2025-01-22 PROCEDURE — 2580000003 HC RX 258: Performed by: ORTHOPAEDIC SURGERY

## 2025-01-22 PROCEDURE — 6360000002 HC RX W HCPCS: Performed by: ANESTHESIOLOGY

## 2025-01-22 PROCEDURE — 3700000001 HC ADD 15 MINUTES (ANESTHESIA): Performed by: ORTHOPAEDIC SURGERY

## 2025-01-22 PROCEDURE — C1776 JOINT DEVICE (IMPLANTABLE): HCPCS | Performed by: ORTHOPAEDIC SURGERY

## 2025-01-22 PROCEDURE — 2500000003 HC RX 250 WO HCPCS: Performed by: ORTHOPAEDIC SURGERY

## 2025-01-22 PROCEDURE — 2709999900 HC NON-CHARGEABLE SUPPLY: Performed by: ORTHOPAEDIC SURGERY

## 2025-01-22 PROCEDURE — 3600000005 HC SURGERY LEVEL 5 BASE: Performed by: ORTHOPAEDIC SURGERY

## 2025-01-22 PROCEDURE — 7100000000 HC PACU RECOVERY - FIRST 15 MIN: Performed by: ORTHOPAEDIC SURGERY

## 2025-01-22 PROCEDURE — 6360000002 HC RX W HCPCS: Performed by: ORTHOPAEDIC SURGERY

## 2025-01-22 PROCEDURE — 3700000000 HC ANESTHESIA ATTENDED CARE: Performed by: ORTHOPAEDIC SURGERY

## 2025-01-22 PROCEDURE — 97162 PT EVAL MOD COMPLEX 30 MIN: CPT

## 2025-01-22 PROCEDURE — 6360000002 HC RX W HCPCS

## 2025-01-22 PROCEDURE — 94150 VITAL CAPACITY TEST: CPT

## 2025-01-22 PROCEDURE — 0055T BONE SRGRY CMPTR CT/MRI IMAG: CPT | Performed by: ORTHOPAEDIC SURGERY

## 2025-01-22 PROCEDURE — 2580000003 HC RX 258: Performed by: ANESTHESIOLOGY

## 2025-01-22 PROCEDURE — 6370000000 HC RX 637 (ALT 250 FOR IP): Performed by: NURSE PRACTITIONER

## 2025-01-22 PROCEDURE — G0378 HOSPITAL OBSERVATION PER HR: HCPCS

## 2025-01-22 PROCEDURE — 2720000010 HC SURG SUPPLY STERILE: Performed by: ORTHOPAEDIC SURGERY

## 2025-01-22 PROCEDURE — 86850 RBC ANTIBODY SCREEN: CPT

## 2025-01-22 PROCEDURE — 27447 TOTAL KNEE ARTHROPLASTY: CPT | Performed by: ORTHOPAEDIC SURGERY

## 2025-01-22 PROCEDURE — 97116 GAIT TRAINING THERAPY: CPT

## 2025-01-22 PROCEDURE — 64999 UNLISTED PX NERVOUS SYSTEM: CPT | Performed by: ANESTHESIOLOGY

## 2025-01-22 PROCEDURE — 27447 TOTAL KNEE ARTHROPLASTY: CPT | Performed by: PHYSICIAN ASSISTANT

## 2025-01-22 PROCEDURE — 94760 N-INVAS EAR/PLS OXIMETRY 1: CPT

## 2025-01-22 PROCEDURE — 86901 BLOOD TYPING SEROLOGIC RH(D): CPT

## 2025-01-22 PROCEDURE — 64447 NJX AA&/STRD FEMORAL NRV IMG: CPT | Performed by: ANESTHESIOLOGY

## 2025-01-22 PROCEDURE — 86900 BLOOD TYPING SEROLOGIC ABO: CPT

## 2025-01-22 PROCEDURE — 36415 COLL VENOUS BLD VENIPUNCTURE: CPT

## 2025-01-22 PROCEDURE — 3600000015 HC SURGERY LEVEL 5 ADDTL 15MIN: Performed by: ORTHOPAEDIC SURGERY

## 2025-01-22 DEVICE — ASYMMETRIC PATELLA
Type: IMPLANTABLE DEVICE | Site: KNEE | Status: FUNCTIONAL
Brand: TRIATHLON

## 2025-01-22 DEVICE — CRUCIATE RETAINING FEMORAL
Type: IMPLANTABLE DEVICE | Site: KNEE | Status: FUNCTIONAL
Brand: TRIATHLON

## 2025-01-22 DEVICE — PRIMARY TIBIAL BASEPLATE
Type: IMPLANTABLE DEVICE | Site: KNEE | Status: FUNCTIONAL
Brand: TRIATHLON

## 2025-01-22 DEVICE — TIBIAL BEARING INSERT - CS
Type: IMPLANTABLE DEVICE | Site: KNEE | Status: FUNCTIONAL
Brand: TRIATHLON

## 2025-01-22 DEVICE — CEMENT BNE RADIOPAQUE FAST SET ACRYL RESIN HI VISC SIMPLEXHV: Type: IMPLANTABLE DEVICE | Site: KNEE | Status: FUNCTIONAL

## 2025-01-22 RX ORDER — HYDROCODONE BITARTRATE AND ACETAMINOPHEN 5; 325 MG/1; MG/1
1-2 TABLET ORAL EVERY 6 HOURS PRN
Qty: 40 TABLET | Refills: 0 | Status: SHIPPED | OUTPATIENT
Start: 2025-01-22 | End: 2025-01-27

## 2025-01-22 RX ORDER — GLYCOPYRROLATE 0.2 MG/ML
INJECTION INTRAMUSCULAR; INTRAVENOUS
Status: DISCONTINUED | OUTPATIENT
Start: 2025-01-22 | End: 2025-01-22 | Stop reason: SDUPTHER

## 2025-01-22 RX ORDER — BUPIVACAINE HYDROCHLORIDE 7.5 MG/ML
INJECTION, SOLUTION EPIDURAL; RETROBULBAR
Status: COMPLETED | OUTPATIENT
Start: 2025-01-22 | End: 2025-01-22

## 2025-01-22 RX ORDER — LOSARTAN POTASSIUM 100 MG/1
100 TABLET ORAL DAILY
Status: DISCONTINUED | OUTPATIENT
Start: 2025-01-23 | End: 2025-01-23 | Stop reason: HOSPADM

## 2025-01-22 RX ORDER — ROPIVACAINE HYDROCHLORIDE 5 MG/ML
INJECTION, SOLUTION EPIDURAL; INFILTRATION; PERINEURAL
Status: COMPLETED | OUTPATIENT
Start: 2025-01-22 | End: 2025-01-22

## 2025-01-22 RX ORDER — HYDROCODONE BITARTRATE AND ACETAMINOPHEN 5; 325 MG/1; MG/1
1 TABLET ORAL EVERY 4 HOURS PRN
Status: DISCONTINUED | OUTPATIENT
Start: 2025-01-22 | End: 2025-01-23 | Stop reason: HOSPADM

## 2025-01-22 RX ORDER — NALOXONE HYDROCHLORIDE 0.4 MG/ML
INJECTION, SOLUTION INTRAMUSCULAR; INTRAVENOUS; SUBCUTANEOUS PRN
Status: DISCONTINUED | OUTPATIENT
Start: 2025-01-22 | End: 2025-01-22 | Stop reason: HOSPADM

## 2025-01-22 RX ORDER — ASPIRIN 81 MG/1
81 TABLET ORAL 2 TIMES DAILY
Status: DISCONTINUED | OUTPATIENT
Start: 2025-01-22 | End: 2025-01-22

## 2025-01-22 RX ORDER — HYDROCODONE BITARTRATE AND ACETAMINOPHEN 5; 325 MG/1; MG/1
2 TABLET ORAL EVERY 4 HOURS PRN
Status: DISCONTINUED | OUTPATIENT
Start: 2025-01-22 | End: 2025-01-23 | Stop reason: HOSPADM

## 2025-01-22 RX ORDER — SODIUM CHLORIDE 0.9 % (FLUSH) 0.9 %
5-40 SYRINGE (ML) INJECTION EVERY 12 HOURS SCHEDULED
Status: DISCONTINUED | OUTPATIENT
Start: 2025-01-22 | End: 2025-01-22 | Stop reason: HOSPADM

## 2025-01-22 RX ORDER — INSULIN LISPRO 100 [IU]/ML
0.08 INJECTION, SOLUTION INTRAVENOUS; SUBCUTANEOUS
Status: DISCONTINUED | OUTPATIENT
Start: 2025-01-22 | End: 2025-01-22

## 2025-01-22 RX ORDER — MAGNESIUM HYDROXIDE 1200 MG/15ML
LIQUID ORAL CONTINUOUS PRN
Status: DISCONTINUED | OUTPATIENT
Start: 2025-01-22 | End: 2025-01-22 | Stop reason: HOSPADM

## 2025-01-22 RX ORDER — SODIUM CHLORIDE 0.9 % (FLUSH) 0.9 %
5-40 SYRINGE (ML) INJECTION PRN
Status: DISCONTINUED | OUTPATIENT
Start: 2025-01-22 | End: 2025-01-22 | Stop reason: HOSPADM

## 2025-01-22 RX ORDER — ATORVASTATIN CALCIUM 40 MG/1
40 TABLET, FILM COATED ORAL DAILY
Status: DISCONTINUED | OUTPATIENT
Start: 2025-01-23 | End: 2025-01-23 | Stop reason: HOSPADM

## 2025-01-22 RX ORDER — ONDANSETRON 4 MG/1
4 TABLET, ORALLY DISINTEGRATING ORAL EVERY 8 HOURS PRN
Status: DISCONTINUED | OUTPATIENT
Start: 2025-01-22 | End: 2025-01-23 | Stop reason: HOSPADM

## 2025-01-22 RX ORDER — FENTANYL CITRATE 0.05 MG/ML
25 INJECTION, SOLUTION INTRAMUSCULAR; INTRAVENOUS EVERY 5 MIN PRN
Status: DISCONTINUED | OUTPATIENT
Start: 2025-01-22 | End: 2025-01-22 | Stop reason: HOSPADM

## 2025-01-22 RX ORDER — LIDOCAINE HYDROCHLORIDE 20 MG/ML
INJECTION, SOLUTION EPIDURAL; INFILTRATION; INTRACAUDAL; PERINEURAL
Status: DISCONTINUED | OUTPATIENT
Start: 2025-01-22 | End: 2025-01-22 | Stop reason: SDUPTHER

## 2025-01-22 RX ORDER — DULOXETIN HYDROCHLORIDE 60 MG/1
60 CAPSULE, DELAYED RELEASE ORAL DAILY
Status: DISCONTINUED | OUTPATIENT
Start: 2025-01-23 | End: 2025-01-23 | Stop reason: HOSPADM

## 2025-01-22 RX ORDER — SODIUM CHLORIDE 0.9 % (FLUSH) 0.9 %
5-40 SYRINGE (ML) INJECTION EVERY 12 HOURS SCHEDULED
Status: DISCONTINUED | OUTPATIENT
Start: 2025-01-22 | End: 2025-01-23 | Stop reason: HOSPADM

## 2025-01-22 RX ORDER — SODIUM CHLORIDE 0.9 % (FLUSH) 0.9 %
5-40 SYRINGE (ML) INJECTION PRN
Status: DISCONTINUED | OUTPATIENT
Start: 2025-01-22 | End: 2025-01-23 | Stop reason: HOSPADM

## 2025-01-22 RX ORDER — ACETAMINOPHEN 500 MG
1000 TABLET ORAL ONCE
Status: COMPLETED | OUTPATIENT
Start: 2025-01-22 | End: 2025-01-22

## 2025-01-22 RX ORDER — ONDANSETRON 2 MG/ML
4 INJECTION INTRAMUSCULAR; INTRAVENOUS EVERY 6 HOURS PRN
Status: DISCONTINUED | OUTPATIENT
Start: 2025-01-22 | End: 2025-01-23 | Stop reason: HOSPADM

## 2025-01-22 RX ORDER — DULOXETIN HYDROCHLORIDE 60 MG/1
60 CAPSULE, DELAYED RELEASE ORAL DAILY
Qty: 14 CAPSULE | Refills: 0 | Status: SHIPPED | OUTPATIENT
Start: 2025-01-23 | End: 2025-02-06

## 2025-01-22 RX ORDER — FENTANYL CITRATE 50 UG/ML
INJECTION, SOLUTION INTRAMUSCULAR; INTRAVENOUS
Status: DISCONTINUED | OUTPATIENT
Start: 2025-01-22 | End: 2025-01-22 | Stop reason: SDUPTHER

## 2025-01-22 RX ORDER — INSULIN LISPRO 100 [IU]/ML
0-4 INJECTION, SOLUTION INTRAVENOUS; SUBCUTANEOUS
Status: DISCONTINUED | OUTPATIENT
Start: 2025-01-22 | End: 2025-01-23 | Stop reason: HOSPADM

## 2025-01-22 RX ORDER — CALCIUM CARBONATE 500 MG/1
500 TABLET, CHEWABLE ORAL 3 TIMES DAILY PRN
Status: DISCONTINUED | OUTPATIENT
Start: 2025-01-22 | End: 2025-01-23 | Stop reason: HOSPADM

## 2025-01-22 RX ORDER — ACETAMINOPHEN 325 MG/1
650 TABLET ORAL EVERY 6 HOURS
Status: DISCONTINUED | OUTPATIENT
Start: 2025-01-22 | End: 2025-01-23 | Stop reason: HOSPADM

## 2025-01-22 RX ORDER — SODIUM CHLORIDE 9 MG/ML
INJECTION, SOLUTION INTRAVENOUS PRN
Status: DISCONTINUED | OUTPATIENT
Start: 2025-01-22 | End: 2025-01-22 | Stop reason: HOSPADM

## 2025-01-22 RX ORDER — ASPIRIN 81 MG/1
81 TABLET ORAL DAILY
Qty: 30 TABLET | Refills: 3
Start: 2025-01-22

## 2025-01-22 RX ORDER — PHENYLEPHRINE HCL IN 0.9% NACL 1 MG/10 ML
SYRINGE (ML) INTRAVENOUS
Status: DISCONTINUED | OUTPATIENT
Start: 2025-01-22 | End: 2025-01-22 | Stop reason: SDUPTHER

## 2025-01-22 RX ORDER — ONDANSETRON 2 MG/ML
4 INJECTION INTRAMUSCULAR; INTRAVENOUS
Status: DISCONTINUED | OUTPATIENT
Start: 2025-01-22 | End: 2025-01-22 | Stop reason: HOSPADM

## 2025-01-22 RX ORDER — MORPHINE SULFATE 4 MG/ML
4 INJECTION, SOLUTION INTRAMUSCULAR; INTRAVENOUS
Status: DISCONTINUED | OUTPATIENT
Start: 2025-01-22 | End: 2025-01-23 | Stop reason: HOSPADM

## 2025-01-22 RX ORDER — ROPIVACAINE HYDROCHLORIDE 5 MG/ML
50 INJECTION, SOLUTION EPIDURAL; INFILTRATION; PERINEURAL ONCE
Status: DISCONTINUED | OUTPATIENT
Start: 2025-01-22 | End: 2025-01-22 | Stop reason: HOSPADM

## 2025-01-22 RX ORDER — PROPOFOL 10 MG/ML
INJECTION, EMULSION INTRAVENOUS
Status: DISCONTINUED | OUTPATIENT
Start: 2025-01-22 | End: 2025-01-22 | Stop reason: SDUPTHER

## 2025-01-22 RX ORDER — FENTANYL CITRATE 0.05 MG/ML
50 INJECTION, SOLUTION INTRAMUSCULAR; INTRAVENOUS EVERY 5 MIN PRN
Status: DISCONTINUED | OUTPATIENT
Start: 2025-01-22 | End: 2025-01-22 | Stop reason: HOSPADM

## 2025-01-22 RX ORDER — DEXTROSE MONOHYDRATE 100 MG/ML
INJECTION, SOLUTION INTRAVENOUS CONTINUOUS PRN
Status: DISCONTINUED | OUTPATIENT
Start: 2025-01-22 | End: 2025-01-23 | Stop reason: HOSPADM

## 2025-01-22 RX ORDER — TRANEXAMIC ACID 650 MG/1
1950 TABLET ORAL ONCE
Status: COMPLETED | OUTPATIENT
Start: 2025-01-22 | End: 2025-01-22

## 2025-01-22 RX ORDER — CYCLOBENZAPRINE HCL 10 MG
5 TABLET ORAL 2 TIMES DAILY PRN
Status: DISCONTINUED | OUTPATIENT
Start: 2025-01-22 | End: 2025-01-23 | Stop reason: HOSPADM

## 2025-01-22 RX ORDER — DIPHENHYDRAMINE HYDROCHLORIDE 50 MG/ML
12.5 INJECTION INTRAMUSCULAR; INTRAVENOUS 2 TIMES DAILY PRN
Status: DISCONTINUED | OUTPATIENT
Start: 2025-01-22 | End: 2025-01-23 | Stop reason: HOSPADM

## 2025-01-22 RX ORDER — KETOROLAC TROMETHAMINE 15 MG/ML
15 INJECTION, SOLUTION INTRAMUSCULAR; INTRAVENOUS
Status: ACTIVE | OUTPATIENT
Start: 2025-01-22 | End: 2025-01-23

## 2025-01-22 RX ORDER — SODIUM CHLORIDE 9 MG/ML
INJECTION, SOLUTION INTRAVENOUS PRN
Status: DISCONTINUED | OUTPATIENT
Start: 2025-01-22 | End: 2025-01-23 | Stop reason: HOSPADM

## 2025-01-22 RX ORDER — BENZOCAINE/MENTHOL 6 MG-10 MG
LOZENGE MUCOUS MEMBRANE 2 TIMES DAILY
Status: DISCONTINUED | OUTPATIENT
Start: 2025-01-22 | End: 2025-01-23 | Stop reason: HOSPADM

## 2025-01-22 RX ORDER — MORPHINE SULFATE 2 MG/ML
2 INJECTION, SOLUTION INTRAMUSCULAR; INTRAVENOUS
Status: DISCONTINUED | OUTPATIENT
Start: 2025-01-22 | End: 2025-01-23 | Stop reason: HOSPADM

## 2025-01-22 RX ORDER — GLUCAGON 1 MG/ML
1 KIT INJECTION PRN
Status: DISCONTINUED | OUTPATIENT
Start: 2025-01-22 | End: 2025-01-23 | Stop reason: HOSPADM

## 2025-01-22 RX ORDER — DULOXETIN HYDROCHLORIDE 60 MG/1
60 CAPSULE, DELAYED RELEASE ORAL ONCE
Status: COMPLETED | OUTPATIENT
Start: 2025-01-22 | End: 2025-01-22

## 2025-01-22 RX ORDER — POLYETHYLENE GLYCOL 3350 17 G/17G
17 POWDER, FOR SOLUTION ORAL DAILY PRN
Status: DISCONTINUED | OUTPATIENT
Start: 2025-01-22 | End: 2025-01-23 | Stop reason: HOSPADM

## 2025-01-22 RX ORDER — SODIUM CHLORIDE 9 MG/ML
INJECTION, SOLUTION INTRAVENOUS CONTINUOUS
Status: DISCONTINUED | OUTPATIENT
Start: 2025-01-22 | End: 2025-01-23 | Stop reason: HOSPADM

## 2025-01-22 RX ORDER — CARVEDILOL 25 MG/1
25 TABLET ORAL 2 TIMES DAILY WITH MEALS
Status: DISCONTINUED | OUTPATIENT
Start: 2025-01-22 | End: 2025-01-23 | Stop reason: HOSPADM

## 2025-01-22 RX ORDER — ISOSORBIDE MONONITRATE 60 MG/1
60 TABLET, EXTENDED RELEASE ORAL DAILY
Status: DISCONTINUED | OUTPATIENT
Start: 2025-01-23 | End: 2025-01-23 | Stop reason: HOSPADM

## 2025-01-22 RX ORDER — AMLODIPINE BESYLATE 10 MG/1
10 TABLET ORAL DAILY
Status: DISCONTINUED | OUTPATIENT
Start: 2025-01-23 | End: 2025-01-23 | Stop reason: HOSPADM

## 2025-01-22 RX ADMIN — LIDOCAINE HYDROCHLORIDE 60 MG: 20 INJECTION, SOLUTION EPIDURAL; INFILTRATION; INTRACAUDAL; PERINEURAL at 10:29

## 2025-01-22 RX ADMIN — Medication 100 MCG: at 11:16

## 2025-01-22 RX ADMIN — FENTANYL CITRATE 25 MCG: 50 INJECTION INTRAMUSCULAR; INTRAVENOUS at 10:05

## 2025-01-22 RX ADMIN — ROPIVACAINE HYDROCHLORIDE 25 ML: 5 INJECTION, SOLUTION EPIDURAL; INFILTRATION; PERINEURAL at 10:05

## 2025-01-22 RX ADMIN — HYDROCODONE BITARTRATE AND ACETAMINOPHEN 1 TABLET: 5; 325 TABLET ORAL at 22:20

## 2025-01-22 RX ADMIN — FENTANYL CITRATE 25 MCG: 50 INJECTION INTRAMUSCULAR; INTRAVENOUS at 10:28

## 2025-01-22 RX ADMIN — GLYCOPYRROLATE 0.1 MG: 0.2 INJECTION, SOLUTION INTRAMUSCULAR; INTRAVENOUS at 10:17

## 2025-01-22 RX ADMIN — FENTANYL CITRATE 25 MCG: 0.05 INJECTION, SOLUTION INTRAMUSCULAR; INTRAVENOUS at 11:55

## 2025-01-22 RX ADMIN — ONDANSETRON 4 MG: 2 INJECTION, SOLUTION INTRAMUSCULAR; INTRAVENOUS at 18:14

## 2025-01-22 RX ADMIN — ACETAMINOPHEN 1000 MG: 500 TABLET ORAL at 09:29

## 2025-01-22 RX ADMIN — SODIUM CHLORIDE: 9 INJECTION, SOLUTION INTRAVENOUS at 09:31

## 2025-01-22 RX ADMIN — SODIUM CHLORIDE, PRESERVATIVE FREE 10 ML: 5 INJECTION INTRAVENOUS at 13:50

## 2025-01-22 RX ADMIN — APIXABAN 2.5 MG: 2.5 TABLET, FILM COATED ORAL at 21:13

## 2025-01-22 RX ADMIN — HYDROCODONE BITARTRATE AND ACETAMINOPHEN 1 TABLET: 5; 325 TABLET ORAL at 18:14

## 2025-01-22 RX ADMIN — HYDROCORTISONE: 1 CREAM TOPICAL at 15:19

## 2025-01-22 RX ADMIN — SODIUM CHLORIDE: 9 INJECTION, SOLUTION INTRAVENOUS at 14:02

## 2025-01-22 RX ADMIN — CYCLOBENZAPRINE 5 MG: 10 TABLET, FILM COATED ORAL at 13:47

## 2025-01-22 RX ADMIN — MORPHINE SULFATE 2 MG: 2 INJECTION, SOLUTION INTRAMUSCULAR; INTRAVENOUS at 13:48

## 2025-01-22 RX ADMIN — FENTANYL CITRATE 50 MCG: 50 INJECTION INTRAMUSCULAR; INTRAVENOUS at 10:03

## 2025-01-22 RX ADMIN — PROPOFOL 150 MCG/KG/MIN: 10 INJECTION, EMULSION INTRAVENOUS at 10:30

## 2025-01-22 RX ADMIN — ACETAMINOPHEN 650 MG: 325 TABLET ORAL at 15:15

## 2025-01-22 RX ADMIN — TRANEXAMIC ACID 1950 MG: 650 TABLET ORAL at 09:29

## 2025-01-22 RX ADMIN — BUPIVACAINE HYDROCHLORIDE 10.5 MG: 7.5 INJECTION, SOLUTION EPIDURAL; RETROBULBAR at 10:28

## 2025-01-22 RX ADMIN — CARVEDILOL 25 MG: 25 TABLET, FILM COATED ORAL at 18:14

## 2025-01-22 RX ADMIN — ACETAMINOPHEN 650 MG: 325 TABLET ORAL at 21:13

## 2025-01-22 RX ADMIN — DULOXETINE HYDROCHLORIDE 60 MG: 60 CAPSULE, DELAYED RELEASE ORAL at 09:29

## 2025-01-22 RX ADMIN — ROPIVACAINE HYDROCHLORIDE 20 ML: 5 INJECTION, SOLUTION EPIDURAL; INFILTRATION; PERINEURAL at 10:03

## 2025-01-22 RX ADMIN — PROPOFOL 30 MG: 10 INJECTION, EMULSION INTRAVENOUS at 10:29

## 2025-01-22 RX ADMIN — WATER 2000 MG: 1 INJECTION INTRAMUSCULAR; INTRAVENOUS; SUBCUTANEOUS at 18:00

## 2025-01-22 RX ADMIN — SODIUM CHLORIDE 2000 MG: 900 INJECTION INTRAVENOUS at 10:29

## 2025-01-22 ASSESSMENT — PAIN DESCRIPTION - ORIENTATION
ORIENTATION: LEFT

## 2025-01-22 ASSESSMENT — PAIN - FUNCTIONAL ASSESSMENT
PAIN_FUNCTIONAL_ASSESSMENT: PREVENTS OR INTERFERES SOME ACTIVE ACTIVITIES AND ADLS
PAIN_FUNCTIONAL_ASSESSMENT: 0-10

## 2025-01-22 ASSESSMENT — PAIN DESCRIPTION - DESCRIPTORS
DESCRIPTORS: DISCOMFORT
DESCRIPTORS: DISCOMFORT
DESCRIPTORS: ACHING
DESCRIPTORS: DISCOMFORT
DESCRIPTORS: ACHING

## 2025-01-22 ASSESSMENT — PAIN DESCRIPTION - FREQUENCY
FREQUENCY: CONTINUOUS
FREQUENCY: CONTINUOUS

## 2025-01-22 ASSESSMENT — PAIN SCALES - GENERAL
PAINLEVEL_OUTOF10: 4
PAINLEVEL_OUTOF10: 0
PAINLEVEL_OUTOF10: 7
PAINLEVEL_OUTOF10: 4
PAINLEVEL_OUTOF10: 0
PAINLEVEL_OUTOF10: 5
PAINLEVEL_OUTOF10: 4
PAINLEVEL_OUTOF10: 3

## 2025-01-22 ASSESSMENT — PAIN DESCRIPTION - LOCATION
LOCATION: KNEE
LOCATION: LEG
LOCATION: KNEE

## 2025-01-22 ASSESSMENT — PAIN DESCRIPTION - PAIN TYPE
TYPE: ACUTE PAIN;SURGICAL PAIN
TYPE: SURGICAL PAIN

## 2025-01-22 ASSESSMENT — PAIN DESCRIPTION - ONSET
ONSET: ON-GOING
ONSET: ON-GOING

## 2025-01-22 ASSESSMENT — LIFESTYLE VARIABLES: SMOKING_STATUS: 0

## 2025-01-22 NOTE — PROGRESS NOTES
John Paul Briggs is a 44 y.o. female who presents for evaluation of annual cpe. Last seen by me oct 28, 2019 in cpe. Missed appt in April. Overall doing well, and doing better over past 4 weeks or so. Since her daughter was dx with dm about 4 weeks ago, she has improved her approach to treating her dm. Am sugars of late all less than 150. Heart rate remains bit elevated, consistently 120 with minimal activity. Resting is mid 90s. Denies any episodes of psvt.       ROS:  Constitutional: negative for fevers, chills, anorexia and weight loss  Eyes:   negative for visual disturbance and irritation  ENT:   negative for tinnitus,sore throat,nasal congestion,ear pain,hoarseness  Respiratory:  negative for cough, hemoptysis, dyspnea,wheezing  CV:   negative for chest pain, palpitations, lower extremity edema  GI:   negative for nausea, vomiting, diarrhea, abdominal pain,melena  Genitourinary: negative for frequency, dysuria and hematuria  Musculoskel: negative for myalgias, arthralgias, back pain, muscle weakness, joint pain  Neurological:  negative for headaches, dizziness, focal weakness, numbness  Psychiatric:     Negative for depression or anxiety      Past Medical History:   Diagnosis Date    Diabetes ([facility] [address]     Hypercholesterolemia     Menopause     OCD (obsessive compulsive disorder)        Past Surgical History:   Procedure Laterality Date    HX  SECTION       and     HX CHOLECYSTECTOMY      HX OTHER SURGICAL      teeth extracted; has dentures       Family History   Problem Relation Age of Onset    COPD Mother     Hypertension Mother     Cancer Mother     Hypertension Father     Cancer Father     Cancer Sister     Diabetes Maternal Aunt     Diabetes Maternal Uncle        Social History     Socioeconomic History    Marital status:      Spouse name: Not on file    Number of children: Not on file    Years of education: Not on file    Highest education Physical Therapy  Facility/Department: [address] ORTHOPEDICS  Physical Therapy Initial Assessment  This note serves as patient discharge summary if pt discharges prior to next PT visit      Name: Breanna Maxwell  : 1955  MRN: [id number]  Date of Service: 2025    Discharge Recommendations:  Therapy recommended at discharge (2-3)     Breanna Maxwell scored a 14/24 on the AM-PAC short mobility form. Current research shows that an AM-PAC score of 18 or greater is typically associated with a discharge to the patient's home setting. Based on the patient's AM-PAC score and their current functional mobility deficits, it is recommended that the patient have 2-3 sessions per week of HOME Physical Therapy at d/c to increase the patient's independence.       PT Equipment Recommendations  Equipment Needed: No  Other: has RW      Patient Diagnosis(es): The encounter diagnosis was Arthritis of left knee.  Past Medical History:  has a past medical history of Arthritis, CAD (coronary artery disease), Constipation, Depression, Diabetes mellitus (HCC), GERD (gastroesophageal reflux disease), History of coronary artery stent placement, Hyperlipidemia, Hypertension, Neuropathy, Pacemaker, Vertigo, Vitamin D deficiency, unspecified, and Wears glasses.  Past Surgical History:  has a past surgical history that includes Hysterectomy; Diagnostic Cardiac Cath Lab Procedure (2019); other surgical history; Colonoscopy; Total knee arthroplasty (Right, 2024); and ep device procedure (Left, 2024).    Assessment  Body Structures, Functions, Activity Limitations Requiring Skilled Therapeutic Intervention: Decreased functional mobility ;Decreased ROM;Decreased strength;Increased pain;Decreased sensation  Assessment: Prior to elective L TKR 25, patient reports living in apartment with spouse, and independence with ADLs, transfers, and gait without device. Status 25: Bed Mobility up to CGA. Transfers CGA/cues. RW  level: Not on file   Occupational History    Not on file   Social Needs    Financial resource strain: Not on file    Food insecurity     Worry: Not on file     Inability: Not on file    Transportation needs     Medical: Not on file     Non-medical: Not on file   Tobacco Use    Smoking status: Former Smoker    Smokeless tobacco: Never Used   Substance and Sexual Activity    Alcohol use: Yes     Comment: occassionally    Drug use: No     Types: Prescription, OTC    Sexual activity: Yes     Partners: Male   Lifestyle    Physical activity     Days per week: Not on file     Minutes per session: Not on file    Stress: Not on file   Relationships    Social connections     Talks on phone: Not on file     Gets together: Not on file     Attends [employer] service: Not on file     Active member of club or organization: Not on file     Attends meetings of clubs or organizations: Not on file     Relationship status: Not on file    Intimate partner violence     Fear of current or ex partner: Not on file     Emotionally abused: Not on file     Physically abused: Not on file     Forced sexual activity: Not on file   Other Topics Concern    Not on file   Social History Narrative    Not on file            Visit Vitals  /88 (BP 1 Location: Left upper arm, BP Patient Position: Sitting)   Pulse (!) 118   Temp 97.9 °F (36.6 °C) (Temporal)   Resp 12   Ht 5' 3\" (1.6 m)   Wt 145 lb 3.2 oz (65.9 kg)   SpO2 100%   BMI 25.72 kg/m²       Physical Examination:   General - Well appearing female  HEENT - PERRL, TM no erythema/opacification, normal nasal turbinates, no oropharyngeal erythema or exudate, MMM  Neck - supple, no bruits, no thyroidomegaly, no lymphadenopathy  Pulm - clear to auscultation bilaterally  Cardio - RRR, normal S1 S2, no murmur  Abd - soft, nontender, no masses, no HSM  Extrem - no edema, +2 distal pulses  Neuro-  No focal deficits, CN intact     Assessment/Plan:    1.   Annual cpe--check cbc, cmp, tsh, hcv  2.  Dm, type 2--on lantus, glucophage. Check a1c, urine micro. Last a1c 10.6, but over past month she has really improved. Will also make an appt for eye exam  3.  htn--rx to start toprol xl  4. Sinus tach, hx psvt--rx to start toprol xl  5.  hyperlipids--check flp, will need rx for lipitor or other statin   6.  ocd--continue zoloft  7. Screen colon cancer--referral to dr hoskins  8. Screen breast cancer--mamm ordered  9.   Screen cervical cancer--referral to dr Mukesh Adamson, gyn    rtc 6 months        Haley Tanner III, DO

## 2025-01-22 NOTE — ANESTHESIA POSTPROCEDURE EVALUATION
Department of Anesthesiology  Postprocedure Note    Patient: Breanna Maxwell  MRN: [id number]  YOB: 1955  Date of evaluation: 1/22/2025    Procedure Summary       Date: 01/22/25 Room / Location: [address]    Anesthesia Start: 1017 Anesthesia Stop: 1144    Procedure: LEFT KNEE TOTAL ARTHROPLASTY ROBOTIC (Left) Diagnosis:       Osteoarthritis of left knee      (Osteoarthritis of left knee [M17.12])    Surgeons: Hayden Sagastume MD Responsible Provider: Anthony Gandara MD    Anesthesia Type: MAC, regional, spinal ASA Status: 3            Anesthesia Type: No value filed.    Kofi Phase I: Kofi Score: 10    Kofi Phase II:      Anesthesia Post Evaluation    Level of consciousness: awake  Airway patency: patent  Nausea & Vomiting: no nausea and no vomiting  Cardiovascular status: hemodynamically stable  Respiratory status: acceptable  Hydration status: stable  Pain management: adequate    No notable events documented.

## 2025-01-22 NOTE — PROGRESS NOTES
[facility] Orthopedic Surgery   Progress Note      S/P :  SUBJECTIVE  in bed. Alert and oriented. States having general itching, mostly in hands. Benadryl ordered. Also hydrocortisone cream she uses at home sometimes. . Pain is   described in left knee and with the intensity of moderate. Pain is described as aching.       OBJECTIVE              Physical                      VITALS:  /71   Pulse 60   Temp 97.4 °F (36.3 °C) (Oral)   Resp 12   Ht 1.549 m (5' 1\")   Wt 70.8 kg (156 lb 1.4 oz)   SpO2 94%   BMI 29.49 kg/m²                     MUSCULOSKELETAL:  left foot NVI. Wiggles toes to command. Unable to plantarflex and dorsiflex left ankle She is post nerve block Pedal pulses are palpable.                    NEUROLOGIC:                                  Sensory:  Touch:  Left Lower Extremity:  normal                                                 Surgical wound appears clean and dry left knee with ACE and ice packs.     Data       CBC:   Lab Results   Component Value Date/Time    WBC 7.0 12/31/2024 11:15 AM    RBC 4.24 12/31/2024 11:15 AM    HGB 12.3 12/31/2024 11:15 AM    HCT 36.7 12/31/2024 11:15 AM    MCV 86.7 12/31/2024 11:15 AM    MCH 29.0 12/31/2024 11:15 AM    MCHC 33.4 12/31/2024 11:15 AM    RDW 16.6 12/31/2024 11:15 AM     12/31/2024 11:15 AM    MPV 7.2 12/31/2024 11:15 AM        WBC:    Lab Results   Component Value Date/Time    WBC 7.0 12/31/2024 11:15 AM        Hemoglobin/Hematocrit:    Lab Results   Component Value Date/Time    HGB 12.3 12/31/2024 11:15 AM    HCT 36.7 12/31/2024 11:15 AM        PT/INR:    Lab Results   Component Value Date/Time    PROTIME 14.7 12/31/2024 11:15 AM    INR 1.13 12/31/2024 11:15 AM              Current Inpatient Medications             Current Facility-Administered Medications: [START ON 1/23/2025] amLODIPine (NORVASC) tablet 10 mg, 10 mg, Oral, Daily  [START ON 1/23/2025] atorvastatin (LIPITOR) tablet 40 mg, 40 mg, Oral, Daily  carvedilol (COREG) tablet 25

## 2025-01-22 NOTE — PROGRESS NOTES
Pt vitals WNL on room air. Pt states pain is at a manageable level and denies nausea. Pt awake and alert, A&Ox4. Report called to 3W RN.

## 2025-01-22 NOTE — ANESTHESIA PROCEDURE NOTES
Peripheral Block    Patient location during procedure: holding area  Reason for block: post-op pain management and at surgeon's request  Start time: 1/22/2025 10:05 AM  End time: 1/22/2025 10:06 AM  Staffing  Performed: anesthesiologist   Anesthesiologist: Anthony Gandara MD  Performed by: Atnhony Gandara MD  Authorized by: Anthony Gandara MD    Preanesthetic Checklist  Completed: patient identified, IV checked, site marked, risks and benefits discussed, surgical/procedural consents, equipment checked, pre-op evaluation, timeout performed, anesthesia consent given, oxygen available and monitors applied/VS acknowledged  Peripheral Block   Patient position: supine  Prep: ChloraPrep  Provider prep: mask and sterile gloves  Patient monitoring: cardiac monitor, continuous pulse ox, frequent blood pressure checks and IV access  Block type: iPacks  Laterality: left  Injection technique: single-shot  Guidance: ultrasound guided  Local infiltration: lidocaine  Infiltration strength: 1 %  Local infiltration: lidocaine  Dose: 3 mL    Needle   Needle gauge: 21 G  Needle localization: ultrasound guidance  Needle insertion depth: 5 cm  Needle length: 10 cm  Assessment   Injection assessment: negative aspiration for heme, no paresthesia on injection and local visualized surrounding nerve on ultrasound  Paresthesia pain: none  Slow fractionated injection: yes  Hemodynamics: stable  Outcomes: uncomplicated and patient tolerated procedure well    Additional Notes  Immediately prior to procedure a \"time out\" was called to verify the correct patient, allergies, laterality, procedure and equipment. Time out performed with Kiara RILEY    Local Anesthetic: 0.125 %  Bupivacaine   Amount: 25 ml  in 5 ml increments after negative aspiration each time.        Medications Administered  ropivacaine (NAROPIN) injection 0.5% - Perineural   25 mL - 1/22/2025 10:05:00 AM

## 2025-01-22 NOTE — PROGRESS NOTES
Met with patient at bedside, patient is alert and oriented x4. Discussed role of nurse navigator.  Reviewed reasons to call with questions or concerns, importance of TEDS, Incentive spirometer and incentive spirometer at bedside, pain medication, and physical and occupational therapy. 2/4 bed rails up, bed locked and in lowest position, fall precautions in place, call light within reach.     Pulses present bilaterally +2 Moderate pedal, Left Knee Ace wrap assessed and is clean,dry, and intact, no signs of redness, drainage, or odor noted Ice in place. Rickey and scds on right lower extremity. moderate dorsi and plantar flexions noted to right foot, absent dorsi and plantar flexion to left foot and Amira Hardwick NP aware, continue to monitor, toes appear appropriate to ethnicity in color , Warm to touch bilaterally. patient denies numbness or tingling in extremities.    Recent Labs     01/22/25  0927 01/22/25  1149   POCGLU 115* 101*     Per Insulin Protocol, Lantus and prandial Humalog discontinued as POC glucose was below 110 and recheck next POC glucose at 9pm as patient has not yet triggered the insulin protocol    Vitals:    01/22/25 1245 01/22/25 1259 01/22/25 1300 01/22/25 1318   BP: 128/68  (!) 130/95 138/71   Pulse: 60 60 60 60   Resp: 16  12    Temp:   98 °F (36.7 °C) 97.4 °F (36.3 °C)   TempSrc:   Temporal Oral   SpO2: 100%  100% 94%   Weight:       Height:           DC Plan: Home with [facility] to transport patient.  DME needs:Denies any DME needs at this time. Patient states they already have a rolling walker.  Prescriptions: Agreeable to meds to bed program for postop prescriptions.    Elvi Jeronimo  Orthopedic Nurse Navigator  Phone number: [phone]    Future Appointments   Date Time Provider Department [place]   2/6/2025  2:45 PM Hayden Sagastume MD [facility]   3/20/2025 10:00 AM SCHEDULE, [facility] DEVICE CHECK I [facility]   3/20/2025 10:15 AM [place]

## 2025-01-22 NOTE — PROGRESS NOTES
Pt to PACU from OR. Pt asleep with oral airway in place, pt on 4L O2 via nasal cannula. Vitals WDL. Dressing to left knee clean, dry and intact.

## 2025-01-22 NOTE — PROGRESS NOTES
Occupational Therapy  Facility/Department: [address] ORTHOPEDICS  Occupational Therapy Initial Assessment    Name: Breanna Maxwell  : 1955  MRN: [id number]  Date of Service: 2025    Discharge Recommendations:  Patient would benefit from continued therapy after discharge, 2-3 sessions per week, 24 hour supervision or assist  OT Equipment Recommendations  Equipment Needed: No     Breanna Maxwell scored a 18/24 on the AM-PAC ADL Inpatient form. Current research shows that an AM-PAC score of 18 or greater is typically associated with a discharge to the patient's home setting. Based on the patient's AM-PAC score, and their current ADL deficits, it is recommended that the patient have 2-3 sessions per week of Occupational Therapy at d/c to increase the patient's independence.  At this time, this patient demonstrates the endurance and safety to discharge home with initial 24/7 assist and a follow up treatment frequency of 2-3x/wk.  Please see assessment section for further patient specific details.    If patient discharges prior to next session this note will serve as a discharge summary.  Please see below for the latest assessment towards goals.      Patient Diagnosis(es): The encounter diagnosis was Arthritis of left knee.  Past Medical History:  has a past medical history of Arthritis, CAD (coronary artery disease), Constipation, Depression, Diabetes mellitus (HCC), GERD (gastroesophageal reflux disease), History of coronary artery stent placement, Hyperlipidemia, Hypertension, Neuropathy, Pacemaker, Vertigo, Vitamin D deficiency, unspecified, and Wears glasses.  Past Surgical History:  has a past surgical history that includes Hysterectomy; Diagnostic Cardiac Cath Lab Procedure (2019); other surgical history; Colonoscopy; Total knee arthroplasty (Right, 2024); and ep device procedure (Left, 2024).    Treatment Diagnosis: impaired strength, balance, activity tolerance, mobility/txs,

## 2025-01-22 NOTE — OP NOTE
Patient: Breanna Maxwell  YOB: 1955  MRN: [id number]    DATE OF PROCEDURE: 1/22/2025      PREOPERATIVE DIAGNOSIS:  Tricompartmental degenerative osteoarthritis of the left knee.     POSTOPERATIVE DIAGNOSIS:  Tricompartmental degenerative osteoarthritis of the left knee.     OPERATION PERFORMED:  Robotic-assisted left total knee arthroplasty (RITCHIE)     SURGEON:  Hayden Sagastume MD     ASSISTANT:  MARY Ruiz    EBL: 100 mL     IMPLANTS:  Maury Triathlon CR femur size 1  Maury Triathlon tibia size 2  11mm CS polyethylene  32mm asymmetric patella     INDICATIONS:  The patient is a 69 y.o. female who presents for left knee replacement.  She had been treated conservatively with anti-inflammatories, physical therapy and intra-articular cortisone injections; none of these gave any significant relief. We discussed total knee arthroplasty. The operative procedure, alternatives, and risks were discussed in detail with the patient.  The risks include but are not limited to: Infection, vessel injury, nerve injury, DVT, pulmonary embolism, implant loosening, need for revision surgery, loss of motion, continued pain. Informed consent for surgery was signed by the patient.     OPERATIVE PROCEDURE:  The patient was seen in the preoperative holding area where the site of surgery was marked and informed consent was confirmed. The patient was brought back to the operating room by OR personnel. General anesthesia was administered. The patient was positioned supine on the operating table. The left lower extremity was then prepped and draped in a standard and sterile fashion. A final and formal timeout was then performed which confirmed the correct patient, correct position, and correct site of surgery. IV antibiotics were administered within 1 hour of the skin incision.     An anterior midline incision was made over the knee. Skin and subcutaneous tissue were divided down to the extensor mechanism. A subvastus

## 2025-01-22 NOTE — ANESTHESIA PRE PROCEDURE
Department of Anesthesiology  Preprocedure Note       Name:  Breanna Maxwell   Age:  69 y.o.  :  1955                                          MRN:  [id number]         Date:  2025      Surgeon: Surgeon(s):  Hayden Sagastume MD    Procedure: Procedure(s):  LEFT KNEE TOTAL ARTHROPLASTY ROBOTIC    Medications prior to admission:   Prior to Admission medications    Medication Sig Start Date End Date Taking? Authorizing Provider   ondansetron (ZOFRAN) 4 MG tablet Take 1 tablet by mouth 3 times daily as needed for Nausea or Vomiting 24  Yes Satish Krishna DO   carvedilol (COREG) 25 MG tablet Take 1 tablet by mouth 2 times daily (with meals) 12/15/24  Yes Mitul Benton MD   Insulin Degludec 100 UNIT/ML SOPN Inject 10 Units into the skin nightly 24  Yes Amanda Degroot MD   furosemide (LASIX) 20 MG tablet Take 1 tablet by mouth daily as needed (Leg swelling, weight gain) 12/15/24  Yes Mitul Benton MD   losartan (COZAAR) 100 MG tablet Take 1 tablet by mouth daily 24  Yes Mitul Benton MD   vitamin D (VITAMIN D3) 50 MCG (2000) CAPS capsule Take 1 capsule by mouth daily   Yes Amanda Degroot MD   atorvastatin (LIPITOR) 40 MG tablet Take 1 tablet by mouth daily   Yes Amanda Degroot MD   hydrOXYzine pamoate (VISTARIL) 50 MG capsule Take 1 capsule by mouth 3 times daily as needed for Itching   Yes Amanda Degroot MD   amLODIPine (NORVASC) 10 MG tablet Take 1 tablet by mouth daily   Yes Amanda Degroot MD   isosorbide mononitrate (IMDUR) 60 MG extended release tablet TAKE 1 TABLET BY MOUTH EVERY DAY 10/20/20  Yes Dariusz Acsota MD   meclizine (ANTIVERT) 25 MG tablet Take 1 tablet by mouth 3 times daily as needed for Dizziness 19  Yes Jovanny Du MD   hydrocortisone 1 % cream Apply topically as needed Apply topically 2 times daily.   Yes Amanda Degroot MD   fluticasone (FLONASE) 50 MCG/ACT nasal spray 1 spray by Each Nostril route daily 
Yes

## 2025-01-22 NOTE — ANESTHESIA PROCEDURE NOTES
Peripheral Block    Patient location during procedure: holding area  Reason for block: post-op pain management and at surgeon's request  Start time: 1/22/2025 10:03 AM  End time: 1/22/2025 10:04 AM  Staffing  Performed: anesthesiologist   Anesthesiologist: Anthony Gandara MD  Performed by: Anthony Gandara MD  Authorized by: Anthony Gandara MD    Preanesthetic Checklist  Completed: patient identified, IV checked, site marked, risks and benefits discussed, surgical/procedural consents, equipment checked, pre-op evaluation, timeout performed, anesthesia consent given, oxygen available and monitors applied/VS acknowledged  Peripheral Block   Patient position: supine  Prep: ChloraPrep  Provider prep: mask and sterile gloves  Patient monitoring: cardiac monitor, continuous pulse ox, frequent blood pressure checks and IV access  Block type: Femoral  Adductor canal  Laterality: left  Injection technique: single-shot  Guidance: ultrasound guided  Local infiltration: lidocaine  Infiltration strength: 1 %  Local infiltration: lidocaine    Needle   Needle type: combined needle/nerve stimulator   Needle gauge: 22 G  Needle localization: ultrasound guidance  Needle insertion depth: 3 cm  Needle length: 5 cm  Assessment   Injection assessment: negative aspiration for heme, no paresthesia on injection and local visualized surrounding nerve on ultrasound  Slow fractionated injection: yes  Hemodynamics: stable  Outcomes: uncomplicated and patient tolerated procedure well    Additional Notes  Sartorius and Vastus Medialis Muscle, Femoral artery and Saphenous nerve are identified; the tip of the needle and the spread of the local anesthetic around the Saphenous nerve are visualized. The Saphenous nerve appeared to be anatomically normal and there were no abnormal pathologically findings seen.   Medications Administered  ropivacaine (NAROPIN) injection 0.5% - Perineural   20 mL - 1/22/2025 10:03:00 AM

## 2025-01-22 NOTE — PROGRESS NOTES
4 Eyes Admission Assessment     I agree as the admission nurse that 2 RN's have performed a thorough Head to Toe Skin Assessment on the patient. ALL assessment sites listed below have been assessed on admission.       Areas assessed by both nurses:   [x]   Head, Face, and Ears   [x]   Shoulders, Back, and Chest  [x]   Arms, Elbows, and Hands   [x]   Coccyx, Sacrum, and Ischum  [x]   Legs, Feet, and Heels        Does the Patient have Skin Breakdown?  No         Carlo Prevention initiated:  No   Wound Care Orders initiated:  No      [facility] nurse consulted for Pressure Injury (Stage 3,4, Unstageable, DTI, NWPT, and Complex wounds):  No      Nurse 1 eSignature: Electronically signed by Brianna Nava RN on 1/22/25 at 6:45 PM EST    **SHARE this note so that the co-signing nurse is able to place an eSignature**    Nurse 2 eSignature: Electronically signed by Seymour Mckeon RN on 1/22/25 at 6:49 PM EST

## 2025-01-22 NOTE — ANESTHESIA PROCEDURE NOTES
Spinal Block    End time: 1/22/2025 10:27 AM  Reason for block: primary anesthetic  Staffing  Performed: resident/CRNA   Resident/CRNA: Christal Galvan APRN - CRNA  Performed by: Christal Galvan APRN - CRNA  Authorized by: Anthony Gandara MD    Spinal Block  Patient position: sitting  Prep: ChloraPrep  Patient monitoring: continuous pulse ox, frequent blood pressure checks, continuous capnometry and oxygen  Approach: midline  Location: L3/L4  Provider prep: mask  Local infiltration: lidocaine  Needle  Needle type: Pencan   Needle gauge: 25 G  Needle length: 3.5 in  Kit: [id number]  Expiration date: 6/30/2026  Assessment  Swirl obtained: Yes  CSF: clear  Attempts: 1  Hemodynamics: stable  Preanesthetic Checklist  Completed: patient identified, IV checked, site marked, risks and benefits discussed, surgical/procedural consents, equipment checked, pre-op evaluation, timeout performed, anesthesia consent given, oxygen available, monitors applied/VS acknowledged, fire risk safety assessment completed and verbalized and blood product R/B/A discussed and consented

## 2025-01-22 NOTE — H&P
Update History & Physical    The patient's History and Physical of January 13, 2025 was reviewed with the patient and I examined the patient. There was no change. The surgical site was confirmed by the patient and me.       Plan: The risks, benefits, expected outcome, and alternative to the recommended procedure have been discussed with the patient. Patient understands and wants to proceed with the procedure.     Electronically signed by SHIRA GOODSON MD on 1/22/2025 at 10:10 AM

## 2025-01-23 ENCOUNTER — TELEPHONE (OUTPATIENT)
Dept: ORTHOPEDIC SURGERY | Age: 70
End: 2025-01-23

## 2025-01-23 VITALS
HEIGHT: 61 IN | WEIGHT: 160.94 LBS | DIASTOLIC BLOOD PRESSURE: 63 MMHG | BODY MASS INDEX: 30.39 KG/M2 | TEMPERATURE: 97.6 F | RESPIRATION RATE: 17 BRPM | SYSTOLIC BLOOD PRESSURE: 138 MMHG | HEART RATE: 66 BPM | OXYGEN SATURATION: 92 %

## 2025-01-23 LAB
ANION GAP SERPL CALCULATED.3IONS-SCNC: 10 MMOL/L (ref 3–16)
BUN SERPL-MCNC: 24 MG/DL (ref 7–20)
CALCIUM SERPL-MCNC: 9.6 MG/DL (ref 8.3–10.6)
CHLORIDE SERPL-SCNC: 114 MMOL/L (ref 99–110)
CO2 SERPL-SCNC: 21 MMOL/L (ref 21–32)
CREAT SERPL-MCNC: 1 MG/DL (ref 0.6–1.2)
GFR SERPLBLD CREATININE-BSD FMLA CKD-EPI: 60 ML/MIN/{1.73_M2}
GLUCOSE BLD-MCNC: 113 MG/DL (ref 70–99)
GLUCOSE SERPL-MCNC: 117 MG/DL (ref 70–99)
PERFORMED ON: ABNORMAL
POTASSIUM SERPL-SCNC: 4.4 MMOL/L (ref 3.5–5.1)
SODIUM SERPL-SCNC: 145 MMOL/L (ref 136–145)

## 2025-01-23 PROCEDURE — 6370000000 HC RX 637 (ALT 250 FOR IP): Performed by: ORTHOPAEDIC SURGERY

## 2025-01-23 PROCEDURE — 80048 BASIC METABOLIC PNL TOTAL CA: CPT

## 2025-01-23 PROCEDURE — G0378 HOSPITAL OBSERVATION PER HR: HCPCS

## 2025-01-23 PROCEDURE — 97116 GAIT TRAINING THERAPY: CPT

## 2025-01-23 PROCEDURE — 97530 THERAPEUTIC ACTIVITIES: CPT

## 2025-01-23 PROCEDURE — 2500000003 HC RX 250 WO HCPCS: Performed by: ORTHOPAEDIC SURGERY

## 2025-01-23 PROCEDURE — 97535 SELF CARE MNGMENT TRAINING: CPT

## 2025-01-23 PROCEDURE — 36415 COLL VENOUS BLD VENIPUNCTURE: CPT

## 2025-01-23 PROCEDURE — 94760 N-INVAS EAR/PLS OXIMETRY 1: CPT

## 2025-01-23 PROCEDURE — 6360000002 HC RX W HCPCS: Performed by: ORTHOPAEDIC SURGERY

## 2025-01-23 PROCEDURE — 6370000000 HC RX 637 (ALT 250 FOR IP): Performed by: NURSE PRACTITIONER

## 2025-01-23 RX ORDER — CHLORAL HYDRATE 500 MG
2000 CAPSULE ORAL DAILY
Qty: 1 CAPSULE | Refills: 0
Start: 2025-01-23

## 2025-01-23 RX ADMIN — WATER 2000 MG: 1 INJECTION INTRAMUSCULAR; INTRAVENOUS; SUBCUTANEOUS at 03:33

## 2025-01-23 RX ADMIN — LOSARTAN POTASSIUM 100 MG: 100 TABLET, FILM COATED ORAL at 08:19

## 2025-01-23 RX ADMIN — CARVEDILOL 25 MG: 25 TABLET, FILM COATED ORAL at 08:22

## 2025-01-23 RX ADMIN — CYCLOBENZAPRINE 5 MG: 10 TABLET, FILM COATED ORAL at 08:20

## 2025-01-23 RX ADMIN — ACETAMINOPHEN 650 MG: 325 TABLET ORAL at 03:33

## 2025-01-23 RX ADMIN — HYDROCORTISONE: 1 CREAM TOPICAL at 08:25

## 2025-01-23 RX ADMIN — DULOXETINE HYDROCHLORIDE 60 MG: 60 CAPSULE, DELAYED RELEASE ORAL at 08:17

## 2025-01-23 RX ADMIN — ISOSORBIDE MONONITRATE 60 MG: 60 TABLET, EXTENDED RELEASE ORAL at 08:18

## 2025-01-23 RX ADMIN — ATORVASTATIN CALCIUM 40 MG: 40 TABLET, FILM COATED ORAL at 08:20

## 2025-01-23 RX ADMIN — HYDROCODONE BITARTRATE AND ACETAMINOPHEN 1 TABLET: 5; 325 TABLET ORAL at 13:42

## 2025-01-23 RX ADMIN — SODIUM CHLORIDE, PRESERVATIVE FREE 10 ML: 5 INJECTION INTRAVENOUS at 08:25

## 2025-01-23 RX ADMIN — AMLODIPINE BESYLATE 10 MG: 10 TABLET ORAL at 08:20

## 2025-01-23 RX ADMIN — APIXABAN 2.5 MG: 2.5 TABLET, FILM COATED ORAL at 08:19

## 2025-01-23 RX ADMIN — HYDROCODONE BITARTRATE AND ACETAMINOPHEN 1 TABLET: 5; 325 TABLET ORAL at 08:20

## 2025-01-23 NOTE — PROGRESS NOTES
Patient resting in bed quietly this evening, A&Ox4. Vital signs stable. PIV infusing NS per orders (see eMAR), dressing CDI, alcohol caps applied to all open ports. All nightly medication taken whole without complaint. Denies N/V, SOB, lightheaded/dizziness. Ace dressing to left knee remains CDI. Rickey hose and SCD's applied bilaterally. SSKIN bundle implemented. This RN attempted to assess patients use of incentive spirometer but patient declined stating that she had already done it once today. Further education provided that it is to be utilized every hour while awake and to that patient stated \"Do you know what time it is.\" No additional needs verbalized at this time. Standard safety precautions in place and call light within reach. Will continue to monitor and assess. Electronically signed by Tamika Hernandez RN on 1/22/2025 at 10:04 PM

## 2025-01-23 NOTE — CONSULTS
V2.0  [facility] Consult [place]      Name:  Breanna Maxwell /Age/Sex: 1955  (69 y.o. female)   MRN & CSN:  [id number] & [id number] Encounter Date/Time: 2025 1:52 PM EST   Location:  [id number] PCP: Ginny Costello MD     Attending:Hayden Sagastume MD  Consulting Provider: Andreea Roper MD      Hospital Day: 2    Assessment and Recommendations   Breanna Maxwell is a 69 y.o. female with pmh of diabetes, hide another admission for thickness who presents with Osteoarthritis of left knee    Hospital Problems             Last Modified POA    * (Principal) Osteoarthritis of left knee 2024 Unknown    Arthritis of left knee 2025 Yes       Recommendations:   Left knee osteoarthritis, status post left knee total knee arthroplasty.  Continue DVT prophylaxis, pain management per Ortho.  History of AV block, status post pacemaker placement recently, cardiology on consult for pacemaker evaluation.  Patient denies any chest pain or palpitations.  Continue telemetry.  History of chronic heart failure with preserved ejection fraction, currently compensated, continue home medications  Type 2 diabetes, continue insulin regimen.  Close monitor blood glucose.  Follow-up peripherally, please call us if any acute issues.      Diet ADULT DIET; Regular   DVT Prophylaxis [] Lovenox, []  Heparin, [] SCDs, [] Ambulation,  [x] Eliquis, [] Xarelto   Code Status Prior             History From:    History obtained from the patient.     History of Present Illness:      Chief Complaint: Left knee pain    Patient reports reasonable pain control after surgery.  Able to work with PT, OT today.  Denies any chest pain or shortness of breath or palpitations.      Review of Systems:      Pertinent positives and negatives discussed in HPI    Objective:     Intake/Output Summary (Last 24 hours) at 2025 1352  Last data filed at 2025 1219  Gross per 24 hour   Intake 822 ml   Output --   Net 822 ml      Vitals:     amLODIPine  10 mg Oral Daily    atorvastatin  40 mg Oral Daily    carvedilol  25 mg Oral BID     isosorbide mononitrate  60 mg Oral Daily    losartan  100 mg Oral Daily    sodium chloride flush  5-40 mL IntraVENous 2 times per day    acetaminophen  650 mg Oral Q6H    DULoxetine  60 mg Oral Daily    hydrocortisone   Topical BID    apixaban  2.5 mg Oral BID    insulin lispro  0-4 Units SubCUTAneous 4x Daily AC & HS      Infusions:    dextrose      sodium chloride Stopped (01/23/25 1002)    sodium chloride       PRN Meds: glucose, 4 tablet, PRN  dextrose bolus, 125 mL, PRN   Or  dextrose bolus, 250 mL, PRN  glucagon (rDNA), 1 mg, PRN  dextrose, , Continuous PRN  sodium chloride flush, 5-40 mL, PRN  sodium chloride, , PRN  morphine, 2 mg, Q3H PRN   Or  morphine, 4 mg, Q3H PRN  ondansetron, 4 mg, Q8H PRN   Or  ondansetron, 4 mg, Q6H PRN  polyethylene glycol, 17 g, Daily PRN  calcium carbonate, 500 mg, TID PRN  HYDROcodone 5 mg - acetaminophen, 1 tablet, Q4H PRN   Or  HYDROcodone 5 mg - acetaminophen, 2 tablet, Q4H PRN  diphenhydrAMINE, 12.5 mg, BID PRN  cyclobenzaprine, 5 mg, BID PRN        Labs and Imaging   XR KNEE LEFT (1-2 VIEWS)    Result Date: 1/22/2025  EXAMINATION: 2 XRAY VIEWS OF THE LEFT KNEE 1/22/2025 12:11 pm COMPARISON: None. HISTORY: ORDERING SYSTEM PROVIDED HISTORY: post-op TECHNOLOGIST PROVIDED HISTORY: Of operative side while in recovery room. Reason for exam:->post-op Reason for Exam: post-op FINDINGS: Post left total knee arthroplasty placement.  Normal alignment.  No fracture dislocation identified.  Expected postsurgical soft tissue emphysema and intra-articular air.     Post left total knee arthroplasty placement.       CBC: No results for input(s): \"WBC\", \"HGB\", \"PLT\" in the last 72 hours.  BMP:    Recent Labs     01/23/25  0541      K 4.4   *   CO2 21   BUN 24*   CREATININE 1.0   GLUCOSE 117*     Hepatic: No results for input(s): \"AST\", \"ALT\", \"BILITOT\", \"ALKPHOS\" in the last 72

## 2025-01-23 NOTE — PLAN OF CARE
Problem: Discharge Planning  Goal: Discharge to home or other facility with appropriate resources  Outcome: Progressing  Flowsheets (Taken 1/22/2025 2248)  Discharge to home or other facility with appropriate resources:   Identify barriers to discharge with patient and caregiver   Identify discharge learning needs (meds, wound care, etc)   Arrange for needed discharge resources and transportation as appropriate     Problem: Chronic Conditions and Co-morbidities  Goal: Patient's chronic conditions and co-morbidity symptoms are monitored and maintained or improved  Outcome: Progressing  Flowsheets (Taken 1/22/2025 2248)  Care Plan - Patient's Chronic Conditions and Co-Morbidity Symptoms are Monitored and Maintained or Improved:   Monitor and assess patient's chronic conditions and comorbid symptoms for stability, deterioration, or improvement   Collaborate with multidisciplinary team to address chronic and comorbid conditions and prevent exacerbation or deterioration   Update acute care plan with appropriate goals if chronic or comorbid symptoms are exacerbated and prevent overall improvement and discharge     Problem: Neurosensory - Adult  Goal: Achieves stable or improved neurological status  Outcome: Progressing  Flowsheets (Taken 1/22/2025 2248)  Achieves stable or improved neurological status: Assess for and report changes in neurological status     Problem: Skin/Tissue Integrity - Adult  Goal: Incisions, wounds, or drain sites healing without S/S of infection  Outcome: Progressing  Flowsheets (Taken 1/22/2025 2248)  Incisions, Wounds, or Drain Sites Healing Without Sign and Symptoms of Infection:   ADMISSION and DAILY: Assess and document risk factors for pressure ulcer development   Initiate pressure ulcer prevention bundle as indicated     Problem: Musculoskeletal - Adult  Goal: Return mobility to safest level of function  Outcome: Progressing  Flowsheets (Taken 1/22/2025 2248)  Return Mobility to Safest

## 2025-01-23 NOTE — PROGRESS NOTES
Patients BP elevated this morning at 172/80. Perfect serve sent to Dr. Warren regarding possible BP management. Awaiting response. Electronically signed by Tamika Hernandez RN on 1/23/2025 at 6:04 AM    Orders placed to consult hospitalist per Dr. Warren. Electronically signed by Tamika Hernandez RN on 1/23/2025 at 6:29 AM

## 2025-01-23 NOTE — PROGRESS NOTES
Norco 5-325 mg PO given for left knee pain . Patient ambulated to bedroom with walker and stand-by assistance.  Patient repositioned for comfort in bed. Pneumatic boots on bilaterally. Left knee guaze/ace wrap dry and intact. Fall/safety precautions in place.  Call light in reach.  Electronically signed by Brianna Nava RN on 1/22/2025 at 9:19 PM

## 2025-01-23 NOTE — PROGRESS NOTES
CLINICAL PHARMACY NOTE: MEDS TO BEDS    Total # of Prescriptions Filled: 4   The following medications were delivered to the patient:  Current Discharge Medication List        START taking these medications    Details   HYDROcodone-acetaminophen (NORCO) 5-325 MG per tablet Take 1-2 tablets by mouth every 6 hours as needed for Pain for up to 5 days. Max Daily Amount: 8 tablets  Qty: 40 tablet, Refills: 0    Comments: Reduce doses taken as pain becomes manageable  Associated Diagnoses: Arthritis of left knee      DULoxetine (CYMBALTA) 60 MG extended release capsule Take 1 capsule by mouth daily for 14 days  Qty: 14 capsule, Refills: 0      apixaban (ELIQUIS) 2.5 MG TABS tablet Take 1 tablet by mouth 2 times daily for 14 days  Qty: 28 tablet, Refills: 0         Tizanidine 4 mg - was tubed to Elvi RILEY due to came down after other meds were already delivered.      Additional Documentation:left meds on  couch in  room , pt told for home use  only

## 2025-01-23 NOTE — PROGRESS NOTES
Huddle performed including Nurse navigator Elvi, Physical therapist Ale, and Occupational therapist Della. Discussed plan of care, discharge plan, and dme needs if applicable for orthopedic total joint patient.

## 2025-01-23 NOTE — PROGRESS NOTES
[facility] Orthopedic Surgery   Progress Note      S/P :  SUBJECTIVE  in bed. Alert and oriented. . Pain is   described in left knee and with the intensity of moderate. Pain is described as aching.       OBJECTIVE              Physical                      VITALS:  BP (!) 146/69   Pulse 68   Temp 97.6 °F (36.4 °C) (Oral)   Resp 17   Ht 1.549 m (5' 1\")   Wt 73 kg (160 lb 15 oz)   SpO2 92%   BMI 30.41 kg/m²                     MUSCULOSKELETAL:  left foot NVI. Wiggles toes to command. Able to plantarflex and dorsiflex ankle Pedal pulses are palpable.                    NEUROLOGIC:                                  Sensory:  Touch:  Left Lower Extremity:  normal                                                 Surgical wound appears clean and dry left knee with Prineo dressing Ice pack applied. RICO hose on.     Data       CBC:   Lab Results   Component Value Date/Time    WBC 7.0 12/31/2024 11:15 AM    RBC 4.24 12/31/2024 11:15 AM    HGB 12.3 12/31/2024 11:15 AM    HCT 36.7 12/31/2024 11:15 AM    MCV 86.7 12/31/2024 11:15 AM    MCH 29.0 12/31/2024 11:15 AM    MCHC 33.4 12/31/2024 11:15 AM    RDW 16.6 12/31/2024 11:15 AM     12/31/2024 11:15 AM    MPV 7.2 12/31/2024 11:15 AM        WBC:    Lab Results   Component Value Date/Time    WBC 7.0 12/31/2024 11:15 AM        Hemoglobin/Hematocrit:    Lab Results   Component Value Date/Time    HGB 12.3 12/31/2024 11:15 AM    HCT 36.7 12/31/2024 11:15 AM        PT/INR:    Lab Results   Component Value Date/Time    PROTIME 14.7 12/31/2024 11:15 AM    INR 1.13 12/31/2024 11:15 AM              Current Inpatient Medications             Current Facility-Administered Medications: amLODIPine (NORVASC) tablet 10 mg, 10 mg, Oral, Daily  atorvastatin (LIPITOR) tablet 40 mg, 40 mg, Oral, Daily  carvedilol (COREG) tablet 25 mg, 25 mg, Oral, BID WC  isosorbide mononitrate (IMDUR) extended release tablet 60 mg, 60 mg, Oral, Daily  losartan (COZAAR) tablet 100 mg, 100 mg, Oral,

## 2025-01-23 NOTE — PROGRESS NOTES
Physical Therapy  Facility/Department: [address] ORTHOPEDICS  Daily Treatment / Discharge      Name: Breanna Maxwell  : 1955  MRN: [id number]  Date of Service: 2025    Discharge Recommendations:  Therapy recommended at discharge (2-3)   Breanna Maxwell scored a 22/24 on the AM-PAC short mobility form. Current research shows that an AM-PAC score of 18 or greater is typically associated with a discharge to the patient's home setting. Based on the patient's AM-PAC score and their current functional mobility deficits, it is recommended that the patient have 2-3 sessions per week of HOME Physical Therapy at d/c to increase the patient's independence.      PT Equipment Recommendations  Equipment Needed: No  Other: has RW      Patient Diagnosis(es): The encounter diagnosis was Arthritis of left knee.  Past Medical History:  has a past medical history of Arthritis, CAD (coronary artery disease), Constipation, Depression, Diabetes mellitus (HCC), GERD (gastroesophageal reflux disease), History of coronary artery stent placement, Hyperlipidemia, Hypertension, Neuropathy, Pacemaker, Vertigo, Vitamin D deficiency, unspecified, and Wears glasses.  Past Surgical History:  has a past surgical history that includes Hysterectomy; Diagnostic Cardiac Cath Lab Procedure (2019); other surgical history; Colonoscopy; Total knee arthroplasty (Right, 2024); ep device procedure (Left, 2024); and Total knee arthroplasty (Left, 2025).    Assessment  Body Structures, Functions, Activity Limitations Requiring Skilled Therapeutic Intervention: Decreased functional mobility ;Decreased ROM;Decreased strength;Increased pain;Decreased sensation  Assessment: Prior to elective L TKR 25, patient reports living in apartment with spouse, and independence with ADLs, transfers, and gait without device. Status 25: Transfers SBA/cues. RW gait with patient 's RW,  home distances with SBA-Supervision and occ cues. She

## 2025-01-23 NOTE — TELEPHONE ENCOUNTER
Prescription Refill     Medication Name:  MUSCLE RELAXER  Pharmacy: [facility] - [address] - P [phone] - F [phone]   Patient Contact Number:  [phone]     PATIENT CURRENTLY ADMITTED

## 2025-01-23 NOTE — PROGRESS NOTES
Patient admitted to room 3102 from PACU.  Patient alert and oriented x 4. Respirations regular and unlabored on room air.  Left knee guaze/ace wrap drsg dry and intact. Patient oriented to room mechanics. Fall/safety precautions in place. Call light in reach.  Electronically signed by Brianna Nava RN on 1/22/2025 at 9:14 PM

## 2025-01-23 NOTE — PROGRESS NOTES
Data- Discharge order received, patient verbalized agreement to discharge, disposition to :previous residence, needs noted for Home Health Care and informed Blossom Hardwick NP. .     Action- discharge instructions prepared and a hardcopy of AVS instructions given to patient , patient  verbalized understanding. Medication information packet given related to NEW and/or CHANGED prescriptions emphasizing name/purpose/side effects, patient verbalized understanding. Discharge instruction summary: Procedure(s):  LEFT KNEE TOTAL ARTHROPLASTY ROBOTIC Diet- ADULT DIET; Regular , Activity-  Lower Extremity Weight Bearing Restrictions  Left Lower Extremity Weight Bearing: Weight Bearing As Tolerated , Primary Care Physician as follows: Ginny Costello -[phone]. Follow up appointment with orthopedic office noted below, immunizations reviewed and discussed with patient, prescription medications to be filled by retail pharmacy and then delivered. Inpatient surgical procedure precautions reviewed: Physical therapist provided Knee exercise handout to patient. Incision site surgical glue dressing assessed and is clean,dry, and intact, no signs of redness, drainage, or odor noted Neurovascular checks performed and moderate dorsi and plantar flexions noted bilaterally, toes appear appropriate to ethnicity in color, Warm to touch, patient denies numbness or tingling in extremities.  Nurse Navigator and Orthopedic Office contact information on discharge instructions and provided to patient. Incentive spirometer in patient possession and educated on surgeon's instructions regarding. Educated patient on abstaining from alcohol consumption while taking narcotic medication and while on prescribed blood thinner. Cardiology to see patient prior to discharge, per Irma with [facility] Acosta will see patient this afternoon.    Response- IV will be removed prior to discharge. prescriptions medications delivered to patient  via meds to bed program including zanaflex. Disposition is private residence with Home Health Care. Patient denies Durable Medical Equipment needs at this time. Patient to be transported by rae Banuelos . Documented belongings in patient's possession.     Recent Labs     01/22/25  0927 01/22/25  1149 01/22/25  2059 01/23/25  0704   POCGLU 115* 101* 119* 113*       Vitals:    01/22/25 2357 01/23/25 0407 01/23/25 0500 01/23/25 0703   BP: (!) 150/55  (!) 172/80 (!) 146/69   Pulse: 57  57 68   Resp: 17 17 17   Temp: 97.7 °F (36.5 °C)  98.1 °F (36.7 °C) 97.6 °F (36.4 °C)   TempSrc: Oral  Oral Oral   SpO2: 94%  96% 92%   Weight:  73 kg (160 lb 15 oz)     Height:           Future Appointments   Date Time Provider Department Center   2/6/2025  2:45 PM Hayden Sagastume MD [facility]   3/20/2025 10:00 AM SCHEDULE, [facility] DEVICE CHECK [facility]   3/20/2025 10:15 AM Luke Vidal MD [facility]   9/17/2025  1:15 PM Dariusz Acosta MD [facility]

## 2025-01-23 NOTE — PROGRESS NOTES
Occupational Therapy  Facility/Department: [address] ORTHOPEDICS  Occupational Daily Treatment Note      Name: Breanna Maxwell  : 1955  MRN: [id number]  Date of Service: 2025    Discharge Recommendations:  Patient would benefit from continued therapy after discharge, 2-3 sessions per week, 24 hour supervision or assist          Patient Diagnosis(es): The encounter diagnosis was Arthritis of left knee.  Past Medical History:  has a past medical history of Arthritis, CAD (coronary artery disease), Constipation, Depression, Diabetes mellitus (HCC), GERD (gastroesophageal reflux disease), History of coronary artery stent placement, Hyperlipidemia, Hypertension, Neuropathy, Pacemaker, Vertigo, Vitamin D deficiency, unspecified, and Wears glasses.  Past Surgical History:  has a past surgical history that includes Hysterectomy; Diagnostic Cardiac Cath Lab Procedure (2019); other surgical history; Colonoscopy; Total knee arthroplasty (Right, 2024); ep device procedure (Left, 2024); and Total knee arthroplasty (Left, 2025).    Treatment Diagnosis: impaired strength, balance, activity tolerance, mobility/txs, ADL/IADLs    Breanna Maxwell scored a 19/24 on the AM-PAC ADL Inpatient form. Current research shows that an AM-PAC score of 18 or greater is typically associated with a discharge to the patient's home setting. Based on the patient's AM-PAC score, and their current ADL deficits, it is recommended that the patient have 2-3 sessions per week of Occupational Therapy at d/c to increase the patient's independence.  At this time, this patient demonstrates the endurance and safety to discharge home with home (home vs OP services) and a follow up treatment frequency of 2-3x/wk.   Please see assessment section for further patient specific details.    If patient discharges prior to next session this note will serve as a discharge summary.  Please see below for the latest assessment towards goals.

## 2025-01-23 NOTE — PLAN OF CARE
Problem: Discharge Planning  Goal: Discharge to home or other facility with appropriate resources  1/23/2025 0749 by Sruthi Barillas RN  Flowsheets (Taken 1/23/2025 0749)  Discharge to home or other facility with appropriate resources:   Identify barriers to discharge with patient and caregiver   Arrange for needed discharge resources and transportation as appropriate   Identify discharge learning needs (meds, wound care, etc)   Refer to discharge planning if patient needs post-hospital services based on physician order or complex needs related to functional status, cognitive ability or social support system  1/22/2025 2248 by Tamika Hernandez, RN  Outcome: Progressing  Flowsheets (Taken 1/22/2025 2248)  Discharge to home or other facility with appropriate resources:   Identify barriers to discharge with patient and caregiver   Identify discharge learning needs (meds, wound care, etc)   Arrange for needed discharge resources and transportation as appropriate     Problem: Chronic Conditions and Co-morbidities  Goal: Patient's chronic conditions and co-morbidity symptoms are monitored and maintained or improved  1/23/2025 0749 by Sruthi Barillas RN  Flowsheets (Taken 1/23/2025 0749)  Care Plan - Patient's Chronic Conditions and Co-Morbidity Symptoms are Monitored and Maintained or Improved:   Collaborate with multidisciplinary team to address chronic and comorbid conditions and prevent exacerbation or deterioration   Update acute care plan with appropriate goals if chronic or comorbid symptoms are exacerbated and prevent overall improvement and discharge   Monitor and assess patient's chronic conditions and comorbid symptoms for stability, deterioration, or improvement  1/22/2025 2248 by Tamika Hernandez RN  Outcome: Progressing  Flowsheets (Taken 1/22/2025 2248)  Care Plan - Patient's Chronic Conditions and Co-Morbidity Symptoms are Monitored and Maintained or Improved:   Monitor and assess patient's

## 2025-01-23 NOTE — CONSULTS
[employer] pacemaker check with [facility].Pacemaker functioning without issues. Dr. Acosta made aware.    RUTH Burch MSN, RN [facility]

## 2025-01-23 NOTE — PROGRESS NOTES
Patient was given PRN pain medication prior to PT/OT working with the patient.  Patient tolerated morning medications whole with sips of water. Electronically signed by Sruthi Barillas RN on 1/23/2025 at 9:18 AM

## 2025-01-23 NOTE — CARE COORDINATION
Case Management Assessment  Initial Evaluation    Date/Time of Evaluation: 1/23/2025 12:21 PM  Assessment Completed by: RICH Gil    If patient is discharged prior to next notation, then this note serves as note for discharge by case management.    Patient Name: Breanna Maxwell                   YOB: 1955  Diagnosis: Osteoarthritis of left knee [M17.12]  Arthritis of left knee [M17.12]                   Date / Time: 1/22/2025  8:35 AM    Patient Admission Status: Observation   Readmission Risk (Low < 19, Mod (19-27), High > 27): Readmission Risk Score: 16.1    Current PCP: Ginny Costello MD  PCP verified by CM? Yes    Chart Reviewed: Yes      History Provided by: Patient  Patient Orientation: Alert and Oriented    Patient Cognition: Alert    Hospitalization in the last 30 days (Readmission):  No    If yes, Readmission Assessment in  Navigator will be completed.    Advance Directives:      Code Status: Prior   Patient's Primary Decision Maker is: Named in Scanned ACP Document      Discharge Planning:    Patient lives with: Spouse/Significant Other Type of Home: Apartment  Primary Care Giver: Self  Patient Support Systems include: Spouse/Significant Other   Current Financial resources: Medicare  Current community resources: None  Current services prior to admission: Durable Medical Equipment            Current DME: Walker            Type of Home Care services:  None    ADLS  Prior functional level: Independent in ADLs/IADLs  Current functional level: Assistance with the following:, Housework, Shopping, Cooking, Bathing, Dressing    PT AM-PAC: 22 /24  OT AM-PAC: 19 /24    Family can provide assistance at DC: Yes  Would you like Case Management to discuss the discharge plan with any other family members/significant others, and if so, who? No  Plans to Return to Present Housing: Yes  Other Identified Issues/Barriers to RETURNING to current housing: needs rehab stay   Potential

## 2025-01-26 NOTE — DISCHARGE SUMMARY
Physician Discharge Summary     Patient ID:  Breanna Maxwell  [id number]  69 y.o.  1955    Admit date: 1/22/2025    Discharge date and time: 1/23/2025  3:00 PM     Admitting Physician: Hayden Sagastume MD     Discharge Physician: BESSIE Sagastume    Admission Diagnoses: Osteoarthritis of left knee [M17.12]  Arthritis of left knee [M17.12]    Discharge Diagnoses: left knee OA    Admission Condition: good    Discharged Condition: good    Indication for Admission: Failed conservative treatment as outpatient for joint pain including PT and pain meds. This patient was then electively scheduled for total joint replacement surgery    Surgical procedure: left   tka    Consults: PT OT SS    This patient had no postoperative complications. They has PT and OT for ADL's . IV and PO pain med for pain control and was eventually DC in stable condition    Treatments: analgesia,  therapies: PT OT,  and surgery      Disposition: home    Patient Instructions:   [unfilled]  Activity: activity as tolerated  Diet: regular diet  Wound Care: keep wound clean and dry    Follow-up with BESSIE Sagastume in 2 weeks.    Signed:  ELISSA Hernandez CNP  1/26/2025  12:40 PM

## 2025-01-27 ENCOUNTER — TELEPHONE (OUTPATIENT)
Dept: ORTHOPEDICS UNIT | Age: 70
End: 2025-01-27

## 2025-01-27 ENCOUNTER — TELEPHONE (OUTPATIENT)
Dept: ORTHOPEDIC SURGERY | Age: 70
End: 2025-01-27

## 2025-01-27 DIAGNOSIS — M17.12 ARTHRITIS OF LEFT KNEE: ICD-10-CM

## 2025-01-27 RX ORDER — HYDROCODONE BITARTRATE AND ACETAMINOPHEN 5; 325 MG/1; MG/1
1-2 TABLET ORAL EVERY 6 HOURS PRN
Qty: 40 TABLET | Refills: 0 | Status: SHIPPED | OUTPATIENT
Start: 2025-01-27 | End: 2025-02-06 | Stop reason: SDUPTHER

## 2025-01-27 NOTE — TELEPHONE ENCOUNTER
Call Transferred      S/p Lt Tkr 1/22. Patient did fall on 1/23, has red marks and bruises at the sx site. Also is warm to touch and very sore. No fever at this time.      Reaching out to clinic      Patient aware call will be returned.

## 2025-01-27 NOTE — TELEPHONE ENCOUNTER
Spoke with Patient regarding post discharge from hospital.    Incision status: No drainage, odor, or redness noted    Edema/Swelling/Teds: reports edema noted to operative site and using ice   reports wearing karishma hose as prescribed    Pain level and status:  7/10 prior to taking norco    Use of pain medications: states taking prescribed pain medication with relief    Blood thinner: Eliquis 2.5mg ; Verified with patient that they are taking their anticoagulant as prescribed twice a day.     Bowels: reports constipation and educated on constipation interventions and prevention measures, no bowel movement since surgery, drinking prune juice.     Therapy active: Active and continues with home therapy, today first    Do you have all of your medications: needs a laxative, educated on laxative recommendations, she states she will have on delivered to her house.    Changes in medications: No    No other questions/concerns at this time. Encouraged patient to call Orthopedic Nurse Navigator Elvi Jeronimo or Orthopedic office if has any questions/concerns.      Follow up appointments:    Future Appointments   Date Time Provider Department Center   2/6/2025  2:45 PM Hayden Sagastume MD [facility]   3/20/2025 10:00 AM SCHEDULE, [facility] DEVICE CHECK [facility]   3/20/2025 10:15 AM Luke Vidal MD [facility]   9/17/2025  1:15 PM Dariusz Acosta MD [facility]     Electronically signed by Elvi Jeronimo RN on 1/27/2025 at 2:25 PM

## 2025-01-27 NOTE — TELEPHONE ENCOUNTER
I called the patient and she fell on her buttocks.  She has bruising on her butt and back.  She stated that she is not having increase pain in her knee.  She needs a refill of medication

## 2025-01-27 NOTE — TELEPHONE ENCOUNTER
General Question     Subject: POSSIBLE RED FLAG  Patient and /or Facility Request: Breanna Maxwell   Contact Number: [phone]     PATIENT FELL ON 1/23 AND HAS RED MARKS AND BRUISES AT SURGERY SITE ALSO WARM TO THE TOUCH TRANSFER TO TRIAGE    ALSO REQUEST REFILL OF TIZANIDIME SENT  TO [facility]

## 2025-01-31 ENCOUNTER — APPOINTMENT (OUTPATIENT)
Dept: GENERAL RADIOLOGY | Age: 70
End: 2025-01-31
Payer: MEDICARE

## 2025-01-31 ENCOUNTER — TELEPHONE (OUTPATIENT)
Dept: ORTHOPEDIC SURGERY | Age: 70
End: 2025-01-31

## 2025-01-31 ENCOUNTER — HOSPITAL ENCOUNTER (EMERGENCY)
Age: 70
Discharge: HOME | End: 2025-01-31
Payer: MEDICARE

## 2025-01-31 VITALS
DIASTOLIC BLOOD PRESSURE: 90 MMHG | BODY MASS INDEX: 30.41 KG/M2 | SYSTOLIC BLOOD PRESSURE: 141 MMHG | OXYGEN SATURATION: 98 % | HEART RATE: 67 BPM | TEMPERATURE: 98.2 F | WEIGHT: 160.94 LBS | RESPIRATION RATE: 18 BRPM

## 2025-01-31 DIAGNOSIS — G89.18 POSTOPERATIVE PAIN OF LEFT KNEE: Primary | ICD-10-CM

## 2025-01-31 DIAGNOSIS — M25.469 KNEE SWELLING: ICD-10-CM

## 2025-01-31 DIAGNOSIS — M25.562 POSTOPERATIVE PAIN OF LEFT KNEE: Primary | ICD-10-CM

## 2025-01-31 LAB
ALBUMIN SERPL-MCNC: 4 G/DL (ref 3.4–5)
ALBUMIN/GLOB SERPL: 1.3 {RATIO} (ref 1.1–2.2)
ALP SERPL-CCNC: 149 U/L (ref 40–129)
ALT SERPL-CCNC: 8 U/L (ref 10–40)
ANION GAP SERPL CALCULATED.3IONS-SCNC: 9 MMOL/L (ref 3–16)
AST SERPL-CCNC: 20 U/L (ref 15–37)
BASOPHILS # BLD: 0 K/UL (ref 0–0.2)
BASOPHILS NFR BLD: 0.4 %
BILIRUB SERPL-MCNC: 0.8 MG/DL (ref 0–1)
BUN SERPL-MCNC: 9 MG/DL (ref 7–20)
CALCIUM SERPL-MCNC: 10.6 MG/DL (ref 8.3–10.6)
CHLORIDE SERPL-SCNC: 104 MMOL/L (ref 99–110)
CO2 SERPL-SCNC: 28 MMOL/L (ref 21–32)
CREAT SERPL-MCNC: 1 MG/DL (ref 0.6–1.2)
DEPRECATED RDW RBC AUTO: 15.5 % (ref 12.4–15.4)
EOSINOPHIL # BLD: 0.4 K/UL (ref 0–0.6)
EOSINOPHIL NFR BLD: 4.8 %
GFR SERPLBLD CREATININE-BSD FMLA CKD-EPI: 60 ML/MIN/{1.73_M2}
GLUCOSE SERPL-MCNC: 137 MG/DL (ref 70–99)
HCT VFR BLD AUTO: 34.7 % (ref 36–48)
HGB BLD-MCNC: 11.6 G/DL (ref 12–16)
LACTATE BLDV-SCNC: 1.1 MMOL/L (ref 0.4–2)
LYMPHOCYTES # BLD: 2.4 K/UL (ref 1–5.1)
LYMPHOCYTES NFR BLD: 25.7 %
MCH RBC QN AUTO: 28.2 PG (ref 26–34)
MCHC RBC AUTO-ENTMCNC: 33.5 G/DL (ref 31–36)
MCV RBC AUTO: 84.1 FL (ref 80–100)
MONOCYTES # BLD: 0.7 K/UL (ref 0–1.3)
MONOCYTES NFR BLD: 7.1 %
NEUTROPHILS # BLD: 5.8 K/UL (ref 1.7–7.7)
NEUTROPHILS NFR BLD: 62 %
PLATELET # BLD AUTO: 452 K/UL (ref 135–450)
PMV BLD AUTO: 6.9 FL (ref 5–10.5)
POTASSIUM SERPL-SCNC: 3.8 MMOL/L (ref 3.5–5.1)
PROT SERPL-MCNC: 7.1 G/DL (ref 6.4–8.2)
RBC # BLD AUTO: 4.13 M/UL (ref 4–5.2)
SODIUM SERPL-SCNC: 141 MMOL/L (ref 136–145)
WBC # BLD AUTO: 9.4 K/UL (ref 4–11)

## 2025-01-31 PROCEDURE — 99284 EMERGENCY DEPT VISIT MOD MDM: CPT

## 2025-01-31 PROCEDURE — 85025 COMPLETE CBC W/AUTO DIFF WBC: CPT

## 2025-01-31 PROCEDURE — 80053 COMPREHEN METABOLIC PANEL: CPT

## 2025-01-31 PROCEDURE — 6370000000 HC RX 637 (ALT 250 FOR IP): Performed by: PHYSICIAN ASSISTANT

## 2025-01-31 PROCEDURE — 73560 X-RAY EXAM OF KNEE 1 OR 2: CPT

## 2025-01-31 PROCEDURE — 87040 BLOOD CULTURE FOR BACTERIA: CPT

## 2025-01-31 PROCEDURE — 83605 ASSAY OF LACTIC ACID: CPT

## 2025-01-31 PROCEDURE — 36415 COLL VENOUS BLD VENIPUNCTURE: CPT

## 2025-01-31 RX ORDER — ACETAMINOPHEN 500 MG
TABLET ORAL
Qty: 60 TABLET | Refills: 0 | Status: SHIPPED | OUTPATIENT
Start: 2025-01-31

## 2025-01-31 RX ORDER — HYDROCODONE BITARTRATE AND ACETAMINOPHEN 5; 325 MG/1; MG/1
1 TABLET ORAL ONCE
Status: COMPLETED | OUTPATIENT
Start: 2025-01-31 | End: 2025-01-31

## 2025-01-31 RX ADMIN — HYDROCODONE BITARTRATE AND ACETAMINOPHEN 1 TABLET: 5; 325 TABLET ORAL at 15:31

## 2025-01-31 ASSESSMENT — PAIN DESCRIPTION - ORIENTATION
ORIENTATION: LEFT
ORIENTATION: LEFT

## 2025-01-31 ASSESSMENT — ENCOUNTER SYMPTOMS
ABDOMINAL PAIN: 0
SHORTNESS OF BREATH: 0
NAUSEA: 0
BACK PAIN: 0
SORE THROAT: 0
EYE PAIN: 0
COUGH: 0
VOMITING: 0

## 2025-01-31 ASSESSMENT — PAIN DESCRIPTION - PAIN TYPE: TYPE: ACUTE PAIN

## 2025-01-31 ASSESSMENT — PAIN DESCRIPTION - LOCATION
LOCATION: KNEE
LOCATION: KNEE

## 2025-01-31 ASSESSMENT — PAIN - FUNCTIONAL ASSESSMENT
PAIN_FUNCTIONAL_ASSESSMENT: 0-10
PAIN_FUNCTIONAL_ASSESSMENT: NONE - DENIES PAIN
PAIN_FUNCTIONAL_ASSESSMENT: PREVENTS OR INTERFERES WITH MANY ACTIVE NOT PASSIVE ACTIVITIES

## 2025-01-31 ASSESSMENT — PAIN SCALES - GENERAL
PAINLEVEL_OUTOF10: 8
PAINLEVEL_OUTOF10: 8

## 2025-01-31 ASSESSMENT — PAIN DESCRIPTION - FREQUENCY: FREQUENCY: CONTINUOUS

## 2025-01-31 ASSESSMENT — PAIN DESCRIPTION - DESCRIPTORS: DESCRIPTORS: PATIENT UNABLE TO DESCRIBE

## 2025-01-31 NOTE — TELEPHONE ENCOUNTER
I spoke to pt and let her know Dr. Sagastume's staff will get in touch with her Monday when they are back in office.

## 2025-01-31 NOTE — TELEPHONE ENCOUNTER
Prescription Refill     Medication Name:    tiZANidine (ZANAFLEX) 4 MG tablet     Pharmacy: [id number] [place] [address] [address] SILVA [phone] - F [phone]     Patient Contact Number:  [phone]     REFILL, PLEASE?

## 2025-01-31 NOTE — TELEPHONE ENCOUNTER
1/22/2025 LEFT KNEE TOTAL ARTHROPLASTY ROBOTIC     Patient called in currently sitting at the ER, states she went to her PCP this morning and he doctor informed her that her knee looks infected and should be evaluated asap. Patient is waiting to be seen at ER     Please advise     [phone]- Breanna

## 2025-01-31 NOTE — ED TRIAGE NOTES
C/o left knee pain, redness and swelling since she had a knee surgery on 1/22/2025. States that today she had her post surg follow up and was told by her PCP that the knee is infected and to come to the ED for IV abx. VSS in triage. Pt unable to ambulate, required a wheelchair.

## 2025-02-01 LAB — BACTERIA BLD CULT: NORMAL

## 2025-02-01 NOTE — ED PROVIDER NOTES
**ADVANCED PRACTICE PROVIDER, I HAVE EVALUATED THIS PATIENT**        [facility] EMERGENCY DEPARTMENT  EMERGENCY DEPARTMENT ENCOUNTER      Pt Name: Breanna Maxwell  MRN:[id number]  Birthdate 1955  Date of evaluation: 1/31/2025  Provider: Ryan Harvey PA-C  Note Started: 7:29 PM EST 1/31/25        Chief Complaint:    Chief Complaint   Patient presents with    Knee Pain    Joint Swelling     C/o left knee pain, redness and swelling since she had a knee surgery on 1/22/2025. States that today she had her post surg follow up and was told by her PCP that the knee is infected and to come to the ED for IV abx. VSS in triage.          Nursing Notes, Past Medical Hx, Past Surgical Hx, Social Hx, Allergies, and Family Hx were all reviewed and agreed with or any disagreements were addressed in the HPI.    HPI: (Location, Duration, Timing, Severity, Quality, Assoc Sx, Context, Modifying factors)    History From: Patient  Limitations to history : None    Social Determinants Significantly Affecting Health : None    Chief Complaint of sent here to emergency room by her primary care provider.  Patient states that she was seen in her primary doctor office today and they looked at her knee and thought it might be an infection.  She recently had left total knee replacement by Dr. Hayden Sagastume.  She complained of being swollen and felt a little warm.  She denies fever.  No chest pain, no shortness of breath.  She is at rehab at the house.  No numbness or tingling her feet or fingers.  Said she had a right knee replacement last April and she did okay.  Says she did not have this much swelling.  No other complaints.    This is a  69 y.o. female who presents to the emergency room with the above complaint    PastMedical/Surgical History:      Diagnosis Date    Arthritis     CAD (coronary artery disease)     \"blocked artery\"  stents placed    Constipation     due to slow movement through bowels    Depression     Diabetes  discharged stable condition.  She was instructed return emergency room for increased swelling redness warmth and/or fever.  In particular if any purulent drainage come from the incision site.    Disposition Considerations (Tests not ordered but considered, Shared Decision Making, Pt Expectation of Test or Tx.):     See discussion above       The patient tolerated their visit well.  I evaluated the patient.  The physician was available for consultation as needed.  The patient and / or the family were informed of the results of any tests, a time was given to answer questions, a plan was proposed and they agreed with plan.     I am the Primary Clinician of Record.     CLINICAL IMPRESSION:  1. Postoperative pain of left knee    2. Knee swelling        DISPOSITION Decision To Discharge 01/31/2025 07:29:32 PM   DISPOSITION CONDITION Stable           PATIENT REFERRED TO:  Ginny Costello MD  [address]  [place] [address] [phone]    Call   As needed    Hayden Sagastume MD  [address]  [place] [address] [phone]    Call in 2 days  If symptoms worsen      DISCHARGE MEDICATIONS:  Discharge Medication List as of 1/31/2025  7:40 PM        START taking these medications    Details   acetaminophen (TYLENOL) 500 MG tablet Take 1 or 2 tablets by mouth every 6 to 8 hours as needed for pain and or fever, Disp-60 tablet, R-0Normal             DISCONTINUED MEDICATIONS:  Discharge Medication List as of 1/31/2025  7:40 PM                 (Please note the MDM and HPI sections of this note were completed with a voice recognition program.  Efforts were made to edit the dictations but occasionally words are mis-transcribed.)    Electronically signed, Ryan Harvey PA-C,          Ryan Harvey PA-C  01/31/25 [id number]

## 2025-02-01 NOTE — DISCHARGE INSTRUCTIONS
Take prescribed medication as prescribed only  Make sure you do not take more than 3 g of Tylenol per day.  Follow-up with Dr. Hayden Sagastume.  Call office on Monday if you still have a lot of pain  Return emergency room for any worsening such as redness warmth swelling fever and or purulent drainage.

## 2025-02-04 LAB — BACTERIA BLD CULT: NORMAL

## 2025-02-06 ENCOUNTER — OFFICE VISIT (OUTPATIENT)
Dept: ORTHOPEDIC SURGERY | Age: 70
End: 2025-02-06

## 2025-02-06 VITALS — HEIGHT: 61 IN | BODY MASS INDEX: 30.41 KG/M2

## 2025-02-06 DIAGNOSIS — Z96.652 STATUS POST TOTAL LEFT KNEE REPLACEMENT: Primary | ICD-10-CM

## 2025-02-06 PROCEDURE — 99024 POSTOP FOLLOW-UP VISIT: CPT | Performed by: ORTHOPAEDIC SURGERY

## 2025-02-06 RX ORDER — HYDROCODONE BITARTRATE AND ACETAMINOPHEN 5; 325 MG/1; MG/1
1-2 TABLET ORAL EVERY 6 HOURS PRN
Qty: 40 TABLET | Refills: 0 | Status: SHIPPED | OUTPATIENT
Start: 2025-02-06 | End: 2025-02-11

## 2025-02-06 NOTE — PROGRESS NOTES
[facility]  Office Visit    Chief Complaint: Follow-up s/p left total knee arthroplasty    HPI:  Breanna Maxwell is a 69 y.o. who is here in follow-up of left total knee arthroplasty performed on January 22, 2025.  She is walking with use of a walker.  She is taking Norco for pain control.  She has been working with home physical therapy.  Her PCP sent her to the ER last week for concerns for infection.  Her labs were normal at that time and her wound was normal as well.  She rates pain as 7/10.    Exam:  Appearance: sitting in exam room chair, appears to be in no acute distress, awake and alert  Resp: unlabored breathing on room air  Skin: warm, dry and intact with out erythema or significant increased temperature  Neuro: grossly intact both lower extremities. Intact sensation to light touch. Motor exam 4+ to 5/5 in all major motor groups.  Left knee: Incision is healing as expected.  There are no signs of infection.  Range of motion is 0 to 100 degrees.  Sensation is intact light touch.  There is brisk capillary refill. There is 5/5 muscle strength in all muscle groups.    Imaging:  3 views of the left knee were performed and reviewed today. Significant for total knee arthroplasty prosthesis in place with no signs of osteolysis, loosening, fracture, or dislocation.    Assessment:  S/p left total knee arthroplasty    Plan:  She is recovering well postoperatively.  She will transition outpatient physical therapy.  [place] was refilled today and she will wean off this as tolerated.  Follow-up in 4 weeks for repeat assessment and range of motion check.    This dictation was done with Dragon dictation and may contain mechanical errors related to translation.

## 2025-02-12 ENCOUNTER — HOSPITAL ENCOUNTER (OUTPATIENT)
Dept: PHYSICAL THERAPY | Age: 70
Setting detail: THERAPIES SERIES
Discharge: HOME | End: 2025-02-12
Attending: ORTHOPAEDIC SURGERY
Payer: MEDICARE

## 2025-02-12 DIAGNOSIS — R26.9 GAIT ABNORMALITY: ICD-10-CM

## 2025-02-12 DIAGNOSIS — R26.89 DECREASED FUNCTIONAL MOBILITY: ICD-10-CM

## 2025-02-12 DIAGNOSIS — M25.662 DECREASED ROM OF LEFT KNEE: ICD-10-CM

## 2025-02-12 DIAGNOSIS — R60.0 LOCALIZED EDEMA: ICD-10-CM

## 2025-02-12 DIAGNOSIS — M25.562 PAIN IN JOINT OF LEFT KNEE: Primary | ICD-10-CM

## 2025-02-12 DIAGNOSIS — R29.898 DECREASED STRENGTH OF LOWER EXTREMITY: ICD-10-CM

## 2025-02-12 DIAGNOSIS — R26.89 POOR BALANCE: ICD-10-CM

## 2025-02-12 PROCEDURE — 97161 PT EVAL LOW COMPLEX 20 MIN: CPT

## 2025-02-12 PROCEDURE — 97016 VASOPNEUMATIC DEVICE THERAPY: CPT

## 2025-02-12 PROCEDURE — 97530 THERAPEUTIC ACTIVITIES: CPT

## 2025-02-13 ENCOUNTER — TELEPHONE (OUTPATIENT)
Dept: ORTHOPEDIC SURGERY | Age: 70
End: 2025-02-13

## 2025-02-13 DIAGNOSIS — Z96.652 H/O TOTAL KNEE REPLACEMENT, LEFT: Primary | ICD-10-CM

## 2025-02-13 RX ORDER — METHYLPREDNISOLONE 4 MG/1
TABLET ORAL
Qty: 1 KIT | Refills: 0 | Status: SHIPPED | OUTPATIENT
Start: 2025-02-13

## 2025-02-13 RX ORDER — TRAMADOL HYDROCHLORIDE 50 MG/1
50 TABLET ORAL EVERY 6 HOURS PRN
Qty: 28 TABLET | Refills: 0 | Status: SHIPPED | OUTPATIENT
Start: 2025-02-13 | End: 2025-02-20

## 2025-02-13 NOTE — TELEPHONE ENCOUNTER
Prescription Refill     Medication Name:  TIZANIDINE AND ALTERNATE MEDICATION   Pharmacy: [facility] ON [address]   Patient Contact Number:  [phone]      PATIENT CALLED REQ A REFILL OF HER TIZANIDINE MEDICATION    SHE IS ALSO REQ  ALTERNATE MEDICATION FOR THE HYDROCODONE DUE TO IT CAUSING HER TO HAVE A RASH ON HER LEGS     SE IS ALSO WANTING SOMETHING FOR THE RASH     PLEASE CALL BACK THE ABOVE NUMBER

## 2025-02-14 NOTE — PLAN OF CARE
Problem: Chronic Conditions and Co-morbidities  Goal: Patient's chronic conditions and co-morbidity symptoms are monitored and maintained or improved  Recent Flowsheet Documentation  Taken 12/13/2024 2000 by Shagufta Rich RN  Care Plan - Patient's Chronic Conditions and Co-Morbidity Symptoms are Monitored and Maintained or Improved: Monitor and assess patient's chronic conditions and comorbid symptoms for stability, deterioration, or improvement     Problem: Cardiovascular - Adult  Goal: Maintains optimal cardiac output and hemodynamic stability  Recent Flowsheet Documentation  Taken 12/13/2024 2000 by Shagufta Rich RN  Maintains optimal cardiac output and hemodynamic stability: Monitor urine output and notify Licensed Independent Practitioner for values outside of normal range  Goal: Absence of cardiac dysrhythmias or at baseline  Recent Flowsheet Documentation  Taken 12/13/2024 2000 by Shagufta Rich RN  Absence of cardiac dysrhythmias or at baseline: Monitor cardiac rate and rhythm     Problem: Cardiovascular - Adult  Goal: Absence of cardiac dysrhythmias or at baseline  Recent Flowsheet Documentation  Taken 12/13/2024 2000 by Shagufta Rich RN  Absence of cardiac dysrhythmias or at baseline: Monitor cardiac rate and rhythm     Problem: Musculoskeletal - Adult  Goal: Return mobility to safest level of function  Recent Flowsheet Documentation  Taken 12/13/2024 2000 by Shagufta Rich RN  Return Mobility to Safest Level of Function:   Assess patient stability and activity tolerance for standing, transferring and ambulating with or without assistive devices   Assist with transfers and ambulation using safe patient handling equipment as needed  Goal: Maintain proper alignment of affected body part  Recent Flowsheet Documentation  Taken 12/13/2024 2000 by Shagufta Rich RN  Maintain proper alignment of affected body part: Support and protect limb and body alignment per provider's orders  Goal: Return ADL status to  1 person assist

## 2025-02-17 ENCOUNTER — HOSPITAL ENCOUNTER (OUTPATIENT)
Dept: PHYSICAL THERAPY | Age: 70
Setting detail: THERAPIES SERIES
Discharge: HOME | End: 2025-02-17
Attending: ORTHOPAEDIC SURGERY
Payer: MEDICARE

## 2025-02-17 PROCEDURE — 97112 NEUROMUSCULAR REEDUCATION: CPT

## 2025-02-17 PROCEDURE — 97140 MANUAL THERAPY 1/> REGIONS: CPT

## 2025-02-17 PROCEDURE — 97110 THERAPEUTIC EXERCISES: CPT

## 2025-02-17 NOTE — FLOWSHEET NOTE
been instructed to contact their primary care physician regarding ROS issues if not already being addressed at this time.    [x] Patient history, allergies, meds reviewed. Medical chart reviewed. See intake form.     OBJECTIVE EXAMINATION     2/12/25  ROM/Strength: (Blank cells denote NT)    Mvmt (norm) AROM L AROM R Notes PROM L PROM R Notes     LUMBAR Flex (90)         Ext (25)         SB (25)          Rotation (30)             HIP Flex (120)          Abd (45)          ER (50)          IR (45)          Ext (20)         KNEE Flex (140) 90** 110        Ext (0) Lacking 12** Lacking 1         ANKLE DF (20)          PF (50)          Inversion (30)          Eversion (20)             MMT L MMT R Notes       HIP  Flexion 4/5 4/5      Abduction        ER        IR        Extension      KNEE  Flexion 4-/5 4+/5      Extension 4-/5 4+/5      ANKLE  DF 5/5 5/5      PF        Inversion        Eversion        Repeated Movements: [] Normal  [] Abnormal [] N/A    Palpation:   Patient reported tenderness with palpation  Location:Medial/lateral TFJ line, patella, proximal gastroc    Posture:   decreased WB on L    Bandages/Dressings/Incisions:  Patients wound/incision appears to be healing as expected  Patients wound/incision appears clean, dry and intact    Dermatomes: Abnormal findings listed below  All WNL    Myotomes: Abnormal findings listed below  NT    Reflexes: Abnormal findings listed below  Not Tested    Specific Joint Mobility Testing/Accessory Motions:      Knee: hypomobile on R on L PFJ Tib fem     Special Tests:  N/A    Gait:    Pattern: antalgic pattern, decreased reta, Increased NATALIE, decreased step length on left, and decreased stance time on left  Assistive Device Used: Rolling walker (RW)    Balance:  [x] WNL      [] NT       [] Dysfunction noted  Comment:     Falls Risk Assessment (30 days):   Falls Risk assessed and no intervention required.  Time Up and Go (TUG):   Not Assessed        Exercises/Interventions

## 2025-02-19 ENCOUNTER — HOSPITAL ENCOUNTER (OUTPATIENT)
Dept: PHYSICAL THERAPY | Age: 70
Setting detail: THERAPIES SERIES
Discharge: HOME | End: 2025-02-19
Attending: ORTHOPAEDIC SURGERY
Payer: MEDICARE

## 2025-02-19 PROCEDURE — 97140 MANUAL THERAPY 1/> REGIONS: CPT

## 2025-02-19 PROCEDURE — 97112 NEUROMUSCULAR REEDUCATION: CPT

## 2025-02-19 PROCEDURE — 97110 THERAPEUTIC EXERCISES: CPT

## 2025-02-19 NOTE — FLOWSHEET NOTE
assessed and no intervention required.  Time Up and Go (TUG):   Not Assessed        Exercises/Interventions      Flexion Extension   Eval 2/12 90    2/17 104 with strap** Lacking 5**   2/19 102 with strap**               Therapeutic Ex (47136)  resistance Sets/time Reps Notes/Cues/Progressions   Nu-step  5 min  L4   Heel slides  3 min     Gastroc stretch at IB     HS stretch at steps  30'' 3    Knee flexion at steps  10'' 10    LAQ  #4  20    Leg Press SL #45 2 10                                       Manual Intervention (59200)  TIME     PROM- knee ext, PA/AP TFJ mobs- grade 3, patellar mobs- 4 ways  5 min                                 NMR re-education (19073) resistance Sets/time Reps CUES NEEDED   Quad set  10'' 10 Extension ROM   SLR- flex/abd  3 10 each    Step ups- fwd/lat    STS  2 10                  Therapeutic Activity (37251)  Sets/time     Patient education   HEP, benefits of icing to reduce pain/edema, itching sensation with tissue healing, TKA rehab and pain levels, scheduling, benefits of both knees being replaced, scare healing, deficits, importance of a quad set                                 Modalities:    Vasopneumatic Compression (Game Ready): Applied to Knee Left for significant edema, swelling, pain control for  0 minutes.  Relevant ICD-10 R22.4 Localized swelling of lower limb.   Girth Left Right Post Vaso   Knee: Midpatella      Knee: 5 cm above 44.5 41.5 44.3   Knee: 15 cm above      Ankle: transmalleolar      Ankle: figure 8              Education/Home Exercise Program: Patient HEP program created electronically.  Refer to [employer] access code:    Access Code: [email or web address]  URL: [email or web address]  Date: 02/12/2025  Prepared by: Ana Laura Welch    Exercises  - Supine Active Straight Leg Raise  - 1 x daily - 7 x weekly - 2 sets - 10 reps  - Sidelying Hip Abduction  - 1 x daily - 7 x weekly - 2 sets - 10 reps  - Supine Heel Slide with Strap  - 1 x daily - 7 x weekly - 3 sets - 10 reps

## 2025-02-20 DIAGNOSIS — Z96.652 H/O TOTAL KNEE REPLACEMENT, LEFT: ICD-10-CM

## 2025-02-20 RX ORDER — TRAMADOL HYDROCHLORIDE 50 MG/1
50 TABLET ORAL EVERY 6 HOURS PRN
Qty: 28 TABLET | Refills: 0 | Status: SHIPPED | OUTPATIENT
Start: 2025-02-20 | End: 2025-02-27

## 2025-02-20 NOTE — TELEPHONE ENCOUNTER
Prescription Refill     Medication Name:  TRAMADOL 50 MG  Pharmacy: [facility] [address]   Patient Contact Number:  [phone]     PATIENT CALLED REQ A REFILL OF HER PAIN MEDICATION AS SHE ONLY HAS 3 LEFT     PLEASE CALL BACK THE ABOVE NUMBER

## 2025-02-24 ENCOUNTER — HOSPITAL ENCOUNTER (OUTPATIENT)
Dept: PHYSICAL THERAPY | Age: 70
Setting detail: THERAPIES SERIES
End: 2025-02-24
Attending: ORTHOPAEDIC SURGERY
Payer: MEDICARE

## 2025-02-24 ENCOUNTER — APPOINTMENT (OUTPATIENT)
Dept: CT IMAGING | Age: 70
End: 2025-02-24
Payer: MEDICARE

## 2025-02-24 ENCOUNTER — HOSPITAL ENCOUNTER (EMERGENCY)
Age: 70
Discharge: HOME | End: 2025-02-24
Attending: EMERGENCY MEDICINE
Payer: MEDICARE

## 2025-02-24 ENCOUNTER — APPOINTMENT (OUTPATIENT)
Dept: GENERAL RADIOLOGY | Age: 70
End: 2025-02-24
Payer: MEDICARE

## 2025-02-24 VITALS
OXYGEN SATURATION: 99 % | DIASTOLIC BLOOD PRESSURE: 62 MMHG | TEMPERATURE: 97.8 F | SYSTOLIC BLOOD PRESSURE: 142 MMHG | HEART RATE: 75 BPM | RESPIRATION RATE: 16 BRPM

## 2025-02-24 DIAGNOSIS — R59.0 HILAR LYMPHADENOPATHY: ICD-10-CM

## 2025-02-24 DIAGNOSIS — S32.010A CLOSED COMPRESSION FRACTURE OF L1 LUMBAR VERTEBRA, INITIAL ENCOUNTER (HCC): ICD-10-CM

## 2025-02-24 DIAGNOSIS — M54.50 ACUTE LEFT-SIDED LOW BACK PAIN WITHOUT SCIATICA: Primary | ICD-10-CM

## 2025-02-24 LAB
ALBUMIN SERPL-MCNC: 3.9 G/DL (ref 3.4–5)
ALBUMIN/GLOB SERPL: 1.5 {RATIO} (ref 1.1–2.2)
ALP SERPL-CCNC: 154 U/L (ref 40–129)
ALT SERPL-CCNC: 27 U/L (ref 10–40)
ANION GAP SERPL CALCULATED.3IONS-SCNC: 10 MMOL/L (ref 3–16)
AST SERPL-CCNC: 14 U/L (ref 15–37)
BASOPHILS # BLD: 0.1 K/UL (ref 0–0.2)
BASOPHILS NFR BLD: 0.7 %
BILIRUB SERPL-MCNC: 0.6 MG/DL (ref 0–1)
BILIRUB UR QL STRIP.AUTO: NEGATIVE
BUN SERPL-MCNC: 12 MG/DL (ref 7–20)
CALCIUM SERPL-MCNC: 10.2 MG/DL (ref 8.3–10.6)
CHLORIDE SERPL-SCNC: 104 MMOL/L (ref 99–110)
CLARITY UR: CLEAR
CO2 SERPL-SCNC: 25 MMOL/L (ref 21–32)
COLOR UR: YELLOW
CREAT SERPL-MCNC: 1 MG/DL (ref 0.6–1.2)
DEPRECATED RDW RBC AUTO: 16.8 % (ref 12.4–15.4)
EOSINOPHIL # BLD: 0.1 K/UL (ref 0–0.6)
EOSINOPHIL NFR BLD: 0.7 %
GFR SERPLBLD CREATININE-BSD FMLA CKD-EPI: 60 ML/MIN/{1.73_M2}
GLUCOSE SERPL-MCNC: 112 MG/DL (ref 70–99)
GLUCOSE UR STRIP.AUTO-MCNC: 500 MG/DL
HCT VFR BLD AUTO: 36.5 % (ref 36–48)
HGB BLD-MCNC: 12.1 G/DL (ref 12–16)
HGB UR QL STRIP.AUTO: NEGATIVE
KETONES UR STRIP.AUTO-MCNC: NEGATIVE MG/DL
LEUKOCYTE ESTERASE UR QL STRIP.AUTO: NEGATIVE
LIPASE SERPL-CCNC: 13 U/L (ref 13–60)
LYMPHOCYTES # BLD: 2.1 K/UL (ref 1–5.1)
LYMPHOCYTES NFR BLD: 18.8 %
MCH RBC QN AUTO: 28.6 PG (ref 26–34)
MCHC RBC AUTO-ENTMCNC: 33.1 G/DL (ref 31–36)
MCV RBC AUTO: 86.5 FL (ref 80–100)
MONOCYTES # BLD: 1.4 K/UL (ref 0–1.3)
MONOCYTES NFR BLD: 11.8 %
NEUTROPHILS # BLD: 7.8 K/UL (ref 1.7–7.7)
NEUTROPHILS NFR BLD: 68 %
NITRITE UR QL STRIP.AUTO: NEGATIVE
PH UR STRIP.AUTO: 6.5 [PH] (ref 5–8)
PLATELET # BLD AUTO: 235 K/UL (ref 135–450)
PMV BLD AUTO: 7.4 FL (ref 5–10.5)
POTASSIUM SERPL-SCNC: 4.5 MMOL/L (ref 3.5–5.1)
PROT SERPL-MCNC: 6.5 G/DL (ref 6.4–8.2)
PROT UR STRIP.AUTO-MCNC: NEGATIVE MG/DL
RBC # BLD AUTO: 4.22 M/UL (ref 4–5.2)
SODIUM SERPL-SCNC: 139 MMOL/L (ref 136–145)
SP GR UR STRIP.AUTO: 1.01 (ref 1–1.03)
UA COMPLETE W REFLEX CULTURE PNL UR: ABNORMAL
UA DIPSTICK W REFLEX MICRO PNL UR: ABNORMAL
URN SPEC COLLECT METH UR: ABNORMAL
UROBILINOGEN UR STRIP-ACNC: 0.2 E.U./DL
WBC # BLD AUTO: 11.4 K/UL (ref 4–11)

## 2025-02-24 PROCEDURE — 80053 COMPREHEN METABOLIC PANEL: CPT

## 2025-02-24 PROCEDURE — 85025 COMPLETE CBC W/AUTO DIFF WBC: CPT

## 2025-02-24 PROCEDURE — 96374 THER/PROPH/DIAG INJ IV PUSH: CPT

## 2025-02-24 PROCEDURE — 73552 X-RAY EXAM OF FEMUR 2/>: CPT

## 2025-02-24 PROCEDURE — 6370000000 HC RX 637 (ALT 250 FOR IP): Performed by: EMERGENCY MEDICINE

## 2025-02-24 PROCEDURE — 36415 COLL VENOUS BLD VENIPUNCTURE: CPT

## 2025-02-24 PROCEDURE — 6360000004 HC RX CONTRAST MEDICATION: Performed by: EMERGENCY MEDICINE

## 2025-02-24 PROCEDURE — 81003 URINALYSIS AUTO W/O SCOPE: CPT

## 2025-02-24 PROCEDURE — 6360000002 HC RX W HCPCS: Performed by: EMERGENCY MEDICINE

## 2025-02-24 PROCEDURE — 83690 ASSAY OF LIPASE: CPT

## 2025-02-24 PROCEDURE — 99285 EMERGENCY DEPT VISIT HI MDM: CPT

## 2025-02-24 PROCEDURE — 74177 CT ABD & PELVIS W/CONTRAST: CPT

## 2025-02-24 RX ORDER — LIDOCAINE 50 MG/G
1 PATCH TOPICAL DAILY
Qty: 10 PATCH | Refills: 0 | Status: SHIPPED | OUTPATIENT
Start: 2025-02-24 | End: 2025-03-06

## 2025-02-24 RX ORDER — ACETAMINOPHEN 500 MG
1000 TABLET ORAL ONCE
Status: COMPLETED | OUTPATIENT
Start: 2025-02-24 | End: 2025-02-24

## 2025-02-24 RX ORDER — KETOROLAC TROMETHAMINE 15 MG/ML
15 INJECTION, SOLUTION INTRAMUSCULAR; INTRAVENOUS ONCE
Status: COMPLETED | OUTPATIENT
Start: 2025-02-24 | End: 2025-02-24

## 2025-02-24 RX ADMIN — KETOROLAC TROMETHAMINE 15 MG: 15 INJECTION, SOLUTION INTRAMUSCULAR; INTRAVENOUS at 08:48

## 2025-02-24 RX ADMIN — IOMEPROL INJECTION 100 ML: 714 INJECTION, SOLUTION INTRAVASCULAR at 10:30

## 2025-02-24 RX ADMIN — ACETAMINOPHEN 1000 MG: 500 TABLET ORAL at 08:48

## 2025-02-24 ASSESSMENT — PAIN DESCRIPTION - PAIN TYPE: TYPE: ACUTE PAIN

## 2025-02-24 ASSESSMENT — PAIN DESCRIPTION - DESCRIPTORS: DESCRIPTORS: DISCOMFORT

## 2025-02-24 ASSESSMENT — PAIN SCALES - GENERAL
PAINLEVEL_OUTOF10: 5
PAINLEVEL_OUTOF10: 10
PAINLEVEL_OUTOF10: 7
PAINLEVEL_OUTOF10: 10

## 2025-02-24 ASSESSMENT — PAIN - FUNCTIONAL ASSESSMENT: PAIN_FUNCTIONAL_ASSESSMENT: 0-10

## 2025-02-24 ASSESSMENT — PAIN DESCRIPTION - LOCATION: LOCATION: BACK

## 2025-02-24 NOTE — DISCHARGE INSTRUCTIONS
Follow-up with a primary care physician in 1 to 2 days for reexamination.  If condition worsens or new symptoms develop, return immediately to the emergency department.  Use ice to the affected areas.  Avoid heat or heating pads.  No lifting greater than 5 pounds.  Avoid strenuous lifting or heavy physical activity for 1 week.  Do not drive or operate machinery while taking pain medication or muscle relaxants.  May cause drowsiness.     Use caution when walking or changing positions    If symptoms persist, recommend further evaluation with MRI lumbar spine.    Your CT scan showed some swollen lymph nodes in the chest.  Recommend that you follow-up with your primary care physician as soon as possible regarding this finding.  Further testing may be needed.  Cause is unknown.

## 2025-02-24 NOTE — ED PROVIDER NOTES
[facility] EMERGENCY DEPARTMENT  EMERGENCY DEPARTMENT ENCOUNTER      Pt Name: Breanna Maxwell  MRN: [id number]  Birthdate 1955  Date of evaluation: 2/24/2025  Provider: SRAVANTHI DIAZ DO    CHIEF COMPLAINT       Chief Complaint   Patient presents with    Back Pain     Left side back pain radiating down the left leg x 3 days.          HISTORY OF PRESENT ILLNESS   (Location/Symptom, Timing/Onset, Context/Setting, Quality, Duration, Modifying Factors, Severity)  Note limiting factors.   Breanna Maxwell is a 69 y.o. female who presents to the emergency department with a complaint of left sided hip and back pain.  The patient states that on Friday, 3 days ago she woke up with the pain located in the left lower flank and hip area.  She points to the left superior buttock and lower left lateral flank area.  She describes the pain as constant and sharp.  The pain is nonradiating.  She denies any associated weakness or numbness.  She denies any actual pain in the hip but the pain is worsened with bearing weight.  She denies any moment of injury, fall trauma, etc.  She denies any previous occurrence.  She denies any knee pain, groin pain, thigh pain, or ankle pain.  No foot pain.    She denies any associated chest pain heaviness pressure tightness shortness of breath diaphoresis or dyspnea on exertion.  She denies any rash.  She denies any nausea vomiting or diarrhea.  Appetite is normal.    Last bowel movement was yesterday evening and was normal.  She denies any incontinence, bowel or bladder difficulties.  She just urinated prior to arrival.  She denies any dysuria hematuria frequency or urgency.  She denies any saddle anesthesia.  She denies any prior back surgeries or back problems.  She denies any prior history of sciatica.    She did have a left total knee replacement by Dr. Sagastume on January 25, 2025, 1 month ago and reports that she has been doing well.  She denies any pain or discomfort in her

## 2025-02-26 ENCOUNTER — APPOINTMENT (OUTPATIENT)
Dept: PHYSICAL THERAPY | Age: 70
End: 2025-02-26
Attending: ORTHOPAEDIC SURGERY
Payer: MEDICARE

## 2025-02-27 ENCOUNTER — TELEPHONE (OUTPATIENT)
Dept: CARDIOLOGY CLINIC | Age: 70
End: 2025-02-27

## 2025-02-27 ENCOUNTER — TELEPHONE (OUTPATIENT)
Dept: ORTHOPEDIC SURGERY | Age: 70
End: 2025-02-27

## 2025-02-27 NOTE — TELEPHONE ENCOUNTER
Noted will call pt to let her know of serial numbers below and to see if she has mychart. We can attach this on there or we can give her information through the phone.       Lead and device information:

## 2025-02-27 NOTE — TELEPHONE ENCOUNTER
Pt states she is having a procedure and they cannot schedule without her lead number.  It is not listed on her card and she is calling to find out what it is.    Please advise.

## 2025-02-27 NOTE — TELEPHONE ENCOUNTER
Called pt regarding message below she would prefer that I give her information through the phone. Gave her lead numbers she v/u.

## 2025-02-27 NOTE — TELEPHONE ENCOUNTER
Prescription Refill     Medication Name:  ZANAFLEX  Pharmacy: [facility]   Patient Contact Number:  [phone]

## 2025-03-03 ENCOUNTER — HOSPITAL ENCOUNTER (OUTPATIENT)
Dept: PHYSICAL THERAPY | Age: 70
Setting detail: THERAPIES SERIES
Discharge: HOME | End: 2025-03-03
Attending: ORTHOPAEDIC SURGERY
Payer: MEDICARE

## 2025-03-03 PROCEDURE — 97110 THERAPEUTIC EXERCISES: CPT

## 2025-03-03 PROCEDURE — 97112 NEUROMUSCULAR REEDUCATION: CPT

## 2025-03-03 NOTE — FLOWSHEET NOTE
[facility] - Outpatient Rehabilitation and Therapy: [address], [address], [place] [address] office: [phone] fax: [phone]      Physical Therapy: TREATMENT/PROGRESS NOTE   Patient: Breanna Maxwell (69 y.o. female)   Examination Date: 2025   :  1955 MRN: [id number]   Visit #: 4   Insurance Allowable Auth Needed   [place] PC[place]   []Yes    []No    Insurance: Payor: [facility] MEDICARE / Plan: UNITEDHEALTHCARE DUAL COMPLETE / Product Type: *No Product type* /   Insurance ID: [id number] - (Medicare Managed)  Secondary Insurance (if applicable):    Treatment Diagnosis:     ICD-10-CM    1. Pain in joint of left knee  M25.562       2. Decreased strength of lower extremity  R29.898       3. Gait abnormality  R26.9       4. Poor balance  R26.89       5. Decreased ROM of left knee  M25.662       6. Decreased functional mobility  R26.89          Medical Diagnosis:  Status post total left knee replacement [Z96.652]   Referring Physician: Hayden Sagastume MD  PCP: Ginny Costello MD     Plan of care signed (Y/N):     Date of Patient follow up with Physician:      Plan of Care Report: NO  POC update due: (10 visits /OR AUTH LIMITS, whichever is less)  3/14/2025                                             Medical History:  Comorbidities:    Relevant Medical History:   Medical History    Diagnosis Date Comment Source   Arthritis      CAD (coronary artery disease)  \"blocked artery\"  stents placed    Diabetes mellitus (HCC)      Hyperlipidemia      Hypertension         Other Medical History    Diagnosis Date Comment Source   Constipation  due to slow movement through bowels    Depression      GERD (gastroesophageal reflux disease)      History of coronary artery stent placement      Neuropathy      Pacemaker      Vertigo      Vitamin D deficiency, unspecified      Wears glasses                                                  Precautions/ Contra-indications:           Latex allergy:

## 2025-03-05 ENCOUNTER — HOSPITAL ENCOUNTER (OUTPATIENT)
Dept: PHYSICAL THERAPY | Age: 70
Setting detail: THERAPIES SERIES
Discharge: HOME | End: 2025-03-05
Attending: ORTHOPAEDIC SURGERY
Payer: MEDICARE

## 2025-03-05 PROCEDURE — 97112 NEUROMUSCULAR REEDUCATION: CPT

## 2025-03-05 PROCEDURE — 97110 THERAPEUTIC EXERCISES: CPT

## 2025-03-05 PROCEDURE — 97140 MANUAL THERAPY 1/> REGIONS: CPT

## 2025-03-05 NOTE — FLOWSHEET NOTE
[facility] - Outpatient Rehabilitation and Therapy: [address], [address], [place] [address] office: [phone] fax: [phone]      Physical Therapy: TREATMENT/PROGRESS NOTE   Patient: Breanna Maxwell (69 y.o. female)   Examination Date: 2025   :  1955 MRN: [id number]   Visit #: 5   Insurance Allowable Auth Needed   [place]   []Yes    []No    Insurance: Payor: [facility] MEDICARE / Plan: UNITEDHEALTHCARE DUAL COMPLETE / Product Type: *No Product type* /   Insurance ID: [id number] - (Medicare Managed)  Secondary Insurance (if applicable):    Treatment Diagnosis:     ICD-10-CM    1. Pain in joint of left knee  M25.562       2. Decreased strength of lower extremity  R29.898       3. Gait abnormality  R26.9       4. Poor balance  R26.89       5. Decreased ROM of left knee  M25.662       6. Decreased functional mobility  R26.89          Medical Diagnosis:  Status post total left knee replacement [Z96.652]   Referring Physician: Hayden Sagastume MD  PCP: Ginny Costello MD     Plan of care signed (Y/N):     Date of Patient follow up with Physician:      Plan of Care Report: NO  POC update due: (10 visits /OR AUTH LIMITS, whichever is less)  3/14/2025                                             Medical History:  Comorbidities:    Relevant Medical History:   Medical History    Diagnosis Date Comment Source   Arthritis      CAD (coronary artery disease)  \"blocked artery\"  stents placed    Diabetes mellitus (HCC)      Hyperlipidemia      Hypertension         Other Medical History    Diagnosis Date Comment Source   Constipation  due to slow movement through bowels    Depression      GERD (gastroesophageal reflux disease)      History of coronary artery stent placement      Neuropathy      Pacemaker      Vertigo      Vitamin D deficiency, unspecified      Wears glasses                                                  Precautions/ Contra-indications:           Latex allergy:

## 2025-03-06 ENCOUNTER — OFFICE VISIT (OUTPATIENT)
Dept: ORTHOPEDIC SURGERY | Age: 70
End: 2025-03-06

## 2025-03-06 VITALS — BODY MASS INDEX: 30.41 KG/M2 | HEIGHT: 61 IN

## 2025-03-06 DIAGNOSIS — Z96.652 H/O TOTAL KNEE REPLACEMENT, LEFT: Primary | ICD-10-CM

## 2025-03-06 PROCEDURE — 99024 POSTOP FOLLOW-UP VISIT: CPT | Performed by: PHYSICIAN ASSISTANT

## 2025-03-06 NOTE — TELEPHONE ENCOUNTER
Prescription Refill     Medication Name:  ZANAFLEX   Pharmacy: [facility] [address] [place] [address] [place] [address]  Patient Contact Number:  [phone]

## 2025-03-06 NOTE — PROGRESS NOTES
This dictation was done with [employer] dictation and may contain mechanical errors related to translation.  Height 1.549 m (5' 1\").    This is a very pleasant 69-year-old female who is working hard after left total knee replacement on 1/22/2025.  She is 0 to 100 degrees of motion still is working her quad strength but the swelling is down and her pain score is only a 2 out of 10.    At today's visit her incisions healing nicely no signs of infection no neurologic deficits to the left knee.    My impression is stable healing left total knee replacement around 6 weeks postop we will have her follow-up in another 5 weeks and further droll based on how she is doing at that time

## 2025-03-10 ENCOUNTER — HOSPITAL ENCOUNTER (OUTPATIENT)
Dept: PHYSICAL THERAPY | Age: 70
Setting detail: THERAPIES SERIES
Discharge: HOME | End: 2025-03-10
Attending: ORTHOPAEDIC SURGERY
Payer: MEDICARE

## 2025-03-10 DIAGNOSIS — Z96.652 H/O TOTAL KNEE REPLACEMENT, LEFT: ICD-10-CM

## 2025-03-10 PROCEDURE — 97110 THERAPEUTIC EXERCISES: CPT

## 2025-03-10 PROCEDURE — 97112 NEUROMUSCULAR REEDUCATION: CPT

## 2025-03-10 RX ORDER — TRAMADOL HYDROCHLORIDE 50 MG/1
50 TABLET ORAL EVERY 6 HOURS PRN
Qty: 28 TABLET | Refills: 0 | Status: SHIPPED | OUTPATIENT
Start: 2025-03-10 | End: 2025-03-17

## 2025-03-10 NOTE — TELEPHONE ENCOUNTER
Prescription Refill     Medication Name:  TRAMADOL  Pharmacy: [id number] [place] [id number] - [place] [phone] - F [phone]   Patient Contact Number:  [phone]      Doxepin Counseling:  Patient advised that the medication is sedating and not to drive a car after taking this medication. Patient informed of potential adverse effects including but not limited to dry mouth, urinary retention, and blurry vision.  The patient verbalized understanding of the proper use and possible adverse effects of doxepin.  All of the patient's questions and concerns were addressed.

## 2025-03-10 NOTE — FLOWSHEET NOTE
swelling, pain control for  0 minutes.  Relevant ICD-10 R22.4 Localized swelling of lower limb.   Girth Left Right Post Vaso   Knee: Midpatella      Knee: 5 cm above 44.5 41.5 44.3   Knee: 15 cm above      Ankle: transmalleolar      Ankle: figure 8              Education/Home Exercise Program: Patient HEP program created electronically.  Refer to [employer] access code:    Access Code: [email or web address]  URL: [email or web address]  Date: 02/12/2025  Prepared by: Ana Laura Welch    Exercises  - Supine Active Straight Leg Raise  - 1 x daily - 7 x weekly - 2 sets - 10 reps  - Sidelying Hip Abduction  - 1 x daily - 7 x weekly - 2 sets - 10 reps  - Supine Heel Slide with Strap  - 1 x daily - 7 x weekly - 3 sets - 10 reps - 5'' hold  - Long Sitting Quad Set with Towel Roll Under Heel  - 1 x daily - 7 x weekly - 2 sets - 10 reps - 10'' hold  - Seated Hamstring Stretch  - 1 x daily - 7 x weekly - 3 sets - 30'' hold  - Seated Long Arc Quad  - 1 x daily - 7 x weekly - 3 sets - 10 reps - 5'' hold    ASSESSMENT   Assessment:   Breanna Maxwell is a 69 y.o. female presenting today to Outpatient PT with signs and symptoms consistent with L TKA.    Pt. presents with the functional impairments and activity limitations listed below and would benefit from Outpatient PT to address the below impairments as well as improve pain, and restore function.     Today's Assessment: Patient had fair  tolerance to today's session, reporting improved ROM, muscular fatigue, increased soreness, challenge, and difficulty with program completed. Able to progress  intensity, resistance, and exercise difficulty on Quad strengthening and knee flex/ext ROM . Continues to display deficits in tightness, stiffness, weakness, decreased dynamic stabilty, decreased NM control, and decreased balance control which required ongoing skilled physical therapy and decision making.  Pt now able to perform full revolutions on recumbent bike. Able to incorporate resisted knee

## 2025-03-12 ENCOUNTER — HOSPITAL ENCOUNTER (OUTPATIENT)
Dept: PHYSICAL THERAPY | Age: 70
Setting detail: THERAPIES SERIES
Discharge: HOME | End: 2025-03-12
Attending: ORTHOPAEDIC SURGERY
Payer: MEDICARE

## 2025-03-12 PROCEDURE — 97110 THERAPEUTIC EXERCISES: CPT

## 2025-03-12 PROCEDURE — 97112 NEUROMUSCULAR REEDUCATION: CPT

## 2025-03-12 NOTE — FLOWSHEET NOTE
(140) 90** 110        Ext (0) Lacking 12** Lacking 1         ANKLE DF (20)          PF (50)          Inversion (30)          Eversion (20)             MMT L MMT R Notes       HIP  Flexion 4/5 4/5      Abduction        ER        IR        Extension      KNEE  Flexion 4-/5 4+/5      Extension 4-/5 4+/5      ANKLE  DF 5/5 5/5      PF        Inversion        Eversion        Repeated Movements: [] Normal  [] Abnormal [] N/A    Palpation:   Patient reported tenderness with palpation  Location:Medial/lateral TFJ line, patella, proximal gastroc    Posture:   decreased WB on L    Bandages/Dressings/Incisions:  Patients wound/incision appears to be healing as expected  Patients wound/incision appears clean, dry and intact    Dermatomes: Abnormal findings listed below  All WNL    Myotomes: Abnormal findings listed below  NT    Reflexes: Abnormal findings listed below  Not Tested    Specific Joint Mobility Testing/Accessory Motions:      Knee: hypomobile on R on L PFJ Tib fem     Special Tests:  N/A    Gait:    Pattern: antalgic pattern, decreased reta, Increased ANTALIE, decreased step length on left, and decreased stance time on left  Assistive Device Used: Rolling walker (RW)    Balance:  [x] WNL      [] NT       [] Dysfunction noted  Comment:     Falls Risk Assessment (30 days):   Falls Risk assessed and no intervention required.  Time Up and Go (TUG):   Not Assessed        Exercises/Interventions      Flexion Extension   Eval 2/12 90    2/17 104 with strap** Lacking 5**   2/19 102 with strap**    3/3 109 at steps Lacking 3-4**   3/5 110 at steps Lacking 5**   3/10 113 at steps Lacking 3-4**                   Therapeutic Ex (33904)  resistance Sets/time Reps Notes/Cues/Progressions   Recumbent bike  5 min  Half rotations to full rotations   Heel slides      Gastroc stretch at IB     HS stretch at steps     Knee flexion at steps  10'' 10    Quantum ext #10 SL 2 10 Up with 2, down with 1   Quantum HS curls #10 SL 2 10    Leg

## 2025-03-17 ENCOUNTER — HOSPITAL ENCOUNTER (OUTPATIENT)
Dept: PHYSICAL THERAPY | Age: 70
Setting detail: THERAPIES SERIES
Discharge: HOME | End: 2025-03-17
Attending: ORTHOPAEDIC SURGERY
Payer: MEDICARE

## 2025-03-17 PROCEDURE — 97112 NEUROMUSCULAR REEDUCATION: CPT

## 2025-03-17 PROCEDURE — 97110 THERAPEUTIC EXERCISES: CPT

## 2025-03-17 NOTE — FLOWSHEET NOTE
patient does not return for scheduled/recommended follow up visits, this note will serve as a discharge from care along with the most recent update on progress.    Ortho Evaluation

## 2025-03-19 ENCOUNTER — HOSPITAL ENCOUNTER (OUTPATIENT)
Dept: PHYSICAL THERAPY | Age: 70
Setting detail: THERAPIES SERIES
Discharge: HOME | End: 2025-03-19
Attending: ORTHOPAEDIC SURGERY
Payer: MEDICARE

## 2025-03-19 PROCEDURE — 97110 THERAPEUTIC EXERCISES: CPT

## 2025-03-19 PROCEDURE — 97112 NEUROMUSCULAR REEDUCATION: CPT

## 2025-03-19 NOTE — FLOWSHEET NOTE
SB (25)          Rotation (30)             HIP Flex (120)          Abd (45)          ER (50)          IR (45)          Ext (20)         KNEE Flex (140) 90** 110        Ext (0) Lacking 12** Lacking 1         ANKLE DF (20)          PF (50)          Inversion (30)          Eversion (20)             MMT L MMT R Notes       HIP  Flexion 4/5 4/5      Abduction        ER        IR        Extension      KNEE  Flexion 4-/5 4+/5      Extension 4-/5 4+/5      ANKLE  DF 5/5 5/5      PF        Inversion        Eversion        Repeated Movements: [] Normal  [] Abnormal [] N/A    Palpation:   Patient reported tenderness with palpation  Location:Medial/lateral TFJ line, patella, proximal gastroc    Posture:   decreased WB on L    Bandages/Dressings/Incisions:  Patients wound/incision appears to be healing as expected  Patients wound/incision appears clean, dry and intact    Dermatomes: Abnormal findings listed below  All WNL    Myotomes: Abnormal findings listed below  NT    Reflexes: Abnormal findings listed below  Not Tested    Specific Joint Mobility Testing/Accessory Motions:      Knee: hypomobile on R on L PFJ Tib fem     Special Tests:  N/A    Gait:    Pattern: antalgic pattern, decreased reta, Increased NATALIE, decreased step length on left, and decreased stance time on left  Assistive Device Used: Rolling walker (RW)    Balance:  [x] WNL      [] NT       [] Dysfunction noted  Comment:     Falls Risk Assessment (30 days):   Falls Risk assessed and no intervention required.  Time Up and Go (TUG):   Not Assessed        Exercises/Interventions      Flexion Extension   Eval 2/12 90    2/17 104 with strap** Lacking 5**   2/19 102 with strap**    3/3 109 at steps Lacking 3-4**   3/5 110 at steps Lacking 5**   3/10 113 at steps Lacking 3-4**   3/19 112 at steps                         Therapeutic Ex (09629)  resistance Sets/time Reps Notes/Cues/Progressions   Recumbent bike  5 min  Full rotations   Heel slides      Gastroc

## 2025-03-20 ENCOUNTER — CLINICAL SUPPORT (OUTPATIENT)
Dept: CARDIOLOGY CLINIC | Age: 70
End: 2025-03-20

## 2025-03-20 ENCOUNTER — OFFICE VISIT (OUTPATIENT)
Dept: CARDIOLOGY CLINIC | Age: 70
End: 2025-03-20

## 2025-03-20 VITALS
OXYGEN SATURATION: 99 % | HEART RATE: 51 BPM | SYSTOLIC BLOOD PRESSURE: 138 MMHG | DIASTOLIC BLOOD PRESSURE: 60 MMHG | BODY MASS INDEX: 28.7 KG/M2 | HEIGHT: 61 IN | WEIGHT: 152 LBS

## 2025-03-20 DIAGNOSIS — I50.32 CHRONIC HEART FAILURE WITH PRESERVED EJECTION FRACTION (HCC): Primary | ICD-10-CM

## 2025-03-20 DIAGNOSIS — I25.10 CORONARY ARTERY DISEASE INVOLVING NATIVE CORONARY ARTERY OF NATIVE HEART WITHOUT ANGINA PECTORIS: ICD-10-CM

## 2025-03-20 DIAGNOSIS — Z95.0 CARDIAC PACEMAKER IN SITU: ICD-10-CM

## 2025-03-20 DIAGNOSIS — I44.30 ATRIOVENTRICULAR BLOCK: ICD-10-CM

## 2025-03-20 DIAGNOSIS — I10 ESSENTIAL HYPERTENSION: ICD-10-CM

## 2025-03-20 NOTE — PROGRESS NOTES
[facility]   Electrophysiology Follow up     Date: 3/20/2025  I had the privilege of visiting Breanna Maxwell in the office.       CC: Follow up pacemaker.     HPI: Breanna Maxwell is a 69 y.o. female with history of uncontrolled hypertension, coronary artery disease, hyperlipidemia, diabetes, high grade AV block, s/p dual chamber pacemaker 12/12/2024    Patient presents to the office today as follow-up for the management of pacemaker.  She had experienced lightheadedness, dizziness and fatigue prior to device implantation, resolution of symptoms following pacemaker.    Review of System:  [x] Full ROS obtained and negative except as mentioned in HPI      Prior to Admission medications    Medication Sig Start Date End Date Taking? Authorizing Provider   tiZANidine (ZANAFLEX) 4 MG tablet Take 1 tablet by mouth 3 times daily as needed (per spasms) 3/6/25  Yes Hayden Sagastume MD   acetaminophen (TYLENOL) 500 MG tablet Take 1 or 2 tablets by mouth every 6 to 8 hours as needed for pain and or fever 1/31/25  Yes Ryan Harvey PA-C   Omega-3 Fatty Acids (FISH OIL) 1000 MG capsule Take 2 capsules by mouth daily Hold for 14 days after knee surgery 1/23/25  Yes Amira Hardwick, APRN - CNP   aspirin 81 MG EC tablet Take 1 tablet by mouth daily HOLD while on Eliquis 1/22/25  Yes Amira Hardwick, APRN - CNP   ondansetron (ZOFRAN) 4 MG tablet Take 1 tablet by mouth 3 times daily as needed for Nausea or Vomiting 12/17/24  Yes Satish Krishna DO   carvedilol (COREG) 25 MG tablet Take 1 tablet by mouth 2 times daily (with meals) 12/15/24  Yes Mitul Benton MD   empagliflozin (JARDIANCE) 10 MG tablet Take 1 tablet by mouth daily 12/16/24  Yes Mitul Benton MD   Insulin Degludec 100 UNIT/ML SOPN Inject 10 Units into the skin nightly 11/25/24  Yes ProviderAmanda MD   furosemide (LASIX) 20 MG tablet Take 1 tablet by mouth daily as needed (Leg swelling, weight gain) 12/15/24  Yes Mitul Benton

## 2025-03-24 ENCOUNTER — HOSPITAL ENCOUNTER (OUTPATIENT)
Dept: PHYSICAL THERAPY | Age: 70
Setting detail: THERAPIES SERIES
Discharge: HOME | End: 2025-03-24
Attending: ORTHOPAEDIC SURGERY
Payer: MEDICARE

## 2025-03-24 PROCEDURE — 97112 NEUROMUSCULAR REEDUCATION: CPT

## 2025-03-24 PROCEDURE — 97110 THERAPEUTIC EXERCISES: CPT

## 2025-03-24 NOTE — FLOWSHEET NOTE
note have been templated and/or copied from initial evaluation, reassessments and prior notes for documentation efficiency.    Note: If patient does not return for scheduled/recommended follow up visits, this note will serve as a discharge from care along with the most recent update on progress.    Ortho Evaluation

## 2025-03-26 ENCOUNTER — HOSPITAL ENCOUNTER (OUTPATIENT)
Dept: PHYSICAL THERAPY | Age: 70
Setting detail: THERAPIES SERIES
Discharge: HOME | End: 2025-03-26
Attending: ORTHOPAEDIC SURGERY
Payer: MEDICARE

## 2025-03-26 PROCEDURE — 97112 NEUROMUSCULAR REEDUCATION: CPT

## 2025-03-26 PROCEDURE — 97110 THERAPEUTIC EXERCISES: CPT

## 2025-03-26 NOTE — FLOWSHEET NOTE
[facility] - Outpatient Rehabilitation and Therapy: [address], [address], [place] [address] office: [phone] fax: [phone]      Physical Therapy: TREATMENT/PROGRESS NOTE   Patient: Breanna Maxwell (69 y.o. female)   Examination Date: 2025   :  1955 MRN: [id number]   Visit #: 11   Insurance Allowable Auth Needed   [place]   []Yes    []No    Insurance: Payor: [facility] MEDICARE / Plan: UNITEDHEALTHCARE DUAL COMPLETE / Product Type: *No Product type* /   Insurance ID: [id number] - (Medicare Managed)  Secondary Insurance (if applicable):    Treatment Diagnosis:     ICD-10-CM    1. Pain in joint of left knee  M25.562       2. Decreased strength of lower extremity  R29.898       3. Gait abnormality  R26.9       4. Poor balance  R26.89       5. Decreased ROM of left knee  M25.662       6. Decreased functional mobility  R26.89          Medical Diagnosis:  Status post total left knee replacement [Z96.652]   Referring Physician: Hayden Sagastume MD  PCP: Ginny Costello MD     Plan of care signed (Y/N):     Date of Patient follow up with Physician:      Plan of Care Report: NO  POC update due: (10 visits /OR AUTH LIMITS, whichever is less)  2025                                             Medical History:  Comorbidities:    Relevant Medical History:   Medical History    Diagnosis Date Comment Source   Arthritis      CAD (coronary artery disease)  \"blocked artery\"  stents placed    Diabetes mellitus (HCC)      Hyperlipidemia      Hypertension         Other Medical History    Diagnosis Date Comment Source   Constipation  due to slow movement through bowels    Depression      GERD (gastroesophageal reflux disease)      History of coronary artery stent placement      Neuropathy      Pacemaker      Vertigo      Vitamin D deficiency, unspecified      Wears glasses                                                  Precautions/ Contra-indications:           Latex allergy:

## 2025-03-31 ENCOUNTER — HOSPITAL ENCOUNTER (OUTPATIENT)
Dept: PHYSICAL THERAPY | Age: 70
Setting detail: THERAPIES SERIES
End: 2025-03-31
Attending: ORTHOPAEDIC SURGERY
Payer: MEDICARE

## 2025-04-10 ENCOUNTER — OFFICE VISIT (OUTPATIENT)
Dept: ORTHOPEDIC SURGERY | Age: 70
End: 2025-04-10

## 2025-04-10 VITALS — HEIGHT: 61 IN | BODY MASS INDEX: 29.64 KG/M2 | WEIGHT: 157 LBS

## 2025-04-10 DIAGNOSIS — Z96.652 H/O TOTAL KNEE REPLACEMENT, LEFT: Primary | ICD-10-CM

## 2025-04-10 PROCEDURE — 99024 POSTOP FOLLOW-UP VISIT: CPT | Performed by: ORTHOPAEDIC SURGERY

## 2025-04-10 RX ORDER — DICLOFENAC SODIUM 100 %
POWDER (GRAM) MISCELLANEOUS
Qty: 240 G | Refills: 0 | Status: SHIPPED | OUTPATIENT
Start: 2025-04-10

## 2025-04-10 NOTE — PROGRESS NOTES
[facility]  Office Visit    Chief Complaint: Follow-up s/p left total knee arthroplasty    HPI:  Breanna Maxwell is a 69 y.o. who is here in follow-up of left total knee arthroplasty performed on January 22, 2025.  She reports minimal pain today.  She does have sensitivity around the incision and scar.  She walks without assistive device and is finished with formal physical therapy.    Exam:  Appearance: sitting in exam room chair, appears to be in no acute distress, awake and alert  Resp: unlabored breathing on room air  Skin: warm, dry and intact with out erythema or significant increased temperature  Neuro: grossly intact both lower extremities. Intact sensation to light touch. Motor exam 4+ to 5/5 in all major motor groups.  Left knee: Incision is healed.  Active knee range of motion is 0 to 125 degrees.  Sensation is intact light touch.  There is brisk capillary refill. There is 5/5 muscle strength in all muscle groups.    Imaging:  3 views of the left knee were performed and reviewed today. Significant for total knee arthroplasty prosthesis in place with no signs of osteolysis, loosening, fracture, or dislocation.    Assessment:  S/p left total knee arthroplasty    Plan:  She is recovering well postoperatively.  She will continue self-directed physical therapy exercises.  I recommended continue scar massage and use of a cream containing gabapentin to help with the sensitivity around her incision.  She will follow-up here on an annual basis for repeat assessment and radiographs of both knees in January 2026 or sooner if needed.    This dictation was done with [employer] dictation and may contain mechanical errors related to translation.

## 2025-08-04 ENCOUNTER — TELEPHONE (OUTPATIENT)
Dept: CARDIOLOGY CLINIC | Age: 70
End: 2025-08-04

## (undated) DEVICE — BASIC SINGLE BASIN 1-LF: Brand: MEDLINE INDUSTRIES, INC.

## (undated) DEVICE — TOTAL KNEE: Brand: MEDLINE INDUSTRIES, INC.

## (undated) DEVICE — TRANSFER SET 3": Brand: MEDLINE INDUSTRIES, INC.

## (undated) DEVICE — BLADE SURG SAW S STL NAR OSC W/ SERR EDGE DISP

## (undated) DEVICE — TOWEL,OR,DSP,ST,BLUE,STD,4/PK,20PK/CS: Brand: MEDLINE

## (undated) DEVICE — 1010 S-DRAPE TOWEL DRAPE 10/BX: Brand: STERI-DRAPE™

## (undated) DEVICE — SOLUTION PREP PAINT POV IOD FOR SKIN MUCOUS MEM

## (undated) DEVICE — SUTURE ABSORBABLE MONOFILAMENT 1 OS-8 36 CM 40 MM VIO PDS +

## (undated) DEVICE — CATHETER GUID 7FR L43CM ID1.8MM RIGHTSITE

## (undated) DEVICE — SYSTEM SKIN CLOSURE 42CM DERMABOND PRINEO

## (undated) DEVICE — ADHESIVE SKIN CLOSURE XL 42 CM 2.7 CC MESH LIQUIBAND SECUR

## (undated) DEVICE — SUTURE VICRYL + SZ 1 L18IN ABSRB UD L36MM CT-1 1/2 CIR VCP841D

## (undated) DEVICE — SUTURE STRATAFIX SPRL SZ 2 0 L14IN ABSRB UD MH L36MM 1 2 CIR SXMP2B401

## (undated) DEVICE — PIN BNE FIX L110MM DIA32MM

## (undated) DEVICE — ELECTRODE PT RET AD L9FT HI MOIST COND ADH HYDRGEL CORDED

## (undated) DEVICE — HYPODERMIC SAFETY NEEDLE: Brand: MONOJECT

## (undated) DEVICE — DUAL CUT SAGITTAL BLADE

## (undated) DEVICE — PIN BNE FIX TEMP L140MM DIA4MM MAKO

## (undated) DEVICE — SUTURE MONOCRYL STRATAFIX SPRL SZ 3-0 L12IN ABSRB UD FS-1 L30X30CM SXMP2B410

## (undated) DEVICE — GLOVE SURG SZ 8 L12IN FNGR THK79MIL GRN LTX FREE

## (undated) DEVICE — SOLUTION WND IRRIGATION 450 ML 0.5 PVP-I 0.9 NACL

## (undated) DEVICE — SYRINGE MED 30ML STD CLR PLAS LUERLOCK TIP N CTRL DISP

## (undated) DEVICE — KIT DRP FOR RIO ROBOTIC ARM ASST SYS (ORDER MUTLIPLES OF 10 EACH)

## (undated) DEVICE — CORD RETRCT SIL - ORDER MULTIPLES OF 10 EACH

## (undated) DEVICE — SOLUTION IV 1000ML 0.9% SOD CHL FOR IRRIG PLAS CONT

## (undated) DEVICE — GOWN SIRUS NONREIN XL W/TWL: Brand: MEDLINE INDUSTRIES, INC.

## (undated) DEVICE — GLOVE ORTHO 8   MSG9480

## (undated) DEVICE — 3M™ COBAN™ NL STERILE NON-LATEX SELF-ADHERENT WRAP, 2086S, 6 IN X 5 YD (15 CM X 4,5 M), 12 ROLLS/CASE: Brand: 3M™ COBAN™

## (undated) DEVICE — KIT INT FIX FEM TIB CKPT MAKOPLASTY

## (undated) DEVICE — INTRODUCER SHTH L13CM OD7FR SH ORNG HUB SEAMLESS SAFSHTH

## (undated) DEVICE — KIT TRK KNEE PROC VIZADISC

## (undated) DEVICE — SUTURE STRATAFIX SPRL SZ 2 0 L14IN ABSRB UD MH L36MM 1 2 CIR SXMD2B401

## (undated) DEVICE — PAD,NON-ADHERENT,3X8,STERILE,LF,1/PK: Brand: MEDLINE

## (undated) DEVICE — BANDAGE COMPR W6INXL15YD WHT BGE POLY COT WV E HK LOOP CLSR

## (undated) DEVICE — RADIFOCUS GLIDEWIRE: Brand: GLIDEWIRE

## (undated) DEVICE — SLITTER LD GUID ADJ DISP

## (undated) DEVICE — MATTRESS MAXI AIR TRNSF SGL PT USE DCS 37 45 48 52 75

## (undated) DEVICE — DRAPE,ORTHOMAX,EXTREMITY: Brand: MEDLINE

## (undated) DEVICE — PADDING CAST W6INXL4YD COT LO LINTING WYTEX

## (undated) DEVICE — KIT DRP FOR RIO ROBOTIC ARM ASST SYS

## (undated) DEVICE — MERCY HEALTH WEST TURNOVER: Brand: MEDLINE INDUSTRIES, INC.

## (undated) DEVICE — BOOT POS LEG DEMAYO

## (undated) DEVICE — INTRODUCER SHTH 0.018 IN 4 FRX40 CM KT SFT TIP NIT SS VSI